# Patient Record
Sex: FEMALE | Race: BLACK OR AFRICAN AMERICAN | Employment: OTHER | URBAN - METROPOLITAN AREA
[De-identification: names, ages, dates, MRNs, and addresses within clinical notes are randomized per-mention and may not be internally consistent; named-entity substitution may affect disease eponyms.]

---

## 2017-01-03 NOTE — TELEPHONE ENCOUNTER
Last Visit: 12/15/2016 with MATA Rodriguez    Next Appointment: 01/26/2017 with MD Geri Moreno   Previous Refill Encounters: 12/15/2016 per MATA Rodriguez #[id number]     Requested Prescriptions     Pending Prescriptions Disp Refills    HYDROcodone-acetaminophen (NORCO)  mg tablet 60 Tab 0     Sig: Take 1 Tab by mouth every eight (8) hours as needed for Pain. Max Daily Amount: 3 Tabs.

## 2017-01-04 ENCOUNTER — HOSPITAL ENCOUNTER (EMERGENCY)
Age: 67
Discharge: HOME | End: 2017-01-04
Attending: EMERGENCY MEDICINE | Admitting: EMERGENCY MEDICINE
Payer: MEDICARE

## 2017-01-04 ENCOUNTER — APPOINTMENT (OUTPATIENT)
Dept: GENERAL RADIOLOGY | Age: 67
End: 2017-01-04
Attending: EMERGENCY MEDICINE
Payer: MEDICARE

## 2017-01-04 VITALS
RESPIRATION RATE: 24 BRPM | HEART RATE: 108 BPM | HEIGHT: 65 IN | BODY MASS INDEX: 31.65 KG/M2 | OXYGEN SATURATION: 95 % | TEMPERATURE: 99.5 F | WEIGHT: 190 LBS | SYSTOLIC BLOOD PRESSURE: 144 MMHG | DIASTOLIC BLOOD PRESSURE: 73 MMHG

## 2017-01-04 DIAGNOSIS — J45.21 MILD INTERMITTENT ASTHMA WITH ACUTE EXACERBATION: Primary | ICD-10-CM

## 2017-01-04 PROCEDURE — 94640 AIRWAY INHALATION TREATMENT: CPT

## 2017-01-04 PROCEDURE — 71020 XR CHEST PA LAT: CPT

## 2017-01-04 PROCEDURE — 99283 EMERGENCY DEPT VISIT LOW MDM: CPT

## 2017-01-04 PROCEDURE — 96372 THER/PROPH/DIAG INJ SC/IM: CPT

## 2017-01-04 PROCEDURE — 77030013140 HC MSK NEB VYRM -A

## 2017-01-04 PROCEDURE — 74011000250 HC RX REV CODE- 250: Performed by: EMERGENCY MEDICINE

## 2017-01-04 PROCEDURE — 74011250636 HC RX REV CODE- 250/636: Performed by: EMERGENCY MEDICINE

## 2017-01-04 PROCEDURE — 74011250637 HC RX REV CODE- 250/637: Performed by: EMERGENCY MEDICINE

## 2017-01-04 RX ORDER — AZITHROMYCIN 250 MG/1
TABLET, FILM COATED ORAL
Qty: 4 TAB | Refills: 0 | Status: SHIPPED | OUTPATIENT
Start: 2017-01-04 | End: 2017-01-09

## 2017-01-04 RX ORDER — PREDNISONE 50 MG/1
50 TABLET ORAL DAILY
Qty: 5 TAB | Refills: 0 | Status: SHIPPED | OUTPATIENT
Start: 2017-01-04 | End: 2017-01-09

## 2017-01-04 RX ORDER — AZITHROMYCIN 250 MG/1
500 TABLET, FILM COATED ORAL
Status: COMPLETED | OUTPATIENT
Start: 2017-01-04 | End: 2017-01-04

## 2017-01-04 RX ORDER — HYDROCODONE BITARTRATE AND ACETAMINOPHEN 10; 325 MG/1; MG/1
1 TABLET ORAL
Qty: 60 TAB | Refills: 0 | Status: SHIPPED | OUTPATIENT
Start: 2017-01-04 | End: 2017-01-04

## 2017-01-04 RX ORDER — IPRATROPIUM BROMIDE AND ALBUTEROL SULFATE 2.5; .5 MG/3ML; MG/3ML
3 SOLUTION RESPIRATORY (INHALATION) ONCE
Status: COMPLETED | OUTPATIENT
Start: 2017-01-04 | End: 2017-01-04

## 2017-01-04 RX ORDER — IPRATROPIUM BROMIDE AND ALBUTEROL SULFATE 2.5; .5 MG/3ML; MG/3ML
3 SOLUTION RESPIRATORY (INHALATION)
Status: COMPLETED | OUTPATIENT
Start: 2017-01-04 | End: 2017-01-04

## 2017-01-04 RX ADMIN — IPRATROPIUM BROMIDE AND ALBUTEROL SULFATE 3 ML: .5; 3 SOLUTION RESPIRATORY (INHALATION) at 18:53

## 2017-01-04 RX ADMIN — METHYLPREDNISOLONE SODIUM SUCCINATE 125 MG: 125 INJECTION, POWDER, FOR SOLUTION INTRAMUSCULAR; INTRAVENOUS at 19:07

## 2017-01-04 RX ADMIN — IPRATROPIUM BROMIDE AND ALBUTEROL SULFATE 3 ML: .5; 3 SOLUTION RESPIRATORY (INHALATION) at 19:07

## 2017-01-04 RX ADMIN — AZITHROMYCIN 500 MG: 250 TABLET, FILM COATED ORAL at 19:42

## 2017-01-04 NOTE — ED TRIAGE NOTES
Pt presents to ER c/o difficulty breathing, cough productive of white sputum & no appetite x 3 days.

## 2017-01-04 NOTE — ED PROVIDER NOTES
HPI Comments: 6:18 PM female 73 y.o. with a history of breast cancer, HTN, DM, hypercholesteremia, asthma, COPD and GERD presenting to the ED complaining of dyspnea x 3 days. She also endorses productive cough with white sputum and 3-4 episodes of NV today. Also reports no appetite today. Denies fever, dysuria, hematuria or recent sick contacts. She states she used her duonebulizer x 2 today with no relief of symptoms. Pt denies the use of tobacco. No other associated symptoms or other concerns at this time. PCP: Isabel Segovia MD      Patient is a 73 y.o. female presenting with shortness of breath, cough, and arm pain. The history is provided by the patient. Shortness of Breath   Associated symptoms include cough. Pertinent negatives include no fever, no headaches, no sore throat, no neck pain, no chest pain, no vomiting, no abdominal pain and no rash. Cough   Associated symptoms include shortness of breath. Pertinent negatives include no chest pain, no chills, no headaches, no sore throat, no nausea and no vomiting. Arm Pain    Pertinent negatives include no back pain and no neck pain. Past Medical History:   Diagnosis Date    Anemia     Asthma     Blood disorder     Cancer ([facility] [address]      breast, diagnosed 2006 left    Cancer [facility])     Chest pain, unspecified      The working diagnosis is chest pain. The differential diagnosis includes chest wall pain, stable angina.  Chronic obstructive pulmonary disease (HCC)     Essential hypertension     Essential hypertension, benign      Uncontrolled     GERD (gastroesophageal reflux disease)     Hypercholesterolemia     Hypertension     Neuropathy     Nonspecific abnormal electrocardiogram (ECG) (EKG)     Obesity, unspecified      Weight loss has been strongly encouraged by following dietary restrictions and an exercise routine.     Polio      as a young child    Pre-operative cardiovascular examination     Sciatica     Unspecified cerebral artery occlusion with cerebral infarction [facility])        Past Surgical History:   Procedure Laterality Date    Pr breast surgery procedure unlisted       initial surgery 2006, then left modified radical mastectomy 6/2012    Hx breast biopsy  4/11/12     Left    Hx breast biopsy Left          Family History:   Problem Relation Age of Onset    Cancer Mother     Cancer Father     Arthritis-osteo Other     Hypertension Other     Heart Disease Neg Hx      Negative family history of premature CAD or CVA       Social History     Social History    Marital status:      Spouse name: N/A    Number of children: N/A    Years of education: N/A     Occupational History    Not on file. Social History Main Topics    Smoking status: Never Smoker    Smokeless tobacco: Never Used    Alcohol use No    Drug use: No    Sexual activity: Not on file     Other Topics Concern    Not on file     Social History Narrative    ** Merged History Encounter **              ALLERGIES: Review of patient's allergies indicates no known allergies. Review of Systems   Constitutional: Negative for chills and fever. HENT: Negative for congestion, nosebleeds and sore throat. Eyes: Negative for pain. Respiratory: Positive for cough and shortness of breath. Cardiovascular: Negative for chest pain and palpitations. Gastrointestinal: Negative for abdominal pain, constipation, nausea and vomiting. Genitourinary: Negative for dysuria and flank pain. Musculoskeletal: Negative for back pain and neck pain. Skin: Negative for color change and rash. Neurological: Negative for dizziness, weakness and headaches. All other systems reviewed and are negative.       Vitals:    01/04/17 1733 01/04/17 1816   BP: (!) 152/96    Pulse: (!) 110    Resp: 24    Temp: 98.4 °F (36.9 °C)    SpO2: 95% 95%   Weight: 86.2 kg (190 lb)    Height: 5' 5\" (1.651 m)             Physical Exam   Constitutional: She appears well-developed and well-nourished. Non-toxic appearance. She does not have a sickly appearance. She does not appear ill. No distress. HENT:   Head: Normocephalic and atraumatic. Mouth/Throat: Oropharynx is clear and moist. No oropharyngeal exudate. Eyes: Conjunctivae and EOM are normal. Pupils are equal, round, and reactive to light. No scleral icterus. Neck: Normal range of motion. Neck supple. No hepatojugular reflux and no JVD present. No tracheal deviation present. No thyromegaly present. Cardiovascular: Regular rhythm, S1 normal, S2 normal, normal heart sounds, intact distal pulses and normal pulses. Tachycardia present. Exam reveals no gallop, no S3 and no S4. No murmur heard. Pulses:       Radial pulses are 2+ on the right side, and 2+ on the left side. Dorsalis pedis pulses are 2+ on the right side, and 2+ on the left side. Pulmonary/Chest: Accessory muscle usage present. Tachypnea noted. She is in respiratory distress. She has decreased breath sounds. She has wheezes in the right middle field, the right lower field, the left middle field and the left lower field. She has no rhonchi. She has no rales. Abdominal: Soft. Normal appearance and bowel sounds are normal. She exhibits no distension and no mass. There is no hepatosplenomegaly. There is no tenderness. There is no rigidity, no rebound, no guarding, no CVA tenderness, no tenderness at McBurney's point and negative Ayala's sign. Musculoskeletal: Normal range of motion. Strength 3/5 throughout    Lymphadenopathy:        Head (right side): No submental, no submandibular, no preauricular and no occipital adenopathy present. Head (left side): No submental, no submandibular, no preauricular and no occipital adenopathy present. She has no cervical adenopathy. Right: No supraclavicular adenopathy present. Left: No supraclavicular adenopathy present. Neurological: She is alert.  She has normal strength and normal reflexes. She is not disoriented. No cranial nerve deficit or sensory deficit. Coordination and gait normal. GCS eye subscore is 4. GCS verbal subscore is 5. GCS motor subscore is 6. Grossly intact    Skin: Skin is warm, dry and intact. No rash noted. She is not diaphoretic. Psychiatric: She has a normal mood and affect. Her speech is normal and behavior is normal. Judgment and thought content normal. Cognition and memory are normal.   Nursing note and vitals reviewed. MDM  Number of Diagnoses or Management Options  Mild intermittent asthma with acute exacerbation:   Diagnosis management comments: Shortness of breath etiologies include chronic obstructive pulmonary disease (COPD), acute asthma exacerbation, congestive heart failure, pneumonia, acute bronchitis, pulmonary embolism, upper respiratory infection, cardiac event to include acute coronary syndrome, acute myocardial infarction or a combination of the above (ex URI on top of COPD thus causing respiratory distress). ED Course       Procedures    Chest X-Ray showed atelectasis vs infiltrate on the right side. 7:26 PM 1/4/2017    I have reassessed the patient. Patient is feeling much better. Patient will be prescribed Azithromycin and Prednisone. Patient was discharged in stable condition. Patient is to contact Eunice Duran MD for follow up care. Patient is to return to emergency department if any new or worsening condition. 7:26 PM     Diagnosis:  1. Mild intermittent asthma with acute exacerbation      Disposition: Discharged    Scribe [address], [place] [address] for and in the presence of Sourav Michaels DO  01/04/17. Signed by: Tr Batista 01/04/17, 7:27 PM    Physician Attestation  I personally performed the services described in the documentation, reviewed the documentation, as recorded by the scribe in my presence, and it accurately and completely records my words and actions.     Sourav Michaels DO 01/04/17

## 2017-01-05 NOTE — DISCHARGE INSTRUCTIONS

## 2017-01-05 NOTE — ED NOTES
Report given to oncoming nurse. Patient tolerating breathing treatments fair. Family member at bedside. Patient does not want IVL for meds or lab work. Dr. Dorina Khan aware. CBC on hold at present.

## 2017-01-05 NOTE — ED NOTES
Pt discharge upon DR Ramos re- evaluation. Pt states she feels better than when she got here. Pt aware to return if not improving or condition continuous. Pt also oriented about medication prescribe and follow up.

## 2017-01-25 ENCOUNTER — HOSPITAL ENCOUNTER (OUTPATIENT)
Dept: MAMMOGRAPHY | Age: 67
Discharge: HOME | End: 2017-01-25
Attending: SURGERY
Payer: MEDICARE

## 2017-01-25 DIAGNOSIS — C50.919 RECURRENT BREAST CANCER, UNSPECIFIED LATERALITY (HCC): ICD-10-CM

## 2017-01-25 PROCEDURE — 77061 BREAST TOMOSYNTHESIS UNI: CPT

## 2017-01-26 ENCOUNTER — OFFICE VISIT (OUTPATIENT)
Dept: ORTHOPEDIC SURGERY | Age: 67
End: 2017-01-26

## 2017-01-26 VITALS
HEIGHT: 65 IN | HEART RATE: 103 BPM | DIASTOLIC BLOOD PRESSURE: 76 MMHG | SYSTOLIC BLOOD PRESSURE: 140 MMHG | WEIGHT: 199 LBS | BODY MASS INDEX: 33.15 KG/M2

## 2017-01-26 DIAGNOSIS — M25.561 RIGHT KNEE PAIN, UNSPECIFIED CHRONICITY: Primary | ICD-10-CM

## 2017-01-26 DIAGNOSIS — M17.11 PRIMARY OSTEOARTHRITIS OF RIGHT KNEE: ICD-10-CM

## 2017-01-26 RX ORDER — HYDROCODONE BITARTRATE AND ACETAMINOPHEN 7.5; 325 MG/1; MG/1
TABLET ORAL
Qty: 40 TAB | Refills: 0 | Status: SHIPPED | OUTPATIENT
Start: 2017-01-26 | End: 2017-01-31 | Stop reason: ALTCHOICE

## 2017-01-26 RX ORDER — DICLOFENAC SODIUM 10 MG/G
GEL TOPICAL EVERY 6 HOURS
Qty: 100 G | Refills: 4 | Status: SHIPPED | OUTPATIENT
Start: 2017-01-26 | End: 2019-07-31 | Stop reason: SDUPTHER

## 2017-01-26 NOTE — PATIENT INSTRUCTIONS
An MRI or CT has been ordered for you. A [employer] will be contacting you to schedule the appointment as soon as it has been approved with your insurance company. Please schedule an appointment to follow up with the doctor or the physicians assistant after the MRI or CT has been conducted. Magnetic Resonance Imaging (MRI): About This Test  What is it? Magnetic resonance imaging (MRI) is a test that uses a magnetic field and pulses of radio wave energy to make pictures of organs and structures inside the body. When you have an MRI, you lie on a table and your body is moved into the MRI machine, where an image is taken of the area of the body being studied. Why is this test done? You may have an MRI for many reasons. This test can find problems such as tumors, bleeding, injury, blood vessel disease, and infection. An MRI also may provide more information about a problem seen on an X-ray, ultrasound scan, CT scan, or nuclear medicine exam.  How can you prepare for the test?  Talk to your doctor about all your health conditions before the test. For example, tell your doctor if:  · You are allergic to any medicines. · You are or might be pregnant. · You have a pacemaker, an artificial limb, any metal pins or metal parts in your body, metal heart valves, metal clips in your brain, metal implants in your ears, or any other implanted or prosthetic medical device. · You have an intrauterine device (IUD) in place. · You get nervous in confined spaces. You may need medicine to help you relax. · You wear any patches that contain medicine. · You have kidney disease. What happens before the test?  · You will remove all metal objects from your body. These include hearing aids, dentures, jewelry, watches, and hairpins. · You will take off all or most of your clothes and then change into a gown. · If you do leave some clothes on, make sure you take everything out of your pockets.   · You may have contrast materials (dye) put into your arm through a tube called an IV. Contrast material helps doctors see specific organs, blood vessels, and most tumors. What happens during the test?  · You will lie on your back on a table that is part of the MRI scanner. · The table will slide into the space that contains the magnet. · Inside the scanner you will hear a fan and feel air moving. You may hear tapping, thumping, or snapping noises. You may be given earplugs or headphones to reduce the noise. · You will be asked to hold still during the scan. You may be asked to hold your breath for short periods. · You may be alone in the scanning room, but a technologist will be watching you through a window and talking with you during the test.  What else should you know about the test?  · An MRI does not hurt. · If a dye is used, you may feel a quick sting or pinch and some coolness when the IV is started. The dye may give you a metallic taste in your mouth. Some people feel sick to their stomach or get a headache. · If you breastfeed and are concerned about whether the dye used in this test is safe, talk to your doctor. Most experts believe that very little dye passes into breast milk and even less is passed on to the baby. But if you prefer, you can store some of your breast milk ahead of time and use it for a day or two after the test.  · You may feel warmth in the area being examined. This is normal.  How long does the test take? · The test usually takes 30 to 60 minutes but can take as long as 2 hours. What happens after the test?  · You will probably be able to go home right away, depending on the reason for the test.  Follow-up care is a key part of your treatment and safety. Be sure to make and go to all appointments, and call your doctor if you are having problems. It's also a good idea to keep a list of the medicines you take. Ask your doctor when you can expect to have your test results.   Where can you learn more?  Go to http://laura-jessie.info/. Enter V016 in the search box to learn more about \"Magnetic Resonance Imaging (MRI): About This Test.\"  Current as of: February 19, 2016  Content Version: 11.1  © 8269-2572 [employer]. Care instructions adapted under license by [employer] (which disclaims liability or warranty for this information). If you have questions about a medical condition or this instruction, always ask your healthcare professional. [place] [address] any warranty or liability for your use of this information. Knee Pain or Injury: Care Instructions  Your Care Instructions    Injuries are a common cause of knee problems. Sudden (acute) injuries may be caused by a direct blow to the knee. They can also be caused by abnormal twisting, bending, or falling on the knee. Pain, bruising, or swelling may be severe, and may start within minutes of the injury. Overuse is another cause of knee pain. Other causes are climbing stairs, kneeling, and other activities that use the knee. Everyday wear and tear, especially as you get older, also can cause knee pain. Rest, along with home treatment, often relieves pain and allows your knee to heal. If you have a serious knee injury, you may need tests and treatment. Follow-up care is a key part of your treatment and safety. Be sure to make and go to all appointments, and call your doctor if you are having problems. It's also a good idea to know your test results and keep a list of the medicines you take. How can you care for yourself at home? · Be safe with medicines. Read and follow all instructions on the label. ¨ If the doctor gave you a prescription medicine for pain, take it as prescribed. ¨ If you are not taking a prescription pain medicine, ask your doctor if you can take an over-the-counter medicine. · Rest and protect your knee. Take a break from any activity that may cause pain.   · Put ice or a cold pack on your knee for 10 to 20 minutes at a time. Put a thin cloth between the ice and your skin. · Prop up a sore knee on a pillow when you ice it or anytime you sit or lie down for the next 3 days. Try to keep it above the level of your heart. This will help reduce swelling. · If your knee is not swollen, you can put moist heat, a heating pad, or a warm cloth on your knee. · If your doctor recommends an elastic bandage, sleeve, or other type of support for your knee, wear it as directed. · Follow your doctor's instructions about how much weight you can put on your leg. Use a cane, crutches, or a walker as instructed. · Follow your doctor's instructions about activity during your healing process. If you can do mild exercise, slowly increase your activity. · Reach and stay at a healthy weight. Extra weight can strain the joints, especially the knees and hips, and make the pain worse. Losing even a few pounds may help. When should you call for help? Call 911 anytime you think you may need emergency care. For example, call if:  · You have symptoms of a blood clot in your lung (called a pulmonary embolism). These may include:  ¨ Sudden chest pain. ¨ Trouble breathing. ¨ Coughing up blood. Call your doctor now or seek immediate medical care if:  · You have severe or increasing pain. · Your leg or foot turns cold or changes color. · You cannot stand or put weight on your knee. · Your knee looks twisted or bent out of shape. · You cannot move your knee. · You have signs of infection, such as:  ¨ Increased pain, swelling, warmth, or redness. ¨ Red streaks leading from the knee. ¨ Pus draining from a place on your knee. ¨ A fever. · You have signs of a blood clot in your leg (called a deep vein thrombosis), such as:  ¨ Pain in your calf, back of the knee, thigh, or groin. ¨ Redness and swelling in your leg or groin.   Watch closely for changes in your health, and be sure to contact your doctor if:  · You have tingling, weakness, or numbness in your knee. · You have any new symptoms, such as swelling. · You have bruises from a knee injury that last longer than 2 weeks. · You do not get better as expected. Where can you learn more? Go to http://laura-jessie.info/. Enter K195 in the search box to learn more about \"Knee Pain or Injury: Care Instructions. \"  Current as of: May 27, 2016  Content Version: 11.1  © 4130-9766 [employer]. Care instructions adapted under license by [employer] (which disclaims liability or warranty for this information). If you have questions about a medical condition or this instruction, always ask your healthcare professional. [address] any warranty or liability for your use of this information.

## 2017-01-26 NOTE — MR AVS SNAPSHOT
Visit Information Date & Time Provider Department Dept. Phone Encounter #  
 1/26/2017  2:00 PM Jermain Borjas MD [facility] [id number] Your Appointments 1/31/2017  1:00 PM  
Follow Up with MD FREDIS [facility] ([address]) Appt Note: 2 month f/up [address] [phone] [address] [facility] Upcoming Health Maintenance Date Due Hepatitis C Screening 1950 DTaP/Tdap/Td series (1 - Tdap) 11/24/1972 ZOSTER VACCINE AGE 60> 11/24/2011 FOBT Q 1 YEAR AGE 50-75 6/9/2013 INFLUENZA AGE 9 TO ADULT 8/1/2016 GLAUCOMA SCREENING Q2Y 11/24/2016 OSTEOPOROSIS SCREENING (DEXA) 11/24/2016 Pneumococcal 65+ High/Highest Risk (1 of 2 - PCV13) 11/24/2016 MEDICARE YEARLY EXAM 11/24/2016 BREAST CANCER SCRN MAMMOGRAM 1/25/2019 Allergies as of 1/26/2017  Review Complete On: 1/26/2017 By: Jermain Borjas MD  
 No Known Allergies Current Immunizations  Never Reviewed No immunizations on file. Not reviewed this visit You Were Diagnosed With   
  
 Codes Comments Right knee pain, unspecified chronicity    -  Primary ICD-10-CM: M25.561 ICD-9-CM: 719.46 Primary osteoarthritis of right knee     ICD-10-CM: M17.11 ICD-9-CM: 715.16 Vitals BP Pulse Height(growth percentile) Weight(growth percentile) BMI OB Status 140/76 (!) 103 5' 5\" (1.651 m) 199 lb (90.3 kg) 33.12 kg/m2 Postmenopausal  
 Smoking Status Never Smoker Vitals History BMI and BSA Data Body Mass Index Body Surface Area  
 33.12 kg/m 2 2.04 m 2 Preferred Pharmacy Pharmacy Name Phone [id number] - [address] [phone] Your Updated Medication List  
  
   
 This list is accurate as of: 1/26/17  3:41 PM.  Always use your most recent med list.  
  
  
  
  
 Emile Custard HFA IN Take  by inhalation. amLODIPine 10 mg tablet Commonly known as:  Sofi Davis Take  by mouth daily. aspirin 81 mg tablet Take 81 mg by mouth. bethanechol 10 mg tablet Commonly known as:  URECHOLINE Take 10 mg by mouth Before breakfast, lunch, and dinner. desonide 0.05 % cream  
Commonly known as:  TRIDESILON  
  
 gabapentin 100 mg capsule Commonly known as:  NEURONTIN Take 2 Caps by mouth three (3) times daily. Indications: NEUROPATHIC PAIN, POSTOPERATIVE ACUTE PAIN  
  
 HYDROcodone-acetaminophen  mg tablet Commonly known as:  [place]esa Dorothy Take 1 Tab by mouth every eight (8) hours as needed for Pain. Max Daily Amount: 3 Tabs. lisinopril 20 mg tablet Commonly known as:  Brian Edman Take  by mouth daily. methocarbamol 500 mg tablet Commonly known as:  ROBAXIN Take 1 Tab by mouth four (4) times daily. naproxen 375 mg tablet Commonly known as:  NAPROSYN Take 1 Tab by mouth two (2) times daily (with meals). NexIUM 40 mg capsule Generic drug:  esomeprazole Take  by mouth daily. oxyCODONE-acetaminophen 7.5-325 mg per tablet Commonly known as:  PERCOCET 7.5 Take 1 Tab by mouth every four (4) hours as needed for Pain. Max Daily Amount: 6 Tabs. PROAIR HFA IN Take  by inhalation. SINGULAIR 10 mg tablet Generic drug:  montelukast  
Take 10 mg by mouth daily. SPIRIVA WITH HANDIHALER 18 mcg inhalation capsule Generic drug:  tiotropium Take 1 Cap by inhalation daily. triamterene-hydroCHLOROthiazide 37.5-25 mg per capsule Commonly known as:  DYAZIDE  
  
 VITAMIN D2 50,000 unit capsule Generic drug:  ergocalciferol Take 50,000 Units by mouth Every Thursday. We Performed the Following AMB POC XRAY, KNEE; COMPLETE, 4+ VIEW [27956 CPT(R)] Patient Instructions An MRI or CT has been ordered for you. A [employer] will be contacting you to schedule the appointment as soon as it has been approved with your insurance company. Please schedule an appointment to follow up with the doctor or the physicians assistant after the MRI or CT has been conducted. Magnetic Resonance Imaging (MRI): About This Test 
What is it? Magnetic resonance imaging (MRI) is a test that uses a magnetic field and pulses of radio wave energy to make pictures of organs and structures inside the body. When you have an MRI, you lie on a table and your body is moved into the MRI machine, where an image is taken of the area of the body being studied. Why is this test done? You may have an MRI for many reasons. This test can find problems such as tumors, bleeding, injury, blood vessel disease, and infection. An MRI also may provide more information about a problem seen on an X-ray, ultrasound scan, CT scan, or nuclear medicine exam. 
How can you prepare for the test? 
Talk to your doctor about all your health conditions before the test. For example, tell your doctor if: 
· You are allergic to any medicines. · You are or might be pregnant. · You have a pacemaker, an artificial limb, any metal pins or metal parts in your body, metal heart valves, metal clips in your brain, metal implants in your ears, or any other implanted or prosthetic medical device. · You have an intrauterine device (IUD) in place. · You get nervous in confined spaces. You may need medicine to help you relax. · You wear any patches that contain medicine. · You have kidney disease. What happens before the test? 
· You will remove all metal objects from your body. These include hearing aids, dentures, jewelry, watches, and hairpins. · You will take off all or most of your clothes and then change into a gown. · If you do leave some clothes on, make sure you take everything out of your pockets. · You may have contrast materials (dye) put into your arm through a tube called an IV. Contrast material helps doctors see specific organs, blood vessels, and most tumors. What happens during the test? 
· You will lie on your back on a table that is part of the MRI scanner. · The table will slide into the space that contains the magnet. · Inside the scanner you will hear a fan and feel air moving. You may hear tapping, thumping, or snapping noises. You may be given earplugs or headphones to reduce the noise. · You will be asked to hold still during the scan. You may be asked to hold your breath for short periods. · You may be alone in the scanning room, but a technologist will be watching you through a window and talking with you during the test. 
What else should you know about the test? 
· An MRI does not hurt. · If a dye is used, you may feel a quick sting or pinch and some coolness when the IV is started. The dye may give you a metallic taste in your mouth. Some people feel sick to their stomach or get a headache. · If you breastfeed and are concerned about whether the dye used in this test is safe, talk to your doctor. Most experts believe that very little dye passes into breast milk and even less is passed on to the baby. But if you prefer, you can store some of your breast milk ahead of time and use it for a day or two after the test. 
· You may feel warmth in the area being examined. This is normal. 
How long does the test take? · The test usually takes 30 to 60 minutes but can take as long as 2 hours. What happens after the test? 
· You will probably be able to go home right away, depending on the reason for the test. 
Follow-up care is a key part of your treatment and safety. Be sure to make and go to all appointments, and call your doctor if you are having problems. It's also a good idea to keep a list of the medicines you take. Ask your doctor when you can expect to have your test results. Where can you learn more? Go to http://laura-jessie.info/. Enter V016 in the search box to learn more about \"Magnetic Resonance Imaging (MRI): About This Test.\" Current as of: February 19, 2016 Content Version: 11.1 © 8396-9266 [employer]. Care instructions adapted under license by [employer] (which disclaims liability or warranty for this information). If you have questions about a medical condition or this instruction, always ask your healthcare professional. [place] [address] any warranty or liability for your use of this information. Knee Pain or Injury: Care Instructions Your Care Instructions Injuries are a common cause of knee problems. Sudden (acute) injuries may be caused by a direct blow to the knee. They can also be caused by abnormal twisting, bending, or falling on the knee. Pain, bruising, or swelling may be severe, and may start within minutes of the injury. Overuse is another cause of knee pain. Other causes are climbing stairs, kneeling, and other activities that use the knee. Everyday wear and tear, especially as you get older, also can cause knee pain. Rest, along with home treatment, often relieves pain and allows your knee to heal. If you have a serious knee injury, you may need tests and treatment. Follow-up care is a key part of your treatment and safety. Be sure to make and go to all appointments, and call your doctor if you are having problems. It's also a good idea to know your test results and keep a list of the medicines you take. How can you care for yourself at home? · Be safe with medicines. Read and follow all instructions on the label. ¨ If the doctor gave you a prescription medicine for pain, take it as prescribed. ¨ If you are not taking a prescription pain medicine, ask your doctor if you can take an over-the-counter medicine. · Rest and protect your knee.  Take a break from any activity that may cause pain. · Put ice or a cold pack on your knee for 10 to 20 minutes at a time. Put a thin cloth between the ice and your skin. · Prop up a sore knee on a pillow when you ice it or anytime you sit or lie down for the next 3 days. Try to keep it above the level of your heart. This will help reduce swelling. · If your knee is not swollen, you can put moist heat, a heating pad, or a warm cloth on your knee. · If your doctor recommends an elastic bandage, sleeve, or other type of support for your knee, wear it as directed. · Follow your doctor's instructions about how much weight you can put on your leg. Use a cane, crutches, or a walker as instructed. · Follow your doctor's instructions about activity during your healing process. If you can do mild exercise, slowly increase your activity. · Reach and stay at a healthy weight. Extra weight can strain the joints, especially the knees and hips, and make the pain worse. Losing even a few pounds may help. When should you call for help? Call 911 anytime you think you may need emergency care. For example, call if: 
· You have symptoms of a blood clot in your lung (called a pulmonary embolism). These may include: 
¨ Sudden chest pain. ¨ Trouble breathing. ¨ Coughing up blood. Call your doctor now or seek immediate medical care if: 
· You have severe or increasing pain. · Your leg or foot turns cold or changes color. · You cannot stand or put weight on your knee. · Your knee looks twisted or bent out of shape. · You cannot move your knee. · You have signs of infection, such as: 
¨ Increased pain, swelling, warmth, or redness. ¨ Red streaks leading from the knee. ¨ Pus draining from a place on your knee. ¨ A fever. · You have signs of a blood clot in your leg (called a deep vein thrombosis), such as: 
¨ Pain in your calf, back of the knee, thigh, or groin. ¨ Redness and swelling in your leg or groin. Watch closely for changes in your health, and be sure to contact your doctor if: 
· You have tingling, weakness, or numbness in your knee. · You have any new symptoms, such as swelling. · You have bruises from a knee injury that last longer than 2 weeks. · You do not get better as expected. Where can you learn more? Go to http://laura-jessie.info/. Enter K195 in the search box to learn more about \"Knee Pain or Injury: Care Instructions. \" Current as of: May 27, 2016 Content Version: 11.1 © 2015-9268 [employer]. Care instructions adapted under license by [employer] (which disclaims liability or warranty for this information). If you have questions about a medical condition or this instruction, always ask your healthcare professional. [address] any warranty or liability for your use of this information. Introducing [facility]! Clinton Fraire introduces AppMakr patient portal. Now you can access parts of your medical record, email your doctor's office, and request medication refills online. 1. In your internet browser, go to [email or web address] 2. Click on the First Time User? Click Here link in the Sign In box. You will see the New Member Sign Up page. 3. Enter your AppMakr Access Code exactly as it appears below. You will not need to use this code after youve completed the sign-up process. If you do not sign up before the expiration date, you must request a new code. · AppMakr Access Code: [email or web address] Expires: 2/6/2017 12:44 PM 
 
4. Enter the last four digits of your Social Security Number (xxxx) and Date of Birth (mm/dd/yyyy) as indicated and click Submit. You will be taken to the next sign-up page. 5. Create a AppMakr ID. This will be your AppMakr login ID and cannot be changed, so think of one that is secure and easy to remember. 6. Create a Red Blue Voice password. You can change your password at any time. 7. Enter your Password Reset Question and Answer. This can be used at a later time if you forget your password. 8. Enter your e-mail address. You will receive e-mail notification when new information is available in [address] 9. Click Sign Up. You can now view and download portions of your medical record. 10. Click the Download Summary menu link to download a portable copy of your medical information. If you have questions, please visit the Frequently Asked Questions section of the Red Blue Voice website. Remember, Red [employer] Voice is NOT to be used for urgent needs. For medical emergencies, dial 911. Now available from your iPhone and Android! Please provide this summary of care documentation to your next provider. Your primary care clinician is listed as Laura Arguello. If you have any questions after today's visit, please call [phone].

## 2017-01-26 NOTE — PROGRESS NOTES
Patient: Guerda Martinez                MRN: [id number]       SSN: [id number]  YOB: 1950        AGE: 73 y.o. SEX: female  Body mass index is 33.12 kg/(m^2). PCP: David Alex MD  01/26/17    HISTORY: Eric Bernal returns in followup with complaints of right knee pain. She rates it a 9/10. I do not think it is quite 9/10, but it is significant. She was on Percocet 10 mg recently. She has had no recent trauma. She has had cortisone. She has had viscosupplementation. She is still complaining of fairly severe, medially based pain. It is worse with stairs, kneeling, and getting up and down from chair. There is no true locking or giving way. There is some swelling involved. She also complains of some ankle discomfort as well. All systems are reviewed and updated on the EMR. All other systems are reviewed and are essentially negative. PHYSICAL EXAMINATION:  She is examined with a female assistant present. Her hips rotate nicely. Her ankle has good motion. There is a little bit of peripheral edema distally. Sensation is grossly intact to L4-5. The knee itself, the knee extensors are slightly weak compared to the opposite side. There is a little bit of a positive J sign. There is mild VMO attenuation. She has distinct medial joint line tenderness. Her Mikey's test is just equivocal. There is no click, but it is tender for her. She has 1+ ACL. The knee is stable. There is no evidence for infection or DVT. RADIOGRAPHS:  Review of her x-rays of her knee show mild, possibly moderate, arthritis only. PLAN:  I think her pain is a little out of proportion to the clinical findings today. I would strongly recommend an MRI test to rule out AVN of the medial femoral condyle as well as meniscal tear. We will see her back afterwards.            REVIEW OF SYSTEMS:      CON: negative for weight loss, fever  EYE: negative for double vision  ENT: negative for hoarseness  RS:   negative for Tb  GI:    negative for blood in stool  :  negative for blood in urine  Other systems reviewed and noted below. Past Medical History   Diagnosis Date    Anemia     Asthma     Blood disorder     Cancer ([facility] [address]      breast, diagnosed 2006 left    Cancer [facility])     Chest pain, unspecified      The working diagnosis is chest pain. The differential diagnosis includes chest wall pain, stable angina.  Chronic obstructive pulmonary disease (HCC)     Essential hypertension     Essential hypertension, benign      Uncontrolled     GERD (gastroesophageal reflux disease)     Hypercholesterolemia     Hypertension     Neuropathy     Nonspecific abnormal electrocardiogram (ECG) (EKG)     Obesity, unspecified      Weight loss has been strongly encouraged by following dietary restrictions and an exercise routine.  Polio      as a young child    Pre-operative cardiovascular examination     Sciatica     Unspecified cerebral artery occlusion with cerebral infarction ([facility] [address]        Family History   Problem Relation Age of Onset    Cancer Mother     Cancer Father     Arthritis-osteo Other     Hypertension Other     Heart Disease Neg Hx      Negative family history of premature CAD or CVA       Current Outpatient Prescriptions   Medication Sig Dispense Refill    HYDROcodone-acetaminophen (NORCO)  mg tablet Take 1 Tab by mouth every eight (8) hours as needed for Pain. Max Daily Amount: 3 Tabs. 60 Tab 0    gabapentin (NEURONTIN) 100 mg capsule Take 2 Caps by mouth three (3) times daily. Indications: NEUROPATHIC PAIN, POSTOPERATIVE ACUTE PAIN 20 Cap 0    triamterene-hydrochlorothiazide (DYAZIDE) 37.5-25 mg per capsule   5    desonide (TRIDESILON) 0.05 % cream   0    ALBUTEROL SULFATE (PROAIR HFA IN) Take  by inhalation.  FLUTICASONE/SALMETEROL (ADVAIR HFA IN) Take  by inhalation.  montelukast (SINGULAIR) 10 mg tablet Take 10 mg by mouth daily.       tiotropium (SPIRIVA WITH HANDIHALER) 18 mcg inhalation capsule Take 1 Cap by inhalation daily.  lisinopril (PRINIVIL, ZESTRIL) 20 mg tablet Take  by mouth daily.  amLODIPine (NORVASC) 10 mg tablet Take  by mouth daily.  bethanechol (URECHOLINE) 10 mg tablet Take 10 mg by mouth Before breakfast, lunch, and dinner.  esomeprazole (NEXIUM) 40 mg capsule Take  by mouth daily.  aspirin 81 mg tablet Take 81 mg by mouth.  ergocalciferol (VITAMIN D2) 50,000 unit capsule Take 50,000 Units by mouth Every Thursday.  oxyCODONE-acetaminophen (PERCOCET 7.5) 7.5-325 mg per tablet Take 1 Tab by mouth every four (4) hours as needed for Pain. Max Daily Amount: 6 Tabs. 40 Tab 0    methocarbamol (ROBAXIN) 500 mg tablet Take 1 Tab by mouth four (4) times daily. 20 Tab 0    naproxen (NAPROSYN) 375 mg tablet Take 1 Tab by mouth two (2) times daily (with meals). 14 Tab 0       No Known Allergies    Past Surgical History   Procedure Laterality Date    Pr breast surgery procedure unlisted       initial surgery 2006, then left modified radical mastectomy 6/2012    Hx breast biopsy  4/11/12     Left    Hx breast biopsy Left        Social History     Social History    Marital status:      Spouse name: N/A    Number of children: N/A    Years of education: N/A     Occupational History    Not on file. Social History Main Topics    Smoking status: Never Smoker    Smokeless tobacco: Never Used    Alcohol use No    Drug use: No    Sexual activity: Not on file     Other Topics Concern    Not on file     Social History Narrative    ** Merged History Encounter **            Visit Vitals    /76    Pulse (!) 103    Ht 5' 5\" (1.651 m)    Wt 199 lb (90.3 kg)    BMI 33.12 kg/m2         PHYSICAL EXAMINATION:  GENERAL: Alert and oriented x3, in no acute distress, well-developed, well-nourished, afebrile. HEART: No JVD.   EYES: No scleral icterus   NECK: No significant lymphadenopathy LUNGS: No respiratory compromise or indrawing  ABDOMEN: Soft, non-tender, non-distended. Electronically signed by:  Noreen Rodriguez MD

## 2017-01-31 ENCOUNTER — OFFICE VISIT (OUTPATIENT)
Dept: SURGERY | Age: 67
End: 2017-01-31

## 2017-01-31 VITALS
BODY MASS INDEX: 32.82 KG/M2 | SYSTOLIC BLOOD PRESSURE: 128 MMHG | HEIGHT: 65 IN | WEIGHT: 197 LBS | HEART RATE: 92 BPM | RESPIRATION RATE: 15 BRPM | OXYGEN SATURATION: 99 % | TEMPERATURE: 98.1 F | DIASTOLIC BLOOD PRESSURE: 65 MMHG

## 2017-01-31 DIAGNOSIS — Z85.3 HISTORY OF BREAST CANCER: ICD-10-CM

## 2017-01-31 DIAGNOSIS — I89.0 LYMPHEDEMA OF LEFT ARM: Primary | ICD-10-CM

## 2017-01-31 RX ORDER — GABAPENTIN 100 MG/1
200 CAPSULE ORAL 3 TIMES DAILY
Qty: 180 CAP | Refills: 2 | Status: SHIPPED | OUTPATIENT
Start: 2017-01-31 | End: 2017-01-31 | Stop reason: ALTCHOICE

## 2017-01-31 RX ORDER — GABAPENTIN 100 MG/1
100 CAPSULE ORAL 3 TIMES DAILY
Qty: 90 CAP | Refills: 1 | Status: SHIPPED | OUTPATIENT
Start: 2017-01-31 | End: 2017-03-07 | Stop reason: SDUPTHER

## 2017-01-31 RX ORDER — OXYCODONE AND ACETAMINOPHEN 7.5; 325 MG/1; MG/1
1 TABLET ORAL
Qty: 40 TAB | Refills: 0 | Status: SHIPPED | OUTPATIENT
Start: 2017-01-31 | End: 2017-02-09 | Stop reason: SDUPTHER

## 2017-01-31 NOTE — MR AVS SNAPSHOT
Visit Information Date & Time Provider Department Dept. Phone Encounter #  
 1/31/2017  1:00 PM MD FREDIS [facility] [id number] Your Appointments 2/9/2017  3:10 PM  
Follow Up with Pamela Kendall PA-C  
[address] ([facility]) Appt Note: f/u MRI sched for 2/3 - rt knee [address], [address] [facility] [address]  
[phone] 23[address] [facility]  
  
    
 2/23/2017  2:00 PM  
New Patient with Sisi Baxter MD  
[address] [facility], [address] [facility] [address] ([facility]) Appt Note: 1 mo f/u rt knee [address], [address] [facility]  
[phone] 2300 [facility]  
  
    
 3/7/2017  2:00 PM  
Follow Up with MD FREDIS [facility] ([facility]) Appt Note: 1 month f/up [address] [id number] [address] [facility] Upcoming Health Maintenance Date Due Hepatitis C Screening 1950 DTaP/Tdap/Td series (1 - Tdap) 11/24/1972 ZOSTER VACCINE AGE 60> 11/24/2011 FOBT Q 1 YEAR AGE 50-75 6/9/2013 INFLUENZA AGE 9 TO ADULT 8/1/2016 GLAUCOMA SCREENING Q2Y 11/24/2016 OSTEOPOROSIS SCREENING (DEXA) 11/24/2016 Pneumococcal 65+ High/Highest Risk (1 of 2 - PCV13) 11/24/2016 MEDICARE YEARLY EXAM 11/24/2016 BREAST CANCER SCRN MAMMOGRAM 1/25/2019 Allergies as of 1/31/2017  Review Complete On: 1/31/2017 By: Chasidy Smith MD  
 No Known Allergies Current Immunizations  Never Reviewed No immunizations on file. Not reviewed this visit You Were Diagnosed With   
  
 Codes Comments Lymphedema of left arm    -  Primary ICD-10-CM: I89.0 ICD-9-CM: 591.5 History of breast cancer     ICD-10-CM: Z85.3 ICD-9-CM: V10.3 Vitals BP Pulse Temp Resp Height(growth percentile) Weight(growth percentile) 128/65 92 98.1 °F (36.7 °C) (Oral) 15 5' 5\" (1.651 m) 197 lb (89.4 kg) SpO2 BMI OB Status Smoking Status 99% 32.78 kg/m2 Postmenopausal Never Smoker Vitals History BMI and BSA Data Body Mass Index Body Surface Area 32.78 kg/m 2 2.02 m 2 Preferred Pharmacy Pharmacy Name Phone 9[address] [facility], [address] Your Updated Medication List  
  
   
This list is accurate as of: 1/31/17  2:25 PM.  Always use your most recent med list.  
  
  
  
  
 ADVAIR HFA IN Take  by inhalation. amLODIPine 10 mg tablet Commonly known as:  [place] Take  by mouth daily. aspirin 81 mg tablet Take 81 mg by mouth. bethanechol 10 mg tablet Commonly known as:  URECHOLINE Take 10 mg by mouth Before breakfast, lunch, and dinner. desonide 0.05 % cream  
Commonly known as:  TRIDESILON  
  
 diclofenac 1 % Gel Commonly known as:  VOLTAREN Apply  to affected area every six (6) hours. Apply 4 grams to affected joint up to 4 times per day, maximum 16 grams per joint per day Dispense 5 100 gram tubes  
  
 gabapentin 100 mg capsule Commonly known as:  NEURONTIN Take 1 Cap by mouth three (3) times daily. Indications: NEUROPATHIC PAIN  
  
 lisinopril 20 mg tablet Commonly known as:  Antonio [place] Take  by mouth daily. methocarbamol 500 mg tablet Commonly known as:  ROBAXIN Take 1 Tab by mouth four (4) times daily. naproxen 375 mg tablet Commonly known as:  NAPROSYN Take 1 Tab by mouth two (2) times daily (with meals). NexIUM 40 mg capsule Generic drug:  esomeprazole Take  by mouth daily. oxyCODONE-acetaminophen 7.5-325 mg per tablet Commonly known as:  PERCOCET 7.5 Take 1 Tab by mouth every four (4) hours as needed for Pain. Max Daily Amount: 6 Tabs. PROAIR HFA IN Take  by inhalation. SINGULAIR 10 mg tablet Generic drug:  montelukast  
Take 10 mg by mouth daily. SPIRIVA WITH HANDIHALER 18 mcg inhalation capsule Generic drug:  tiotropium Take 1 Cap by inhalation daily. triamterene-hydroCHLOROthiazide 37.5-25 mg per capsule Commonly known as:  DYAZIDE  
  
 VITAMIN D2 50,000 unit capsule Generic drug:  ergocalciferol Take 50,000 Units by mouth Every Thursday. Prescriptions Printed Refills  
 oxyCODONE-acetaminophen (PERCOCET 7.5) 7.5-325 mg per tablet 0 Sig: Take 1 Tab by mouth every four (4) hours as needed for Pain. Max Daily Amount: 6 Tabs. Class: Print Route: Oral  
  
Prescriptions Sent to Pharmacy Refills  
 gabapentin (NEURONTIN) 100 mg capsule 1 Sig: Take 1 Cap by mouth three (3) times daily. Indications: NEUROPATHIC PAIN Class: Normal  
 Pharmacy: [address] [place] [address], [facility] Ph #: [phone] Route: Oral  
  
To-Do List   
 02/03/2017 9:00 AM  
  Appointment with [facility] MRI RM 1 at [address] [facility] ([phone]) GENERAL INSTRUCTIONS  Bring information (ID card) if you have any medically implanted devices. You will be required to lie still while the procedure is being performed. Remove any jewelry (including body piercing, hairpins) prior to MRI. If you have had a creatinine level drawn within the past 30 days, please bring most recent results to your appt. Bring any films, CD's, and reports related to your study with you on the day of your exam.  This only includes studies done outside of [facility], [facility], [place], and [facility].   Bring a complete list of all medications you are currently taking to include prescriptions, over-the-counter meds, herbals, vitamins & any dietary supplements. If you were given medications for claustrophobia or anxiety, please arrange to have someone drive you to your appointment. QUESTIONS  Notify the MRI Department if you have any questions concerning your study. [address] - [phone] [address] - [phone] Introducing [facility]! Saturnino Hooks introduces Listia patient portal. Now you can access parts of your medical record, email your doctor's office, and request medication refills online. 1. In your internet browser, go to [email or web address] the Bet. [email or web address] the Bet 2. Click on the First Time User? Click Here link in the Sign In box. You will see the New Member Sign Up page. 3. Enter your Listia Access Code exactly as it appears below. You will not need to use this code after youve completed the sign-up process. If you do not sign up before the expiration date, you must request a new code. · Listia Access Code: [email or web address] Expires: 2/6/2017 12:44 PM 
 
4. Enter the last four digits of your Social Security Number (xxxx) and Date of Birth (mm/dd/yyyy) as indicated and click Submit. You will be taken to the next sign-up page. 5. Create a Listia ID. This will be your Listia login ID and cannot be changed, so think of one that is secure and easy to remember. 6. Create a Listia password. You can change your password at any time. 7. Enter your Password Reset Question and Answer. This can be used at a later time if you forget your password. 8. Enter your e-mail address. You will receive e-mail notification when new information is available in [address] 9. Click Sign Up. You can now view and download portions of your medical record. 10. Click the Download Summary menu link to download a portable copy of your medical information. If you have questions, please visit the Frequently Asked Questions section of the Listia website.  Remember, Listia is NOT to be used for urgent needs. For medical emergencies, dial 911. Now available from your iPhone and Android! Please provide this summary of care documentation to your next provider. Your primary care clinician is listed as Jim Foster. If you have any questions after today's visit, please call [phone].

## 2017-01-31 NOTE — PROGRESS NOTES
Shon Arteaga M.D. FACS  PROGRESS NOTE    Name: Janice Alvarenga MRN: [id number]   : 1950 [facility]   Date: 2017 Admission Date: No admission date for patient encounter. Subjective:  Patient presents today with continued complaints of pain swelling and coldness in the left upper extremity and hand. She has documented lymphedema of the left upper extremity status post left modified radical mastectomy with immediate reconstruction. She states that she does not take the Neurontin because it does not make her feel the same way that the Percocet does not make her not care about the pain with the Percocet does. She denies ever having a sleeve for compression. She states that she did participate with the lymphedema clinic but she was discharged from the clinic after completing the therapy sessions. She states that they wrapped the arm while she was in therapy but never gave her a sleeve. A lymphedema pump had been requested for the patient in the past.  She was denied. We wish to show that the patient has been compliant with all therapy so that she can be considered for lymphedema pump. Objective:  Vitals:    17 1337   BP: 128/65   Pulse: 92   Resp: 15   Temp: 98.1 °F (36.7 °C)   TempSrc: Oral   SpO2: 99%   Weight: 89.4 kg (197 lb)   Height: 5' 5\" (1.651 m)       Physical Exam:    General: in no apparent distress    BREASTS:    Left:  No dimpling, discoloration   No axillary or supraclavicular lymphadenopathy. No mass   Right:  No dimpling, discoloration, nipple inversion or retractions. No axillary or supraclavicular lymphadenopathy. No mass   Left upper extremity: Swelling from the shoulder down to the hand no pitting. There is  tenderness to palpation mostly in the upper arm. No open areas or skin. No erythema or  infection. Labs:  No results found for this or any previous visit (from the past 24 hour(s)).     Current Medications:  Current Outpatient Prescriptions   Medication Sig Dispense Refill    gabapentin (NEURONTIN) 100 mg capsule Take 2 Caps by mouth three (3) times daily. Indications: NEUROPATHIC PAIN, POSTOPERATIVE ACUTE PAIN 180 Cap 2    oxyCODONE-acetaminophen (PERCOCET 7.5) 7.5-325 mg per tablet Take 1 Tab by mouth every four (4) hours as needed for Pain. Max Daily Amount: 6 Tabs. 40 Tab 0    diclofenac (VOLTAREN) 1 % gel Apply  to affected area every six (6) hours. Apply 4 grams to affected joint up to 4 times per day, maximum 16 grams per joint per day  Dispense 5 100 gram tubes 100 g 4    methocarbamol (ROBAXIN) 500 mg tablet Take 1 Tab by mouth four (4) times daily. 20 Tab 0    naproxen (NAPROSYN) 375 mg tablet Take 1 Tab by mouth two (2) times daily (with meals). 14 Tab 0    triamterene-hydrochlorothiazide (DYAZIDE) 37.5-25 mg per capsule   5    desonide (TRIDESILON) 0.05 % cream   0    ALBUTEROL SULFATE (PROAIR HFA IN) Take  by inhalation.  FLUTICASONE/SALMETEROL (ADVAIR HFA IN) Take  by inhalation.  montelukast (SINGULAIR) 10 mg tablet Take 10 mg by mouth daily.  tiotropium (SPIRIVA WITH HANDIHALER) 18 mcg inhalation capsule Take 1 Cap by inhalation daily.  lisinopril (PRINIVIL, ZESTRIL) 20 mg tablet Take  by mouth daily.  amLODIPine (NORVASC) 10 mg tablet Take  by mouth daily.  bethanechol (URECHOLINE) 10 mg tablet Take 10 mg by mouth Before breakfast, lunch, and dinner.  esomeprazole (NEXIUM) 40 mg capsule Take  by mouth daily.  aspirin 81 mg tablet Take 81 mg by mouth.  ergocalciferol (VITAMIN D2) 50,000 unit capsule Take 50,000 Units by mouth Every Thursday. Chart and notes reviewed. Data reviewed. I have evaluated and examined the patient. Right breast mammogram 10/25/2017 BI-RADS 2       IMPRESSION:   · Patient with lymphedema of the left upper extremity following left modified radical mastectomy with immediate reconstruction.         PLAN:/DISCUSION: · Compression sleeve prescription given to the patient along with information for a medical supply store-[facility]. · The patient was given prescriptions for Neurontin 100 mg p.o. 3 times daily and Percocet. · Follow-up in 1 week  · Annual mammography of the right breast next year after January 25.           Tish Osorio MD

## 2017-02-09 ENCOUNTER — OFFICE VISIT (OUTPATIENT)
Dept: ORTHOPEDIC SURGERY | Age: 67
End: 2017-02-09

## 2017-02-09 VITALS
BODY MASS INDEX: 33.32 KG/M2 | TEMPERATURE: 97.4 F | WEIGHT: 200 LBS | SYSTOLIC BLOOD PRESSURE: 168 MMHG | HEART RATE: 101 BPM | HEIGHT: 65 IN | DIASTOLIC BLOOD PRESSURE: 92 MMHG

## 2017-02-09 RX ORDER — OXYCODONE AND ACETAMINOPHEN 7.5; 325 MG/1; MG/1
1 TABLET ORAL
Qty: 60 TAB | Refills: 0 | Status: SHIPPED | OUTPATIENT
Start: 2017-02-09 | End: 2017-03-07 | Stop reason: SDUPTHER

## 2017-02-11 ENCOUNTER — HOSPITAL ENCOUNTER (OUTPATIENT)
Dept: MRI IMAGING | Age: 67
Discharge: HOME | End: 2017-02-11
Attending: ORTHOPAEDIC SURGERY
Payer: MEDICARE

## 2017-02-11 DIAGNOSIS — M17.11 PRIMARY OSTEOARTHRITIS OF RIGHT KNEE: ICD-10-CM

## 2017-02-11 PROCEDURE — 73721 MRI JNT OF LWR EXTRE W/O DYE: CPT

## 2017-02-23 ENCOUNTER — OFFICE VISIT (OUTPATIENT)
Dept: ORTHOPEDIC SURGERY | Age: 67
End: 2017-02-23

## 2017-02-23 VITALS
SYSTOLIC BLOOD PRESSURE: 142 MMHG | DIASTOLIC BLOOD PRESSURE: 74 MMHG | HEIGHT: 65 IN | BODY MASS INDEX: 32.99 KG/M2 | WEIGHT: 198 LBS | HEART RATE: 98 BPM

## 2017-02-23 DIAGNOSIS — M17.11 PRIMARY OSTEOARTHRITIS OF RIGHT KNEE: Primary | ICD-10-CM

## 2017-02-23 DIAGNOSIS — G89.29 CHRONIC RIGHT-SIDED LOW BACK PAIN WITH RIGHT-SIDED SCIATICA: ICD-10-CM

## 2017-02-23 DIAGNOSIS — M54.41 CHRONIC RIGHT-SIDED LOW BACK PAIN WITH RIGHT-SIDED SCIATICA: ICD-10-CM

## 2017-02-23 RX ORDER — BETAMETHASONE SODIUM PHOSPHATE AND BETAMETHASONE ACETATE 3; 3 MG/ML; MG/ML
6 INJECTION, SUSPENSION INTRA-ARTICULAR; INTRALESIONAL; INTRAMUSCULAR; SOFT TISSUE ONCE
Qty: 1 ML | Refills: 0 | Status: CANCELLED
Start: 2017-02-23 | End: 2017-02-23

## 2017-02-23 NOTE — PROGRESS NOTES
Patient: Rajwinder Herrera                MRN: [id number]       SSN: [id number]  YOB: 1950        AGE: 73 y.o. SEX: female  Body mass index is 32.95 kg/(m^2). PCP: Gavin Howard MD  02/23/17    HISTORY:  I saw Ms. Jorje Causey. She has had an arthroscopy a couple of years ago. She has had viscosupplementation, cortisone, and still has a lot of knee pain. It is moderate. She also reports dysesthesia pain going all the way down leg if she stands at the kitchen sink for any period of time, and her back has been bothering her. I am going to send her The [address]. We did obtain an MRI of the knee, as she had failed the injections, and it reveals some moderate arthritis involving the knee, especially patellofemoral, but the main part of the knee is actually doing reasonably well and there is some arthritis in the lateral compartment as well. There is mild cartilage thinning in the medial compartment. There is no avascular necrosis. There is some blunting of the free edge of the medial meniscus but no definitive tear. PHYSICAL EXAMINATION:  On examination today, she walks with her cane. She has no major antalgic component to the gait. Her VMOs are somewhat attenuated, and she is a little bit resistant about going back to physical therapy. She denies fevers or chills. She has a little bit of pitting edema distally but not severely so. She has some mild soft tissue tenderness all over in both lower extremities. I am wondering if she has a component of fibromyalgia as well. IMPRESSION:  My overall impression is mild to moderate arthritis of the knee but noo surgery indicated currently. PLAN:  I would like her to have some blood tests, inflammatory work. I would like her to have a rheumatological opinion. I think she has a component of symptomatic spinal stenosis. Again, surgery is not indicated for the knee currently.  She may eventually require joint replacement, but I think physical therapy would be helpful for her even though she is not too keen on the idea at this point. We will see her back after the blood tests and investigations. REVIEW OF SYSTEMS:      CON: negative for weight loss, fever  EYE: negative for double vision  ENT: negative for hoarseness  RS:   negative for Tb  GI:    negative for blood in stool  :  negative for blood in urine  Other systems reviewed and noted below. Past Medical History:   Diagnosis Date    Anemia     Asthma     Blood disorder     Cancer ([facility] [address]     breast, diagnosed 2006 left    Cancer [facility])     Chest pain, unspecified     The working diagnosis is chest pain. The differential diagnosis includes chest wall pain, stable angina.  Chronic obstructive pulmonary disease (HCC)     Essential hypertension     Essential hypertension, benign     Uncontrolled     GERD (gastroesophageal reflux disease)     Hypercholesterolemia     Hypertension     Neuropathy     Nonspecific abnormal electrocardiogram (ECG) (EKG)     Obesity, unspecified     Weight loss has been strongly encouraged by following dietary restrictions and an exercise routine.  Polio     as a young child    Pre-operative cardiovascular examination     Sciatica     Unspecified cerebral artery occlusion with cerebral infarction ([facility] [address]        Family History   Problem Relation Age of Onset    Cancer Mother     Cancer Father     Arthritis-osteo Other     Hypertension Other     Heart Disease Neg Hx      Negative family history of premature CAD or CVA       Current Outpatient Prescriptions   Medication Sig Dispense Refill    oxyCODONE-acetaminophen (PERCOCET 7.5) 7.5-325 mg per tablet Take 1 Tab by mouth every four (4) hours as needed for Pain. Max Daily Amount: 6 Tabs. 60 Tab 0    gabapentin (NEURONTIN) 100 mg capsule Take 1 Cap by mouth three (3) times daily.  Indications: NEUROPATHIC PAIN 90 Cap 1    diclofenac (VOLTAREN) 1 % gel Apply  to affected area every six (6) hours. Apply 4 grams to affected joint up to 4 times per day, maximum 16 grams per joint per day  Dispense 5 100 gram tubes 100 g 4    methocarbamol (ROBAXIN) 500 mg tablet Take 1 Tab by mouth four (4) times daily. 20 Tab 0    triamterene-hydrochlorothiazide (DYAZIDE) 37.5-25 mg per capsule   5    desonide (TRIDESILON) 0.05 % cream   0    ALBUTEROL SULFATE (PROAIR HFA IN) Take  by inhalation.  FLUTICASONE/SALMETEROL (ADVAIR HFA IN) Take  by inhalation.  montelukast (SINGULAIR) 10 mg tablet Take 10 mg by mouth daily.  tiotropium (SPIRIVA WITH HANDIHALER) 18 mcg inhalation capsule Take 1 Cap by inhalation daily.  lisinopril (PRINIVIL, ZESTRIL) 20 mg tablet Take  by mouth daily.  amLODIPine (NORVASC) 10 mg tablet Take  by mouth daily.  esomeprazole (NEXIUM) 40 mg capsule Take  by mouth daily.  aspirin 81 mg tablet Take 81 mg by mouth.  ergocalciferol (VITAMIN D2) 50,000 unit capsule Take 50,000 Units by mouth Every Thursday.  naproxen (NAPROSYN) 375 mg tablet Take 1 Tab by mouth two (2) times daily (with meals). 14 Tab 0    bethanechol (URECHOLINE) 10 mg tablet Take 10 mg by mouth Before breakfast, lunch, and dinner. No Known Allergies    Past Surgical History:   Procedure Laterality Date    BREAST SURGERY PROCEDURE UNLISTED      initial surgery 2006, then left modified radical mastectomy 6/2012    HX BREAST BIOPSY  4/11/12    Left    HX BREAST BIOPSY Left        Social History     Social History    Marital status:      Spouse name: N/A    Number of children: N/A    Years of education: N/A     Occupational History    Not on file.      Social History Main Topics    Smoking status: Never Smoker    Smokeless tobacco: Never Used    Alcohol use No    Drug use: No    Sexual activity: Not on file     Other Topics Concern    Not on file     Social History Narrative    ** Merged History Encounter ** Visit Vitals    /74    Pulse 98    Ht 5' 5\" (1.651 m)    Wt 198 lb (89.8 kg)    BMI 32.95 kg/m2         PHYSICAL EXAMINATION:  GENERAL: Alert and oriented x3, in no acute distress, well-developed, well-nourished, afebrile. HEART: No JVD. EYES: No scleral icterus   NECK: No significant lymphadenopathy   LUNGS: No respiratory compromise or indrawing  ABDOMEN: Soft, non-tender, non-distended. Electronically signed by:  Abdoul Shepard MD

## 2017-02-23 NOTE — PATIENT INSTRUCTIONS
The doctor has ordered some laboratory studies for you. Please go to [facility] or [facility] to have your lab tests conducted. If you wish to go to another facility that is okay. Please have the lab forward a copy of the results to our office. Knee Pain or Injury: Care Instructions  Your Care Instructions    Injuries are a common cause of knee problems. Sudden (acute) injuries may be caused by a direct blow to the knee. They can also be caused by abnormal twisting, bending, or falling on the knee. Pain, bruising, or swelling may be severe, and may start within minutes of the injury. Overuse is another cause of knee pain. Other causes are climbing stairs, kneeling, and other activities that use the knee. Everyday wear and tear, especially as you get older, also can cause knee pain. Rest, along with home treatment, often relieves pain and allows your knee to heal. If you have a serious knee injury, you may need tests and treatment. Follow-up care is a key part of your treatment and safety. Be sure to make and go to all appointments, and call your doctor if you are having problems. It's also a good idea to know your test results and keep a list of the medicines you take. How can you care for yourself at home? · Be safe with medicines. Read and follow all instructions on the label. ¨ If the doctor gave you a prescription medicine for pain, take it as prescribed. ¨ If you are not taking a prescription pain medicine, ask your doctor if you can take an over-the-counter medicine. · Rest and protect your knee. Take a break from any activity that may cause pain. · Put ice or a cold pack on your knee for 10 to 20 minutes at a time. Put a thin cloth between the ice and your skin. · Prop up a sore knee on a pillow when you ice it or anytime you sit or lie down for the next 3 days. Try to keep it above the level of your heart. This will help reduce swelling.   · If your knee is not swollen, you can put moist heat, a heating pad, or a warm cloth on your knee. · If your doctor recommends an elastic bandage, sleeve, or other type of support for your knee, wear it as directed. · Follow your doctor's instructions about how much weight you can put on your leg. Use a cane, crutches, or a walker as instructed. · Follow your doctor's instructions about activity during your healing process. If you can do mild exercise, slowly increase your activity. · Reach and stay at a healthy weight. Extra weight can strain the joints, especially the knees and hips, and make the pain worse. Losing even a few pounds may help. When should you call for help? Call 911 anytime you think you may need emergency care. For example, call if:  · You have symptoms of a blood clot in your lung (called a pulmonary embolism). These may include:  ¨ Sudden chest pain. ¨ Trouble breathing. ¨ Coughing up blood. Call your doctor now or seek immediate medical care if:  · You have severe or increasing pain. · Your leg or foot turns cold or changes color. · You cannot stand or put weight on your knee. · Your knee looks twisted or bent out of shape. · You cannot move your knee. · You have signs of infection, such as:  ¨ Increased pain, swelling, warmth, or redness. ¨ Red streaks leading from the knee. ¨ Pus draining from a place on your knee. ¨ A fever. · You have signs of a blood clot in your leg (called a deep vein thrombosis), such as:  ¨ Pain in your calf, back of the knee, thigh, or groin. ¨ Redness and swelling in your leg or groin. Watch closely for changes in your health, and be sure to contact your doctor if:  · You have tingling, weakness, or numbness in your knee. · You have any new symptoms, such as swelling. · You have bruises from a knee injury that last longer than 2 weeks. · You do not get better as expected. Where can you learn more? Go to http://laura-jessie.info/.   Enter K195 in the search box to learn more about \"Knee Pain or Injury: Care Instructions. \"  Current as of: May 27, 2016  Content Version: 11.1  © 2103-8561 [employer]. Care instructions adapted under license by [employer] (which disclaims liability or warranty for this information). If you have questions about a medical condition or this instruction, always ask your healthcare professional. [place] [address] any warranty or liability for your use of this information.

## 2017-02-23 NOTE — MR AVS SNAPSHOT
Visit Information Date & Time Provider Department Dept. Phone Encounter #  
 2/23/2017  2:00 PM Vivica Klinefelter, MD [facility] [phone] [id number] Your Appointments 3/7/2017  2:00 PM  
Follow Up with MD FREDIS Mason [facility] ([facility]) Appt Note: 1 month f/up [address] [place] [id number] [address] Upcoming Health Maintenance Date Due Hepatitis C Screening 1950 DTaP/Tdap/Td series (1 - Tdap) 11/24/1972 ZOSTER VACCINE AGE 60> 11/24/2011 FOBT Q 1 YEAR AGE 50-75 6/9/2013 INFLUENZA AGE 9 TO ADULT 8/1/2016 GLAUCOMA SCREENING Q2Y 11/24/2016 OSTEOPOROSIS SCREENING (DEXA) 11/24/2016 Pneumococcal 65+ High/Highest Risk (1 of 2 - PCV13) 11/24/2016 MEDICARE YEARLY EXAM 11/24/2016 BREAST CANCER SCRN MAMMOGRAM 1/25/2019 Allergies as of 2/23/2017  Review Complete On: 2/23/2017 By: Vivica Klinefelter, MD  
 No Known Allergies Current Immunizations  Never Reviewed No immunizations on file. Not reviewed this visit You Were Diagnosed With   
  
 Codes Comments Primary osteoarthritis of right knee    -  Primary ICD-10-CM: M17.11 ICD-9-CM: 715.16 Chronic right-sided low back pain with right-sided sciatica     ICD-10-CM: M54.41, G89.29 ICD-9-CM: 724.2, 724.3, 338.29 Vitals BP  
  
  
  
  
  
 142/74 Vitals History BMI and BSA Data Body Mass Index Body Surface Area 32.95 kg/m 2 2.03 m 2 Preferred Pharmacy Pharmacy Name Phone [phone] [facility], [address] [address]ane Your Updated Medication List  
  
   
This list is accurate as of: 2/23/17  2:57 PM.  Always use your most recent med list.  
  
  
  
  
 ADVAIR HFA IN Take  by inhalation. amLODIPine 10 mg tablet Commonly known as:  Sofi Davis Take  by mouth daily. aspirin 81 mg tablet Take 81 mg by mouth. bethanechol 10 mg tablet Commonly known as:  URECHOLINE Take 10 mg by mouth Before breakfast, lunch, and dinner. desonide 0.05 % cream  
Commonly known as:  TRIDESILON  
  
 diclofenac 1 % Gel Commonly known as:  VOLTAREN Apply  to affected area every six (6) hours. Apply 4 grams to affected joint up to 4 times per day, maximum 16 grams per joint per day Dispense 5 100 gram tubes  
  
 gabapentin 100 mg capsule Commonly known as:  NEURONTIN Take 1 Cap by mouth three (3) times daily. Indications: NEUROPATHIC PAIN  
  
 lisinopril 20 mg tablet Commonly known as:  Torres Edman Take  by mouth daily. methocarbamol 500 mg tablet Commonly known as:  ROBAXIN Take 1 Tab by mouth four (4) times daily. naproxen 375 mg tablet Commonly known as:  NAPROSYN Take 1 Tab by mouth two (2) times daily (with meals). NexIUM 40 mg capsule Generic drug:  esomeprazole Take  by mouth daily. oxyCODONE-acetaminophen 7.5-325 mg per tablet Commonly known as:  PERCOCET 7.5 Take 1 Tab by mouth every four (4) hours as needed for Pain. Max Daily Amount: 6 Tabs. PROAIR HFA IN Take  by inhalation. SINGULAIR 10 mg tablet Generic drug:  montelukast  
Take 10 mg by mouth daily. SPIRIVA WITH HANDIHALER 18 mcg inhalation capsule Generic drug:  tiotropium Take 1 Cap by inhalation daily. triamterene-hydroCHLOROthiazide 37.5-25 mg per capsule Commonly known as:  DYAZIDE  
  
 VITAMIN D2 50,000 unit capsule Generic drug:  ergocalciferol Take 50,000 Units by mouth Every Thursday. Patient Instructions The doctor has ordered some laboratory studies for you.  Please go to [facility] or [facility] to have your lab tests conducted. If you wish to go to another facility that is okay. Please have the lab forward a copy of the results to our office. Knee Pain or Injury: Care Instructions Your Care Instructions Injuries are a common cause of knee problems. Sudden (acute) injuries may be caused by a direct blow to the knee. They can also be caused by abnormal twisting, bending, or falling on the knee. Pain, bruising, or swelling may be severe, and may start within minutes of the injury. Overuse is another cause of knee pain. Other causes are climbing stairs, kneeling, and other activities that use the knee. Everyday wear and tear, especially as you get older, also can cause knee pain. Rest, along with home treatment, often relieves pain and allows your knee to heal. If you have a serious knee injury, you may need tests and treatment. Follow-up care is a key part of your treatment and safety. Be sure to make and go to all appointments, and call your doctor if you are having problems. It's also a good idea to know your test results and keep a list of the medicines you take. How can you care for yourself at home? · Be safe with medicines. Read and follow all instructions on the label. ¨ If the doctor gave you a prescription medicine for pain, take it as prescribed. ¨ If you are not taking a prescription pain medicine, ask your doctor if you can take an over-the-counter medicine. · Rest and protect your knee. Take a break from any activity that may cause pain. · Put ice or a cold pack on your knee for 10 to 20 minutes at a time. Put a thin cloth between the ice and your skin. · Prop up a sore knee on a pillow when you ice it or anytime you sit or lie down for the next 3 days. Try to keep it above the level of your heart. This will help reduce swelling. · If your knee is not swollen, you can put moist heat, a heating pad, or a warm cloth on your knee. · If your doctor recommends an elastic bandage, sleeve, or other type of support for your knee, wear it as directed. · Follow your doctor's instructions about how much weight you can put on your leg. Use a cane, crutches, or a walker as instructed. · Follow your doctor's instructions about activity during your healing process. If you can do mild exercise, slowly increase your activity. · Reach and stay at a healthy weight. Extra weight can strain the joints, especially the knees and hips, and make the pain worse. Losing even a few pounds may help. When should you call for help? Call 911 anytime you think you may need emergency care. For example, call if: 
· You have symptoms of a blood clot in your lung (called a pulmonary embolism). These may include: 
¨ Sudden chest pain. ¨ Trouble breathing. ¨ Coughing up blood. Call your doctor now or seek immediate medical care if: 
· You have severe or increasing pain. · Your leg or foot turns cold or changes color. · You cannot stand or put weight on your knee. · Your knee looks twisted or bent out of shape. · You cannot move your knee. · You have signs of infection, such as: 
¨ Increased pain, swelling, warmth, or redness. ¨ Red streaks leading from the knee. ¨ Pus draining from a place on your knee. ¨ A fever. · You have signs of a blood clot in your leg (called a deep vein thrombosis), such as: 
¨ Pain in your calf, back of the knee, thigh, or groin. ¨ Redness and swelling in your leg or groin. Watch closely for changes in your health, and be sure to contact your doctor if: 
· You have tingling, weakness, or numbness in your knee. · You have any new symptoms, such as swelling. · You have bruises from a knee injury that last longer than 2 weeks. · You do not get better as expected. Where can you learn more? Go to http://laura-jessie.info/. Enter K195 in the search box to learn more about \"Knee Pain or Injury: Care Instructions. \" 
 Current as of: May 27, 2016 Content Version: 11.1 © 9708-5349 [employer], [employer]. Care instructions adapted under license by [employer] (which disclaims liability or warranty for this information). If you have questions about a medical condition or this instruction, always ask your healthcare professional. [address] any warranty or liability for your use of this information. Introducing \[facility]\"" & HEALTH SERVICES! Alexia Shannon introduces Gen[employer]per[employer] patient portal. Now you can access parts of your medical record, email your doctor's office, and request medication refills online. 1. In your internet browser, go to [email or web address] 2. Click on the First Time User? Click Here link in the Sign In box. You will see the New Member Sign Up page. 3. Enter your [employer] Access Code exactly as it appears below. You will not need to use this code after youve completed the sign-up process. If you do not sign up before the expiration date, you must request a new code. · [employer] Access Code: [email or web address] Expires: 5/10/2017  3:48 PM 
 
4. Enter the last four digits of your Social Security Number (xxxx) and Date of Birth (mm/dd/yyyy) as indicated and click Submit. You will be taken to the next sign-up page. 5. Create a GenSpera ID. This will be your Gen[employer]pera login ID and cannot be changed, so think of one that is secure and easy to remember. 6. Create a [employer] password. You can change your password at any time. 7. Enter your Password Reset Question and Answer. This can be used at a later time if you forget your password. 8. Enter your e-mail address. You will receive e-mail notification when new information is available in [address] 9. Click Sign Up. You can now view and download portions of your medical record. 10. Click the Download Summary menu link to download a portable copy of your medical information. If you have questions, please visit the Frequently Asked Questions section of the [employer] website. Remember, [employer] is NOT to be used for urgent needs. For medical emergencies, dial 911. Now available from your iPhone and Android! Please provide this summary of care documentation to your next provider. Lyme Disease Testing Disclaimer:   
 § 87.8-6747.6. (Expires July 1, 2018) Lyme disease testing information disclosure. A. Every licensee or his in-office designee who orders a laboratory test for the presence of Lyme disease shall provide to the patient or his legal representative the following written information: \"ACCORDING TO THE CENTERS FOR DISEASE CONTROL AND PREVENTION, AS OF 2011 LYME DISEASE IS THE SIXTH FASTEST GROWING DISEASE IN THE [place]. YOUR HEALTH CARE PROVIDER HAS ORDERED A LABORATORY TEST FOR THE PRESENCE OF LYME DISEASE FOR YOU. CURRENT LABORATORY TESTING FOR LYME DISEASE CAN BE PROBLEMATIC AND STANDARD LABORATORY TESTS OFTEN RESULT IN FALSE NEGATIVE AND FALSE POSITIVE RESULTS, AND IF DONE TOO EARLY, YOU MAY NOT HAVE PRODUCED ENOUGH ANTIBODIES TO BE CONSIDERED POSITIVE BECAUSE YOUR IMMUNE RESPONSE REQUIRES TIME TO DEVELOP ANTIBODIES. IF YOU ARE TESTED FOR LYME DISEASE, AND THE RESULTS ARE NEGATIVE, THIS DOES NOT NECESSARILY MEAN YOU DO NOT HAVE LYME DISEASE. IF YOU CONTINUE TO EXPERIENCE SYMPTOMS, YOU SHOULD CONTACT YOUR HEALTH CARE PROVIDER AND INQUIRE ABOUT THE APPROPRIATENESS OF RETESTING OR ADDITIONAL TREATMENT. \"  
B. Licensees shall be immune from civil liability for the provision of the written information required by this section absent gross negligence or willful misconduct. Your primary care clinician is listed as Stephen Rodriguez. If you have any questions after today's visit, please call [phone].

## 2017-02-24 ENCOUNTER — TELEPHONE (OUTPATIENT)
Dept: ORTHOPEDIC SURGERY | Age: 67
End: 2017-02-24

## 2017-02-24 NOTE — TELEPHONE ENCOUNTER
PATIENT CALLED  FOR DR. HOUSE  OR A NURSE. PATIENT SAID DR. HOUSE WANTS HER TO GET SOME TEST DONE BUT IS UNSURE AS TO WHICH TEST AND WHERE DR. HOUSE WANTS HER TO GO .    PATIENT SAID SOMEONE CALLED HER THIS MORNING BUT SHE DOES NOT KNOW WHO CALLED HER OR WHY. PATIENT SAID SHE IS ALSO SUPPOSED TO GO SOMEWHERE IN THE MONTH OF April BUT DOES NOT KNOW WHERE. PATIENT IS ASKING FOR A CALL BACK AS TO WHAT TEST DR. HOUSE WANTS HER TO HAVE AND WHEN AND WHERE SHE IS SUPPOSED TO GO TO GET IT DONE. PATIENT TEL. [phone].

## 2017-03-02 ENCOUNTER — HOSPITAL ENCOUNTER (OUTPATIENT)
Dept: LAB | Age: 67
Discharge: HOME | End: 2017-03-02
Payer: MEDICARE

## 2017-03-02 LAB
BASOPHILS # BLD AUTO: 0 K/UL (ref 0–0.06)
BASOPHILS # BLD: 0 % (ref 0–2)
CRP SERPL-MCNC: 1.2 MG/DL (ref 0–0.3)
DIFFERENTIAL METHOD BLD: ABNORMAL
EOSINOPHIL # BLD: 0 K/UL (ref 0–0.4)
EOSINOPHIL NFR BLD: 1 % (ref 0–5)
ERYTHROCYTE [DISTWIDTH] IN BLOOD BY AUTOMATED COUNT: 15.5 % (ref 11.6–14.5)
ERYTHROCYTE [SEDIMENTATION RATE] IN BLOOD: 44 MM/HR (ref 0–30)
HCT VFR BLD AUTO: 38.4 % (ref 35–45)
HGB BLD-MCNC: 11.8 G/DL (ref 12–16)
LYMPHOCYTES # BLD AUTO: 45 % (ref 21–52)
LYMPHOCYTES # BLD: 3.4 K/UL (ref 0.9–3.6)
MCH RBC QN AUTO: 26 PG (ref 24–34)
MCHC RBC AUTO-ENTMCNC: 30.7 G/DL (ref 31–37)
MCV RBC AUTO: 84.6 FL (ref 74–97)
MONOCYTES # BLD: 0.4 K/UL (ref 0.05–1.2)
MONOCYTES NFR BLD AUTO: 5 % (ref 3–10)
NEUTS SEG # BLD: 3.7 K/UL (ref 1.8–8)
NEUTS SEG NFR BLD AUTO: 49 % (ref 40–73)
PLATELET # BLD AUTO: 251 K/UL (ref 135–420)
PMV BLD AUTO: 11.6 FL (ref 9.2–11.8)
RBC # BLD AUTO: 4.54 M/UL (ref 4.2–5.3)
URATE SERPL-MCNC: 4.2 MG/DL (ref 2.6–7.2)
WBC # BLD AUTO: 7.6 K/UL (ref 4.6–13.2)

## 2017-03-02 PROCEDURE — 85652 RBC SED RATE AUTOMATED: CPT | Performed by: ORTHOPAEDIC SURGERY

## 2017-03-02 PROCEDURE — 86225 DNA ANTIBODY NATIVE: CPT | Performed by: ORTHOPAEDIC SURGERY

## 2017-03-02 PROCEDURE — 86431 RHEUMATOID FACTOR QUANT: CPT | Performed by: ORTHOPAEDIC SURGERY

## 2017-03-02 PROCEDURE — 85025 COMPLETE CBC W/AUTO DIFF WBC: CPT | Performed by: ORTHOPAEDIC SURGERY

## 2017-03-02 PROCEDURE — 84550 ASSAY OF BLOOD/URIC ACID: CPT | Performed by: ORTHOPAEDIC SURGERY

## 2017-03-02 PROCEDURE — 36415 COLL VENOUS BLD VENIPUNCTURE: CPT | Performed by: ORTHOPAEDIC SURGERY

## 2017-03-02 PROCEDURE — 86140 C-REACTIVE PROTEIN: CPT | Performed by: ORTHOPAEDIC SURGERY

## 2017-03-02 PROCEDURE — 86617 LYME DISEASE ANTIBODY: CPT | Performed by: ORTHOPAEDIC SURGERY

## 2017-03-03 LAB
CENTROMERE B AB SER-ACNC: <0.2 AI (ref 0–0.9)
CHROMATIN AB SERPL-ACNC: <0.2 AI (ref 0–0.9)
DSDNA AB SER-ACNC: <1 IU/ML (ref 0–9)
ENA JO1 AB SER-ACNC: <0.2 AI (ref 0–0.9)
ENA RNP AB SER-ACNC: 0.2 AI (ref 0–0.9)
ENA SCL70 AB SER-ACNC: <0.2 AI (ref 0–0.9)
ENA SM AB SER-ACNC: <0.2 AI (ref 0–0.9)
ENA SS-A AB SER-ACNC: <0.2 AI (ref 0–0.9)
ENA SS-B AB SER-ACNC: <0.2 AI (ref 0–0.9)
SEE BELOW, 164869: NORMAL

## 2017-03-06 LAB
B BURGDOR IGG PATRN SER IB-IMP: NEGATIVE
B BURGDOR IGM PATRN SER IB-IMP: NEGATIVE
B BURGDOR18KD IGG SER QL IB: ABNORMAL
B BURGDOR23KD IGG SER QL IB: ABNORMAL
B BURGDOR23KD IGM SER QL IB: PRESENT
B BURGDOR28KD IGG SER QL IB: ABNORMAL
B BURGDOR30KD IGG SER QL IB: ABNORMAL
B BURGDOR39KD IGG SER QL IB: PRESENT
B BURGDOR39KD IGM SER QL IB: ABNORMAL
B BURGDOR41KD IGG SER QL IB: PRESENT
B BURGDOR41KD IGM SER QL IB: ABNORMAL
B BURGDOR45KD IGG SER QL IB: ABNORMAL
B BURGDOR58KD IGG SER QL IB: ABNORMAL
B BURGDOR66KD IGG SER QL IB: ABNORMAL
B BURGDOR93KD IGG SER QL IB: PRESENT

## 2017-03-07 ENCOUNTER — OFFICE VISIT (OUTPATIENT)
Dept: SURGERY | Age: 67
End: 2017-03-07

## 2017-03-07 VITALS
HEIGHT: 65 IN | RESPIRATION RATE: 16 BRPM | TEMPERATURE: 98.2 F | HEART RATE: 102 BPM | DIASTOLIC BLOOD PRESSURE: 78 MMHG | OXYGEN SATURATION: 99 % | SYSTOLIC BLOOD PRESSURE: 135 MMHG | WEIGHT: 199 LBS | BODY MASS INDEX: 33.15 KG/M2

## 2017-03-07 DIAGNOSIS — I89.0 LYMPHEDEMA OF LEFT ARM: Primary | ICD-10-CM

## 2017-03-07 RX ORDER — OXYCODONE AND ACETAMINOPHEN 7.5; 325 MG/1; MG/1
1 TABLET ORAL
Qty: 60 TAB | Refills: 0 | Status: SHIPPED | OUTPATIENT
Start: 2017-03-07 | End: 2017-04-11 | Stop reason: SDUPTHER

## 2017-03-07 RX ORDER — GABAPENTIN 100 MG/1
200 CAPSULE ORAL 3 TIMES DAILY
Qty: 90 CAP | Refills: 1 | Status: SHIPPED | OUTPATIENT
Start: 2017-03-07 | End: 2017-03-07 | Stop reason: SDUPTHER

## 2017-03-07 RX ORDER — GABAPENTIN 100 MG/1
100 CAPSULE ORAL 3 TIMES DAILY
Qty: 90 CAP | Refills: 1 | Status: SHIPPED | OUTPATIENT
Start: 2017-03-07 | End: 2017-04-11 | Stop reason: SDUPTHER

## 2017-03-07 NOTE — PROGRESS NOTES
Shon Martínez M.D. FACS  PROGRESS NOTE    Subjective:  Patient is here with a lymphedema stocking which she states she found at home. She now recalls being given these prior to doing physical therapy at [facility] several years ago. She states that at the time when she was wearing the stocking daily she had no swelling. After physical therapy she states that they did not inform her that she needs to continue to wear the stockings so she stopped wearing it. We have been attempting to get her stockings since our first encounter back in September or October. The patient was also prescribed Neurontin 100 mg p.o. 3 times daily. She states she took the Neurontin so she stopped having the pain that she stopped taking the Neurontin and the pain returned. She is also taking the 7.5/325 milligrams Percocet. She states that the left arm is much bigger than the right. Objective:  Vitals:    03/07/17 1409   BP: 135/78   Pulse: (!) 102   Resp: 16   Temp: 98.2 °F (36.8 °C)   TempSrc: Oral   SpO2: 99%   Weight: 90.3 kg (199 lb)   Height: 5' 5\" (1.651 m)       Physical Exam:    General: Awake and alert, no apparent distress, uncomfortable   Breasts:    Left: Upper extremity is nearly twice the diameter of the right upper extremity. The arm is slightly coated to touch and tender to palpation from the forearm to the shoulder. No masses are palpable. Current Medications:  Current Outpatient Prescriptions   Medication Sig Dispense Refill    oxyCODONE-acetaminophen (PERCOCET 7.5) 7.5-325 mg per tablet Take 1 Tab by mouth every four (4) hours as needed for Pain. Max Daily Amount: 6 Tabs. 60 Tab 0    diclofenac (VOLTAREN) 1 % gel Apply  to affected area every six (6) hours. Apply 4 grams to affected joint up to 4 times per day, maximum 16 grams per joint per day  Dispense 5 100 gram tubes 100 g 4    methocarbamol (ROBAXIN) 500 mg tablet Take 1 Tab by mouth four (4) times daily.  20 Tab 0    triamterene-hydrochlorothiazide (DYAZIDE) 37.5-25 mg per capsule   5    desonide (TRIDESILON) 0.05 % cream   0    ALBUTEROL SULFATE (PROAIR HFA IN) Take  by inhalation.  FLUTICASONE/SALMETEROL (ADVAIR HFA IN) Take  by inhalation.  montelukast (SINGULAIR) 10 mg tablet Take 10 mg by mouth daily.  tiotropium (SPIRIVA WITH HANDIHALER) 18 mcg inhalation capsule Take 1 Cap by inhalation daily.  lisinopril (PRINIVIL, ZESTRIL) 20 mg tablet Take  by mouth daily.  amLODIPine (NORVASC) 10 mg tablet Take  by mouth daily.  bethanechol (URECHOLINE) 10 mg tablet Take 10 mg by mouth Before breakfast, lunch, and dinner.  esomeprazole (NEXIUM) 40 mg capsule Take  by mouth daily.  aspirin 81 mg tablet Take 81 mg by mouth.  ergocalciferol (VITAMIN D2) 50,000 unit capsule Take 50,000 Units by mouth Every Thursday.  gabapentin (NEURONTIN) 100 mg capsule Take 1 Cap by mouth three (3) times daily. Indications: NEUROPATHIC PAIN 90 Cap 1    naproxen (NAPROSYN) 375 mg tablet Take 1 Tab by mouth two (2) times daily (with meals). 14 Tab 0       Chart and notes reviewed. Data reviewed. I have evaluated and examined the patient. Impression and plan:  Patient with long history of lymphedema of the left upper extremity following lymph node dissection the left axilla. We will continue our efforts to secure a lymphedema stocking and physical therapy for the patient.   Increase Neurontin dose to 200 mg p.o. 3 times daily  Percocet 7.5/325 mg p.o. every 6 hours as needed pain  Follow-up in 4 weeks     Carry MD Stefan

## 2017-03-16 LAB — CANNABINOIDS BLD CFM-MCNC: 9.8 IU/ML (ref 0–15)

## 2017-03-24 ENCOUNTER — OFFICE VISIT (OUTPATIENT)
Dept: ORTHOPEDIC SURGERY | Age: 67
End: 2017-03-24

## 2017-03-24 VITALS
WEIGHT: 198.4 LBS | DIASTOLIC BLOOD PRESSURE: 65 MMHG | SYSTOLIC BLOOD PRESSURE: 130 MMHG | HEIGHT: 65 IN | HEART RATE: 93 BPM | BODY MASS INDEX: 33.05 KG/M2 | TEMPERATURE: 96.6 F

## 2017-03-24 DIAGNOSIS — M06.061 RHEUMATOID ARTHRITIS INVOLVING RIGHT KNEE WITH NEGATIVE RHEUMATOID FACTOR (HCC): Primary | ICD-10-CM

## 2017-03-24 RX ORDER — HYDROCODONE BITARTRATE AND ACETAMINOPHEN 7.5; 325 MG/1; MG/1
1 TABLET ORAL
Qty: 50 TAB | Refills: 0 | Status: SHIPPED | OUTPATIENT
Start: 2017-03-24 | End: 2017-04-10

## 2017-03-24 NOTE — PATIENT INSTRUCTIONS
You should follow up after your Rheumatology appointment. If your condition worsens, contact our office. Rheumatoid Arthritis: Care Instructions  Your Care Instructions    Arthritis is a common health problem in which the joints are inflamed. There are many types of arthritis. In rheumatoid arthritis, the body's own immune system attacks the joints. This causes pain, stiffness, and swelling in the joints, especially in the hands and feet. It can become hard to open jars, write, and do other daily tasks. Sometimes rheumatoid arthritis can also cause bumps to form under the skin. Over time, rheumatoid arthritis can damage and deform joints. Early treatment with medicines may reduce your chances of having a lasting disability. Follow-up care is a key part of your treatment and safety. Be sure to make and go to all appointments, and call your doctor if you are having problems. Its also a good idea to know your test results and keep a list of the medicines you take. How can you care for yourself at home? · If your doctor recommends it, get more exercise. Walking is a good choice. If your knees or ankles hurt, try riding a stationary bike or swimming. · Move each joint gently through its full range of motion once or twice a day. · Rest joints when they are sore or overworked. Short rest breaks may help more than staying in bed. · Reach and stay at a healthy weight. Regular exercise and a healthy diet will help you do this. Extra weight can strain the joints, especially the knees and hips, and make the pain worse. Losing even a few pounds may help. · Get enough calcium and vitamin D to help prevent osteoporosis, which causes thin bones. Talk to your doctor about how much you should take. · Protect your joints from injury. Do not overuse them. Try to limit or avoid activities that cause joint pain or swelling.  Use special kitchen tools and other self-help devices as well as walkers, splints, or canes if needed. · Use heat to ease pain. Take warm showers or baths. Use hot packs or a heating pad set on low. Sleep under a warm electric blanket. · Put ice or a cold pack on the area for 10 to 20 minutes at a time. Put a thin cloth between the ice and your skin. · Take pain medicines exactly as directed. ¨ If the doctor gave you a prescription medicine for pain, take it as prescribed. ¨ If you are not taking a prescription pain medicine, ask your doctor if you can take an over-the-counter medicine. · Take an active role in managing your condition. Set up a treatment plan with your doctor, and learn as much as you can about rheumatoid arthritis. This will help you control pain and stay active. When should you call for help? Call your doctor now or seek immediate medical care if:  · You have a fever or a rash along with joint pain. · You have joint pain that is so severe that you cannot use the joint at all. · You have sudden swelling, redness, or pain in one or more joints, and you do not know why. · You have back or neck pain along with weakness in your arms or legs. · You have a loss of bowel or bladder control. Watch closely for changes in your health, and be sure to contact your doctor if:  · You have joint pain that lasts for more than 6 weeks. · You have side effects from your arthritis medicines, such as stomach pain, nausea, heartburn, or dark and tarlike stools. Where can you learn more? Go to http://laura-jessie.info/. Enter K205 in the search box to learn more about \"Rheumatoid Arthritis: Care Instructions. \"  Current as of: February 24, 2016  Content Version: 11.1  © 3224-9636 [employer]. Care instructions adapted under license by [employer] (which disclaims liability or warranty for this information).  If you have questions about a medical condition or this instruction, always ask your healthcare professional. [address] any warranty or liability for your use of this information.

## 2017-03-24 NOTE — PROGRESS NOTES
Patient: Huy Watkins                MRN: [id number]       SSN: [id number]  YOB: 1950        AGE: 73 y.o. SEX: female  Body mass index is 33.02 kg/(m^2). PCP: Jim Foster MD  03/24/17    Ms. Juwan Ordonez is here today in follow-up. She had a knee scope a few years ago by Dr. Zachary Monsivais where there were grade III changes. We sent her for a repeat MRI. There are no acute tears. It does confirm some arthritis and patellofemoral arthritis as well. I sent her for inflammatory labs, and her C-reactive protein and sedimentation rate are up a little bit and so is her rheumatoid factor at 9.8. I am wondering if she is a seronegative or borderline positive inflammatory arthritic patient. As a result, I suspect the X-rays are lagging behind. The injections have not been helpful for her. Her pain is worse with stairs, kneeling, and getting up and down from a chair. She has had no mechanical symptoms currently either. The MRI is negative for avascular necrosis. All systems were reviewed and updated on the EMR. She does get some mild morning stiffness. EXAMINATION: She walks with a mildly antalgic gait owing to the affected extremity. She has a fair bit of patellofemoral crepitus actually with terminal extension. There is a fair bit of rub. She has joint line tenderness in all three compartments. There is mild effusion. It does not appear to be infected. The calf is nontender. There is a mild Bakers cyst.    PLAN:  I am going to get Dr. Salazar Hooks and Dr. Mark Gomez input with this to see if there is anything else medication wise and see what they think about the rheumatological labs. I do think she is heading towards a knee replacement. We did discuss chronic pain associated with knee replacements, as well as for some patients not getting as much relief of pain as they want when arthritis surgery is done a little bit earlier than later.   We are going to discuss this again when I see her next time, and we are going to repeat the weightbearing X-rays of the knee upon her arrival.           REVIEW OF SYSTEMS:      CON: negative for weight loss, fever  EYE: negative for double vision  ENT: negative for hoarseness  RS:   negative for Tb  GI:    negative for blood in stool  :  negative for blood in urine  Other systems reviewed and noted below. Past Medical History:   Diagnosis Date    Anemia     Asthma     Blood disorder     Cancer ([facility]     breast, diagnosed 2006 left    Cancer [facility])     Chest pain, unspecified     The working diagnosis is chest pain. The differential diagnosis includes chest wall pain, stable angina.  Chronic obstructive pulmonary disease (HCC)     Essential hypertension     Essential hypertension, benign     Uncontrolled     GERD (gastroesophageal reflux disease)     Hypercholesterolemia     Hypertension     Neuropathy     Nonspecific abnormal electrocardiogram (ECG) (EKG)     Obesity, unspecified     Weight loss has been strongly encouraged by following dietary restrictions and an exercise routine.  Polio     as a young child    Pre-operative cardiovascular examination     Sciatica     Unspecified cerebral artery occlusion with cerebral infarction ([facility]        Family History   Problem Relation Age of Onset    Cancer Mother     Cancer Father     Arthritis-osteo Other     Hypertension Other     Heart Disease Neg Hx      Negative family history of premature CAD or CVA       Current Outpatient Prescriptions   Medication Sig Dispense Refill    oxyCODONE-acetaminophen (PERCOCET 7.5) 7.5-325 mg per tablet Take 1 Tab by mouth every four (4) hours as needed for Pain. Max Daily Amount: 6 Tabs. 60 Tab 0    gabapentin (NEURONTIN) 100 mg capsule Take 1 Cap by mouth three (3) times daily. Indications: NEUROPATHIC PAIN 90 Cap 1    diclofenac (VOLTAREN) 1 % gel Apply  to affected area every six (6) hours.  Apply 4 grams to affected joint up to 4 times per day, maximum 16 grams per joint per day  Dispense 5 100 gram tubes 100 g 4    methocarbamol (ROBAXIN) 500 mg tablet Take 1 Tab by mouth four (4) times daily. 20 Tab 0    naproxen (NAPROSYN) 375 mg tablet Take 1 Tab by mouth two (2) times daily (with meals). 14 Tab 0    triamterene-hydrochlorothiazide (DYAZIDE) 37.5-25 mg per capsule   5    desonide (TRIDESILON) 0.05 % cream   0    ALBUTEROL SULFATE (PROAIR HFA IN) Take  by inhalation.  FLUTICASONE/SALMETEROL (ADVAIR HFA IN) Take  by inhalation.  montelukast (SINGULAIR) 10 mg tablet Take 10 mg by mouth daily.  tiotropium (SPIRIVA WITH HANDIHALER) 18 mcg inhalation capsule Take 1 Cap by inhalation daily.  lisinopril (PRINIVIL, ZESTRIL) 20 mg tablet Take  by mouth daily.  amLODIPine (NORVASC) 10 mg tablet Take  by mouth daily.  bethanechol (URECHOLINE) 10 mg tablet Take 10 mg by mouth Before breakfast, lunch, and dinner.  esomeprazole (NEXIUM) 40 mg capsule Take  by mouth daily.  aspirin 81 mg tablet Take 81 mg by mouth.  ergocalciferol (VITAMIN D2) 50,000 unit capsule Take 50,000 Units by mouth Every Thursday. No Known Allergies    Past Surgical History:   Procedure Laterality Date    BREAST SURGERY PROCEDURE UNLISTED      initial surgery 2006, then left modified radical mastectomy 6/2012    HX BREAST BIOPSY  4/11/12    Left    HX BREAST BIOPSY Left        Social History     Social History    Marital status:      Spouse name: N/A    Number of children: N/A    Years of education: N/A     Occupational History    Not on file.      Social History Main Topics    Smoking status: Never Smoker    Smokeless tobacco: Never Used    Alcohol use No    Drug use: No    Sexual activity: Not on file     Other Topics Concern    Not on file     Social History Narrative    ** Merged History Encounter **            Visit Vitals    /65    Pulse 93    Temp 96.6 °F (35.9 °C) (Oral)    Ht 5' 5\" (1.651 m)    Wt 198 lb 6.4 oz (90 kg)    BMI 33.02 kg/m2         PHYSICAL EXAMINATION:  GENERAL: Alert and oriented x3, in no acute distress, well-developed, well-nourished, afebrile. HEART: No JVD. EYES: No scleral icterus   NECK: No significant lymphadenopathy   LUNGS: No respiratory compromise or indrawing  ABDOMEN: Soft, non-tender, non-distended. Electronically signed by:  Jil Griffin MD

## 2017-03-24 NOTE — MR AVS SNAPSHOT
Visit Information Date & Time Provider Department Dept. Phone Encounter #  
 3/24/2017 10:00 AM Sherwin Silvina, [address] [facility] [phone] Your Appointments 4/3/2017 10:35 AM  
New Patient with Donn Lutz MD  
[facility] [place] ([address]) Appt Note: BACK PAIN  
 [address] [facility] [id number]  
[address] 4/11/2017  2:00 PM  
Follow Up with Anderson Sanabria MD  
[facility] ([address]) Appt Note: 4w f/u; 4w f/u  
 [address] [facility] [address] [facility] [address] [facility] Upcoming Health Maintenance Date Due Hepatitis C Screening 1950 DTaP/Tdap/Td series (1 - Tdap) 11/24/1972 ZOSTER VACCINE AGE 60> 11/24/2011 FOBT Q 1 YEAR AGE 50-75 6/9/2013 INFLUENZA AGE 9 TO ADULT 8/1/2016 GLAUCOMA SCREENING Q2Y 11/24/2016 OSTEOPOROSIS SCREENING (DEXA) 11/24/2016 Pneumococcal 65+ High/Highest Risk (1 of 2 - PCV13) 11/24/2016 MEDICARE YEARLY EXAM 11/24/2016 BREAST CANCER SCRN MAMMOGRAM 1/25/2019 Allergies as of 3/24/2017  Review Complete On: 3/24/2017 By: Janneth Mei No Known Allergies Current Immunizations  Never Reviewed No immunizations on file. Not reviewed this visit You Were Diagnosed With   
  
 Codes Comments Rheumatoid arthritis involving right knee with negative rheumatoid factor ([facility] [address]    -  Primary ICD-10-CM: M06.061 
ICD-9-CM: 714.0 Vitals BP Pulse Temp Height(growth percentile) Weight(growth percentile) BMI  
 130/65 93 96.6 °F (35.9 °C) (Oral) 5' 5\" (1.651 m) 198 lb 6.4 oz (90 kg) 33.02 kg/m2 OB Status Smoking Status Postmenopausal Never Smoker BMI and BSA Data Body Mass Index Body Surface Area 33.02 kg/m 2 2.03 m 2 Preferred Pharmacy Pharmacy Name Phone [address] [facility], [address] Your Updated Medication List  
  
   
This list is accurate as of: 3/24/17 11:38 AM.  Always use your most recent med list.  
  
  
  
  
 Nadine Nurse HFA IN Take  by inhalation. amLODIPine 10 mg tablet Commonly known as:  Fidelina Blade Take  by mouth daily. aspirin 81 mg tablet Take 81 mg by mouth. bethanechol 10 mg tablet Commonly known as:  URECHOLINE Take 10 mg by mouth Before breakfast, lunch, and dinner. desonide 0.05 % cream  
Commonly known as:  TRIDESILON  
  
 diclofenac 1 % Gel Commonly known as:  VOLTAREN Apply  to affected area every six (6) hours. Apply 4 grams to affected joint up to 4 times per day, maximum 16 grams per joint per day Dispense 5 100 gram tubes  
  
 gabapentin 100 mg capsule Commonly known as:  NEURONTIN Take 1 Cap by mouth three (3) times daily. Indications: NEUROPATHIC PAIN  
  
 lisinopril 20 mg tablet Commonly known as:  Ora Bee Take  by mouth daily. methocarbamol 500 mg tablet Commonly known as:  ROBAXIN Take 1 Tab by mouth four (4) times daily. naproxen 375 mg tablet Commonly known as:  NAPROSYN Take 1 Tab by mouth two (2) times daily (with meals). NexIUM 40 mg capsule Generic drug:  esomeprazole Take  by mouth daily. oxyCODONE-acetaminophen 7.5-325 mg per tablet Commonly known as:  PERCOCET 7.5 Take 1 Tab by mouth every four (4) hours as needed for Pain. Max Daily Amount: 6 Tabs. PROAIR HFA IN Take  by inhalation. SINGULAIR 10 mg tablet Generic drug:  montelukast  
Take 10 mg by mouth daily. SPIRIVA WITH HANDIHALER 18 mcg inhalation capsule Generic drug:  tiotropium Take 1 Cap by inhalation daily. triamterene-hydroCHLOROthiazide 37.5-25 mg per capsule Commonly known as:  DYAZIDE  
  
 VITAMIN D2 50,000 unit capsule Generic drug:  ergocalciferol Take 50,000 Units by mouth Every Thursday. Patient Instructions You should follow up after your Rheumatology appointment. If your condition worsens, contact our office. Rheumatoid Arthritis: Care Instructions Your Care Instructions Arthritis is a common health problem in which the joints are inflamed. There are many types of arthritis. In rheumatoid arthritis, the body's own immune system attacks the joints. This causes pain, stiffness, and swelling in the joints, especially in the hands and feet. It can become hard to open jars, write, and do other daily tasks. Sometimes rheumatoid arthritis can also cause bumps to form under the skin. Over time, rheumatoid arthritis can damage and deform joints. Early treatment with medicines may reduce your chances of having a lasting disability. Follow-up care is a key part of your treatment and safety. Be sure to make and go to all appointments, and call your doctor if you are having problems. Its also a good idea to know your test results and keep a list of the medicines you take. How can you care for yourself at home? · If your doctor recommends it, get more exercise. Walking is a good choice. If your knees or ankles hurt, try riding a stationary bike or swimming. · Move each joint gently through its full range of motion once or twice a day. · Rest joints when they are sore or overworked. Short rest breaks may help more than staying in bed. · Reach and stay at a healthy weight. Regular exercise and a healthy diet will help you do this. Extra weight can strain the joints, especially the knees and hips, and make the pain worse. Losing even a few pounds may help. · Get enough calcium and vitamin D to help prevent osteoporosis, which causes thin bones. Talk to your doctor about how much you should take. · Protect your joints from injury. Do not overuse them. Try to limit or avoid activities that cause joint pain or swelling. Use special kitchen tools and other self-help devices as well as walkers, splints, or canes if needed. · Use heat to ease pain. Take warm showers or baths. Use hot packs or a heating pad set on low. Sleep under a warm electric blanket. · Put ice or a cold pack on the area for 10 to 20 minutes at a time. Put a thin cloth between the ice and your skin. · Take pain medicines exactly as directed. ¨ If the doctor gave you a prescription medicine for pain, take it as prescribed. ¨ If you are not taking a prescription pain medicine, ask your doctor if you can take an over-the-counter medicine. · Take an active role in managing your condition. Set up a treatment plan with your doctor, and learn as much as you can about rheumatoid arthritis. This will help you control pain and stay active. When should you call for help? Call your doctor now or seek immediate medical care if: 
· You have a fever or a rash along with joint pain. · You have joint pain that is so severe that you cannot use the joint at all. · You have sudden swelling, redness, or pain in one or more joints, and you do not know why. · You have back or neck pain along with weakness in your arms or legs. · You have a loss of bowel or bladder control. Watch closely for changes in your health, and be sure to contact your doctor if: 
· You have joint pain that lasts for more than 6 weeks. · You have side effects from your arthritis medicines, such as stomach pain, nausea, heartburn, or dark and tarlike stools. Where can you learn more? Go to http://laura-jessie.info/. Enter K205 in the search box to learn more about \"Rheumatoid Arthritis: Care Instructions. \" Current as of: February 24, 2016 Content Version: 11.1 © 1768-0919 [employer].  Care instructions adapted under license by [address] (which disclaims liability or warranty for this information). If you have questions about a medical condition or this instruction, always ask your healthcare professional. [address] any warranty or liability for your use of this information. Introducing [facility]! [employer] introduces Strut patient portal. Now you can access parts of your medical record, email your doctor's office, and request medication refills online. 1. In your internet browser, go to [email or web address] 2. Click on the First Time User? Click Here link in the Sign In box. You will see the New Member Sign Up page. 3. Enter your Strut Access Code exactly as it appears below. You will not need to use this code after youve completed the sign-up process. If you do not sign up before the expiration date, you must request a new code. · Strut Access Code: [email or web address] Expires: 5/10/2017  4:48 PM 
 
4. Enter the last four digits of your Social Security Number (xxxx) and Date of Birth (mm/dd/yyyy) as indicated and click Submit. You will be taken to the next sign-up page. 5. Create a Strut ID. This will be your Strut login ID and cannot be changed, so think of one that is secure and easy to remember. 6. Create a Strut password. You can change your password at any time. 7. Enter your Password Reset Question and Answer. This can be used at a later time if you forget your password. 8. Enter your e-mail address. You will receive e-mail notification when new information is available in [address] 9. Click Sign Up. You can now view and download portions of your medical record. 10. Click the Download Summary menu link to download a portable copy of your medical information. If you have questions, please visit the Frequently Asked Questions section of the Strut website.  Remember, Strut is NOT to be used for urgent needs. For medical emergencies, dial 911. Now available from your iPhone and Android! Please provide this summary of care documentation to your next provider. Your primary care clinician is listed as Cali Gould. If you have any questions after today's visit, please call [phone].

## 2017-04-10 ENCOUNTER — APPOINTMENT (OUTPATIENT)
Dept: GENERAL RADIOLOGY | Age: 67
End: 2017-04-10
Attending: PHYSICIAN ASSISTANT
Payer: MEDICARE

## 2017-04-10 ENCOUNTER — HOSPITAL ENCOUNTER (EMERGENCY)
Age: 67
Discharge: HOME | End: 2017-04-10
Attending: EMERGENCY MEDICINE | Admitting: EMERGENCY MEDICINE
Payer: MEDICARE

## 2017-04-10 VITALS
RESPIRATION RATE: 19 BRPM | HEIGHT: 65 IN | OXYGEN SATURATION: 100 % | BODY MASS INDEX: 31.65 KG/M2 | TEMPERATURE: 97.9 F | WEIGHT: 190 LBS | HEART RATE: 102 BPM | DIASTOLIC BLOOD PRESSURE: 73 MMHG | SYSTOLIC BLOOD PRESSURE: 149 MMHG

## 2017-04-10 DIAGNOSIS — M17.12 ARTHRITIS OF LEFT KNEE: Primary | ICD-10-CM

## 2017-04-10 PROCEDURE — 99283 EMERGENCY DEPT VISIT LOW MDM: CPT

## 2017-04-10 PROCEDURE — 74011250637 HC RX REV CODE- 250/637: Performed by: PHYSICIAN ASSISTANT

## 2017-04-10 PROCEDURE — 73562 X-RAY EXAM OF KNEE 3: CPT

## 2017-04-10 PROCEDURE — 93971 EXTREMITY STUDY: CPT

## 2017-04-10 RX ORDER — OXYCODONE AND ACETAMINOPHEN 5; 325 MG/1; MG/1
TABLET ORAL
Qty: 12 TAB | Refills: 0 | Status: SHIPPED | OUTPATIENT
Start: 2017-04-10 | End: 2017-05-09

## 2017-04-10 RX ORDER — OXYCODONE AND ACETAMINOPHEN 5; 325 MG/1; MG/1
1 TABLET ORAL
Status: COMPLETED | OUTPATIENT
Start: 2017-04-10 | End: 2017-04-10

## 2017-04-10 RX ADMIN — OXYCODONE HYDROCHLORIDE AND ACETAMINOPHEN 1 TABLET: 5; 325 TABLET ORAL at 11:08

## 2017-04-10 NOTE — ED NOTES
I have reviewed discharge instructions with the patient. The patient verbalized understanding. Current Discharge Medication List      START taking these medications    Details   oxyCODONE-acetaminophen (PERCOCET) 5-325 mg per tablet Take 1-2 tabs every 4-6 hours prn  Qty: 12 Tab, Refills: 0         CONTINUE these medications which have NOT CHANGED    Details   oxyCODONE-acetaminophen (PERCOCET 7.5) 7.5-325 mg per tablet Take 1 Tab by mouth every four (4) hours as needed for Pain. Max Daily Amount: 6 Tabs. Qty: 60 Tab, Refills: 0      gabapentin (NEURONTIN) 100 mg capsule Take 1 Cap by mouth three (3) times daily. Indications: NEUROPATHIC PAIN  Qty: 90 Cap, Refills: 1      diclofenac (VOLTAREN) 1 % gel Apply  to affected area every six (6) hours. Apply 4 grams to affected joint up to 4 times per day, maximum 16 grams per joint per day  Dispense 5 100 gram tubes  Qty: 100 g, Refills: 4      methocarbamol (ROBAXIN) 500 mg tablet Take 1 Tab by mouth four (4) times daily. Qty: 20 Tab, Refills: 0      naproxen (NAPROSYN) 375 mg tablet Take 1 Tab by mouth two (2) times daily (with meals). Qty: 14 Tab, Refills: 0      triamterene-hydrochlorothiazide (DYAZIDE) 37.5-25 mg per capsule Refills: 5      desonide (TRIDESILON) 0.05 % cream Refills: 0      ALBUTEROL SULFATE (PROAIR HFA IN) Take  by inhalation. FLUTICASONE/SALMETEROL (ADVAIR HFA IN) Take  by inhalation. montelukast (SINGULAIR) 10 mg tablet Take 10 mg by mouth daily. tiotropium (SPIRIVA WITH HANDIHALER) 18 mcg inhalation capsule Take 1 Cap by inhalation daily. lisinopril (PRINIVIL, ZESTRIL) 20 mg tablet Take  by mouth daily. amLODIPine (NORVASC) 10 mg tablet Take  by mouth daily. bethanechol (URECHOLINE) 10 mg tablet Take 10 mg by mouth Before breakfast, lunch, and dinner. esomeprazole (NEXIUM) 40 mg capsule Take  by mouth daily.         aspirin 81 mg tablet Take 81 mg by mouth.        ergocalciferol (VITAMIN D2) 50,000 unit capsule Take 50,000 Units by mouth Every Thursday.          Patient armband removed and shredded

## 2017-04-10 NOTE — PROCEDURES
[facility]  *** FINAL REPORT ***    Name: Gatito Jean  MRN: [id number]  : 1950  HIS Order #: [id number]  [id number] [facility] Visit #: [id number]  Date: 10 Apr 2017    TYPE OF TEST: Peripheral Venous Testing    REASON FOR TEST  Pain in limb, Limb swelling    Left Leg:-  Deep venous thrombosis:           No  Superficial venous thrombosis:    No  Deep venous insufficiency:        Not examined  Superficial venous insufficiency: Not examined      INTERPRETATION/FINDINGS  Duplex images were obtained using 2-D gray scale, color flow, and  spectral Doppler analysis. Left leg :  1. Deep vein(s) visualized include the common femoral, proximal  femoral, mid femoral, distal femoral, popliteal(above knee),  popliteal(fossa), popliteal(below knee), posterior tibial and peroneal   veins. 2. No evidence of deep venous thrombosis detected in the veins  visualized. 3. No evidence of deep vein thrombosis in the contralateral common  femoral vein. 4. Superficial vein(s) visualized include the great saphenous vein. 5. No evidence of superficial thrombosis detected. ADDITIONAL COMMENTS    I have personally reviewed the data relevant to the interpretation of  this  study. TECHNOLOGIST: Cleotilde Kawasaki, [facility], RVT/  Signed: 04/10/2017 11:32 AM    PHYSICIAN: Rhonda Youssef.  Rebecca Randolph MD  Signed: 04/10/2017 12:39 PM

## 2017-04-10 NOTE — ED PROVIDER NOTES
HPI Comments: 70yoF with hx of CVA, HTN, anemia, sciatica, polio, asthma, GERD, COPD, breast cancer, arthritis to ED c/o posterior knee pain x 2 days. Reports mild swelling. More painful with extension. Pt denies trauma. Taking percocet for pain with some relief. Patient is a 73 y.o. female presenting with knee pain. The history is provided by the patient. Knee Pain           Past Medical History:   Diagnosis Date    Anemia     Asthma     Blood disorder     Cancer ([address]     breast, diagnosed 2006 left    Cancer [facility])     Chest pain, unspecified     The working diagnosis is chest pain. The differential diagnosis includes chest wall pain, stable angina.  Chronic obstructive pulmonary disease (HCC)     Essential hypertension     Essential hypertension, benign     Uncontrolled     GERD (gastroesophageal reflux disease)     Hypercholesterolemia     Hypertension     Neuropathy     Nonspecific abnormal electrocardiogram (ECG) (EKG)     Obesity, unspecified     Weight loss has been strongly encouraged by following dietary restrictions and an exercise routine. [address] [place] [address] Polio     as a young child    Pre-operative cardiovascular examination     Sciatica     Unspecified cerebral artery occlusion with cerebral infarction        Past Surgical History:   Procedure Laterality Date    BREAST SURGERY PROCEDURE UNLISTED      initial surgery 2006, then left modified radical mastectomy 6/2012    HX BREAST BIOPSY  4/11/12    Left    HX BREAST BIOPSY Left          Family History:   Problem Relation Age of Onset    Cancer Mother     Cancer Father     Arthritis-osteo Other     Hypertension Other     Heart Disease Neg Hx      Negative family history of premature CAD or CVA       Social History     Social History    Marital status:      Spouse name: N/A    Number of children: N/A    Years of education: N/A     Occupational History    Not on file.      Social History Main Topics    Smoking status: Never Smoker    Smokeless tobacco: Never Used    Alcohol use No    Drug use: No    Sexual activity: Not on file     Other Topics Concern    Not on file     Social History Narrative    ** Merged History Encounter **              ALLERGIES: Review of patient's allergies indicates no known allergies. Review of Systems   Musculoskeletal: Positive for arthralgias and joint swelling. All other systems reviewed and are negative. Vitals:    04/10/17 1057   BP: 149/73   Pulse: (!) 102   Resp: 19   Temp: 97.9 °F (36.6 °C)   SpO2: 100%   Weight: 86.2 kg (190 lb)   Height: 5' 5\" (1.651 m)            Physical Exam   Constitutional: She appears well-developed and well-nourished. No distress. HENT:   Head: Normocephalic and atraumatic. Right Ear: External ear normal.   Left Ear: External ear normal.   Mouth/Throat: Oropharynx is clear and moist.   Eyes: Conjunctivae are normal.   Neck: Normal range of motion. Neck supple. Cardiovascular: Normal rate and regular rhythm. Pulmonary/Chest: Effort normal and breath sounds normal. She has no wheezes. Musculoskeletal:        Left knee: She exhibits decreased range of motion (limited by pain) and swelling (mild). She exhibits no ecchymosis, no deformity, no erythema and no bony tenderness. Tenderness (posterior knee) found. Left lower leg: Normal.   Skin: She is not diaphoretic. MDM  Number of Diagnoses or Management Options  Arthritis of left knee:   Diagnosis management comments: Ddx: arthritis, DVT, Baker's cyst, effusion, gout. Left posterior knee pain, atraumatic. Will check xray and venous doppler. 11:42 AM No DVT; left knee xr with DJD. Ace wrap applied. Advised pt to f/u with ortho. Pt agrees with plan. Splint Note  Type of Splint: ace wrap  Location: left knee    Applied by RN neurovascular intact prior to splint placement neurovascular intact after splint placement splint is placed in good position.      MATA Mir  April 10, 2017  11:43 AM         Amount and/or Complexity of Data Reviewed  Tests in the radiology section of CPT®: ordered and reviewed    Risk of Complications, Morbidity, and/or Mortality  Presenting problems: moderate  Diagnostic procedures: low  Management options: low    Patient Progress  Patient progress: stable    ED Course       Procedures

## 2017-04-11 ENCOUNTER — OFFICE VISIT (OUTPATIENT)
Dept: SURGERY | Age: 67
End: 2017-04-11

## 2017-04-11 ENCOUNTER — HOSPITAL ENCOUNTER (OUTPATIENT)
Dept: LAB | Age: 67
Discharge: HOME | End: 2017-04-11
Payer: MEDICARE

## 2017-04-11 VITALS
SYSTOLIC BLOOD PRESSURE: 138 MMHG | HEIGHT: 65 IN | HEART RATE: 98 BPM | DIASTOLIC BLOOD PRESSURE: 76 MMHG | BODY MASS INDEX: 32.32 KG/M2 | RESPIRATION RATE: 16 BRPM | WEIGHT: 194 LBS

## 2017-04-11 DIAGNOSIS — I89.0 LYMPHEDEMA OF LEFT ARM: ICD-10-CM

## 2017-04-11 DIAGNOSIS — M79.602 UPPER EXTREMITY PAIN, INFERIOR, LEFT: Primary | ICD-10-CM

## 2017-04-11 DIAGNOSIS — Z85.3 HISTORY OF BREAST CANCER: ICD-10-CM

## 2017-04-11 DIAGNOSIS — M79.602 UPPER EXTREMITY PAIN, INFERIOR, LEFT: ICD-10-CM

## 2017-04-11 LAB
AMPHET UR QL SCN: NEGATIVE
BARBITURATES UR QL SCN: NEGATIVE
BENZODIAZ UR QL: NEGATIVE
CANNABINOIDS UR QL SCN: NEGATIVE
COCAINE UR QL SCN: NEGATIVE
HDSCOM,HDSCOM: NORMAL
METHADONE UR QL: NEGATIVE
OPIATES UR QL: NEGATIVE
PCP UR QL: NEGATIVE

## 2017-04-11 PROCEDURE — 80307 DRUG TEST PRSMV CHEM ANLYZR: CPT | Performed by: SURGERY

## 2017-04-11 RX ORDER — NALOXONE HYDROCHLORIDE 4 MG/.1ML
1 SPRAY NASAL AS NEEDED
Qty: 1 CONTAINER | Refills: 1 | Status: SHIPPED | OUTPATIENT
Start: 2017-04-11 | End: 2017-05-09

## 2017-04-11 RX ORDER — OXYCODONE AND ACETAMINOPHEN 7.5; 325 MG/1; MG/1
1 TABLET ORAL
Qty: 60 TAB | Refills: 0 | Status: SHIPPED | OUTPATIENT
Start: 2017-04-11 | End: 2017-05-09

## 2017-04-11 RX ORDER — GABAPENTIN 100 MG/1
200 CAPSULE ORAL 3 TIMES DAILY
Qty: 90 CAP | Refills: 1 | Status: SHIPPED | OUTPATIENT
Start: 2017-04-11 | End: 2017-07-19 | Stop reason: SDUPTHER

## 2017-04-24 ENCOUNTER — OFFICE VISIT (OUTPATIENT)
Dept: ORTHOPEDIC SURGERY | Age: 67
End: 2017-04-24

## 2017-04-24 VITALS
WEIGHT: 198.4 LBS | RESPIRATION RATE: 18 BRPM | HEART RATE: 98 BPM | DIASTOLIC BLOOD PRESSURE: 84 MMHG | HEIGHT: 65 IN | BODY MASS INDEX: 33.05 KG/M2 | SYSTOLIC BLOOD PRESSURE: 142 MMHG

## 2017-04-24 DIAGNOSIS — M50.20 DISPLACEMENT OF CERVICAL INTERVERTEBRAL DISC WITHOUT MYELOPATHY: ICD-10-CM

## 2017-04-24 DIAGNOSIS — M54.50 LOW BACK PAIN WITHOUT SCIATICA, UNSPECIFIED BACK PAIN LATERALITY, UNSPECIFIED CHRONICITY: Primary | ICD-10-CM

## 2017-04-24 DIAGNOSIS — M48.03 SPINAL STENOSIS, CERVICOTHORACIC REGION: ICD-10-CM

## 2017-04-24 DIAGNOSIS — M51.26 DISPLACEMENT OF LUMBAR INTERVERTEBRAL DISC WITHOUT MYELOPATHY: ICD-10-CM

## 2017-04-24 RX ORDER — IBUPROFEN 800 MG/1
TABLET ORAL
COMMUNITY
End: 2017-10-28 | Stop reason: ALTCHOICE

## 2017-04-24 NOTE — MR AVS SNAPSHOT
Visit Information Date & Time Provider Department Dept. Phone Encounter #  
 4/24/2017  2:45 PM Jaclyn Rees  [facility] [address] [facility] - [facility] [phone] [id number] Follow-up Instructions Return for following block. Your Appointments 5/12/2017  1:00 PM  
Follow Up with MD FREDIS Ribeiro [facility] ([facility]) Appt Note: 1 month f/up [address] [facility] [address] [facility] Upcoming Health Maintenance Date Due Hepatitis C Screening 1950 DTaP/Tdap/Td series (1 - Tdap) 11/24/1972 ZOSTER VACCINE AGE 60> 11/24/2011 FOBT Q 1 YEAR AGE 50-75 6/9/2013 INFLUENZA AGE 9 TO ADULT 8/1/2016 GLAUCOMA SCREENING Q2Y 11/24/2016 OSTEOPOROSIS SCREENING (DEXA) 11/24/2016 Pneumococcal 65+ High/Highest Risk (1 of 2 - PCV13) 11/24/2016 MEDICARE YEARLY EXAM 11/24/2016 BREAST CANCER SCRN MAMMOGRAM 1/25/2019 Allergies as of 4/24/2017  Review Complete On: 4/24/2017 By: Jaclyn Rees MD  
 No Known Allergies Current Immunizations  Never Reviewed No immunizations on file. Not reviewed this visit You Were Diagnosed With   
  
 Codes Comments Low back pain without sciatica, unspecified back pain laterality, unspecified chronicity    -  Primary ICD-10-CM: M54.5 ICD-9-CM: 724.2 Displacement of lumbar intervertebral disc without myelopathy     ICD-10-CM: M51.26 
ICD-9-CM: 722.10 Thoracic or lumbosacral neuritis or radiculitis, unspecified     ICD-10-CM: [id number] ICD-9-CM: 724.4 Displacement of cervical intervertebral disc without myelopathy     ICD-10-CM: M50.20 ICD-9-CM: 722.0 Spinal stenosis, cervicothoracic region     ICD-10-CM: M48.03 
ICD-9-CM: 723.0 Vitals BP Pulse Resp Height(growth percentile) Weight(growth percentile) BMI 142/84 (BP 1 Location: Right arm, BP Patient Position: Sitting) 98 18 5' 5\" (1.651 m) 198 lb 6.4 oz (90 kg) 33.02 kg/m2 OB Status Smoking Status Postmenopausal Never Smoker BMI and BSA Data Body Mass Index Body Surface Area 33.02 kg/m 2 2.03 m 2 Preferred Pharmacy Pharmacy Name Phone [phone], [address] Your Updated Medication List  
  
   
This list is accurate as of: 4/24/17  4:08 PM.  Always use your most recent med list.  
  
  
  
  
 ADVAIR HFA IN Take  by inhalation. amLODIPine 10 mg tablet Commonly known as:  Ann Million Take  by mouth daily. aspirin 81 mg tablet Take 81 mg by mouth. bethanechol 10 mg tablet Commonly known as:  URECHOLINE Take 10 mg by mouth Before breakfast, lunch, and dinner. desonide 0.05 % cream  
Commonly known as:  TRIDESILON  
  
 diclofenac 1 % Gel Commonly known as:  VOLTAREN Apply  to affected area every six (6) hours. Apply 4 grams to affected joint up to 4 times per day, maximum 16 grams per joint per day Dispense 5 100 gram tubes  
  
 gabapentin 100 mg capsule Commonly known as:  NEURONTIN Take 2 Caps by mouth three (3) times daily. Indications: NEUROPATHIC PAIN  
  
 ibuprofen 800 mg tablet Commonly known as:  MOTRIN Take  by mouth.  
  
 lisinopril 20 mg tablet Commonly known as:  Dorette Rajeev Take  by mouth daily. methocarbamol 500 mg tablet Commonly known as:  ROBAXIN Take 1 Tab by mouth four (4) times daily. naloxone 4 mg/actuation Spry Commonly known as:  NARCAN  
1 mg by Nasal route as needed. Indications: OPIATE-INDUCED RESPIRATORY DEPRESSION, OPIOID TOXICITY  
  
 naproxen 375 mg tablet Commonly known as:  NAPROSYN Take 1 Tab by mouth two (2) times daily (with meals). NexIUM 40 mg capsule Generic drug:  esomeprazole Take  by mouth daily. * oxyCODONE-acetaminophen 5-325 mg per tablet Commonly known as:  PERCOCET Take 1-2 tabs every 4-6 hours prn  
  
 * oxyCODONE-acetaminophen 7.5-325 mg per tablet Commonly known as:  PERCOCET 7.5 Take 1 Tab by mouth every four (4) hours as needed for Pain. Max Daily Amount: 6 Tabs. PROAIR HFA IN Take  by inhalation. SINGULAIR 10 mg tablet Generic drug:  montelukast  
Take 10 mg by mouth daily. SPIRIVA WITH HANDIHALER 18 mcg inhalation capsule Generic drug:  tiotropium Take 1 Cap by inhalation daily. triamterene-hydroCHLOROthiazide 37.5-25 mg per capsule Commonly known as:  DYAZIDE  
  
 VITAMIN D2 50,000 unit capsule Generic drug:  ergocalciferol Take 50,000 Units by mouth Every Thursday. * Notice: This list has 2 medication(s) that are the same as other medications prescribed for you. Read the directions carefully, and ask your doctor or other care provider to review them with you. We Performed the Following AMB POC XRAY, SPINE, LUMBOSACRAL; 2 O [78382 CPT(R)] Follow-up Instructions Return for following block. Introducing \[facility] [place] [facility]\"" & HEALTH SERVICES! Lexii Owen introduces [employer] patient portal. Now you can access parts of your medical record, email your doctor's office, and request medication refills online. 1. In your internet browser, go to [email or web address] 2. Click on the First Time User? Click Here link in the Sign In box. You will see the New Member Sign Up page. 3. Enter your [employer] Access Code exactly as it appears below. You will not need to use this code after youve completed the sign-up process. If you do not sign up before the expiration date, you must request a new code. · [employer] Access Code: [email or web address] Expires: 5/10/2017  4:48 PM 
 
4. Enter the last four digits of your Social Security Number (xxxx) and Date of Birth (mm/dd/yyyy) as indicated and click Submit.  You will be taken to the next sign-up page. 5. Create a [employer] ID. This will be your [employer] login ID and cannot be changed, so think of one that is secure and easy to remember. 6. Create a [employer] password. You can change your password at any time. 7. Enter your Password Reset Question and Answer. This can be used at a later time if you forget your password. 8. Enter your e-mail address. You will receive e-mail notification when new information is available in [address]. 9. Click Sign Up. You can now view and download portions of your medical record. 10. Click the Download Summary menu link to download a portable copy of your medical information. If you have questions, please visit the Frequently Asked Questions section of the [employer] website. Remember, [employer] is NOT to be used for urgent needs. For medical emergencies, dial 911. Now available from your iPhone and Android! Please provide this summary of care documentation to your next provider. Your primary care clinician is listed as Maya Bermudez. If you have any questions after today's visit, please call [phone].

## 2017-04-24 NOTE — PROGRESS NOTES
[facility]  [address], [address]  Phone: [phone]  Fax: [phone]        PROGRESS NOTE      HISTORY OF PRESENT ILLNESS:  The patient is a 73 y.o. female whom I last saw on 7/14/14 low back pain extending into the RLE. Pt has taken Neurontin and Cymbalta, noting more relief with Cymbalta. A previous EMG per report revealed evidence to suggest chronic S1 radiculopathy on the right side. At her last clinical appointment, I discussed with the patient multiple treatment options. She wished to continue her current treatment. I refilled her Cymbalta. She failed to f/u in 3 month's time until today. The patient returns today with low back pain into the BLE (R>L) extending anteriorly into the plantar aspect of the feet. She rates pain 10/10. Pt states her current symptoms are similar to when she previously saw me in 7/2014 but are progressing. Pt was discharge from pain management with Dr. Jade Alvarado. Pt is followed by Dr. Lew Jett for breast cancer and underwent surgery 5-6 years ago. She did receive radiation therapy. Pt denies h/o lumbar spinal surgery or blocks. She has not recently attended physical therapy/chiropractor and discontinued her HEP. Lumbar spine MRI dated 5/18/15 per report revealed straightened lumbar lordosis. No malalignment otherwise. No vertebral body compression deformity or edema. Multilevel degenerative discogenic disease, with mild to moderate central canal narrowing L4-5, fairly similar in extent to the previous exam. Varying degrees of bilateral neural foraminal encroachment at several levels.  reviewed. Past Medical History:   Diagnosis Date    Anemia     Asthma     Blood disorder     Cancer ([address]     breast, diagnosed 2006 left    Cancer [facility])     Chest pain, unspecified     The working diagnosis is chest pain. The differential diagnosis includes chest wall pain, stable angina.     Chronic obstructive pulmonary disease ([facility]     Essential hypertension     Essential hypertension, benign     Uncontrolled     GERD (gastroesophageal reflux disease)     Hypercholesterolemia     Hypertension     Neuropathy     Nonspecific abnormal electrocardiogram (ECG) (EKG)     Obesity, unspecified     Weight loss has been strongly encouraged by following dietary restrictions and an exercise routine.  Polio     as a young child    Pre-operative cardiovascular examination     Sciatica     Unspecified cerebral artery occlusion with cerebral infarction         Social History     Social History    Marital status:      Spouse name: N/A    Number of children: N/A    Years of education: N/A     Occupational History    Not on file. Social History Main Topics    Smoking status: Never Smoker    Smokeless tobacco: Never Used    Alcohol use No    Drug use: No    Sexual activity: Not on file     Other Topics Concern    Not on file     Social History Narrative    ** Merged History Encounter **            Current Outpatient Prescriptions   Medication Sig Dispense Refill    ibuprofen (MOTRIN) 800 mg tablet Take  by mouth.  oxyCODONE-acetaminophen (PERCOCET 7.5) 7.5-325 mg per tablet Take 1 Tab by mouth every four (4) hours as needed for Pain. Max Daily Amount: 6 Tabs. 60 Tab 0    ALBUTEROL SULFATE (PROAIR HFA IN) Take  by inhalation.  montelukast (SINGULAIR) 10 mg tablet Take 10 mg by mouth daily.  tiotropium (SPIRIVA WITH HANDIHALER) 18 mcg inhalation capsule Take 1 Cap by inhalation daily.  amLODIPine (NORVASC) 10 mg tablet Take  by mouth daily.  bethanechol (URECHOLINE) 10 mg tablet Take 10 mg by mouth Before breakfast, lunch, and dinner.  aspirin 81 mg tablet Take 81 mg by mouth.  ergocalciferol (VITAMIN D2) 50,000 unit capsule Take 50,000 Units by mouth Every Thursday.  gabapentin (NEURONTIN) 100 mg capsule Take 2 Caps by mouth three (3) times daily.  Indications: NEUROPATHIC PAIN 90 Cap 1    naloxone (NARCAN) 4 mg/actuation spry 1 mg by Nasal route as needed. Indications: OPIATE-INDUCED RESPIRATORY DEPRESSION, OPIOID TOXICITY 1 Container 1    oxyCODONE-acetaminophen (PERCOCET) 5-325 mg per tablet Take 1-2 tabs every 4-6 hours prn 12 Tab 0    diclofenac (VOLTAREN) 1 % gel Apply  to affected area every six (6) hours. Apply 4 grams to affected joint up to 4 times per day, maximum 16 grams per joint per day  Dispense 5 100 gram tubes 100 g 4    methocarbamol (ROBAXIN) 500 mg tablet Take 1 Tab by mouth four (4) times daily. 20 Tab 0    naproxen (NAPROSYN) 375 mg tablet Take 1 Tab by mouth two (2) times daily (with meals). 14 Tab 0    triamterene-hydrochlorothiazide (DYAZIDE) 37.5-25 mg per capsule   5    desonide (TRIDESILON) 0.05 % cream   0    FLUTICASONE/SALMETEROL (ADVAIR HFA IN) Take  by inhalation.  lisinopril (PRINIVIL, ZESTRIL) 20 mg tablet Take  by mouth daily.  esomeprazole (NEXIUM) 40 mg capsule Take  by mouth daily. No Known Allergies     Ambulates with a single point cane. PHYSICAL EXAMINATION    Visit Vitals    /84 (BP 1 Location: Right arm, BP Patient Position: Sitting)    Pulse 98    Resp 18    Ht 5' 5\" (1.651 m)    Wt 198 lb 6.4 oz (90 kg)    BMI 33.02 kg/m2       CONSTITUTIONAL: NAD, A&O x 3  SENSATION: Intact to light touch throughout  RANGE OF MOTION: The patient has full passive range of motion in all four extremities. MOTOR:  Straight Leg Raise: Negative, bilateral               Hip Flex Knee Ext Knee Flex Ankle DF GTE Ankle PF Tone   Right +4/5 +4/5 +4/5 +4/5 +4/5 +4/5 +4/5   Left +4/5 +4/5 +4/5 +4/5 +4/5 +4/5 +4/5     RADIOGRAPHS  Preliminary reading of lumbar plain films:   Anterior osteophytes at L2, L3, L4, L5. No acute pathology identified. These are being sent out for official reading by Dr. Andrey Chew. ROSS Monsivais was seen today for back pain and leg pain.     Diagnoses and all orders for this visit:    Low back pain with sciatica, unspecified back pain laterality, unspecified chronicity  -     [96302] L/S 2-3 views  -     SCHEDULE SURGERY    Displacement of lumbar intervertebral disc without myelopathy  -     SCHEDULE SURGERY    Thoracic or lumbosacral neuritis or radiculitis, unspecified  -     SCHEDULE SURGERY    Displacement of cervical intervertebral disc without myelopathy  -     SCHEDULE SURGERY    Spinal stenosis, cervicothoracic region  -     SCHEDULE SURGERY      IMPRESSION AND PLAN:  Patient is neurologically intact. We discussed multiple treatment options. I informed patient that it has been my policy to not prescribe narcotics to patients who have previously been discharged from pain management. I explained to patient that I would not be taking over her chronic pain management or prescribe any scheduled medications. She is uninterested in surgical intervention. Patient elects to proceed with lumbar blocks. I will order a L4/5 epidural. I will see the patient back following block. Written by Angella Miguel, as dictated by Mario Eduardo MD  I examined the patient, reviewed and agree with the note.

## 2017-05-07 ENCOUNTER — HOSPITAL ENCOUNTER (EMERGENCY)
Age: 67
Discharge: LEFT BEFORE DISCHARGE | End: 2017-05-07
Attending: EMERGENCY MEDICINE
Payer: MEDICARE

## 2017-05-07 VITALS
DIASTOLIC BLOOD PRESSURE: 85 MMHG | WEIGHT: 180 LBS | TEMPERATURE: 98 F | SYSTOLIC BLOOD PRESSURE: 156 MMHG | HEIGHT: 65 IN | RESPIRATION RATE: 16 BRPM | OXYGEN SATURATION: 99 % | BODY MASS INDEX: 29.99 KG/M2 | HEART RATE: 95 BPM

## 2017-05-07 PROCEDURE — 77030013179 HC SHOE PSTOP OPN DJOR -A

## 2017-05-07 PROCEDURE — 75810000275 HC EMERGENCY DEPT VISIT NO LEVEL OF CARE

## 2017-05-07 PROCEDURE — L3809 WHFO W/O JOINTS PRE OTS: HCPCS

## 2017-05-07 NOTE — ED TRIAGE NOTES
Pt presents to the ED with right foot pain onset x2 weeks. Pt states increasing pain. Pt reports taking taking leftover 7.5 mg of Percocet, states some relief. Pt denies falls or injuries.

## 2017-05-09 ENCOUNTER — APPOINTMENT (OUTPATIENT)
Dept: GENERAL RADIOLOGY | Age: 67
End: 2017-05-09
Attending: PHYSICIAN ASSISTANT
Payer: MEDICARE

## 2017-05-09 ENCOUNTER — HOSPITAL ENCOUNTER (EMERGENCY)
Age: 67
Discharge: HOME | End: 2017-05-09
Attending: EMERGENCY MEDICINE | Admitting: EMERGENCY MEDICINE
Payer: MEDICARE

## 2017-05-09 VITALS
HEIGHT: 65 IN | WEIGHT: 190 LBS | OXYGEN SATURATION: 97 % | SYSTOLIC BLOOD PRESSURE: 148 MMHG | BODY MASS INDEX: 31.65 KG/M2 | DIASTOLIC BLOOD PRESSURE: 86 MMHG | HEART RATE: 93 BPM | TEMPERATURE: 98 F

## 2017-05-09 DIAGNOSIS — M79.671 RIGHT FOOT PAIN: Primary | ICD-10-CM

## 2017-05-09 PROCEDURE — 74011250637 HC RX REV CODE- 250/637: Performed by: PHYSICIAN ASSISTANT

## 2017-05-09 PROCEDURE — L1902 AFO ANKLE GAUNTLET PRE OTS: HCPCS

## 2017-05-09 PROCEDURE — 73630 X-RAY EXAM OF FOOT: CPT

## 2017-05-09 PROCEDURE — 99282 EMERGENCY DEPT VISIT SF MDM: CPT

## 2017-05-09 RX ORDER — OXYCODONE AND ACETAMINOPHEN 7.5; 325 MG/1; MG/1
1 TABLET ORAL
Qty: 12 TAB | Refills: 0 | Status: SHIPPED | OUTPATIENT
Start: 2017-05-09 | End: 2017-05-12 | Stop reason: SDUPTHER

## 2017-05-09 RX ORDER — OXYCODONE AND ACETAMINOPHEN 5; 325 MG/1; MG/1
2 TABLET ORAL
Status: COMPLETED | OUTPATIENT
Start: 2017-05-09 | End: 2017-05-09

## 2017-05-09 RX ADMIN — OXYCODONE HYDROCHLORIDE AND ACETAMINOPHEN 2 TABLET: 5; 325 TABLET ORAL at 13:46

## 2017-05-09 NOTE — ED NOTES
Frederic Bolton is a 73 y.o. female that was discharged in good condition. The patients diagnosis, condition and treatment were explained to  patient and aftercare instructions were given. The patient verbalized understanding. Patient armband removed and shredded.

## 2017-05-09 NOTE — DISCHARGE INSTRUCTIONS

## 2017-05-09 NOTE — ED PROVIDER NOTES
HPI Comments: 73year old female to the ED with C/O right foot pain for the past three weeks. Denies history of gout, denies history of falls. States she is limping due to the pain. The history is provided by the patient. Past Medical History:   Diagnosis Date    Anemia     Asthma     Blood disorder     Cancer ([address]     breast, diagnosed 2006 left    Cancer [facility])     Chest pain, unspecified     The working diagnosis is chest pain. The differential diagnosis includes chest wall pain, stable angina.  Chronic obstructive pulmonary disease (HCC)     Essential hypertension     Essential hypertension, benign     Uncontrolled     GERD (gastroesophageal reflux disease)     Hypercholesterolemia     Hypertension     Neuropathy     Nonspecific abnormal electrocardiogram (ECG) (EKG)     Obesity, unspecified     Weight loss has been strongly encouraged by following dietary restrictions and an exercise routine. [facility]     as a young child    Pre-operative cardiovascular examination     Sciatica     Unspecified cerebral artery occlusion with cerebral infarction        Past Surgical History:   Procedure Laterality Date    BREAST SURGERY PROCEDURE UNLISTED      initial surgery 2006, then left modified radical mastectomy 6/2012    HX BREAST BIOPSY  4/11/12    Left    HX BREAST BIOPSY Left          Family History:   Problem Relation Age of Onset    Cancer Mother     Cancer Father     Arthritis-osteo Other     Hypertension Other     Heart Disease Neg Hx      Negative family history of premature CAD or CVA       Social History     Social History    Marital status:      Spouse name: N/A    Number of children: N/A    Years of education: N/A     Occupational History    Not on file.      Social History Main Topics    Smoking status: Never Smoker    Smokeless tobacco: Never Used    Alcohol use No    Drug use: No    Sexual activity: Not on file     Other Topics Concern    Not on file     Social History Narrative    ** Merged History Encounter **              ALLERGIES: Review of patient's allergies indicates no known allergies. Review of Systems   Constitutional: Negative for chills and fever. HENT: Negative. Respiratory: Negative for chest tightness, shortness of breath and wheezing. Gastrointestinal: Negative for abdominal pain, diarrhea, nausea and vomiting. Genitourinary: Negative for dysuria, flank pain and frequency. Musculoskeletal: Positive for arthralgias. Right foot pain   Skin: Negative. Neurological: Negative. Vitals:    05/09/17 1257   BP: 148/86   Pulse: 93   Temp: 98 °F (36.7 °C)   SpO2: 97%   Weight: 86.2 kg (190 lb)   Height: 5' 5\" (1.651 m)            Physical Exam   Constitutional: She is oriented to person, place, and time. She appears well-developed and well-nourished. No distress. HENT:   Head: Normocephalic and atraumatic. Eyes: EOM are normal. Pupils are equal, round, and reactive to light. No scleral icterus. Neck: Neck supple. No tracheal deviation present. Cardiovascular: Normal rate, regular rhythm and normal heart sounds. Exam reveals no gallop and no friction rub. No murmur heard. Pulmonary/Chest: Effort normal and breath sounds normal. No respiratory distress. She has no wheezes. She has no rales. Musculoskeletal:   FROM of  BLE against resistance in flexion and extension with 5/5 strength. Non tender to bilateral hands/hips/knees/ankles. There is TTP over the lateral aspect of the right foot without gross edema or deformity. Patient can ambulate on the foot with assistance of a cane. Pulses intact and equal.       Lymphadenopathy:     She has no cervical adenopathy. Neurological: She is alert and oriented to person, place, and time. No cranial nerve deficit. Skin: Skin is warm and dry. No rash noted. She is not diaphoretic. No erythema. No pallor. Psychiatric: She has a normal mood and affect.  Her behavior is normal.   Nursing note and vitals reviewed. MDM  Number of Diagnoses or Management Options  Diagnosis management comments: XR prelim by me:  + arthritis. + small calcaneal spur. No fracture. Impression;  Right foot pain. Suspect it could be related to an arthritic process vs a fasciitis. Plan to place in post op shoe, give small amount of pain meds, have her follow with PCP and podiatry. She agrees with the plan.   MATA Larson 1:44 PM           Amount and/or Complexity of Data Reviewed  Tests in the radiology section of CPT®: ordered and reviewed    Risk of Complications, Morbidity, and/or Mortality  Presenting problems: low  Diagnostic procedures: low  Management options: low    Patient Progress  Patient progress: stable    ED Course       Procedures

## 2017-05-09 NOTE — ED TRIAGE NOTES
Patient c/o pain to the right foot for 3 weeks. Patient denies any injury to it.  Patient is using a cane to walk

## 2017-05-12 ENCOUNTER — OFFICE VISIT (OUTPATIENT)
Dept: SURGERY | Age: 67
End: 2017-05-12

## 2017-05-12 VITALS
WEIGHT: 197 LBS | RESPIRATION RATE: 20 BRPM | HEIGHT: 65 IN | DIASTOLIC BLOOD PRESSURE: 79 MMHG | TEMPERATURE: 97.9 F | SYSTOLIC BLOOD PRESSURE: 130 MMHG | BODY MASS INDEX: 32.82 KG/M2 | HEART RATE: 93 BPM

## 2017-05-12 DIAGNOSIS — G89.4 CHRONIC PAIN SYNDROME: Primary | ICD-10-CM

## 2017-05-12 DIAGNOSIS — I89.0 LYMPHEDEMA OF LEFT ARM: ICD-10-CM

## 2017-05-12 RX ORDER — OXYCODONE AND ACETAMINOPHEN 7.5; 325 MG/1; MG/1
1 TABLET ORAL
Qty: 12 TAB | Refills: 0 | Status: SHIPPED | OUTPATIENT
Start: 2017-05-12 | End: 2017-06-21 | Stop reason: SDUPTHER

## 2017-05-12 NOTE — MR AVS SNAPSHOT
Visit Information Date & Time Provider Department Dept. Phone Encounter #  
 5/12/2017  1:00 PM MD FREDIS Cummins [facility] [phone] [id number] Your Appointments 6/9/2017  1:00 PM  
Follow Up with Freddy Elizabeth MD  
[facility] [place] ([address]) Appt Note: fu block [address] [facility] [address]  
[address] 6/13/2017 11:30 AM  
Follow Up with MD FREDIS Cummins [facility] ([address]) Appt Note: 1 month f/up [address] [facility] [address] Upcoming Health Maintenance Date Due Hepatitis C Screening 1950 DTaP/Tdap/Td series (1 - Tdap) 11/24/1972 ZOSTER VACCINE AGE 60> 11/24/2011 FOBT Q 1 YEAR AGE 50-75 6/9/2013 GLAUCOMA SCREENING Q2Y 11/24/2016 OSTEOPOROSIS SCREENING (DEXA) 11/24/2016 Pneumococcal 65+ High/Highest Risk (1 of 2 - PCV13) 11/24/2016 MEDICARE YEARLY EXAM 11/24/2016 INFLUENZA AGE 9 TO ADULT 8/1/2017 BREAST CANCER SCRN MAMMOGRAM 1/25/2019 Allergies as of 5/12/2017  Review Complete On: 5/12/2017 By: Mandie Miles MD  
 No Known Allergies Current Immunizations  Never Reviewed No immunizations on file. Not reviewed this visit You Were Diagnosed With   
  
 Codes Comments Chronic pain syndrome    -  Primary ICD-10-CM: G89.4 ICD-9-CM: 338. 4 Vitals BP Pulse Temp Resp Height(growth percentile) Weight(growth percentile) 130/79 93 97.9 °F (36.6 °C) 20 5' 5\" (1.651 m) 197 lb (89.4 kg) BMI OB Status Smoking Status 32.78 kg/m2 Postmenopausal Never Smoker Vitals History BMI and BSA Data Body Mass Index Body Surface Area  32.78 kg/m 2 2.02 m 2  
  
  
 Preferred Pharmacy Pharmacy Name Phone [address] [facility], [address] Your Updated Medication List  
  
   
This list is accurate as of: 5/12/17  1:58 PM.  Always use your most recent med list.  
  
  
  
  
 ADVAIR HFA IN Take  by inhalation. amLODIPine 10 mg tablet Commonly known as:  Zelda Grebe Take  by mouth daily. aspirin 81 mg tablet Take 81 mg by mouth. bethanechol 10 mg tablet Commonly known as:  URECHOLINE Take 10 mg by mouth Before breakfast, lunch, and dinner. desonide 0.05 % cream  
Commonly known as:  TRIDESILON  
  
 diclofenac 1 % Gel Commonly known as:  VOLTAREN Apply  to affected area every six (6) hours. Apply 4 grams to affected joint up to 4 times per day, maximum 16 grams per joint per day Dispense 5 100 gram tubes  
  
 gabapentin 100 mg capsule Commonly known as:  NEURONTIN Take 2 Caps by mouth three (3) times daily. Indications: NEUROPATHIC PAIN  
  
 ibuprofen 800 mg tablet Commonly known as:  MOTRIN Take  by mouth.  
  
 lisinopril 20 mg tablet Commonly known as:  Valene Pencil Take  by mouth daily. methocarbamol 500 mg tablet Commonly known as:  ROBAXIN Take 1 Tab by mouth four (4) times daily. naproxen 375 mg tablet Commonly known as:  NAPROSYN Take 1 Tab by mouth two (2) times daily (with meals). NexIUM 40 mg capsule Generic drug:  esomeprazole Take  by mouth daily. oxyCODONE-acetaminophen 7.5-325 mg per tablet Commonly known as:  PERCOCET 7.5 Take 1 Tab by mouth every eight (8) hours as needed for Pain. Max Daily Amount: 3 Tabs. PROAIR HFA IN Take  by inhalation. SINGULAIR 10 mg tablet Generic drug:  montelukast  
Take 10 mg by mouth daily. SPIRIVA WITH HANDIHALER 18 mcg inhalation capsule Generic drug:  tiotropium Take 1 Cap by inhalation daily. triamterene-hydroCHLOROthiazide 37.5-25 mg per capsule Commonly known as:  DYAZIDE  
  
 VITAMIN D2 50,000 unit capsule Generic drug:  ergocalciferol Take 50,000 Units by mouth Every Thursday. Prescriptions Printed Refills  
 oxyCODONE-acetaminophen (PERCOCET 7.5) 7.5-325 mg per tablet 0 Sig: Take 1 Tab by mouth every eight (8) hours as needed for Pain. Max Daily Amount: 3 Tabs. Class: Print Route: Oral  
  
To-Do List   
 05/12/2017 Lab:  DRUG SCREEN, URINE Introducing [facility] & HEALTH SERVICES! [facility] introduces [employer] patient portal. Now you can access parts of your medical record, email your doctor's office, and request medication refills online. 1. In your internet browser, go to [email or web address] 2. Click on the First Time User? Click Here link in the Sign In box. You will see the New Member Sign Up page. 3. Enter your HealthC[employer]d Access Code exactly as it appears below. You will not need to use this code after youve completed the sign-up process. If you do not sign up before the expiration date, you must request a new code. · [employer]d Access Code: [email or web address] Expires: 8/10/2017  1:58 PM 
 
4. Enter the last four digits of your Social Security Number (xxxx) and Date of Birth (mm/dd/yyyy) as indicated and click Submit. You will be taken to the next sign-up page. 5. Create a HealthC[employer]d ID. This will be your HealthCrowd login ID and cannot be changed, so think of one that is secure and easy to remember. 6. Create a [employer]d password. You can change your password at any time. 7. Enter your Password Reset Question and Answer. This can be used at a later time if you forget your password. 8. Enter your e-mail address. You will receive e-mail notification when new information is available in [address] 9. Click Sign Up. You can now view and download portions of your medical record. 10. Click the Download Summary menu link to download a portable copy of your medical information. If you have questions, please visit the Frequently Asked Questions section of the [employer] website. Remember, [employer] is NOT to be used for urgent needs. For medical emergencies, dial 911. Now available from your iPhone and Android! Please provide this summary of care documentation to your next provider. Your primary care clinician is listed as Keke Solares. If you have any questions after today's visit, please call [phone].

## 2017-06-07 ENCOUNTER — HOSPITAL ENCOUNTER (OUTPATIENT)
Dept: LAB | Age: 67
Discharge: HOME | End: 2017-06-07
Payer: MEDICARE

## 2017-06-07 DIAGNOSIS — G89.4 CHRONIC PAIN SYNDROME: ICD-10-CM

## 2017-06-07 LAB
AMPHET UR QL SCN: NEGATIVE
BARBITURATES UR QL SCN: NEGATIVE
BENZODIAZ UR QL: NEGATIVE
CANNABINOIDS UR QL SCN: NEGATIVE
COCAINE UR QL SCN: NEGATIVE
HDSCOM,HDSCOM: ABNORMAL
METHADONE UR QL: NEGATIVE
OPIATES UR QL: POSITIVE
PCP UR QL: NEGATIVE

## 2017-06-07 PROCEDURE — 36415 COLL VENOUS BLD VENIPUNCTURE: CPT | Performed by: SURGERY

## 2017-06-07 PROCEDURE — 80307 DRUG TEST PRSMV CHEM ANLYZR: CPT | Performed by: SURGERY

## 2017-06-21 ENCOUNTER — OFFICE VISIT (OUTPATIENT)
Dept: SURGERY | Age: 67
End: 2017-06-21

## 2017-06-21 VITALS
WEIGHT: 195 LBS | SYSTOLIC BLOOD PRESSURE: 128 MMHG | HEART RATE: 92 BPM | HEIGHT: 65 IN | RESPIRATION RATE: 16 BRPM | BODY MASS INDEX: 32.49 KG/M2 | DIASTOLIC BLOOD PRESSURE: 72 MMHG | TEMPERATURE: 97.8 F

## 2017-06-21 DIAGNOSIS — G89.4 CHRONIC PAIN SYNDROME: Primary | ICD-10-CM

## 2017-06-21 RX ORDER — OXYCODONE AND ACETAMINOPHEN 7.5; 325 MG/1; MG/1
1 TABLET ORAL
Qty: 30 TAB | Refills: 0 | Status: SHIPPED | OUTPATIENT
Start: 2017-06-21 | End: 2017-10-14

## 2017-06-21 NOTE — PROGRESS NOTES
[facility]  General Surgery    Subjective:  Patient returns today to it for evaluation of lymphedema. He has been 6 weeks since her last visit. She states that she has been out of the narcotic Percocet for 4 weeks. She admits that she does not use the gabapentin regularly. I explained to the patient that if she is not going to use the gabapentin regularly it will not help her because it needs to be at a steady state in her system. She voiced an understanding of this. She is supposed be taking 200 mg p.o. 3 times daily. She states that she does not take the gabapentin because it does not work for her like the [address] does I explained to her that I understand that the medicine does not work the way the [address] does. However, she needs to take the medication the way that it is written or I would no longer give her the Percocet either. The purpose of the gabapentin is to slow the impulses of the pain to the brain thereby decrease in pain. The narcotic works by making not care about the pain. The patient voiced an understanding of this and stated that she would take the gabapentin as written. I did ask her to take a another drug test.  Objective:  Vitals:    06/21/17 1322   BP: 128/72   Pulse: 92   Resp: 16   Temp: 97.8 °F (36.6 °C)   TempSrc: Oral   Weight: 88.5 kg (195 lb)   Height: 5' 5\" (1.651 m)       Physical Exam:    General: Awake and alert, oriented ×4, no apparent distress   Breasts: The left upper extremity is swollen the glove and the sleeve are in place. The patient states that she is wearing these religiously. No cellulitis no erythema. There is some tenderness to touch along the arm. Current Medications:  Current Outpatient Prescriptions   Medication Sig Dispense Refill    oxyCODONE-acetaminophen (PERCOCET 7.5) 7.5-325 mg per tablet Take 1 Tab by mouth every eight (8) hours as needed for Pain. Max Daily Amount: 3 Tabs.  30 Tab 0    ibuprofen (MOTRIN) 800 mg tablet Take  by mouth.  gabapentin (NEURONTIN) 100 mg capsule Take 2 Caps by mouth three (3) times daily. Indications: NEUROPATHIC PAIN 90 Cap 1    diclofenac (VOLTAREN) 1 % gel Apply  to affected area every six (6) hours. Apply 4 grams to affected joint up to 4 times per day, maximum 16 grams per joint per day  Dispense 5 100 gram tubes 100 g 4    naproxen (NAPROSYN) 375 mg tablet Take 1 Tab by mouth two (2) times daily (with meals). 14 Tab 0    triamterene-hydrochlorothiazide (DYAZIDE) 37.5-25 mg per capsule   5    desonide (TRIDESILON) 0.05 % cream   0    ALBUTEROL SULFATE (PROAIR HFA IN) Take  by inhalation.  FLUTICASONE/SALMETEROL (ADVAIR HFA IN) Take  by inhalation.  montelukast (SINGULAIR) 10 mg tablet Take 10 mg by mouth daily.  tiotropium (SPIRIVA WITH HANDIHALER) 18 mcg inhalation capsule Take 1 Cap by inhalation daily.  lisinopril (PRINIVIL, ZESTRIL) 20 mg tablet Take  by mouth daily.  amLODIPine (NORVASC) 10 mg tablet Take  by mouth daily.  bethanechol (URECHOLINE) 10 mg tablet Take 10 mg by mouth Before breakfast, lunch, and dinner.  esomeprazole (NEXIUM) 40 mg capsule Take  by mouth daily.  aspirin 81 mg tablet Take 81 mg by mouth.  ergocalciferol (VITAMIN D2) 50,000 unit capsule Take 50,000 Units by mouth Every Thursday.  methocarbamol (ROBAXIN) 500 mg tablet Take 1 Tab by mouth four (4) times daily. 20 Tab 0       Chart and notes reviewed. Data reviewed. I have evaluated and examined the patient. Impression and plan:  Patient with lymphedema of the left upper extremity following left modified radical mastectomy with immediate reconstruction. The patient needs to take the gabapentin. 200 mg p.o. 3 times daily  Percocet 7.5/325 mg dispense  The representative from the [employer] is working toward getting the patient a pump for lymphedema.      Rossy Ley MD

## 2017-06-29 NOTE — PROGRESS NOTES
Shon Gipson M.D. FACS  PROGRESS NOTE    Name: Allen Webster MRN: [id number]   : 1950 Hospital: [facility]   Date: 2017 Admission Date: No admission date for patient encounter. Subjective:  Pt without new c/o but continued c/o of the LUE pain and swelling. Objective:  Vitals:    17 1408   BP: 138/76   Pulse: 98   Resp: 16   Weight: 88 kg (194 lb)   Height: 5' 5\" (1.651 m)       Physical Exam:    General: in no apparent distress    Lungs: normal air entry   Heart: Regular rate and rhythm   Left upper ext: moderate non pitting edema, tender in the upper arm and forearm, no open wounds,  No discoloration    Labs:  No results found for this or any previous visit (from the past 24 hour(s)). Current Medications:  Current Outpatient Prescriptions   Medication Sig Dispense Refill    gabapentin (NEURONTIN) 100 mg capsule Take 2 Caps by mouth three (3) times daily. Indications: NEUROPATHIC PAIN 90 Cap 1    oxyCODONE-acetaminophen (PERCOCET 7.5) 7.5-325 mg per tablet Take 1 Tab by mouth every eight (8) hours as needed for Pain. Max Daily Amount: 3 Tabs. 30 Tab 0    ibuprofen (MOTRIN) 800 mg tablet Take  by mouth.  diclofenac (VOLTAREN) 1 % gel Apply  to affected area every six (6) hours. Apply 4 grams to affected joint up to 4 times per day, maximum 16 grams per joint per day  Dispense 5 100 gram tubes 100 g 4    methocarbamol (ROBAXIN) 500 mg tablet Take 1 Tab by mouth four (4) times daily. 20 Tab 0    naproxen (NAPROSYN) 375 mg tablet Take 1 Tab by mouth two (2) times daily (with meals). 14 Tab 0    triamterene-hydrochlorothiazide (DYAZIDE) 37.5-25 mg per capsule   5    desonide (TRIDESILON) 0.05 % cream   0    ALBUTEROL SULFATE (PROAIR HFA IN) Take  by inhalation.  FLUTICASONE/SALMETEROL (ADVAIR HFA IN) Take  by inhalation.  montelukast (SINGULAIR) 10 mg tablet Take 10 mg by mouth daily.       tiotropium (SPIRIVA WITH HANDIHALER) 18 mcg inhalation capsule Take 1 Cap by inhalation daily.  lisinopril (PRINIVIL, ZESTRIL) 20 mg tablet Take  by mouth daily.  amLODIPine (NORVASC) 10 mg tablet Take  by mouth daily.  bethanechol (URECHOLINE) 10 mg tablet Take 10 mg by mouth Before breakfast, lunch, and dinner.  esomeprazole (NEXIUM) 40 mg capsule Take  by mouth daily.  aspirin 81 mg tablet Take 81 mg by mouth.  ergocalciferol (VITAMIN D2) 50,000 unit capsule Take 50,000 Units by mouth Every Thursday. Chart and notes reviewed. Data reviewed. I have evaluated and examined the patient. IMPRESSION:   · Pt is with lymphedema of the LUE following Left MRM for left breast cancer.        PLAN:/DISCUSION:   · Referral to pain management   · Percocet/gabapentin/mobic  · UDS to confirm usage of medication  · F/u 1 month        Malu Donato MD

## 2017-06-30 ENCOUNTER — HOSPITAL ENCOUNTER (OUTPATIENT)
Dept: LAB | Age: 67
Discharge: HOME | End: 2017-06-30
Payer: MEDICARE

## 2017-06-30 PROCEDURE — 80307 DRUG TEST PRSMV CHEM ANLYZR: CPT | Performed by: SURGERY

## 2017-06-30 PROCEDURE — 36415 COLL VENOUS BLD VENIPUNCTURE: CPT | Performed by: SURGERY

## 2017-07-03 ENCOUNTER — OFFICE VISIT (OUTPATIENT)
Dept: ORTHOPEDIC SURGERY | Age: 67
End: 2017-07-03

## 2017-07-03 VITALS
SYSTOLIC BLOOD PRESSURE: 164 MMHG | DIASTOLIC BLOOD PRESSURE: 84 MMHG | HEIGHT: 66 IN | BODY MASS INDEX: 29.73 KG/M2 | HEART RATE: 94 BPM | WEIGHT: 185 LBS | TEMPERATURE: 99 F

## 2017-07-03 DIAGNOSIS — M48.061 LUMBAR SPINAL STENOSIS: Primary | ICD-10-CM

## 2017-07-03 DIAGNOSIS — M54.16 LUMBAR NEURITIS: ICD-10-CM

## 2017-07-03 DIAGNOSIS — M51.26 OTHER INTERVERTEBRAL DISC DISPLACEMENT, LUMBAR REGION: ICD-10-CM

## 2017-07-03 DIAGNOSIS — M51.36 OTHER INTERVERTEBRAL DISC DEGENERATION, LUMBAR REGION: ICD-10-CM

## 2017-07-03 RX ORDER — MELOXICAM 15 MG/1
TABLET ORAL
Refills: 0 | COMMUNITY
Start: 2017-06-27 | End: 2017-10-14

## 2017-07-03 RX ORDER — DULOXETIN HYDROCHLORIDE 30 MG/1
30 CAPSULE, DELAYED RELEASE ORAL DAILY
Qty: 30 CAP | Refills: 1 | Status: SHIPPED | OUTPATIENT
Start: 2017-07-03 | End: 2017-10-28

## 2017-07-03 RX ORDER — DICYCLOMINE HYDROCHLORIDE 20 MG/1
20 TABLET ORAL EVERY 6 HOURS
Refills: 0 | COMMUNITY
Start: 2017-05-28 | End: 2022-08-11

## 2017-07-03 NOTE — MR AVS SNAPSHOT
Visit Information Date & Time Provider Department Dept. Phone Encounter #  
 7/3/2017  2:45 PM Long Mejia  [facility] [address] [facility] - SPECIALTY [facility] [phone] [id number] Follow-up Instructions Return in about 4 weeks (around 7/31/2017). Your Appointments 7/19/2017  2:00 PM  
Follow Up with MD FREDIS Yeboah [facility] ([facility]) Appt Note: 2 wk f/up; 2 wk f/up [address] [place] [address] [address] [facility] [address] [facility] [address] Upcoming Health Maintenance Date Due Hepatitis C Screening 1950 DTaP/Tdap/Td series (1 - Tdap) 11/24/1972 ZOSTER VACCINE AGE 60> 11/24/2011 FOBT Q 1 YEAR AGE 50-75 6/9/2013 GLAUCOMA SCREENING Q2Y 11/24/2016 OSTEOPOROSIS SCREENING (DEXA) 11/24/2016 Pneumococcal 65+ High/Highest Risk (1 of 2 - PCV13) 11/24/2016 MEDICARE YEARLY EXAM 11/24/2016 INFLUENZA AGE 9 TO ADULT 8/1/2017 BREAST CANCER SCRN MAMMOGRAM 1/25/2019 Allergies as of 7/3/2017  Review Complete On: 7/3/2017 By: Long Mejia MD  
 No Known Allergies Current Immunizations  Never Reviewed No immunizations on file. Not reviewed this visit You Were Diagnosed With   
  
 Codes Comments Lumbar spinal stenosis    -  Primary ICD-10-CM: M48.06 
ICD-9-CM: 724.02 Other intervertebral disc displacement, lumbar region     ICD-10-CM: M51.26 
ICD-9-CM: 722.10 Lumbar neuritis     ICD-10-CM: M54.16 
ICD-9-CM: 724.4 Other intervertebral disc degeneration, lumbar region     ICD-10-CM: M51.36 
ICD-9-CM: 722.52 Vitals BP Pulse Temp Height(growth percentile) Weight(growth percentile) BMI  
 164/84 94 99 °F (37.2 °C) 5' 5.5\" (1.664 m) 185 lb (83.9 kg) 30.32 kg/m2 OB Status Smoking Status Postmenopausal Never Smoker BMI and BSA Data Body Mass Index Body Surface Area  
 30.32 kg/m 2 1.97 m 2 Preferred Pharmacy Pharmacy Name Phone [address] [facility], [address] Your Updated Medication List  
  
   
This list is accurate as of: 7/3/17  4:21 PM.  Always use your most recent med list.  
  
  
  
  
 ADVAIR HFA IN Take  by inhalation. amLODIPine 10 mg tablet Commonly known as:  Remonia Grates Take  by mouth daily. aspirin 81 mg tablet Take 81 mg by mouth. bethanechol 10 mg tablet Commonly known as:  URECHOLINE Take 10 mg by mouth Before breakfast, lunch, and dinner. desonide 0.05 % cream  
Commonly known as:  TRIDESILON  
  
 diclofenac 1 % Gel Commonly known as:  VOLTAREN Apply  to affected area every six (6) hours. Apply 4 grams to affected joint up to 4 times per day, maximum 16 grams per joint per day Dispense 5 100 gram tubes  
  
 dicyclomine 20 mg tablet Commonly known as:  BENTYL DULoxetine 30 mg capsule Commonly known as:  CYMBALTA Take 1 Cap by mouth daily. gabapentin 100 mg capsule Commonly known as:  NEURONTIN Take 2 Caps by mouth three (3) times daily. Indications: NEUROPATHIC PAIN  
  
 ibuprofen 800 mg tablet Commonly known as:  MOTRIN Take  by mouth.  
  
 lisinopril 20 mg tablet Commonly known as:  Radhames Littler Take  by mouth daily. meloxicam 15 mg tablet Commonly known as:  MOBIC  
TAKE 1 TAB ONCE A DAY AS NEEDED FOR PAIN  
  
 methocarbamol 500 mg tablet Commonly known as:  ROBAXIN Take 1 Tab by mouth four (4) times daily. naproxen 375 mg tablet Commonly known as:  NAPROSYN Take 1 Tab by mouth two (2) times daily (with meals). NexIUM 40 mg capsule Generic drug:  esomeprazole Take  by mouth daily. oxyCODONE-acetaminophen 7.5-325 mg per tablet Commonly known as:  PERCOCET 7.5 Take 1 Tab by mouth every eight (8) hours as needed for Pain.  Max Daily Amount: 3 Tabs. PROAIR HFA IN Take  by inhalation. SINGULAIR 10 mg tablet Generic drug:  montelukast  
Take 10 mg by mouth daily. SPIRIVA WITH HANDIHALER 18 mcg inhalation capsule Generic drug:  tiotropium Take 1 Cap by inhalation daily. triamterene-hydroCHLOROthiazide 37.5-25 mg per capsule Commonly known as:  DYAZIDE  
  
 VITAMIN D2 50,000 unit capsule Generic drug:  ergocalciferol Take 50,000 Units by mouth Every Thursday. Prescriptions Sent to Pharmacy Refills DULoxetine (CYMBALTA) 30 mg capsule 1 Sig: Take 1 Cap by mouth daily. Class: Normal  
 Pharmacy: [id number], [address] [facility] Ph #: [phone] Route: Oral  
  
Follow-up Instructions Return in about 4 weeks (around 7/31/2017). Introducing [facility] & HEALTH SERVICES! Mary Wills introduces [employer] patient portal. Now you can access parts of your medical record, email your doctor's office, and request medication refills online. 1. In your internet browser, go to [email or web address] 2. Click on the First Time User? Click Here link in the Sign In box. You will see the New Member Sign Up page. 3. Enter your Ret[employer]Ps Access Code exactly as it appears below. You will not need to use this code after youve completed the sign-up process. If you do not sign up before the expiration date, you must request a new code. · [employer] Access Code: [email or web address] Expires: 8/10/2017  1:58 PM 
 
4. Enter the last four digits of your Social Security Number (xxxx) and Date of Birth (mm/dd/yyyy) as indicated and click Submit. You will be taken to the next sign-up page. 5. Create a [employer] ID. This will be your Ret[employer]Ps login ID and cannot be changed, so think of one that is secure and easy to remember. 6. Create a [employer]Ps password. You can change your password at any time. 7. Enter your Password Reset Question and Answer.  This can be used at a later time if you forget your password. 8. Enter your e-mail address. You will receive e-mail notification when new information is available in [address] 9. Click Sign Up. You can now view and download portions of your medical record. 10. Click the Download Summary menu link to download a portable copy of your medical information. If you have questions, please visit the Frequently Asked Questions section of the [employer] website. Remember, [employer] is NOT to be used for urgent needs. For medical emergencies, dial 911. Now available from your iPhone and Android! Please provide this summary of care documentation to your next provider. Your primary care clinician is listed as Koffi Patino. If you have any questions after today's visit, please call [phone].

## 2017-07-03 NOTE — PROGRESS NOTES
[facility]  [address] [facility]  [address], [address]   Phone: [phone]  Fax: [phone]        PROGRESS NOTE      HISTORY OF PRESENT ILLNESS:  The patient is a 73 y.o. female and was seen today for follow up of low back pain into the BLE (R>L) extending anteriorly into the plantar aspect of the feet. Pt states her current symptoms are similar to when she previously saw me in 7/2014 but are progressing. Pt was discharged from pain management with Dr. General Sandoval. Pt is followed by Dr. Troy Joel for breast cancer and underwent surgery 5-6 years ago. She did receive radiation therapy. Pt denies h/o lumbar spinal surgery or blocks. She has not recently attended physical therapy/chiropractor and discontinued her HEP. Pt has taken Neurontin and Cymbalta in the past, noting more relief with Cymbalta. A previous EMG per report revealed evidence to suggest chronic S1 radiculopathy on the right side. Lumbar spine MRI dated 5/18/15 per report revealed straightened lumbar lordosis. No malalignment otherwise. No vertebral body compression deformity or edema. Multilevel degenerative discogenic disease, with mild to moderate central canal narrowing L4-5, fairly similar in extent to the previous exam. Varying degrees of bilateral neural foraminal encroachment at several levels. Preliminary reading of lumbar plain films revealed anterior osteophytes at L2, L3, L4, L5. No acute pathology identified. At her last clinical appointment, patient was neurologically intact. We discussed multiple treatment options. I informed patient that it had been my policy to not prescribe narcotics to patients who have previously been discharged from pain management. I explained to patient that I would not be taking over her chronic pain management or prescribe any scheduled medications. She was uninterested in surgical intervention. Patient elected to proceed with lumbar blocks.  I ordered a L4/5 epidural. The patient returns today with pain location and distribution remain unchanged. She rates pain 7/10, a decrease since her last visit (10/10). ER note from Maliha Quesada PA-C dated 5/9/17 indicates patient presented for right foot pain. Pt underwent L4/5 epidural on 5/16/17 with slight relief.  reviewed. Past Medical History:   Diagnosis Date    Anemia     Asthma     Blood disorder     Cancer ([facility] [address]     breast, diagnosed 2006 left    Cancer [facility])     Chest pain, unspecified     The working diagnosis is chest pain. The differential diagnosis includes chest wall pain, stable angina.  Chronic obstructive pulmonary disease (HCC)     Essential hypertension     Essential hypertension, benign     Uncontrolled     GERD (gastroesophageal reflux disease)     Hypercholesterolemia     Hypertension     Neuropathy     Nonspecific abnormal electrocardiogram (ECG) (EKG)     Obesity, unspecified     Weight loss has been strongly encouraged by following dietary restrictions and an exercise routine.  Polio     as a young child    Pre-operative cardiovascular examination     Sciatica     Unspecified cerebral artery occlusion with cerebral infarction         Social History     Social History    Marital status:      Spouse name: N/A    Number of children: N/A    Years of education: N/A     Occupational History    Not on file. Social History Main Topics    Smoking status: Never Smoker    Smokeless tobacco: Never Used    Alcohol use No    Drug use: No    Sexual activity: Not on file     Other Topics Concern    Not on file     Social History Narrative    ** Merged History Encounter **            Current Outpatient Prescriptions   Medication Sig Dispense Refill    meloxicam (MOBIC) 15 mg tablet TAKE 1 TAB ONCE A DAY AS NEEDED FOR PAIN  0    dicyclomine (BENTYL) 20 mg tablet   0    DULoxetine (CYMBALTA) 30 mg capsule Take 1 Cap by mouth daily.  30 Cap 1    oxyCODONE-acetaminophen (PERCOCET 7.5) 7.5-325 mg per tablet Take 1 Tab by mouth every eight (8) hours as needed for Pain. Max Daily Amount: 3 Tabs. 30 Tab 0    gabapentin (NEURONTIN) 100 mg capsule Take 2 Caps by mouth three (3) times daily. Indications: NEUROPATHIC PAIN 90 Cap 1    diclofenac (VOLTAREN) 1 % gel Apply  to affected area every six (6) hours. Apply 4 grams to affected joint up to 4 times per day, maximum 16 grams per joint per day  Dispense 5 100 gram tubes 100 g 4    naproxen (NAPROSYN) 375 mg tablet Take 1 Tab by mouth two (2) times daily (with meals). 14 Tab 0    triamterene-hydrochlorothiazide (DYAZIDE) 37.5-25 mg per capsule   5    desonide (TRIDESILON) 0.05 % cream   0    ALBUTEROL SULFATE (PROAIR HFA IN) Take  by inhalation.  FLUTICASONE/SALMETEROL (ADVAIR HFA IN) Take  by inhalation.  montelukast (SINGULAIR) 10 mg tablet Take 10 mg by mouth daily.  tiotropium (SPIRIVA WITH HANDIHALER) 18 mcg inhalation capsule Take 1 Cap by inhalation daily.  lisinopril (PRINIVIL, ZESTRIL) 20 mg tablet Take  by mouth daily.  amLODIPine (NORVASC) 10 mg tablet Take  by mouth daily.  bethanechol (URECHOLINE) 10 mg tablet Take 10 mg by mouth Before breakfast, lunch, and dinner.  esomeprazole (NEXIUM) 40 mg capsule Take  by mouth daily.  aspirin 81 mg tablet Take 81 mg by mouth.  ergocalciferol (VITAMIN D2) 50,000 unit capsule Take 50,000 Units by mouth Every Thursday.  ibuprofen (MOTRIN) 800 mg tablet Take  by mouth.  methocarbamol (ROBAXIN) 500 mg tablet Take 1 Tab by mouth four (4) times daily. 20 Tab 0       No Known Allergies     Ambulates with a single point cane. PHYSICAL EXAMINATION    Visit Vitals    /84    Pulse 94    Temp 99 °F (37.2 °C)    Ht 5' 5.5\" (1.664 m)    Wt 185 lb (83.9 kg)    BMI 30.32 kg/m2       CONSTITUTIONAL: NAD, A&O x 3  SENSATION: Decreased sensation to light touch at S1 of the RLE. Sensation to light touch otherwise intact.   RANGE OF MOTION: The patient has full passive range of motion in all four extremities. MOTOR:  Straight Leg Raise: Negative, bilateral               Hip Flex Knee Ext Knee Flex Ankle DF GTE Ankle PF Tone   Right +4/5 +4/5 +4/5 +4/5 +4/5 +4/5 +4/5   Left +4/5 +4/5 +4/5 +4/5 +4/5 +4/5 +4/5       ASSESSMENT   Leo Morales was seen today for back pain. Diagnoses and all orders for this visit:    Lumbar spinal stenosis    Other intervertebral disc displacement, lumbar region    Lumbar neuritis    Other intervertebral disc degeneration, lumbar region    Other orders  -     DULoxetine (CYMBALTA) 30 mg capsule; Take 1 Cap by mouth daily. IMPRESSION AND PLAN:  Patient is s/p L4/5 epidural from 5/16/17 noting slight relief. She is not interested in additional blocks at this time. I will restart her on Cymbalta 30 mg qd. Patient advised to call the office if intolerant to medication. I will see the patient back in 1 month's time or earlier if needed. Written by Mp Dover, as dictated by Melania Darby MD  I examined the patient, reviewed and agree with the note.

## 2017-07-19 ENCOUNTER — OFFICE VISIT (OUTPATIENT)
Dept: SURGERY | Age: 67
End: 2017-07-19

## 2017-07-19 DIAGNOSIS — I89.0 LYMPHEDEMA OF LEFT ARM: ICD-10-CM

## 2017-07-19 DIAGNOSIS — I97.2 POSTMASTECTOMY LYMPHEDEMA SYNDROME: Primary | ICD-10-CM

## 2017-07-19 RX ORDER — GABAPENTIN 100 MG/1
200 CAPSULE ORAL 3 TIMES DAILY
Qty: 90 CAP | Refills: 1 | Status: SHIPPED | OUTPATIENT
Start: 2017-07-19 | End: 2019-06-10 | Stop reason: DRUGHIGH

## 2017-07-19 NOTE — PROGRESS NOTES
[facility]. ANDRÉS Summers  Office Progress Note    Patient: Quinn Castro  MRN: [id number]  SSN: [id number]   YOB: 1950  Age: 73 y.o. Sex: female     Chief Complaint   Patient presents with    Follow-up     left arm       HPI    Ms. Constanza Nixon is a very pleasant 23-[facility] with a history of left modified radical mastectomy with tissue reconstruction for a recurrent carcinoma in June of 2012. She continues to have left upper extremity edema. during today's visit, she is wearing her compression sleeve to her entire left arm and hand. She is being evaluated by [facility] for a lymphedema pump and states that they have been her home for consult but she has not received the pump yet. Dr. Evita Dial saw the patient as well and discussed her use of narcotic pain medication related to her negative urine drug screens. He explained her that we will no longer be able to provide her narcotic pain relief, but will be happy to refill her gabapentin for the nerve pain. I will touch base with [facility] following her visit today to ensure they have everything they need to fit her with a  lymphedema pump. Past Medical History:   Diagnosis Date    Anemia     Asthma     Blood disorder     Cancer ([facility] [address]     breast, diagnosed 2006 left    Cancer [facility])     Chest pain, unspecified     The working diagnosis is chest pain. The differential diagnosis includes chest wall pain, stable angina.  Chronic obstructive pulmonary disease (HCC)     Essential hypertension     Essential hypertension, benign     Uncontrolled     GERD (gastroesophageal reflux disease)     Hypercholesterolemia     Hypertension     Neuropathy (HCC)     Nonspecific abnormal electrocardiogram (ECG) (EKG)     Obesity, unspecified     Weight loss has been strongly encouraged by following dietary restrictions and an exercise routine.     Polio     as a young child    Pre-operative cardiovascular examination     Sciatica     Unspecified cerebral artery occlusion with cerebral infarction      Past Surgical History:   Procedure Laterality Date    BREAST SURGERY PROCEDURE UNLISTED      initial surgery 2006, then left modified radical mastectomy 6/2012    HX BREAST BIOPSY  4/11/12    Left    HX BREAST BIOPSY Left      No Known Allergies  Current Outpatient Prescriptions   Medication Sig Dispense Refill    gabapentin (NEURONTIN) 100 mg capsule Take 2 Caps by mouth three (3) times daily. Indications: NEUROPATHIC PAIN 90 Cap 1    dicyclomine (BENTYL) 20 mg tablet   0    DULoxetine (CYMBALTA) 30 mg capsule Take 1 Cap by mouth daily. 30 Cap 1    oxyCODONE-acetaminophen (PERCOCET 7.5) 7.5-325 mg per tablet Take 1 Tab by mouth every eight (8) hours as needed for Pain. Max Daily Amount: 3 Tabs. 30 Tab 0    ibuprofen (MOTRIN) 800 mg tablet Take  by mouth.  diclofenac (VOLTAREN) 1 % gel Apply  to affected area every six (6) hours. Apply 4 grams to affected joint up to 4 times per day, maximum 16 grams per joint per day  Dispense 5 100 gram tubes 100 g 4    methocarbamol (ROBAXIN) 500 mg tablet Take 1 Tab by mouth four (4) times daily. 20 Tab 0    naproxen (NAPROSYN) 375 mg tablet Take 1 Tab by mouth two (2) times daily (with meals). 14 Tab 0    desonide (TRIDESILON) 0.05 % cream   0    ALBUTEROL SULFATE (PROAIR HFA IN) Take  by inhalation.  FLUTICASONE/SALMETEROL (ADVAIR HFA IN) Take  by inhalation.  montelukast (SINGULAIR) 10 mg tablet Take 10 mg by mouth daily.  tiotropium (SPIRIVA WITH HANDIHALER) 18 mcg inhalation capsule Take 1 Cap by inhalation daily.  lisinopril (PRINIVIL, ZESTRIL) 20 mg tablet Take  by mouth daily.  amLODIPine (NORVASC) 10 mg tablet Take  by mouth daily.  bethanechol (URECHOLINE) 10 mg tablet Take 10 mg by mouth Before breakfast, lunch, and dinner.  esomeprazole (NEXIUM) 40 mg capsule Take  by mouth daily.  aspirin 81 mg tablet Take 81 mg by mouth.  ergocalciferol (VITAMIN D2) 50,000 unit capsule Take 50,000 Units by mouth Every Thursday.  meloxicam (MOBIC) 15 mg tablet TAKE 1 TAB ONCE A DAY AS NEEDED FOR PAIN  0    triamterene-hydrochlorothiazide (DYAZIDE) 37.5-25 mg per capsule   5     Social History     Social History    Marital status:      Spouse name: N/A    Number of children: N/A    Years of education: N/A     Occupational History    Not on file. Social History Main Topics    Smoking status: Never Smoker    Smokeless tobacco: Never Used    Alcohol use No    Drug use: No    Sexual activity: Not on file     Other Topics Concern    Not on file     Social History Narrative    ** Merged History Encounter **          Family History   Problem Relation Age of Onset    Cancer Mother     Cancer Father     Arthritis-osteo Other     Hypertension Other     Heart Disease Neg Hx      Negative family history of premature CAD or CVA         Review of systems:  Patient denies any reflux, emesis, change in bowel habits, difficulties voiding, hematochezia, melena, fever, unintentional weight loss, fatigue, chills, palpitations, hypertension, edema, wheezing, dizziness, or fainting. Patient denies no new onset chest pain, abdominal pain, new, sharp or differing headache, new or lingering cough,or new onset shortness of breath. Physical Examination    Well developed well nourished female in no apparent distress  Head: normocephalic, atraumatic  Neck: supple, no masses, no adenopathy, trachea midline  Resp: clear to auscultation bilaterally, no wheezes, rhonchi or rales  Cardio: Regular rate and rhythm, no murmurs, no edema or varicosities  Abdomen: soft, nontender, nondistended, normoactive bowel sounds, no hernias  Back: Deferred  Extremeties: warm, well-perfused, normal gait/station. Left upper extremity edema from shoulder to fingertips. Patient is wearing compression garment to this extremity today.   Neuro: sensation and strength grossly intact and symmetrical  Psych: alert and oriented to person, place and time      IMPRESSION  Patient with a history of left modified radical mastectomy with tissue reconstruction for a recurrent carcinoma in June of 2012 here for evaluation of ongoing lymphedema and chronic pain. PLAN  Orders Placed This Encounter    gabapentin (NEURONTIN) 100 mg capsule     Sig: Take 2 Caps by mouth three (3) times daily. Indications: NEUROPATHIC PAIN     Dispense:  90 Cap     Refill:  1     Refill gabapentin  Follow-up in 3 months or as needed    Ms. Santy Kwon has a reminder for a \"due or due soon\" health maintenance. I have asked that she contact her primary care provider for follow-up on this health maintenance. Renea Wright.  George Nash, FNP-C

## 2017-07-19 NOTE — PATIENT INSTRUCTIONS
Lymphedema: Care Instructions  Your Care Instructions  Lymphedema is fluid that builds up in the arms or legs. It is often caused by surgery to remove lymph nodes during cancer treatment, especially breast cancer surgery, which can cause fluid to build up in the arm. It can happen after radiation treatment to an area that involves lymph nodes. It also can be caused by a fractured bone or surgery to fix a fracture. And some medicines also can cause lymphedema. Some people get it for unknown reasons. Normally, lymph nodes trap bacteria and other substances as fluid flows through them. Then, the white cells in the body's defense, or immune, system can destroy the substances. But if there are few or no lymph nodes--or if the lymph system in an arm or leg has been damaged--fluid can build up in the affected arm or leg. You can take simple steps at home to help treat or prevent fluid buildup. Treatment may include raising the arm or leg to let gravity drain the fluid. You also can wear compression stockings or sleeves. Follow-up care is a key part of your treatment and safety. Be sure to make and go to all appointments, and call your doctor if you are having problems. Its also a good idea to know your test results and keep a list of the medicines you take. How can you care for yourself at home? · Wear a compression stocking or sleeve as your doctor suggests. It can help keep fluid from pooling in an arm or leg. Wear it during air travel. · Prop up the swollen arm or leg on a pillow anytime you sit or lie down. Try to keep it above the level of your heart. This will help reduce swelling. · Avoid crossing your legs if your legs are swollen. · Get some exercise on most days of the week. Increase the intensity of exercise slowly. Water aerobics can help reduce swelling by helping fluid move around. Wear your compression stocking or sleeve during exercise, but not during water exercise.   · See a physical therapist. He or she can teach you how to do self-massage to help fluid move around. You also can learn what activities would be best for you. · Keep your feet clean and wear clean socks or stockings every day. Check your feet often for signs of infection, such as redness or heat. Do not walk barefoot. · If you have had lymph nodes removed from under your arm:  ¨ Do not have blood drawn from the arm on the side of the lymph node surgery. ¨ Do not allow a blood pressure cuff to be placed on that arm. If you are in the hospital, make sure your nurse and other hospital staff know of your condition. ¨ Wear gloves when gardening or doing other activities that may lead to cuts on your fingers or hands. · If you have had lymph nodes removed from your groin:  ¨ Bathe your feet daily in lukewarm, not hot, water. Use a mild soap that has a moisturizer, or use a moisturizer separately. ¨ Check your feet for blisters or cuts. ¨ Wear comfortable and supportive shoes that fit properly. ¨ Wear the correct size of panty hose and stockings. Avoid garters or knee-high or thigh-high stockings. · Ask your doctor how to treat any cuts, scratches, insect bites, or other injuries that may occur. · Use sunscreen and insect repellent when outdoors to protect your skin from sunburn and insect bites. · Wear medical alert jewelry that says you have lymphedema. You can buy these at most drugstores and on the Internet. When should you call for help? Call your doctor now or seek immediate medical care if:  · You have signs of infection, such as:  ¨ Increased pain, swelling, warmth, or redness. ¨ Red streaks leading from the area of lymph node surgery or radiation. ¨ Pus draining from the area of surgery or radiation. ¨ A fever. · You have a feeling of tightness or swelling in or around your arm, hand, leg, or foot. · You have pain, weakness that keeps getting worse, or a \"pins-and-needles\" feeling.   Watch closely for changes in your health, and be sure to contact your doctor if:  · You continue to have fluid buildup even with home treatment. Where can you learn more? Go to http://laura-jessie.info/. Enter V398 in the search box to learn more about \"Lymphedema: Care Instructions. \"  Current as of: July 26, 2016  Content Version: 11.3  © 2078-9778 [employer]. Care instructions adapted under license by [employer] (which disclaims liability or warranty for this information). If you have questions about a medical condition or this instruction, always ask your healthcare professional. [place] [address] any warranty or liability for your use of this information.

## 2017-07-28 NOTE — PROGRESS NOTES
formerly [facility]  General Surgery    Subjective:  Patient presents today with continued complaints of left upper extremity pain. She has been wearing her sleeve. She was seen in the emergency department since her last visit for pain in her feet. She states that she is taking Neurontin twice daily as prescribed along with the narcotic. Objective:  Vitals:    05/12/17 1317   BP: 130/79   Pulse: 93   Resp: 20   Temp: 97.9 °F (36.6 °C)   Weight: 89.4 kg (197 lb)   Height: 5' 5\" (1.651 m)       Physical Exam:    General: Awake alert, oriented ×4, no apparent distress   Breasts:    Left: Swelling with tenderness to touch no pitting edema. Current Medications:  Current Outpatient Prescriptions   Medication Sig Dispense Refill    ibuprofen (MOTRIN) 800 mg tablet Take  by mouth.  diclofenac (VOLTAREN) 1 % gel Apply  to affected area every six (6) hours. Apply 4 grams to affected joint up to 4 times per day, maximum 16 grams per joint per day  Dispense 5 100 gram tubes 100 g 4    naproxen (NAPROSYN) 375 mg tablet Take 1 Tab by mouth two (2) times daily (with meals). 14 Tab 0    triamterene-hydrochlorothiazide (DYAZIDE) 37.5-25 mg per capsule   5    desonide (TRIDESILON) 0.05 % cream   0    ALBUTEROL SULFATE (PROAIR HFA IN) Take  by inhalation.  FLUTICASONE/SALMETEROL (ADVAIR HFA IN) Take  by inhalation.  montelukast (SINGULAIR) 10 mg tablet Take 10 mg by mouth daily.  tiotropium (SPIRIVA WITH HANDIHALER) 18 mcg inhalation capsule Take 1 Cap by inhalation daily.  lisinopril (PRINIVIL, ZESTRIL) 20 mg tablet Take  by mouth daily.  amLODIPine (NORVASC) 10 mg tablet Take  by mouth daily.  bethanechol (URECHOLINE) 10 mg tablet Take 10 mg by mouth Before breakfast, lunch, and dinner.  esomeprazole (NEXIUM) 40 mg capsule Take  by mouth daily.  aspirin 81 mg tablet Take 81 mg by mouth.         ergocalciferol (VITAMIN D2) 50,000 unit capsule Take 50,000 Units by mouth Every Thursday.  gabapentin (NEURONTIN) 100 mg capsule Take 2 Caps by mouth three (3) times daily. Indications: NEUROPATHIC PAIN 90 Cap 1    meloxicam (MOBIC) 15 mg tablet TAKE 1 TAB ONCE A DAY AS NEEDED FOR PAIN  0    dicyclomine (BENTYL) 20 mg tablet   0    DULoxetine (CYMBALTA) 30 mg capsule Take 1 Cap by mouth daily. 30 Cap 1    oxyCODONE-acetaminophen (PERCOCET 7.5) 7.5-325 mg per tablet Take 1 Tab by mouth every eight (8) hours as needed for Pain. Max Daily Amount: 3 Tabs. 30 Tab 0    methocarbamol (ROBAXIN) 500 mg tablet Take 1 Tab by mouth four (4) times daily. 20 Tab 0       Chart and notes reviewed. Data reviewed. I have evaluated and examined the patient. Impression and plan:  Patient with history of lymphedema left upper extremity with continued chronic pain. We have been unable to get her into the pain clinic.   Urine drug screen  Percocet  Continue Neurontin  We will evaluate for lymphedema pump with [facility].  Follow-up in 1 month     Tray Catherine MD

## 2017-08-23 ENCOUNTER — HOSPITAL ENCOUNTER (OUTPATIENT)
Dept: ULTRASOUND IMAGING | Age: 67
Discharge: HOME | End: 2017-08-23
Attending: INTERNAL MEDICINE
Payer: MEDICARE

## 2017-08-23 ENCOUNTER — HOSPITAL ENCOUNTER (OUTPATIENT)
Dept: MAMMOGRAPHY | Age: 67
Discharge: HOME | End: 2017-08-23
Attending: INTERNAL MEDICINE
Payer: MEDICARE

## 2017-08-23 DIAGNOSIS — N63.10 MASS OF RIGHT BREAST: ICD-10-CM

## 2017-08-23 PROCEDURE — 77061 BREAST TOMOSYNTHESIS UNI: CPT

## 2017-08-23 PROCEDURE — 76642 ULTRASOUND BREAST LIMITED: CPT

## 2017-09-12 ENCOUNTER — HOSPITAL ENCOUNTER (EMERGENCY)
Age: 67
Discharge: HOME | End: 2017-09-12
Attending: EMERGENCY MEDICINE
Payer: MEDICARE

## 2017-09-12 VITALS
HEIGHT: 64 IN | WEIGHT: 180 LBS | HEART RATE: 105 BPM | OXYGEN SATURATION: 95 % | DIASTOLIC BLOOD PRESSURE: 85 MMHG | TEMPERATURE: 98.6 F | SYSTOLIC BLOOD PRESSURE: 145 MMHG | BODY MASS INDEX: 30.73 KG/M2 | RESPIRATION RATE: 20 BRPM

## 2017-09-12 DIAGNOSIS — N30.00 ACUTE CYSTITIS WITHOUT HEMATURIA: Primary | ICD-10-CM

## 2017-09-12 LAB
APPEARANCE UR: ABNORMAL
BILIRUB UR QL: NEGATIVE
COLOR UR: YELLOW
EPITH CASTS URNS QL MICRO: ABNORMAL /LPF (ref 0–5)
GLUCOSE UR STRIP.AUTO-MCNC: NEGATIVE MG/DL
HGB UR QL STRIP: ABNORMAL
KETONES UR QL STRIP.AUTO: NEGATIVE MG/DL
LEUKOCYTE ESTERASE UR QL STRIP.AUTO: ABNORMAL
MUCOUS THREADS URNS QL MICRO: ABNORMAL /LPF
NITRITE UR QL STRIP.AUTO: NEGATIVE
PH UR STRIP: 6.5 [PH] (ref 5–8)
PROT UR STRIP-MCNC: 30 MG/DL
RBC #/AREA URNS HPF: ABNORMAL /HPF (ref 0–5)
SP GR UR REFRACTOMETRY: 1.01 (ref 1–1.03)
UROBILINOGEN UR QL STRIP.AUTO: 1 EU/DL (ref 0.2–1)
WBC URNS QL MICRO: ABNORMAL /HPF (ref 0–4)

## 2017-09-12 PROCEDURE — 81001 URINALYSIS AUTO W/SCOPE: CPT | Performed by: EMERGENCY MEDICINE

## 2017-09-12 PROCEDURE — 99283 EMERGENCY DEPT VISIT LOW MDM: CPT

## 2017-09-12 RX ORDER — NITROFURANTOIN 25; 75 MG/1; MG/1
100 CAPSULE ORAL 2 TIMES DAILY
Qty: 20 CAP | Refills: 0 | Status: SHIPPED | OUTPATIENT
Start: 2017-09-12 | End: 2017-09-22

## 2017-09-12 NOTE — ED PROVIDER NOTES
HPI Comments: 12:11 PM Emelina Schwarz is a 73 y.o. female with a history of HTN, asthma, GERD, and COPD presents to ED c/o dysuria onset for one week and progressively worsened. Associated symptoms include lower abdominal pain. Pt states that she has used the restroom about five times in one night which is not normal for her. Denies hematuria, fever, and any other symptoms or complaints at the moment. The history is provided by the patient. Past Medical History:   Diagnosis Date    Anemia     Asthma     Blood disorder     Cancer ([address]     breast, diagnosed 2006 left    Cancer [facility])     Chest pain, unspecified     The working diagnosis is chest pain. The differential diagnosis includes chest wall pain, stable angina.  Chronic obstructive pulmonary disease (HCC)     Essential hypertension     Essential hypertension, benign     Uncontrolled     GERD (gastroesophageal reflux disease)     Hypercholesterolemia     Hypertension     Neuropathy (HCC)     Nonspecific abnormal electrocardiogram (ECG) (EKG)     Obesity, unspecified     Weight loss has been strongly encouraged by following dietary restrictions and an exercise routine.    [facility] Polio     as a young child    Pre-operative cardiovascular examination     Sciatica     Unspecified cerebral artery occlusion with cerebral infarction        Past Surgical History:   Procedure Laterality Date    BREAST SURGERY PROCEDURE UNLISTED      initial surgery 2006, then left modified radical mastectomy 6/2012    HX BREAST BIOPSY  4/11/12    Left    HX BREAST BIOPSY Left          Family History:   Problem Relation Age of Onset    Cancer Mother     Cancer Father     Arthritis-osteo Other     Hypertension Other     Heart Disease Neg Hx      Negative family history of premature CAD or CVA       Social History     Social History    Marital status:      Spouse name: N/A    Number of children: N/A    Years of education: N/A Occupational History    Not on file. Social History Main Topics    Smoking status: Never Smoker    Smokeless tobacco: Never Used    Alcohol use No    Drug use: No    Sexual activity: Not on file     Other Topics Concern    Not on file     Social History Narrative    ** Merged History Encounter **              ALLERGIES: Review of patient's allergies indicates no known allergies. Review of Systems   Constitutional: Negative for chills, fatigue and fever. HENT: Negative for congestion, ear pain and sore throat. Eyes: Negative for pain, redness and itching. Respiratory: Negative for chest tightness, shortness of breath and wheezing. Cardiovascular: Negative for chest pain, palpitations and leg swelling. Gastrointestinal: Positive for abdominal pain. Negative for diarrhea, nausea and vomiting. Genitourinary: Positive for dysuria. Negative for flank pain, hematuria and pelvic pain. Musculoskeletal: Negative for arthralgias, back pain, joint swelling and myalgias. Skin: Negative for color change, pallor and rash. Neurological: Negative for dizziness, weakness and headaches. Hematological: Negative for adenopathy. Does not bruise/bleed easily. All other systems reviewed and are negative. Vitals:    09/12/17 1157   BP: 145/85   Pulse: (!) 105   Resp: 20   Temp: 98.6 °F (37 °C)   SpO2: 95%   Weight: 81.6 kg (180 lb)   Height: 5' 4\" (1.626 m)            Physical Exam   Constitutional: No distress. HENT:   Head: Normocephalic and atraumatic. Mouth/Throat: Oropharynx is clear and moist.   Eyes: Conjunctivae and EOM are normal. Pupils are equal, round, and reactive to light. Neck: Normal range of motion. Neck supple. Cardiovascular: Normal rate, regular rhythm and normal heart sounds. No murmur heard. Pulmonary/Chest: Effort normal and breath sounds normal. She has no wheezes. She has no rales. Abdominal: Soft. Bowel sounds are normal. She exhibits no distension.  There is no tenderness. Musculoskeletal: Normal range of motion. She exhibits no edema or deformity. Lymphadenopathy:     She has no cervical adenopathy. Neurological: She is alert. She exhibits normal muscle tone. Coordination normal.   Skin: Skin is warm and dry. No rash noted. She is not diaphoretic. No erythema. Psychiatric: She has a normal mood and affect. Her behavior is normal.        MDM  Number of Diagnoses or Management Options  Acute cystitis without hematuria:   Diagnosis management comments: Results reviewed with pt, she agrees with dispo and F/U plan  Bard Moraima MD         Amount and/or Complexity of Data Reviewed  Clinical lab tests: ordered and reviewed      ED Course       Procedures      Vitals:  Patient Vitals for the past 12 hrs:   Temp Pulse Resp BP SpO2   09/12/17 1157 98.6 °F (37 °C) (!) 105 20 145/85 95 %   Patient is 95% O2 on RA, indicating adequate oxygenation. Medications ordered:   Medications - No data to display      Lab findings:  Recent Results (from the past 12 hour(s))   URINALYSIS W/ RFLX MICROSCOPIC    Collection Time: 09/12/17 11:56 AM   Result Value Ref Range    Color YELLOW      Appearance CLOUDY      Specific gravity 1.012 1.005 - 1.030      pH (UA) 6.5 5.0 - 8.0      Protein 30 (A) NEG mg/dL    Glucose NEGATIVE  NEG mg/dL    Ketone NEGATIVE  NEG mg/dL    Bilirubin NEGATIVE  NEG      Blood SMALL (A) NEG      Urobilinogen 1.0 0.2 - 1.0 EU/dL    Nitrites NEGATIVE  NEG      Leukocyte Esterase LARGE (A) NEG     URINE MICROSCOPIC ONLY    Collection Time: 09/12/17 11:56 AM   Result Value Ref Range    WBC TOO NUMEROUS TO COUNT 0 - 4 /hpf    RBC 1 to 3 0 - 5 /hpf    Epithelial cells 3+ 0 - 5 /lpf    Mucus FEW (A) NEG /lpf         X-Ray, CT or other radiology findings or impressions:  No results found. Progress notes, Consult notes or additional Procedure notes:     Reevaluation of patient:   I have reassessed the patient. Patient is feeling .  Patient was Disposition:  Diagnosis:   1. Acute cystitis without hematuria        Disposition: discharged. Follow-up Information     None           Patient's Medications   Start Taking    No medications on file   Continue Taking    ALBUTEROL SULFATE (PROAIR HFA IN)    Take  by inhalation. AMLODIPINE (NORVASC) 10 MG TABLET    Take  by mouth daily. ASPIRIN 81 MG TABLET    Take 81 mg by mouth. BETHANECHOL (URECHOLINE) 10 MG TABLET    Take 10 mg by mouth Before breakfast, lunch, and dinner. DESONIDE (TRIDESILON) 0.05 % CREAM        DICLOFENAC (VOLTAREN) 1 % GEL    Apply  to affected area every six (6) hours. Apply 4 grams to affected joint up to 4 times per day, maximum 16 grams per joint per day  Dispense 5 100 gram tubes    DICYCLOMINE (BENTYL) 20 MG TABLET        DULOXETINE (CYMBALTA) 30 MG CAPSULE    Take 1 Cap by mouth daily. ERGOCALCIFEROL (VITAMIN D2) 50,000 UNIT CAPSULE    Take 50,000 Units by mouth Every Thursday. ESOMEPRAZOLE (NEXIUM) 40 MG CAPSULE    Take  by mouth daily. FLUTICASONE/SALMETEROL (ADVAIR HFA IN)    Take  by inhalation. GABAPENTIN (NEURONTIN) 100 MG CAPSULE    Take 2 Caps by mouth three (3) times daily. Indications: NEUROPATHIC PAIN    IBUPROFEN (MOTRIN) 800 MG TABLET    Take  by mouth. LISINOPRIL (PRINIVIL, ZESTRIL) 20 MG TABLET    Take  by mouth daily. MELOXICAM (MOBIC) 15 MG TABLET    TAKE 1 TAB ONCE A DAY AS NEEDED FOR PAIN    METHOCARBAMOL (ROBAXIN) 500 MG TABLET    Take 1 Tab by mouth four (4) times daily. MONTELUKAST (SINGULAIR) 10 MG TABLET    Take 10 mg by mouth daily. NAPROXEN (NAPROSYN) 375 MG TABLET    Take 1 Tab by mouth two (2) times daily (with meals). OXYCODONE-ACETAMINOPHEN (PERCOCET 7.5) 7.5-325 MG PER TABLET    Take 1 Tab by mouth every eight (8) hours as needed for Pain. Max Daily Amount: 3 Tabs. TIOTROPIUM (SPIRIVA WITH HANDIHALER) 18 MCG INHALATION CAPSULE    Take 1 Cap by inhalation daily.     TRIAMTERENE-HYDROCHLOROTHIAZIDE (DYAZIDE) 37.5-25 MG PER CAPSULE       These Medications have changed    No medications on file   Stop Taking    No medications on file         Scribe Attestation      Danny Mcdaniels (Aj) acting as a scribe for and in the presence of Karine Lowe MD      September 12, 2017 at 1:10 PM       Provider Attestation:      I personally performed the services described in the documentation, reviewed the documentation, as recorded by the scribe in my presence, and it accurately and completely records my words and actions.  September 12, 2017 at 1:10 PM - Karine Lowe MD

## 2017-09-12 NOTE — DISCHARGE INSTRUCTIONS
Urinary Tract Infection in Women: Care Instructions  Your Care Instructions    A urinary tract infection, or UTI, is a general term for an infection anywhere between the kidneys and the urethra (where urine comes out). Most UTIs are bladder infections. They often cause pain or burning when you urinate. UTIs are caused by bacteria and can be cured with antibiotics. Be sure to complete your treatment so that the infection goes away. Follow-up care is a key part of your treatment and safety. Be sure to make and go to all appointments, and call your doctor if you are having problems. It's also a good idea to know your test results and keep a list of the medicines you take. How can you care for yourself at home? · Take your antibiotics as directed. Do not stop taking them just because you feel better. You need to take the full course of antibiotics. · Drink extra water and other fluids for the next day or two. This may help wash out the bacteria that are causing the infection. (If you have kidney, heart, or liver disease and have to limit fluids, talk with your doctor before you increase your fluid intake.)  · Avoid drinks that are carbonated or have caffeine. They can irritate the bladder. · Urinate often. Try to empty your bladder each time. · To relieve pain, take a hot bath or lay a heating pad set on low over your lower belly or genital area. Never go to sleep with a heating pad in place. To prevent UTIs  · Drink plenty of water each day. This helps you urinate often, which clears bacteria from your system. (If you have kidney, heart, or liver disease and have to limit fluids, talk with your doctor before you increase your fluid intake.)  · Urinate when you need to. · Urinate right after you have sex. · Change sanitary pads often. · Avoid douches, bubble baths, feminine hygiene sprays, and other feminine hygiene products that have deodorants.   · After going to the bathroom, wipe from front to back.  When should you call for help? Call your doctor now or seek immediate medical care if:  · Symptoms such as fever, chills, nausea, or vomiting get worse or appear for the first time. · You have new pain in your back just below your rib cage. This is called flank pain. · There is new blood or pus in your urine. · You have any problems with your antibiotic medicine. Watch closely for changes in your health, and be sure to contact your doctor if:  · You are not getting better after taking an antibiotic for 2 days. · Your symptoms go away but then come back. Where can you learn more? Go to http://laura-jessie.info/. Enter A988 in the search box to learn more about \"Urinary Tract Infection in Women: Care Instructions. \"  Current as of: November 28, 2016  Content Version: 11.3  © 4202-5314 [employer]. Care instructions adapted under license by [employer] (which disclaims liability or warranty for this information). If you have questions about a medical condition or this instruction, always ask your healthcare professional. [address] any warranty or liability for your use of this information.

## 2017-09-12 NOTE — ED TRIAGE NOTES
Patient c/o lower abdominal pain, dysuria and right leg pain x 2 weeks.   Patient states possible UTI

## 2017-09-28 ENCOUNTER — OFFICE VISIT (OUTPATIENT)
Dept: ORTHOPEDIC SURGERY | Age: 67
End: 2017-09-28

## 2017-09-28 VITALS
RESPIRATION RATE: 20 BRPM | OXYGEN SATURATION: 98 % | WEIGHT: 192 LBS | SYSTOLIC BLOOD PRESSURE: 134 MMHG | HEART RATE: 96 BPM | BODY MASS INDEX: 32.78 KG/M2 | HEIGHT: 64 IN | DIASTOLIC BLOOD PRESSURE: 81 MMHG

## 2017-09-28 DIAGNOSIS — M25.561 CHRONIC PAIN OF RIGHT KNEE: Primary | ICD-10-CM

## 2017-09-28 DIAGNOSIS — G89.29 CHRONIC PAIN OF RIGHT KNEE: Primary | ICD-10-CM

## 2017-09-28 DIAGNOSIS — M17.11 PRIMARY OSTEOARTHRITIS OF RIGHT KNEE: ICD-10-CM

## 2017-09-28 RX ORDER — BETAMETHASONE SODIUM PHOSPHATE AND BETAMETHASONE ACETATE 3; 3 MG/ML; MG/ML
6 INJECTION, SUSPENSION INTRA-ARTICULAR; INTRALESIONAL; INTRAMUSCULAR; SOFT TISSUE ONCE
Qty: 1 ML | Refills: 0
Start: 2017-09-28 | End: 2017-09-28

## 2017-09-28 RX ORDER — MELOXICAM 15 MG/1
TABLET ORAL
Qty: 30 TAB | Refills: 2 | Status: SHIPPED | OUTPATIENT
Start: 2017-09-28 | End: 2017-10-28 | Stop reason: ALTCHOICE

## 2017-09-28 RX ORDER — BETAMETHASONE SODIUM PHOSPHATE AND BETAMETHASONE ACETATE 3; 3 MG/ML; MG/ML
6 INJECTION, SUSPENSION INTRA-ARTICULAR; INTRALESIONAL; INTRAMUSCULAR; SOFT TISSUE ONCE
Qty: 1 ML | Refills: 0 | Status: CANCELLED
Start: 2017-09-28 | End: 2017-09-28

## 2017-09-28 RX ORDER — HYDROCODONE BITARTRATE AND ACETAMINOPHEN 7.5; 325 MG/1; MG/1
1 TABLET ORAL
Qty: 21 TAB | Refills: 0 | Status: SHIPPED | OUTPATIENT
Start: 2017-09-28 | End: 2017-10-14

## 2017-09-28 NOTE — PROGRESS NOTES
[facility]  [phone]           Patient: Dulce Pang                MRN: [id number]       SSN: [id number]  YOB: 1950        AGE: 73 y.o. SEX: female  Body mass index is 32.96 kg/(m^2). PCP: Ollie Perry MD  09/28/17      This office note has been dictated. REVIEW OF SYSTEMS:  Constitutional: Negative for fever, chills, weight loss and malaise/fatigue. HENT: Negative. Eyes: Negative. Respiratory: Negative. Cardiovascular: Negative. Gastrointestinal: No bowel incontinence or constipation. Genitourinary: No bladder incontinence or saddle anesthesia. Skin: Negative. Neurological: Negative. Endo/Heme/Allergies: Negative. Psychiatric/Behavioral: Negative. Musculoskeletal: As per HPI above. Past Medical History:   Diagnosis Date    Anemia     Asthma     Blood disorder     Cancer ([facility] [address]     breast, diagnosed 2006 left    Cancer [facility])     Chest pain, unspecified     The working diagnosis is chest pain. The differential diagnosis includes chest wall pain, stable angina.  Chronic obstructive pulmonary disease (HCC)     Essential hypertension     Essential hypertension, benign     Uncontrolled     GERD (gastroesophageal reflux disease)     Hypercholesterolemia     Hypertension     Neuropathy (HCC)     Nonspecific abnormal electrocardiogram (ECG) (EKG)     Obesity, unspecified     Weight loss has been strongly encouraged by following dietary restrictions and an exercise routine.  Polio     as a young child    Pre-operative cardiovascular examination     Sciatica     Unspecified cerebral artery occlusion with cerebral infarction          Current Outpatient Prescriptions:     gabapentin (NEURONTIN) 100 mg capsule, Take 2 Caps by mouth three (3) times daily.  Indications: NEUROPATHIC PAIN, Disp: 90 Cap, Rfl: 1    dicyclomine (BENTYL) 20 mg tablet, , Disp: , Rfl: 0    DULoxetine (CYMBALTA) 30 mg capsule, Take 1 Cap by mouth daily. , Disp: 30 Cap, Rfl: 1    ibuprofen (MOTRIN) 800 mg tablet, Take  by mouth., Disp: , Rfl:     diclofenac (VOLTAREN) 1 % gel, Apply  to affected area every six (6) hours. Apply 4 grams to affected joint up to 4 times per day, maximum 16 grams per joint per day Dispense 5 100 gram tubes, Disp: 100 g, Rfl: 4    triamterene-hydrochlorothiazide (DYAZIDE) 37.5-25 mg per capsule, , Disp: , Rfl: 5    desonide (TRIDESILON) 0.05 % cream, , Disp: , Rfl: 0    ALBUTEROL SULFATE (PROAIR HFA IN), Take  by inhalation. , Disp: , Rfl:     FLUTICASONE/SALMETEROL (ADVAIR HFA IN), Take  by inhalation. , Disp: , Rfl:     montelukast (SINGULAIR) 10 mg tablet, Take 10 mg by mouth daily. , Disp: , Rfl:     tiotropium (SPIRIVA WITH HANDIHALER) 18 mcg inhalation capsule, Take 1 Cap by inhalation daily. , Disp: , Rfl:     lisinopril (PRINIVIL, ZESTRIL) 20 mg tablet, Take  by mouth daily. , Disp: , Rfl:     amLODIPine (NORVASC) 10 mg tablet, Take  by mouth daily. , Disp: , Rfl:     esomeprazole (NEXIUM) 40 mg capsule, Take  by mouth daily. , Disp: , Rfl:     aspirin 81 mg tablet, Take 81 mg by mouth.  , Disp: , Rfl:     ergocalciferol (VITAMIN D2) 50,000 unit capsule, Take 50,000 Units by mouth Every Thursday. , Disp: , Rfl:     meloxicam (MOBIC) 15 mg tablet, TAKE 1 TAB ONCE A DAY AS NEEDED FOR PAIN, Disp: , Rfl: 0    oxyCODONE-acetaminophen (PERCOCET 7.5) 7.5-325 mg per tablet, Take 1 Tab by mouth every eight (8) hours as needed for Pain. Max Daily Amount: 3 Tabs. (Patient not taking: Reported on 9/28/2017), Disp: 30 Tab, Rfl: 0    methocarbamol (ROBAXIN) 500 mg tablet, Take 1 Tab by mouth four (4) times daily. (Patient not taking: Reported on 9/28/2017), Disp: 20 Tab, Rfl: 0    naproxen (NAPROSYN) 375 mg tablet, Take 1 Tab by mouth two (2) times daily (with meals).  (Patient not taking: Reported on 9/28/2017), Disp: 14 Tab, Rfl: 0    bethanechol (URECHOLINE) 10 mg tablet, Take 10 mg by mouth Before breakfast, lunch, and dinner., Disp: , Rfl:     No Known Allergies    Social History     Social History    Marital status:      Spouse name: N/A    Number of children: N/A    Years of education: N/A     Occupational History    Not on file. Social History Main Topics    Smoking status: Never Smoker    Smokeless tobacco: Never Used    Alcohol use No    Drug use: No    Sexual activity: Not on file     Other Topics Concern    Not on file     Social History Narrative    ** Merged History Encounter **            Past Surgical History:   Procedure Laterality Date    BREAST SURGERY PROCEDURE UNLISTED      initial surgery 2006, then left modified radical mastectomy 6/2012    HX BREAST BIOPSY  4/11/12    Left    HX BREAST BIOPSY Left            * Patient was identified by name and date of birth   * Agreement on procedure being performed was verified  * Risks and Benefits explained to the patient  * Procedure site verified and marked as necessary  * Patient was positioned for comfort  * Consent was signed and verified  3:44 PM    The patient was instructed on post injection care. We did see Ms. Corry Estrada for followup with regards to her right knee mainly. The patient does have a history of patellofemoral arthritis in the knees. She has had cortisone in the past, as well as viscosupplementation. She had good results from cortisone. Viscosupplementation did not work that well. She is having trouble getting up from a chair, going up and down stairs, and occasional night pain. She is also reporting a little bit of groin discomfort on occasion with specific movements, i.e., getting in and out of a bed and in and out of a car. She also states the right shoulder has been bothering her over the past couple of weeks without injury, mainly pain in the front of the shoulder and some popping at times.   She has had no numbness and tingling in the upper extremity. She has had no recent fevers, chills, systemic changes, and no injuries to report. PHYSICAL EXAMINATION:  In general, the patient is alert and oriented x 3 in no acute distress. The patient is well-developed, well-nourished, with a normal affect. The patient is afebrile. HEENT:  Head is normocephalic and atraumatic. Pupils are equally round and reactive to light and accommodation. Extraocular eye movements are intact. Neck is supple. Trachea is midline. No JVD is present. Breathing is nonlabored. Examination of the neck reveals good range of motion of the cervical spine. Examination of the right shoulder reveals the skin is intact. There is no ecchymosis, no warmth, and no signs of infection or cellulitis present. There is pain to palpation to the bicipital tendon anteriorly, as well as a positive Armstrong test.  There is a negative drop arm, Speeds, and ODats. External rotation strength is symmetric bilaterally. She has well-maintained range of motion. Radial pulse is 2+. Examination of the lower extremities well-maintained range of motion of the hips. Forced internal rotation does cause some groin discomfort. She does have a little discomfort to palpation of the greater trochanteric bursae. There is negative straight leg raise. There is negative calf tenderness. There is negative Ashers. There is no evidence of DVT present. Examination of the right knee reveals the skin is intact. There is no ecchymosis and no warmth. She does have pain to palpation to the medial joint line, as well as patellofemoral grind and crepitus anteriorly with range of motion activities is noted. RADIOGRAPHS:  Radiographs in the office today, including AP, tunnel, lateral, and skyline of the right knee shows moderate arthritis in the medial compartment with advanced patellofemoral disease. ASSESSMENT:      1.  Right shoulder bicipital tendinitis and subacromial bursitis 2. Right hip osteoarthritis. 3. Right hip trochanteric bursitis. 4. Right knee osteoarthritis. PLAN:  At this point, we discussed treatment options. I am going to start her on Mobic 15 mg once a day with food. A refill of Norco is provided today, a one-week prescription. We will move forward with a cortisone injection for the right knee today. Under aseptic conditions, and after informed and written consent, with ultrasound-guided assistance, and a time out performed, the right knee was prepped with Betadine and 6 mg of Celestone was injected without complications. The patient tolerated the injection well. The patient was instructed on post injection care. We will see her back in the office in about four weeks time for evaluation. If she is still having shoulder and hip pain, we will plan on x-raying these areas at that time.                      JR Rojelio REN, THERON, ATC

## 2017-10-10 ENCOUNTER — OFFICE VISIT (OUTPATIENT)
Dept: CARDIOLOGY CLINIC | Age: 67
End: 2017-10-10

## 2017-10-10 VITALS
SYSTOLIC BLOOD PRESSURE: 136 MMHG | DIASTOLIC BLOOD PRESSURE: 74 MMHG | HEART RATE: 95 BPM | WEIGHT: 191 LBS | HEIGHT: 64 IN | BODY MASS INDEX: 32.61 KG/M2

## 2017-10-10 DIAGNOSIS — I10 ESSENTIAL HYPERTENSION, BENIGN: ICD-10-CM

## 2017-10-10 DIAGNOSIS — E78.5 DYSLIPIDEMIA: ICD-10-CM

## 2017-10-10 DIAGNOSIS — G45.9 TRANSIENT CEREBRAL ISCHEMIA, UNSPECIFIED TYPE: ICD-10-CM

## 2017-10-10 DIAGNOSIS — R06.02 SOB (SHORTNESS OF BREATH): Primary | ICD-10-CM

## 2017-10-10 NOTE — PATIENT INSTRUCTIONS

## 2017-10-10 NOTE — MR AVS SNAPSHOT
Visit Information Date & Time Provider Department Dept. Phone Encounter #  
 10/10/2017  2:15 PM Belinda Ureña MD [facility] [id number] Follow-up Instructions Return in about 1 week (around 10/17/2017). Your Appointments 10/17/2017 10:45 AM  
PROCEDURE with CA ECHO [facility] ([facility]) Appt Note: Echo/[address] [facility] [address]  
  
   
 [address] [facility] [address]  
  
    
 10/18/2017 11:00 AM  
ESTABLISHED PATIENT with Belinda Ureña MD  
[facility] ([facility]) Appt Note: 1 week post echo follow up  
 [address] [facility] [address]  
  
   
 [address] [address] [address] [address]  
  
    
 10/18/2017  2:30 PM  
Follow Up with JENNIFER [address] ([facility]) Appt Note: 3 month f/up [address] [facility] [address] [id number]  
  
    
 10/26/2017  1:40 PM  
Follow Up with Rossy Sanders PA-C  
4 [facility], [address] [facility] ([facility]) Appt Note: RT KNEE 4wk fu  
 [address], [address] [facility] [address]  
[phone] [facility] Upcoming Health Maintenance Date Due Hepatitis C Screening 1950 DTaP/Tdap/Td series (1 - Tdap) 11/24/1972 ZOSTER VACCINE AGE 60> 9/24/2011 FOBT Q 1 YEAR AGE 50-75 6/9/2013 GLAUCOMA SCREENING Q2Y 11/24/2016 OSTEOPOROSIS SCREENING (DEXA) 11/24/2016 Pneumococcal 65+ High/Highest Risk (1 of 2 - PCV13) 11/24/2016 MEDICARE YEARLY EXAM 11/24/2016 INFLUENZA AGE 9 TO ADULT 8/1/2017 BREAST CANCER SCRN MAMMOGRAM 8/23/2019 Allergies as of 10/10/2017  Review Complete On: 10/10/2017 By: Ariana Johnson Arely Precise No Known Allergies Current Immunizations  Never Reviewed No immunizations on file. Not reviewed this visit You Were Diagnosed With   
  
 Codes Comments SOB (shortness of breath)    -  Primary ICD-10-CM: R06.02 
ICD-9-CM: 786.05 Essential hypertension, benign     ICD-10-CM: I10 
ICD-9-CM: 401.1 Dyslipidemia     ICD-10-CM: E78.5 ICD-9-CM: 272.4 Transient cerebral ischemia, unspecified type     ICD-10-CM: G45.9 ICD-9-CM: 435.9 Vitals BP Pulse Height(growth percentile) Weight(growth percentile) BMI OB Status 136/74 95 5' 4\" (1.626 m) 191 lb (86.6 kg) 32.79 kg/m2 Postmenopausal  
 Smoking Status Never Smoker Vitals History BMI and BSA Data Body Mass Index Body Surface Area 32.79 kg/m 2 1.98 m 2 Preferred Pharmacy Pharmacy Name Phone [address] [facility], [phone] [facility] Your Updated Medication List  
  
   
This list is accurate as of: 10/10/17  2:41 PM.  Always use your most recent med list.  
  
  
  
  
 ADVAIR HFA IN Take  by inhalation. amLODIPine 10 mg tablet Commonly known as:  Plascencia Fanti Take  by mouth daily. aspirin 81 mg tablet Take 81 mg by mouth. bethanechol 10 mg tablet Commonly known as:  URECHOLINE Take 10 mg by mouth Before breakfast, lunch, and dinner. desonide 0.05 % cream  
Commonly known as:  TRIDESILON  
  
 diclofenac 1 % Gel Commonly known as:  VOLTAREN Apply  to affected area every six (6) hours. Apply 4 grams to affected joint up to 4 times per day, maximum 16 grams per joint per day Dispense 5 100 gram tubes  
  
 dicyclomine 20 mg tablet Commonly known as:  BENTYL DULoxetine 30 mg capsule Commonly known as:  CYMBALTA Take 1 Cap by mouth daily. gabapentin 100 mg capsule Commonly known as:  NEURONTIN Take 2 Caps by mouth three (3) times daily.  Indications: NEUROPATHIC PAIN  
  
 HYDROcodone-acetaminophen 7.5-325 mg per tablet Commonly known as:  [facility] Take 1 Tab by mouth every eight (8) hours as needed for Pain. Max Daily Amount: 3 Tabs. ibuprofen 800 mg tablet Commonly known as:  MOTRIN Take  by mouth.  
  
 lisinopril 20 mg tablet Commonly known as:  Donnald Code Take  by mouth daily. * meloxicam 15 mg tablet Commonly known as:  MOBIC  
TAKE 1 TAB ONCE A DAY AS NEEDED FOR PAIN  
  
 * meloxicam 15 mg tablet Commonly known as:  MOBIC  
1 tab by mouth daily with food  
  
 methocarbamol 500 mg tablet Commonly known as:  ROBAXIN Take 1 Tab by mouth four (4) times daily. naproxen 375 mg tablet Commonly known as:  NAPROSYN Take 1 Tab by mouth two (2) times daily (with meals). NexIUM 40 mg capsule Generic drug:  esomeprazole Take  by mouth daily. oxyCODONE-acetaminophen 7.5-325 mg per tablet Commonly known as:  PERCOCET 7.5 Take 1 Tab by mouth every eight (8) hours as needed for Pain. Max Daily Amount: 3 Tabs. PROAIR HFA IN Take  by inhalation. SINGULAIR 10 mg tablet Generic drug:  montelukast  
Take 10 mg by mouth daily. SPIRIVA WITH HANDIHALER 18 mcg inhalation capsule Generic drug:  tiotropium Take 1 Cap by inhalation daily. triamterene-hydroCHLOROthiazide 37.5-25 mg per capsule Commonly known as:  DYAZIDE  
  
 VITAMIN D2 50,000 unit capsule Generic drug:  ergocalciferol Take 50,000 Units by mouth Every Thursday. * Notice: This list has 2 medication(s) that are the same as other medications prescribed for you. Read the directions carefully, and ask your doctor or other care provider to review them with you. Follow-up Instructions Return in about 1 week (around 10/17/2017). To-Do List   
 10/17/2017 Cardiac Services:  2D ECHO COMPLETE ADULT (TTE) Patient Instructions Shortness of Breath: Care Instructions Your Care Instructions Shortness of breath has many causes. Sometimes conditions such as anxiety can lead to shortness of breath. Some people get mild shortness of breath when they exercise. Trouble breathing also can be a symptom of a serious problem, such as asthma, lung disease, emphysema, heart problems, and pneumonia. If your shortness of breath continues, you may need tests and treatment. Watch for any changes in your breathing and other symptoms. Follow-up care is a key part of your treatment and safety. Be sure to make and go to all appointments, and call your doctor if you are having problems. Its also a good idea to know your test results and keep a list of the medicines you take. How can you care for yourself at home? · Do not smoke or allow others to smoke around you. If you need help quitting, talk to your doctor about stop-smoking programs and medicines. These can increase your chances of quitting for good. · Get plenty of rest and sleep. · Take your medicines exactly as prescribed. Call your doctor if you think you are having a problem with your medicine. · Find healthy ways to deal with stress. ¨ Exercise daily. ¨ Get plenty of sleep. ¨ Eat regularly and well. When should you call for help? Call 911 anytime you think you may need emergency care. For example, call if: 
· You have severe shortness of breath. · You have symptoms of a heart attack. These may include: ¨ Chest pain or pressure, or a strange feeling in the chest. 
¨ Sweating. ¨ Shortness of breath. ¨ Nausea or vomiting. ¨ Pain, pressure, or a strange feeling in the back, neck, jaw, or upper belly or in one or both shoulders or arms. ¨ Lightheadedness or sudden weakness. ¨ A fast or irregular heartbeat. After you call 911, the  may tell you to chew 1 adult-strength or 2 to 4 low-dose aspirin. Wait for an ambulance. Do not try to drive yourself. Call your doctor now or seek immediate medical care if: · Your shortness of breath gets worse or you start to wheeze. Wheezing is a high-pitched sound when you breathe. · You wake up at night out of breath or have to prop your head up on several pillows to breathe. · You are short of breath after only light activity or while at rest. 
Watch closely for changes in your health, and be sure to contact your doctor if: 
· You do not get better over the next 1 to 2 days. Where can you learn more? Go to http://laura-jessie.info/. Enter S780 in the search box to learn more about \"Shortness of Breath: Care Instructions. \" Current as of: March 25, 2017 Content Version: 11.3 © 2330-2435 [employer]. Care instructions adapted under license by [employer] (which disclaims liability or warranty for this information). If you have questions about a medical condition or this instruction, always ask your healthcare professional. [address] any warranty or liability for your use of this information. Introducing [facility]! [facility] introduces ZMP patient portal. Now you can access parts of your medical record, email your doctor's office, and request medication refills online. 1. In your internet browser, go to [email or web address]. [employer] 2. Click on the First Time User? Click Here link in the Sign In box. You will see the New Member Sign Up page. 3. Enter your ZMP Access Code exactly as it appears below. You will not need to use this code after youve completed the sign-up process. If you do not sign up before the expiration date, you must request a new code. · ZMP Access Code: [email or web address] Expires: 11/20/2017  3:20 PM 
 
4. Enter the last four digits of your Social Security Number (xxxx) and Date of Birth (mm/dd/yyyy) as indicated and click Submit. You will be taken to the next sign-up page. 5. Create a [employer] ID. This will be your [employer] login ID and cannot be changed, so think of one that is secure and easy to remember. 6. Create a [employer] password. You can change your password at any time. 7. Enter your Password Reset Question and Answer. This can be used at a later time if you forget your password. 8. Enter your e-mail address. You will receive e-mail notification when new information is available in [address] 9. Click Sign Up. You can now view and download portions of your medical record. 10. Click the Download Summary menu link to download a portable copy of your medical information. If you have questions, please visit the Frequently Asked Questions section of the [employer] website. Remember, [employer] is NOT to be used for urgent needs. For medical emergencies, dial 911. Now available from your iPhone and Android! Please provide this summary of care documentation to your next provider. Your primary care clinician is listed as Randall Fonseca. If you have any questions after today's visit, please call [phone].

## 2017-10-13 NOTE — PROGRESS NOTES
HISTORY OF PRESENT ILLNESS  Harry Martinez is a 73 y.o. female. New Patient   The history is provided by the patient. This is a chronic problem. The current episode started more than 1 week ago. The problem occurs daily. The problem has been gradually worsening. Associated symptoms include shortness of breath. Pertinent negatives include no chest pain, no abdominal pain and no headaches. Hypertension   The history is provided by the patient. This is a chronic problem. The current episode started more than 1 week ago. The problem occurs every several days. The problem has not changed since onset. Associated symptoms include shortness of breath. Pertinent negatives include no chest pain, no abdominal pain and no headaches. Shortness of Breath   The history is provided by the patient. This is a chronic problem. The problem occurs intermittently. The current episode started more than 1 week ago. The problem has been gradually worsening. Pertinent negatives include no fever, no headaches, no ear pain, no neck pain, no cough, no sputum production, no hemoptysis, no wheezing, no PND, no orthopnea, no chest pain, no vomiting, no abdominal pain, no rash, no leg swelling and no claudication. Associated medical issues do not include chronic lung disease, CAD or heart failure. Review of Systems   Constitutional: Negative for chills, diaphoresis, fever, malaise/fatigue and weight loss. HENT: Negative for congestion, ear discharge, ear pain, hearing loss, nosebleeds and tinnitus. Eyes: Negative for blurred vision. Respiratory: Positive for shortness of breath. Negative for cough, hemoptysis, sputum production, wheezing and stridor. Cardiovascular: Negative for chest pain, palpitations, orthopnea, claudication, leg swelling and PND. Gastrointestinal: Negative for abdominal pain, heartburn, nausea and vomiting. Musculoskeletal: Negative for myalgias and neck pain. Skin: Negative for itching and rash. Neurological: Negative for dizziness, tingling, tremors, focal weakness, loss of consciousness, weakness and headaches. Psychiatric/Behavioral: Negative for depression and suicidal ideas. Family History   Problem Relation Age of Onset   Amber Maru Cancer Mother     Cancer Father     Arthritis-osteo Other     Hypertension Other     Heart Disease Neg Hx      Negative family history of premature CAD or CVA       Past Medical History:   Diagnosis Date    Anemia     Asthma     Blood disorder     Cancer ([facility]     breast, diagnosed 2006 left    Cancer ([facility]     Chest pain, unspecified     The working diagnosis is chest pain. The differential diagnosis includes chest wall pain, stable angina.  Chronic obstructive pulmonary disease (HCC)     Essential hypertension     Essential hypertension, benign     Uncontrolled     GERD (gastroesophageal reflux disease)     Hypercholesterolemia     Hypertension     Neuropathy     Nonspecific abnormal electrocardiogram (ECG) (EKG)     Obesity, unspecified     Weight loss has been strongly encouraged by following dietary restrictions and an exercise routine.     Polio     as a young child    Pre-operative cardiovascular examination     Sciatica     Unspecified cerebral artery occlusion with cerebral infarction        Past Surgical History:   Procedure Laterality Date    BREAST SURGERY PROCEDURE UNLISTED      initial surgery 2006, then left modified radical mastectomy 6/2012    HX BREAST BIOPSY  4/11/12    Left    HX BREAST BIOPSY Left        Social History   Substance Use Topics    Smoking status: Never Smoker    Smokeless tobacco: Never Used    Alcohol use No       No Known Allergies    Outpatient Prescriptions Marked as Taking for the 10/10/17 encounter (Office Visit) with Sofia Richmond MD   Medication Sig Dispense Refill    meloxicam (MOBIC) 15 mg tablet 1 tab by mouth daily with food 30 Tab 2    HYDROcodone-acetaminophen (NORCO) 7.5-325 mg per tablet Take 1 Tab by mouth every eight (8) hours as needed for Pain. Max Daily Amount: 3 Tabs. 21 Tab 0    gabapentin (NEURONTIN) 100 mg capsule Take 2 Caps by mouth three (3) times daily. Indications: NEUROPATHIC PAIN 90 Cap 1    dicyclomine (BENTYL) 20 mg tablet   0    ibuprofen (MOTRIN) 800 mg tablet Take  by mouth.  diclofenac (VOLTAREN) 1 % gel Apply  to affected area every six (6) hours. Apply 4 grams to affected joint up to 4 times per day, maximum 16 grams per joint per day  Dispense 5 100 gram tubes 100 g 4    desonide (TRIDESILON) 0.05 % cream   0    ALBUTEROL SULFATE (PROAIR HFA IN) Take  by inhalation.  FLUTICASONE/SALMETEROL (ADVAIR HFA IN) Take  by inhalation.  montelukast (SINGULAIR) 10 mg tablet Take 10 mg by mouth daily.  tiotropium (SPIRIVA WITH HANDIHALER) 18 mcg inhalation capsule Take 1 Cap by inhalation daily.  lisinopril (PRINIVIL, ZESTRIL) 20 mg tablet Take  by mouth daily.  amLODIPine (NORVASC) 10 mg tablet Take  by mouth daily.  esomeprazole (NEXIUM) 40 mg capsule Take  by mouth daily.  aspirin 81 mg tablet Take 81 mg by mouth.  ergocalciferol (VITAMIN D2) 50,000 unit capsule Take 50,000 Units by mouth Every Thursday. Visit Vitals    /74    Pulse 95    Ht 5' 4\" (1.626 m)    Wt 86.6 kg (191 lb)    BMI 32.79 kg/m2     Physical Exam   Constitutional: She is oriented to person, place, and time. She appears well-developed and well-nourished. No distress. HENT:   Head: Atraumatic. Mouth/Throat: No oropharyngeal exudate. Eyes: Conjunctivae are normal. Right eye exhibits no discharge. Left eye exhibits no discharge. No scleral icterus. Neck: Normal range of motion. Neck supple. No JVD present. No tracheal deviation present. No thyromegaly present. Cardiovascular: Normal rate and regular rhythm. Exam reveals no gallop. No murmur heard. Pulmonary/Chest: Effort normal and breath sounds normal. No stridor.  She has no wheezes. She has no rales. Abdominal: Soft. There is no tenderness. There is no rebound and no guarding. Musculoskeletal: Normal range of motion. She exhibits no edema or tenderness. Lymphadenopathy:     She has no cervical adenopathy. Neurological: She is alert and oriented to person, place, and time. She exhibits normal muscle tone. Skin: Skin is warm. She is not diaphoretic. Psychiatric: She has a normal mood and affect. Her behavior is normal.     ekg sinus rhythm with no acute st-t changes    ASSESSMENT and PLAN    ICD-10-CM ICD-9-CM    1. SOB (shortness of breath) R06.02 786.05 2D ECHO COMPLETE ADULT (TTE)   2. Essential hypertension, benign I10 401.1 CANCELED: AMB POC EKG ROUTINE W/ 12 LEADS, INTER & REP   3. Dyslipidemia E78.5 272.4    4. Transient cerebral ischemia, unspecified type G45.9 435.9      Orders Placed This Encounter    2D ECHO COMPLETE ADULT (TTE)     Standing Status:   Future     Standing Expiration Date:   4/10/2018     Order Specific Question:   Reason for Exam:     Answer:   sob     Follow-up Disposition:  Return in about 1 week (around 10/17/2017). current treatment plan is effective, no change in therapy  reviewed diet, exercise and weight control. Patient with longstanding htn seen for worsening dyspnea  Will proceed with echo to assess LV function. Continue salt restriction and weight reduction.

## 2017-10-14 ENCOUNTER — APPOINTMENT (OUTPATIENT)
Dept: GENERAL RADIOLOGY | Age: 67
End: 2017-10-14
Attending: EMERGENCY MEDICINE
Payer: MEDICARE

## 2017-10-14 ENCOUNTER — HOSPITAL ENCOUNTER (EMERGENCY)
Age: 67
Discharge: HOME | End: 2017-10-14
Attending: EMERGENCY MEDICINE | Admitting: EMERGENCY MEDICINE
Payer: MEDICARE

## 2017-10-14 VITALS
RESPIRATION RATE: 20 BRPM | SYSTOLIC BLOOD PRESSURE: 160 MMHG | OXYGEN SATURATION: 96 % | BODY MASS INDEX: 31.65 KG/M2 | HEIGHT: 65 IN | WEIGHT: 190 LBS | TEMPERATURE: 97.7 F | DIASTOLIC BLOOD PRESSURE: 89 MMHG | HEART RATE: 98 BPM

## 2017-10-14 DIAGNOSIS — M25.511 PAIN IN JOINT OF RIGHT SHOULDER: Primary | ICD-10-CM

## 2017-10-14 PROCEDURE — L3670 SO ACRO/CLAV CAN WEB PRE OTS: HCPCS

## 2017-10-14 PROCEDURE — 93971 EXTREMITY STUDY: CPT

## 2017-10-14 PROCEDURE — 74011250636 HC RX REV CODE- 250/636: Performed by: EMERGENCY MEDICINE

## 2017-10-14 PROCEDURE — 73030 X-RAY EXAM OF SHOULDER: CPT

## 2017-10-14 PROCEDURE — 71010 XR CHEST SNGL V: CPT

## 2017-10-14 PROCEDURE — 99283 EMERGENCY DEPT VISIT LOW MDM: CPT

## 2017-10-14 PROCEDURE — 93005 ELECTROCARDIOGRAM TRACING: CPT

## 2017-10-14 PROCEDURE — 96372 THER/PROPH/DIAG INJ SC/IM: CPT

## 2017-10-14 RX ORDER — MORPHINE SULFATE 2 MG/ML
4 INJECTION, SOLUTION INTRAMUSCULAR; INTRAVENOUS ONCE
Status: COMPLETED | OUTPATIENT
Start: 2017-10-14 | End: 2017-10-14

## 2017-10-14 RX ORDER — TRAMADOL HYDROCHLORIDE 50 MG/1
50 TABLET ORAL
Qty: 8 TAB | Refills: 0 | Status: SHIPPED | OUTPATIENT
Start: 2017-10-14 | End: 2017-10-28

## 2017-10-14 RX ADMIN — Medication 4 MG: at 12:29

## 2017-10-14 NOTE — ED PROVIDER NOTES
HPI Comments: 12:04 PM Kali Walker is a 73 y.o. female with PMHx of HTN, HTN, hypercholesterolemia, COPD, and breast cancer presenting to the ED c/o constant right shoulder pain for the past 2 weeks. Describes pain as sharp and aching. States pain worsens with movement and states \"my arm keeps popping. \" Reports no previous history of similar symptoms. PSHx includes left mastectomy due to breast cancer. Denies injury and any other symptoms or complaints. Patient is a 73 y.o. female presenting with shoulder pain. Shoulder Pain           Past Medical History:   Diagnosis Date    Anemia     Asthma     Blood disorder     Cancer ([facility] [address]     breast, diagnosed 2006 left    Cancer [facility])     Chest pain, unspecified     The working diagnosis is chest pain. The differential diagnosis includes chest wall pain, stable angina.  Chronic obstructive pulmonary disease (HCC)     Essential hypertension     Essential hypertension, benign     Uncontrolled     GERD (gastroesophageal reflux disease)     Hypercholesterolemia     Hypertension     Neuropathy     Nonspecific abnormal electrocardiogram (ECG) (EKG)     Obesity, unspecified     Weight loss has been strongly encouraged by following dietary restrictions and an exercise routine.    [place]     as a young child    Pre-operative cardiovascular examination     Sciatica     Unspecified cerebral artery occlusion with cerebral infarction        Past Surgical History:   Procedure Laterality Date    BREAST SURGERY PROCEDURE UNLISTED      initial surgery 2006, then left modified radical mastectomy 6/2012    HX BREAST BIOPSY  4/11/12    Left    HX BREAST BIOPSY Left          Family History:   Problem Relation Age of Onset    Cancer Mother     Cancer Father     Arthritis-osteo Other     Hypertension Other     Heart Disease Neg Hx      Negative family history of premature CAD or CVA       Social History     Social History    Marital status:  Spouse name: N/A    Number of children: N/A    Years of education: N/A     Occupational History    Not on file. Social History Main Topics    Smoking status: Never Smoker    Smokeless tobacco: Never Used    Alcohol use No    Drug use: No    Sexual activity: Not on file     Other Topics Concern    Not on file     Social History Narrative    ** Merged History Encounter **              ALLERGIES: Review of patient's allergies indicates no known allergies. Review of Systems   Constitutional: Negative for chills, fatigue and fever. HENT: Negative for congestion, ear pain, rhinorrhea and sore throat. Eyes: Negative for pain, redness and itching. Respiratory: Negative for cough, chest tightness and shortness of breath. Cardiovascular: Negative for chest pain, palpitations and leg swelling. Gastrointestinal: Negative for abdominal pain, diarrhea, nausea and vomiting. Genitourinary: Negative for decreased urine volume, dysuria, flank pain, hematuria and pelvic pain. Musculoskeletal: Positive for arthralgias (right shoulder). Negative for back pain, joint swelling and myalgias. Skin: Negative for color change, pallor and rash. Neurological: Negative for dizziness, weakness and headaches. Hematological: Negative for adenopathy. Does not bruise/bleed easily. Vitals:    10/14/17 1135   BP: 160/89   Pulse: 98   Resp: 20   Temp: 97.7 °F (36.5 °C)   SpO2: 96%   Weight: 86.2 kg (190 lb)   Height: 5' 5\" (1.651 m)            Physical Exam   Constitutional: No distress. HENT:   Head: Normocephalic and atraumatic. Mouth/Throat: Oropharynx is clear and moist.   Eyes: Conjunctivae and EOM are normal. Pupils are equal, round, and reactive to light. Neck: Normal range of motion. Neck supple. Cardiovascular: Normal rate, regular rhythm and normal heart sounds. No murmur heard. Pulmonary/Chest: Effort normal and breath sounds normal. She has no wheezes. She has no rales.    Abdominal: Soft. Bowel sounds are normal. She exhibits no distension. There is no tenderness. Musculoskeletal:        Right shoulder: She exhibits tenderness and pain. She exhibits no swelling, no effusion, no crepitus, no deformity, normal pulse and normal strength. Arms:  Tenderness anteriorly. No effusion, erythema, or warmth. Distal sensation and pulses intake. No muscle atrophy. Lymphadenopathy:     She has no cervical adenopathy. Neurological: She is alert. She exhibits normal muscle tone. Coordination normal.   Skin: Skin is warm and dry. No rash noted. She is not diaphoretic. No erythema. Psychiatric: She has a normal mood and affect. Her behavior is normal.        [facility]  ED Course       Procedures      Vitals:  Patient Vitals for the past 12 hrs:   Temp Pulse Resp BP SpO2   10/14/17 1135 97.7 °F (36.5 °C) 98 20 160/89 96 %       Medications Ordered:  Medications   morphine injection 4 mg (4 mg IntraMUSCular Given 10/14/17 1229)       Lab Findings:  Recent Results (from the past 12 hour(s))   EKG, 12 LEAD, INITIAL    Collection Time: 10/14/17 12:18 PM   Result Value Ref Range    Ventricular Rate 102 BPM    Atrial Rate 102 BPM    P-R Interval 142 ms    QRS Duration 88 ms    Q-T Interval 378 ms    QTC Calculation (Bezet) 492 ms    Calculated P Axis 80 degrees    Calculated R Axis 71 degrees    Calculated T Axis 86 degrees    Diagnosis       Sinus tachycardia  Possible Left atrial enlargement  Septal infarct (cited on or before 30-APR-2014)  Abnormal ECG  When compared with ECG of 30-APR-2014 14:13,  T wave inversion less evident in Anterolateral leads         EKG Interpretation by ED physician:    Sinus tach 102, no acute ST changes    X-ray, CT or radiology findings or impressions:  XR CHEST SNGL V   Final Result   IMPRESSION:     No acute abnormalities. Chronic fibrotic and granulomatous changes greatest in  the left upper lung field.    DUPLEX UPPER EXT VENOUS RIGHT   INTERPRETATION/FINDINGS  Duplex images were obtained using 2-D gray scale, color flow, and  spectral Doppler analysis. Right arm :  1. Deep veins visualized include the internal jugular, subclavian,  axillary and brachial veins. 2. No evidence of deep venous thrombosis detected in the veins  visualized. 3. No evidence of deep vein thrombosis in the contralateral internal  jugular and subclavian veins. 4. Superficial veins visualized include the basilic (upper arm) and  cephalic (upper arm) veins. 5. No evidence of superficial thrombosis detected. XR SHOULDER RT AP/LAT MIN 2 V      IMPRESSION     Normal right shoulder. Progress notes, consult notes, or additional procedure notes:    3:19 PM  Patient p/w reproducible right shoulder pain. EKG, CXR, shoulder x-ray unremarkable. No DVT on ultrasound. No signs of infection. Will treat for likely impingement syndrome. Discussed return precautions      Diagnosis:   1. Pain in joint of right shoulder        Disposition: Discharge    Follow-up Information     None           Patient's Medications   Start Taking    No medications on file   Continue Taking    ALBUTEROL SULFATE (PROAIR HFA IN)    Take  by inhalation. AMLODIPINE (NORVASC) 10 MG TABLET    Take  by mouth daily. ASPIRIN 81 MG TABLET    Take 81 mg by mouth. BETHANECHOL (URECHOLINE) 10 MG TABLET    Take 10 mg by mouth Before breakfast, lunch, and dinner. DESONIDE (TRIDESILON) 0.05 % CREAM        DICLOFENAC (VOLTAREN) 1 % GEL    Apply  to affected area every six (6) hours. Apply 4 grams to affected joint up to 4 times per day, maximum 16 grams per joint per day  Dispense 5 100 gram tubes    DICYCLOMINE (BENTYL) 20 MG TABLET        DULOXETINE (CYMBALTA) 30 MG CAPSULE    Take 1 Cap by mouth daily. ERGOCALCIFEROL (VITAMIN D2) 50,000 UNIT CAPSULE    Take 50,000 Units by mouth Every Thursday. ESOMEPRAZOLE (NEXIUM) 40 MG CAPSULE    Take  by mouth daily. FLUTICASONE/SALMETEROL (ADVAIR HFA IN)    Take  by inhalation. GABAPENTIN (NEURONTIN) 100 MG CAPSULE    Take 2 Caps by mouth three (3) times daily. Indications: NEUROPATHIC PAIN    IBUPROFEN (MOTRIN) 800 MG TABLET    Take  by mouth. LISINOPRIL (PRINIVIL, ZESTRIL) 20 MG TABLET    Take  by mouth daily. MELOXICAM (MOBIC) 15 MG TABLET    1 tab by mouth daily with food    MONTELUKAST (SINGULAIR) 10 MG TABLET    Take 10 mg by mouth daily. TIOTROPIUM (SPIRIVA WITH HANDIHALER) 18 MCG INHALATION CAPSULE    Take 1 Cap by inhalation daily. TRIAMTERENE-HYDROCHLOROTHIAZIDE (DYAZIDE) 37.5-25 MG PER CAPSULE       These Medications have changed    No medications on file   Stop Taking    HYDROCODONE-ACETAMINOPHEN (NORCO) 7.5-325 MG PER TABLET    Take 1 Tab by mouth every eight (8) hours as needed for Pain. Max Daily Amount: 3 Tabs. MELOXICAM (MOBIC) 15 MG TABLET    TAKE 1 TAB ONCE A DAY AS NEEDED FOR PAIN    METHOCARBAMOL (ROBAXIN) 500 MG TABLET    Take 1 Tab by mouth four (4) times daily. NAPROXEN (NAPROSYN) 375 MG TABLET    Take 1 Tab by mouth two (2) times daily (with meals). OXYCODONE-ACETAMINOPHEN (PERCOCET 7.5) 7.5-325 MG PER TABLET    Take 1 Tab by mouth every eight (8) hours as needed for Pain. Max Daily Amount: 3 Tabs. Scribe Attestation     Geovanna Townsend acting as a scribe for and in the presence of Nicole Parrish MD      October 14, 2017 at 2:21 PM       Provider Attestation:      I personally performed the services described in the documentation, reviewed the documentation, as recorded by the scribe in my presence, and it accurately and completely records my words and actions.  October 14, 2017 at 2:21 PM - Nicole Parrish MD

## 2017-10-14 NOTE — ED NOTES
Pt states ready for discharge. Pt states she will follow up with PCP as instructed by provider. Pt appears in NOAD. I have reviewed discharge instructions with the patient. Prescriptions were reviewed with patient instructed not to drink alcohol, drive a car, or operate heavy machinery while taking this medicine. The patient verbalized understanding. Patient seen leaving ED ambulatory without difficulty or need for assistance, with S/O in no sign of distress. Patient armband removed and shredded      Current Discharge Medication List      START taking these medications    Details   traMADol (ULTRAM) 50 mg tablet Take 1 Tab by mouth every eight (8) hours as needed for Pain.  Max Daily Amount: 150 mg.  Qty: 8 Tab, Refills: 0

## 2017-10-14 NOTE — DISCHARGE INSTRUCTIONS
IF YOU HAVE SEVERE PAIN, CHEST PAIN, TROUBLE BREATHING, FEVER, OR ANY OTHER WORRYING SIGNS THEN RETURN TO THE ER RIGHT AWAY. Shoulder Pain: Care Instructions  Your Care Instructions    You can hurt your shoulder by using it too much during an activity, such as fishing or baseball. It can also happen as part of the everyday wear and tear of getting older. Shoulder injuries can be slow to heal, but your shoulder should get better with time. Your doctor may recommend a sling to rest your shoulder. If you have injured your shoulder, you may need testing and treatment. Follow-up care is a key part of your treatment and safety. Be sure to make and go to all appointments, and call your doctor if you are having problems. It's also a good idea to know your test results and keep a list of the medicines you take. How can you care for yourself at home? · Take pain medicines exactly as directed. ¨ If the doctor gave you a prescription medicine for pain, take it as prescribed. ¨ If you are not taking a prescription pain medicine, ask your doctor if you can take an over-the-counter medicine. ¨ Do not take two or more pain medicines at the same time unless the doctor told you to. Many pain medicines contain acetaminophen, which is Tylenol. Too much acetaminophen (Tylenol) can be harmful. · If your doctor recommends that you wear a sling, use it as directed. Do not take it off before your doctor tells you to. · Put ice or a cold pack on the sore area for 10 to 20 minutes at a time. Put a thin cloth between the ice and your skin. · If there is no swelling, you can put moist heat, a heating pad, or a warm cloth on your shoulder. Some doctors suggest alternating between hot and cold. · Rest your shoulder for a few days. If your doctor recommends it, you can then begin gentle exercise of the shoulder, but do not lift anything heavy. When should you call for help? Call 911 anytime you think you may need emergency care. For example, call if:  · You have chest pain or pressure. This may occur with:  ¨ Sweating. ¨ Shortness of breath. ¨ Nausea or vomiting. ¨ Pain that spreads from the chest to the neck, jaw, or one or both shoulders or arms. ¨ Dizziness or lightheadedness. ¨ A fast or uneven pulse. After calling 911, chew 1 adult-strength aspirin. Wait for an ambulance. Do not try to drive yourself. · Your arm or hand is cool or pale or changes color. Call your doctor now or seek immediate medical care if:  · You have signs of infection, such as:  ¨ Increased pain, swelling, warmth, or redness in your shoulder. ¨ Red streaks leading from a place on your shoulder. ¨ Pus draining from an area of your shoulder. ¨ Swollen lymph nodes in your neck, armpits, or groin. ¨ A fever. Watch closely for changes in your health, and be sure to contact your doctor if:  · You cannot use your shoulder. · Your shoulder does not get better as expected. Where can you learn more? Go to http://laura-jessie.info/. Enter E670 in the search box to learn more about \"Shoulder Pain: Care Instructions. \"  Current as of: March 21, 2017  Content Version: 11.3  © 5280-0352 [employer]. Care instructions adapted under license by [employer] (which disclaims liability or warranty for this information). If you have questions about a medical condition or this instruction, always ask your healthcare professional. [place] [address] any warranty or liability for your use of this information. Shoulder Stretches: Exercises  Your Care Instructions  Here are some examples of exercises for your shoulder. Start each exercise slowly. Ease off the exercise if you start to have pain. Your doctor or physical therapist will tell you when you can start these exercises and which ones will work best for you.   How to do the exercises  Note: These exercises should cause you to feel a gentle stretch, but no pain. Shoulder stretch    1.  a doorway and place one arm against the door frame. Your elbow should be a little higher than your shoulder. 2. Relax your shoulders as you lean forward, allowing your chest and shoulder muscles to stretch. You can also turn your body slightly away from your arm to stretch the muscles even more. 3. Hold for 15 to 30 seconds. 4. Repeat 2 to 4 times with each arm. Shoulder and chest stretch    Shoulder and chest stretch  1. While sitting, relax your upper body so you slump slightly in your chair. 2. As you breathe in, straighten your back and open your arms out to the sides. 3. Gently pull your shoulder blades back and downward. 4. Hold for 15 to 30 seconds as your breathe normally. 5. Repeat 2 to 4 times. Overhead stretch    1. Reach up over your head with both arms. 2. Hold for 15 to 30 seconds. 3. Repeat 2 to 4 times. Follow-up care is a key part of your treatment and safety. Be sure to make and go to all appointments, and call your doctor if you are having problems. It's also a good idea to know your test results and keep a list of the medicines you take. Where can you learn more? Go to http://laura-jessie.info/. Enter S254 in the search box to learn more about \"Shoulder Stretches: Exercises. \"  Current as of: March 21, 2017  Content Version: 11.3  © 2066-1008 [employer]. Care instructions adapted under license by [employer] (which disclaims liability or warranty for this information). If you have questions about a medical condition or this instruction, always ask your healthcare professional. [place] [address] any warranty or liability for your use of this information.

## 2017-10-14 NOTE — PROCEDURES
[facility]  *** FINAL REPORT ***    Name: Meera Serrato  MRN: [id number]  : 1950  HIS Order #: [id number]  [id number] [facility] Visit #: [id number]  Date: 14 Oct 2017    TYPE OF TEST: Peripheral Venous Testing    REASON FOR TEST  Pain in limb    Right Arm:-  Deep venous thrombosis:           No  Superficial venous thrombosis:    No      INTERPRETATION/FINDINGS  Duplex images were obtained using 2-D gray scale, color flow, and  spectral Doppler analysis. Right arm :  1. Deep veins visualized include the internal jugular, subclavian,  axillary and brachial veins. 2. No evidence of deep venous thrombosis detected in the veins  visualized. 3. No evidence of deep vein thrombosis in the contralateral internal  jugular and subclavian veins. 4. Superficial veins visualized include the basilic (upper arm) and  cephalic (upper arm) veins. 5. No evidence of superficial thrombosis detected. ADDITIONAL COMMENTS    I have personally reviewed the data relevant to the interpretation of  this  study. TECHNOLOGIST: ANDRE Serrato, CEZAR  Signed: 10/14/2017 02:11 PM    PHYSICIAN: Juliana Holloway.  Millie Rankin MD  Signed: 10/16/2017 08:48 AM

## 2017-10-15 LAB
ATRIAL RATE: 102 BPM
CALCULATED P AXIS, ECG09: 80 DEGREES
CALCULATED R AXIS, ECG10: 71 DEGREES
CALCULATED T AXIS, ECG11: 86 DEGREES
DIAGNOSIS, 93000: NORMAL
P-R INTERVAL, ECG05: 142 MS
Q-T INTERVAL, ECG07: 378 MS
QRS DURATION, ECG06: 88 MS
QTC CALCULATION (BEZET), ECG08: 492 MS
VENTRICULAR RATE, ECG03: 102 BPM

## 2017-10-17 ENCOUNTER — CLINICAL SUPPORT (OUTPATIENT)
Dept: CARDIOLOGY CLINIC | Age: 67
End: 2017-10-17

## 2017-10-17 DIAGNOSIS — R06.02 SOB (SHORTNESS OF BREATH): ICD-10-CM

## 2017-10-18 ENCOUNTER — TELEPHONE (OUTPATIENT)
Dept: ORTHOPEDIC SURGERY | Facility: CLINIC | Age: 67
End: 2017-10-18

## 2017-10-18 ENCOUNTER — OFFICE VISIT (OUTPATIENT)
Dept: SURGERY | Age: 67
End: 2017-10-18

## 2017-10-18 ENCOUNTER — OFFICE VISIT (OUTPATIENT)
Dept: CARDIOLOGY CLINIC | Age: 67
End: 2017-10-18

## 2017-10-18 VITALS
BODY MASS INDEX: 31.99 KG/M2 | SYSTOLIC BLOOD PRESSURE: 144 MMHG | HEART RATE: 99 BPM | WEIGHT: 192 LBS | HEIGHT: 65 IN | DIASTOLIC BLOOD PRESSURE: 73 MMHG

## 2017-10-18 VITALS
BODY MASS INDEX: 31.99 KG/M2 | TEMPERATURE: 98.5 F | RESPIRATION RATE: 18 BRPM | DIASTOLIC BLOOD PRESSURE: 88 MMHG | HEIGHT: 65 IN | SYSTOLIC BLOOD PRESSURE: 132 MMHG | WEIGHT: 192 LBS | HEART RATE: 99 BPM

## 2017-10-18 DIAGNOSIS — I10 ESSENTIAL HYPERTENSION, BENIGN: ICD-10-CM

## 2017-10-18 DIAGNOSIS — I89.0 LYMPHEDEMA OF LEFT ARM: ICD-10-CM

## 2017-10-18 DIAGNOSIS — G45.9 TRANSIENT CEREBRAL ISCHEMIA, UNSPECIFIED TYPE: ICD-10-CM

## 2017-10-18 DIAGNOSIS — R06.02 SOB (SHORTNESS OF BREATH): Primary | ICD-10-CM

## 2017-10-18 DIAGNOSIS — Z85.3 HISTORY OF LEFT BREAST CANCER: ICD-10-CM

## 2017-10-18 DIAGNOSIS — M79.602 ARM PAIN, DIFFUSE, LEFT: Primary | ICD-10-CM

## 2017-10-18 DIAGNOSIS — E78.5 DYSLIPIDEMIA: ICD-10-CM

## 2017-10-18 DIAGNOSIS — I97.2 POSTMASTECTOMY LYMPHEDEMA SYNDROME: ICD-10-CM

## 2017-10-18 RX ORDER — METOPROLOL TARTRATE 25 MG/1
25 TABLET, FILM COATED ORAL 2 TIMES DAILY
Qty: 60 TAB | Refills: 4 | Status: SHIPPED | OUTPATIENT
Start: 2017-10-18 | End: 2018-06-30 | Stop reason: SDUPTHER

## 2017-10-18 NOTE — PATIENT INSTRUCTIONS

## 2017-10-18 NOTE — PROGRESS NOTES
1. Have you been to the ER, urgent care clinic since your last visit? Hospitalized since your last visit? No    2. Have you seen or consulted any other health care providers outside of the [facility] since your last visit? Include any pap smears or colon screening. No     3. Since your last visit, have you had any of the following symptoms? shortness of breath. 4.  Have you had any blood work, X-rays or cardiac testing? No              5.  Where do you normally have your labs drawn? [facility]    6. Do you need any refills today?    No

## 2017-10-18 NOTE — PROGRESS NOTES
HISTORY OF PRESENT ILLNESS  Rajwinder Herrera is a 73 y.o. female. Hypertension   The history is provided by the patient. This is a chronic problem. The current episode started more than 1 week ago. The problem occurs every several days. The problem has not changed since onset. Associated symptoms include shortness of breath. Pertinent negatives include no chest pain. Shortness of Breath   The history is provided by the patient. This is a chronic problem. The problem occurs intermittently. The current episode started more than 1 week ago. The problem has not changed since onset. Pertinent negatives include no fever, no ear pain, no neck pain, no cough, no sputum production, no hemoptysis, no wheezing, no PND, no orthopnea, no chest pain, no vomiting, no rash, no leg swelling and no claudication. Associated medical issues do not include chronic lung disease, CAD or heart failure. Review of Systems   Constitutional: Negative for chills, diaphoresis, fever, malaise/fatigue and weight loss. HENT: Negative for congestion, ear discharge, ear pain, hearing loss, nosebleeds and tinnitus. Eyes: Negative for blurred vision. Respiratory: Positive for shortness of breath. Negative for cough, hemoptysis, sputum production, wheezing and stridor. Cardiovascular: Negative for chest pain, palpitations, orthopnea, claudication, leg swelling and PND. Gastrointestinal: Negative for heartburn, nausea and vomiting. Musculoskeletal: Negative for myalgias and neck pain. Skin: Negative for itching and rash. Neurological: Negative for dizziness, tingling, tremors, focal weakness, loss of consciousness and weakness. Psychiatric/Behavioral: Negative for depression and suicidal ideas.      Family History   Problem Relation Age of Onset   [facility] Cancer Mother     Cancer Father     Arthritis-osteo Other     Hypertension Other     Heart Disease Neg Hx      Negative family history of premature CAD or CVA       Past Medical History:   Diagnosis Date    Anemia     Asthma     Blood disorder     Cancer ([facility] [address]     breast, diagnosed 2006 left    Cancer [facility])     Chest pain, unspecified     The working diagnosis is chest pain. The differential diagnosis includes chest wall pain, stable angina.  Chronic obstructive pulmonary disease (HCC)     Essential hypertension     Essential hypertension, benign     Uncontrolled     GERD (gastroesophageal reflux disease)     Hypercholesterolemia     Hypertension     Neuropathy     Nonspecific abnormal electrocardiogram (ECG) (EKG)     Obesity, unspecified     Weight loss has been strongly encouraged by following dietary restrictions and an exercise routine.  Polio     as a young child    Pre-operative cardiovascular examination     Sciatica     Unspecified cerebral artery occlusion with cerebral infarction        Past Surgical History:   Procedure Laterality Date    BREAST SURGERY PROCEDURE UNLISTED      initial surgery 2006, then left modified radical mastectomy 6/2012    HX BREAST BIOPSY  4/11/12    Left    HX BREAST BIOPSY Left        Social History   Substance Use Topics    Smoking status: Never Smoker    Smokeless tobacco: Never Used    Alcohol use No       No Known Allergies    Outpatient Prescriptions Marked as Taking for the 10/18/17 encounter (Office Visit) with Nancy Vega MD   Medication Sig Dispense Refill    metoprolol tartrate (LOPRESSOR) 25 mg tablet Take 1 Tab by mouth two (2) times a day. 60 Tab 4    meloxicam (MOBIC) 15 mg tablet 1 tab by mouth daily with food 30 Tab 2    gabapentin (NEURONTIN) 100 mg capsule Take 2 Caps by mouth three (3) times daily. Indications: NEUROPATHIC PAIN 90 Cap 1    dicyclomine (BENTYL) 20 mg tablet   0    ibuprofen (MOTRIN) 800 mg tablet Take  by mouth.  diclofenac (VOLTAREN) 1 % gel Apply  to affected area every six (6) hours.  Apply 4 grams to affected joint up to 4 times per day, maximum 16 grams per joint per day  Dispense 5 100 gram tubes 100 g 4    desonide (TRIDESILON) 0.05 % cream   0    ALBUTEROL SULFATE (PROAIR HFA IN) Take  by inhalation.  FLUTICASONE/SALMETEROL (ADVAIR HFA IN) Take  by inhalation.  montelukast (SINGULAIR) 10 mg tablet Take 10 mg by mouth daily.  tiotropium (SPIRIVA WITH HANDIHALER) 18 mcg inhalation capsule Take 1 Cap by inhalation daily.  lisinopril (PRINIVIL, ZESTRIL) 20 mg tablet Take  by mouth daily.  amLODIPine (NORVASC) 10 mg tablet Take  by mouth daily.  esomeprazole (NEXIUM) 40 mg capsule Take  by mouth daily.  aspirin 81 mg tablet Take 81 mg by mouth.  ergocalciferol (VITAMIN D2) 50,000 unit capsule Take 50,000 Units by mouth Every Thursday. Visit Vitals    /73    Pulse 99    Ht 5' 5\" (1.651 m)    Wt 87.1 kg (192 lb)    BMI 31.95 kg/m2     Physical Exam   Constitutional: She is oriented to person, place, and time. She appears well-developed and well-nourished. No distress. HENT:   Head: Atraumatic. Mouth/Throat: No oropharyngeal exudate. Eyes: Conjunctivae are normal. Right eye exhibits no discharge. Left eye exhibits no discharge. No scleral icterus. Neck: Normal range of motion. Neck supple. No JVD present. No tracheal deviation present. No thyromegaly present. Cardiovascular: Normal rate and regular rhythm. Exam reveals no gallop. No murmur heard. Pulmonary/Chest: Effort normal and breath sounds normal. No stridor. She has no wheezes. She has no rales. Abdominal: Soft. There is no tenderness. There is no rebound and no guarding. Musculoskeletal: Normal range of motion. She exhibits no edema or tenderness. Lymphadenopathy:     She has no cervical adenopathy. Neurological: She is alert and oriented to person, place, and time. She exhibits normal muscle tone. Skin: Skin is warm. She is not diaphoretic. Psychiatric: She has a normal mood and affect.  Her behavior is normal.     ekg sinus rhythm with no acute st-t changes    ASSESSMENT and PLAN    ICD-10-CM ICD-9-CM    1. SOB (shortness of breath) R06.02 786.05 TSH 3RD GENERATION   2. Dyslipidemia E78.5 272.4    3. Essential hypertension, benign I10 401.1    4. Transient cerebral ischemia, unspecified type G45.9 435.9      Orders Placed This Encounter    TSH 3RD GENERATION     Standing Status:   Future     Standing Expiration Date:   10/19/2018    metoprolol tartrate (LOPRESSOR) 25 mg tablet     Sig: Take 1 Tab by mouth two (2) times a day. Dispense:  60 Tab     Refill:  4     Follow-up Disposition:  Return in about 1 month (around 11/18/2017). current treatment plan is effective, no change in therapy  reviewed diet, exercise and weight control. Patient with longstanding htn seen for worsening dyspnea  Echo revealed normal LV function with LVH and grade 1 DD. Will add lopressor 25mg bid and TSH due to tachycardia. Continue salt restriction and weight reduction.

## 2017-10-18 NOTE — MR AVS SNAPSHOT
Visit Information Date & Time Provider Department Dept. Phone Encounter #  
 10/18/2017  2:30 PM Nellie Mojica  [address] [id number] Your Appointments 10/26/2017  1:40 PM  
Follow Up with Adiel Torres PA-C  
[facility], [address] and [facility] - [facility] ([address]) Appt Note: RT KNEE 4wk fu  
 [address], [address] [facility]  
[phone] [address] [place] [address]  
  
    
 11/15/2017 11:15 AM  
Follow Up with Devante Edmondson MD  
[facility] ([address]) Appt Note: 1 month follow up  
 [address] [facility] [address]  
  
   
 [address] [address] [address] [address]  
  
    
 2/7/2018 11:00 AM  
Follow Up with Nellie Mojica NP [facility] ([address] [facility]) Appt Note: 3 month f/up [address] [id number] [address] [facility] [address] Upcoming Health Maintenance Date Due Hepatitis C Screening 1950 DTaP/Tdap/Td series (1 - Tdap) 11/24/1972 ZOSTER VACCINE AGE 60> 9/24/2011 FOBT Q 1 YEAR AGE 50-75 6/9/2013 GLAUCOMA SCREENING Q2Y 11/24/2016 OSTEOPOROSIS SCREENING (DEXA) 11/24/2016 Pneumococcal 65+ High/Highest Risk (1 of 2 - PCV13) 11/24/2016 MEDICARE YEARLY EXAM 11/24/2016 INFLUENZA AGE 9 TO ADULT 8/1/2017 BREAST CANCER SCRN MAMMOGRAM 8/23/2019 Allergies as of 10/18/2017  Review Complete On: 10/18/2017 By: Dalton Orozco LPN No Known Allergies Current Immunizations  Never Reviewed No immunizations on file. Not reviewed this visit Vitals BP Pulse Temp Resp Height(growth percentile) Weight(growth percentile) 132/88 99 98.5 °F (36.9 °C) (Oral) 18 5' 5\" (1.651 m) 192 lb (87.1 kg) BMI OB Status Smoking Status 31.95 kg/m2 Postmenopausal Never Smoker BMI and BSA Data Body Mass Index Body Surface Area 31.95 kg/m 2 2 m 2 Preferred Pharmacy Pharmacy Name Phone [address] [facility], [address] Your Updated Medication List  
  
   
This list is accurate as of: 10/18/17  3:43 PM.  Always use your most recent med list.  
  
  
  
  
 ADVAIR HFA IN Take  by inhalation. amLODIPine 10 mg tablet Commonly known as:  Love Breach Take  by mouth daily. aspirin 81 mg tablet Take 81 mg by mouth. bethanechol 10 mg tablet Commonly known as:  URECHOLINE Take 10 mg by mouth Before breakfast, lunch, and dinner. desonide 0.05 % cream  
Commonly known as:  TRIDESILON  
  
 diclofenac 1 % Gel Commonly known as:  VOLTAREN Apply  to affected area every six (6) hours. Apply 4 grams to affected joint up to 4 times per day, maximum 16 grams per joint per day Dispense 5 100 gram tubes  
  
 dicyclomine 20 mg tablet Commonly known as:  BENTYL DULoxetine 30 mg capsule Commonly known as:  CYMBALTA Take 1 Cap by mouth daily. gabapentin 100 mg capsule Commonly known as:  NEURONTIN Take 2 Caps by mouth three (3) times daily. Indications: NEUROPATHIC PAIN  
  
 ibuprofen 800 mg tablet Commonly known as:  MOTRIN Take  by mouth.  
  
 lisinopril 20 mg tablet Commonly known as:  [place] Escort Take  by mouth daily. meloxicam 15 mg tablet Commonly known as:  MOBIC  
1 tab by mouth daily with food  
  
 metoprolol tartrate 25 mg tablet Commonly known as:  LOPRESSOR Take 1 Tab by mouth two (2) times a day. NexIUM 40 mg capsule Generic drug:  esomeprazole Take  by mouth daily. PROAIR HFA IN Take  by inhalation. SINGULAIR 10 mg tablet Generic drug:  montelukast  
Take 10 mg by mouth daily. SPIRIVA WITH HANDIHALER 18 mcg inhalation capsule Generic drug:  tiotropium Take 1 Cap by inhalation daily. traMADol 50 mg tablet Commonly known as:  ULTRAM  
Take 1 Tab by mouth every eight (8) hours as needed for Pain. Max Daily Amount: 150 mg.  
  
 triamterene-hydroCHLOROthiazide 37.5-25 mg per capsule Commonly known as:  DYAZIDE  
  
 VITAMIN D2 50,000 unit capsule Generic drug:  ergocalciferol Take 50,000 Units by mouth Every Thursday. Introducing \[facility]\"" & HEALTH SERVICES! Aparna Bo introduces [employer] patient portal. Now you can access parts of your medical record, email your doctor's office, and request medication refills online. 1. In your internet browser, go to [email or web address] 2. Click on the First Time User? Click Here link in the Sign In box. You will see the New Member Sign Up page. 3. Enter your [employer] Access Code exactly as it appears below. You will not need to use this code after youve completed the sign-up process. If you do not sign up before the expiration date, you must request a new code. · [employer] Access Code: [email or web address] Expires: 11/20/2017  3:20 PM 
 
4. Enter the last four digits of your Social Security Number (xxxx) and Date of Birth (mm/dd/yyyy) as indicated and click Submit. You will be taken to the next sign-up page. 5. Create a [employer] ID. This will be your [employer] login ID and cannot be changed, so think of one that is secure and easy to remember. 6. Create a [employer] password. You can change your password at any time. 7. Enter your Password Reset Question and Answer. This can be used at a later time if you forget your password. 8. Enter your e-mail address. You will receive e-mail notification when new information is available in [address] 9. Click Sign Up. You can now view and download portions of your medical record.  
10. Click the Download Summary menu link to download a portable copy of your medical information. If you have questions, please visit the Frequently Asked Questions section of the [employer] website. Remember, [employer] is NOT to be used for urgent needs. For medical emergencies, dial 911. Now available from your iPhone and Android! Please provide this summary of care documentation to your next provider. Your primary care clinician is listed as Sanam Hernandez. If you have any questions after today's visit, please call [phone].

## 2017-10-18 NOTE — MR AVS SNAPSHOT
Visit Information Date & Time Provider Department Dept. Phone Encounter #  
 10/18/2017 11:00 AM Nickie Pearce MD [facility] [phone] Follow-up Instructions Return in about 1 month (around 11/18/2017). Your Appointments 10/18/2017  2:30 PM  
Follow Up with JENNIFER Briceño [facility] ([facility]) Appt Note: 3 month f/up [address] [facility] [address] [id number]  
  
    
 10/26/2017  1:40 PM  
Follow Up with Ryland Spencer PA-C  
[address] [facility], [address] and [facility] - [address] ([facility]) Appt Note: RT KNEE 4wk fu  
 [address], [address] [facility]  
[phone] [address] [place] [address], [address] Upcoming Health Maintenance Date Due Hepatitis C Screening 1950 DTaP/Tdap/Td series (1 - Tdap) 11/24/1972 ZOSTER VACCINE AGE 60> 9/24/2011 FOBT Q 1 YEAR AGE 50-75 6/9/2013 GLAUCOMA SCREENING Q2Y 11/24/2016 OSTEOPOROSIS SCREENING (DEXA) 11/24/2016 Pneumococcal 65+ High/Highest Risk (1 of 2 - PCV13) 11/24/2016 MEDICARE YEARLY EXAM 11/24/2016 INFLUENZA AGE 9 TO ADULT 8/1/2017 BREAST CANCER SCRN MAMMOGRAM 8/23/2019 Allergies as of 10/18/2017  Review Complete On: 10/18/2017 By: Rosanna Talley No Known Allergies Current Immunizations  Never Reviewed No immunizations on file. Not reviewed this visit You Were Diagnosed With   
  
 Codes Comments SOB (shortness of breath)    -  Primary ICD-10-CM: R06.02 
ICD-9-CM: 786.05 Dyslipidemia     ICD-10-CM: E78.5 ICD-9-CM: 272.4 Essential hypertension, benign     ICD-10-CM: I10 
ICD-9-CM: 401.1 Transient cerebral ischemia, unspecified type     ICD-10-CM: G45.9 ICD-9-CM: 435.9 Vitals BP Pulse Height(growth percentile) Weight(growth percentile) BMI OB Status 144/73 99 5' 5\" (1.651 m) 192 lb (87.1 kg) 31.95 kg/m2 Postmenopausal  
 Smoking Status Never Smoker BMI and BSA Data Body Mass Index Body Surface Area 31.95 kg/m 2 2 m 2 Preferred Pharmacy Pharmacy Name Phone [address] [facility], [address] Your Updated Medication List  
  
   
This list is accurate as of: 10/18/17 12:39 PM.  Always use your most recent med list.  
  
  
  
  
 ADVAIR HFA IN Take  by inhalation. amLODIPine 10 mg tablet Commonly known as:  Plascencia Fanti Take  by mouth daily. aspirin 81 mg tablet Take 81 mg by mouth. bethanechol 10 mg tablet Commonly known as:  URECHOLINE Take 10 mg by mouth Before breakfast, lunch, and dinner. desonide 0.05 % cream  
Commonly known as:  TRIDESILON  
  
 diclofenac 1 % Gel Commonly known as:  VOLTAREN Apply  to affected area every six (6) hours. Apply 4 grams to affected joint up to 4 times per day, maximum 16 grams per joint per day Dispense 5 100 gram tubes  
  
 dicyclomine 20 mg tablet Commonly known as:  BENTYL DULoxetine 30 mg capsule Commonly known as:  CYMBALTA Take 1 Cap by mouth daily. gabapentin 100 mg capsule Commonly known as:  NEURONTIN Take 2 Caps by mouth three (3) times daily. Indications: NEUROPATHIC PAIN  
  
 ibuprofen 800 mg tablet Commonly known as:  MOTRIN Take  by mouth.  
  
 lisinopril 20 mg tablet Commonly known as:  Alissa Merino Take  by mouth daily. meloxicam 15 mg tablet Commonly known as:  MOBIC  
1 tab by mouth daily with food  
  
 metoprolol tartrate 25 mg tablet Commonly known as:  LOPRESSOR Take 1 Tab by mouth two (2) times a day. NexIUM 40 mg capsule Generic drug:  esomeprazole Take  by mouth daily. PROAIR HFA IN Take  by inhalation. SINGULAIR 10 mg tablet Generic drug:  montelukast  
Take 10 mg by mouth daily. SPIRIVA WITH HANDIHALER 18 mcg inhalation capsule Generic drug:  tiotropium Take 1 Cap by inhalation daily. traMADol 50 mg tablet Commonly known as:  ULTRAM  
Take 1 Tab by mouth every eight (8) hours as needed for Pain. Max Daily Amount: 150 mg.  
  
 triamterene-hydroCHLOROthiazide 37.5-25 mg per capsule Commonly known as:  DYAZIDE  
  
 VITAMIN D2 50,000 unit capsule Generic drug:  ergocalciferol Take 50,000 Units by mouth Every Thursday. Prescriptions Sent to Pharmacy Refills  
 metoprolol tartrate (LOPRESSOR) 25 mg tablet 4 Sig: Take 1 Tab by mouth two (2) times a day. Class: Normal  
 Pharmacy: [id number], [address] [place] [address] Ph #: [phone] Route: Oral  
  
Follow-up Instructions Return in about 1 month (around 11/18/2017). Patient Instructions Shortness of Breath: Care Instructions Your Care Instructions Shortness of breath has many causes. Sometimes conditions such as anxiety can lead to shortness of breath. Some people get mild shortness of breath when they exercise. Trouble breathing also can be a symptom of a serious problem, such as asthma, lung disease, emphysema, heart problems, and pneumonia. If your shortness of breath continues, you may need tests and treatment. Watch for any changes in your breathing and other symptoms. Follow-up care is a key part of your treatment and safety. Be sure to make and go to all appointments, and call your doctor if you are having problems. Its also a good idea to know your test results and keep a list of the medicines you take. How can you care for yourself at home? · Do not smoke or allow others to smoke around you. If you need help quitting, talk to your doctor about stop-smoking programs and medicines. These can increase your chances of quitting for good. · Get plenty of rest and sleep. · Take your medicines exactly as prescribed. Call your doctor if you think you are having a problem with your medicine. · Find healthy ways to deal with stress. ¨ Exercise daily. ¨ Get plenty of sleep. ¨ Eat regularly and well. When should you call for help? Call 911 anytime you think you may need emergency care. For example, call if: 
· You have severe shortness of breath. · You have symptoms of a heart attack. These may include: ¨ Chest pain or pressure, or a strange feeling in the chest. 
¨ Sweating. ¨ Shortness of breath. ¨ Nausea or vomiting. ¨ Pain, pressure, or a strange feeling in the back, neck, jaw, or upper belly or in one or both shoulders or arms. ¨ Lightheadedness or sudden weakness. ¨ A fast or irregular heartbeat. After you call 911, the  may tell you to chew 1 adult-strength or 2 to 4 low-dose aspirin. Wait for an ambulance. Do not try to drive yourself. Call your doctor now or seek immediate medical care if: 
· Your shortness of breath gets worse or you start to wheeze. Wheezing is a high-pitched sound when you breathe. · You wake up at night out of breath or have to prop your head up on several pillows to breathe. · You are short of breath after only light activity or while at rest. 
Watch closely for changes in your health, and be sure to contact your doctor if: 
· You do not get better over the next 1 to 2 days. Where can you learn more? Go to http://laura-jessie.info/. Enter S780 in the search box to learn more about \"Shortness of Breath: Care Instructions. \" Current as of: March 25, 2017 Content Version: 11.3 © 4445-9722 [employer]. Care instructions adapted under license by [employer] (which disclaims liability or warranty for this information).  If you have questions about a medical condition or this instruction, always ask your healthcare professional. [employer] disclaims any warranty or liability for your use of this information. Introducing [facility] & HEALTH SERVICES! Lauren Adams introduces [employer] patient portal. Now you can access parts of your medical record, email your doctor's office, and request medication refills online. 1. In your internet browser, go to [email or web address] 2. Click on the First Time User? Click Here link in the Sign In box. You will see the New Member Sign Up page. 3. Enter your [employer] Access Code exactly as it appears below. You will not need to use this code after youve completed the sign-up process. If you do not sign up before the expiration date, you must request a new code. · [email or web address] Access Code: [email or web address] Expires: 11/20/2017  3:20 PM 
 
4. Enter the last four digits of your Social Security Number (xxxx) and Date of Birth (mm/dd/yyyy) as indicated and click Submit. You will be taken to the next sign-up page. 5. Create a [employer] ID. This will be your [employer] login ID and cannot be changed, so think of one that is secure and easy to remember. 6. Create a [employer] password. You can change your password at any time. 7. Enter your Password Reset Question and Answer. This can be used at a later time if you forget your password. 8. Enter your e-mail address. You will receive e-mail notification when new information is available in [address] 9. Click Sign Up. You can now view and download portions of your medical record. 10. Click the Download Summary menu link to download a portable copy of your medical information. If you have questions, please visit the Frequently Asked Questions section of the [employer] website. Remember, [employer] is NOT to be used for urgent needs. For medical emergencies, dial 911. Now available from your iPhone and Android! Please provide this summary of care documentation to your next provider. Your primary care clinician is listed as [facility] Officer. If you have any questions after today's visit, please call [phone].

## 2017-10-19 ENCOUNTER — TELEPHONE (OUTPATIENT)
Dept: SURGERY | Age: 67
End: 2017-10-19

## 2017-10-19 ENCOUNTER — DOCUMENTATION ONLY (OUTPATIENT)
Dept: ORTHOPEDIC SURGERY | Age: 67
End: 2017-10-19

## 2017-10-19 RX ORDER — HYDROCODONE BITARTRATE AND ACETAMINOPHEN 7.5; 325 MG/1; MG/1
1 TABLET ORAL
Qty: 21 TAB | Refills: 0 | OUTPATIENT
Start: 2017-10-19

## 2017-10-19 NOTE — PROGRESS NOTES
Fran Plascencia. Jud Michael, FNP-C  PROGRESS NOTE    Subjective:    Ms. Aurora Jones is a very pleasant 72 yo AA female who presents today for a 3 month follow up for lymphedema. She has not yet received her lymphedema pump from [facility], but she states she was measured for it months ago. I will check with our [facility] Rep to get status update on delivery of pump. She is not wearing her compression garments to any extremity today and complains of severe pain in her left arm/wrist. She reports consistent compliance with compression garments. She also complains of pain to the tail of Bolton region and left axilla. She is s/p left mastectomy though she still has a significant portion of left breast remaining though it is unclear how much breast tissue vs fatty tissue remains. I gave her a prescription for gabapentin in July and had increased her dose from one capsule 3 times daily to 2 capsules 3 times daily. She states that she has been taking them correctly, but she does not need a refill today because she has ? Plenty? Left. She is asking for more percocet for pain, but I explained to her that if she was taking her gabapentin appropriately, the nerve pain should not be as severe and Dr. Andrzej Collins explained to her at her last visit that we would not be able to give her any more opioids for pain. I told her I would touch base with her PCP to discuss the possibility of pain management. When reviewing her breast surveillance, I noticed that she had a mammogram and U/S in August of this year ordered by Dr. Farida Raza. Patient states Dr. Farida Raza found an area of suspicion during a breast exam and that the tests came back as normal. I asked Ms. Aurora Jones if she would like to continue her breast surveillance with us or with another provider, and she says that she wants our practice to follow her breast care. Based on her left breast area and axillary pain, I will order a bilateral MRI for January since her yearly right mammogram is due then.  I also did a breast exam today which was unremarkable. 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 For months she has been diligent in her efforts to manage her condition by performing conservative therapies of exercise, compression, elevation and self-MLD. Due to her disease presentation, I strongly rule against basic pump use. Reasons include the inability of the basic pump to create new/healthy lymphatic pathways in/around her scarred axillary and chest wall. Furthermore, simply wearing compression displaces the edema from her arm into her shoulder/axillary. The Flexitouch is the only device clinically proven to stimulate the lymphatics and properly address her lymphedema      Objective:    Vitals:    10/18/17 1451   BP: 132/88   Pulse: 99   Resp: 18   Temp: 98.5 °F (36.9 °C)   TempSrc: Oral   Weight: 87.1 kg (192 lb)   Height: 5' 5\" (1.651 m)       Physical Exam:    General: Alert and oriented x 3, cooperative, in no apparent distress    Breasts:    Left: S/P left mastectomy. No nipple but significant breast size remains. No palpable masses, no skin changes, no adenopathy. Right:  no dimpling, discoloration, nipple inversion or retractions. no axillary or supraclavicular lymphadenopathy. no mass   Bilateral upper extremities: left arm is larger than right from shoulder to wrist.  strengths are mostly equal. Arm strength much less on left side. Hyperkeratosis to axilla. Current Medications:  Current Outpatient Prescriptions   Medication Sig Dispense Refill    metoprolol tartrate (LOPRESSOR) 25 mg tablet Take 1 Tab by mouth two (2) times a day. 60 Tab 4    meloxicam (MOBIC) 15 mg tablet 1 tab by mouth daily with food 30 Tab 2    gabapentin (NEURONTIN) 100 mg capsule Take 2 Caps by mouth three (3) times daily. Indications: NEUROPATHIC PAIN 90 Cap 1    dicyclomine (BENTYL) 20 mg tablet   0    ibuprofen (MOTRIN) 800 mg tablet Take  by mouth.       diclofenac (VOLTAREN) 1 % gel Apply  to affected area every six (6) hours. Apply 4 grams to affected joint up to 4 times per day, maximum 16 grams per joint per day  Dispense 5 100 gram tubes 100 g 4    triamterene-hydrochlorothiazide (DYAZIDE) 37.5-25 mg per capsule   5    desonide (TRIDESILON) 0.05 % cream   0    ALBUTEROL SULFATE (PROAIR HFA IN) Take  by inhalation.  FLUTICASONE/SALMETEROL (ADVAIR HFA IN) Take  by inhalation.  montelukast (SINGULAIR) 10 mg tablet Take 10 mg by mouth daily.  tiotropium (SPIRIVA WITH HANDIHALER) 18 mcg inhalation capsule Take 1 Cap by inhalation daily.  lisinopril (PRINIVIL, ZESTRIL) 20 mg tablet Take  by mouth daily.  amLODIPine (NORVASC) 10 mg tablet Take  by mouth daily.  esomeprazole (NEXIUM) 40 mg capsule Take  by mouth daily.  aspirin 81 mg tablet Take 81 mg by mouth.  ergocalciferol (VITAMIN D2) 50,000 unit capsule Take 50,000 Units by mouth Every Thursday.  traMADol (ULTRAM) 50 mg tablet Take 1 Tab by mouth every eight (8) hours as needed for Pain. Max Daily Amount: 150 mg. 8 Tab 0    DULoxetine (CYMBALTA) 30 mg capsule Take 1 Cap by mouth daily. 30 Cap 1    bethanechol (URECHOLINE) 10 mg tablet Take 10 mg by mouth Before breakfast, lunch, and dinner. Chart and notes reviewed. Data reviewed. I have evaluated and examined the patient. Impression:  · Patient here for 3 month lymphedema follow up has not yet received her pump, still complains of severe left arm pain. Plan:   · Breast exam today  · Follow up with [facility] for lymphedema pump  · Bilateral breast MRI in January followed by a breast exam in this office    Ms. Mike Eckert has a reminder for a \"due or due soon\" health maintenance. I have asked that she contact her primary care provider for follow-up on this health maintenance. Ben Vera. Letty Bolton, FNP-C    ADDENDUM: I have touched base with Titi Moseley from [employer] and she stated that Ms. Mills's insurance had changed and all paperwork had to be resubmitted. We will fax appropriate documentation to her at this time.

## 2017-10-19 NOTE — TELEPHONE ENCOUNTER
Medication Refill (REFILL OF VICODIN)            Daniela Couch routed conversation to You 17 hours ago (3:48 PM)                     [address]  Daniela Couch 17 hours ago (3:47 PM)                       Daniela Couch 17 hours ago (3:47 PM)                 PT CAME BY  OFFICE TO REQ REFILL OF VICODIN.  PLEASE CALL PT TO ADVISE WHEN READY FOR P/U AT [facility]                  Documentation

## 2017-10-19 NOTE — PATIENT INSTRUCTIONS
Lymphedema: Care Instructions  Your Care Instructions  Lymphedema is fluid that builds up in the arms or legs. It is often caused by surgery to remove lymph nodes during cancer treatment, especially breast cancer surgery, which can cause fluid to build up in the arm. It can happen after radiation treatment to an area that involves lymph nodes. It also can be caused by a fractured bone or surgery to fix a fracture. And some medicines also can cause lymphedema. Some people get it for unknown reasons. Normally, lymph nodes trap bacteria and other substances as fluid flows through them. Then, the white cells in the body's defense, or immune, system can destroy the substances. But if there are few or no lymph nodes--or if the lymph system in an arm or leg has been damaged--fluid can build up in the affected arm or leg. You can take simple steps at home to help treat or prevent fluid buildup. Treatment may include raising the arm or leg to let gravity drain the fluid. You also can wear compression stockings or sleeves. Follow-up care is a key part of your treatment and safety. Be sure to make and go to all appointments, and call your doctor if you are having problems. Its also a good idea to know your test results and keep a list of the medicines you take. How can you care for yourself at home? · Wear a compression stocking or sleeve as your doctor suggests. It can help keep fluid from pooling in an arm or leg. Wear it during air travel. · Prop up the swollen arm or leg on a pillow anytime you sit or lie down. Try to keep it above the level of your heart. This will help reduce swelling. · Avoid crossing your legs if your legs are swollen. · Get some exercise on most days of the week. Increase the intensity of exercise slowly. Water aerobics can help reduce swelling by helping fluid move around. Wear your compression stocking or sleeve during exercise, but not during water exercise.   · See a physical therapist. He or she can teach you how to do self-massage to help fluid move around. You also can learn what activities would be best for you. · Keep your feet clean and wear clean socks or stockings every day. Check your feet often for signs of infection, such as redness or heat. Do not walk barefoot. · If you have had lymph nodes removed from under your arm:  ¨ Do not have blood drawn from the arm on the side of the lymph node surgery. ¨ Do not allow a blood pressure cuff to be placed on that arm. If you are in the hospital, make sure your nurse and other hospital staff know of your condition. ¨ Wear gloves when gardening or doing other activities that may lead to cuts on your fingers or hands. · If you have had lymph nodes removed from your groin:  ¨ Bathe your feet daily in lukewarm, not hot, water. Use a mild soap that has a moisturizer, or use a moisturizer separately. ¨ Check your feet for blisters or cuts. ¨ Wear comfortable and supportive shoes that fit properly. ¨ Wear the correct size of panty hose and stockings. Avoid garters or knee-high or thigh-high stockings. · Ask your doctor how to treat any cuts, scratches, insect bites, or other injuries that may occur. · Use sunscreen and insect repellent when outdoors to protect your skin from sunburn and insect bites. · Wear medical alert jewelry that says you have lymphedema. You can buy these at most drugstores and on the Internet. When should you call for help? Call your doctor now or seek immediate medical care if:  · You have signs of infection, such as:  ¨ Increased pain, swelling, warmth, or redness. ¨ Red streaks leading from the area of lymph node surgery or radiation. ¨ Pus draining from the area of surgery or radiation. ¨ A fever. · You have a feeling of tightness or swelling in or around your arm, hand, leg, or foot. · You have pain, weakness that keeps getting worse, or a \"pins-and-needles\" feeling.   Watch closely for changes in your health, and be sure to contact your doctor if:  · You continue to have fluid buildup even with home treatment. Where can you learn more? Go to http://laura-jessie.info/. Enter V398 in the search box to learn more about \"Lymphedema: Care Instructions. \"  Current as of: July 26, 2016  Content Version: 11.3  © 9809-4375 [employer]. Care instructions adapted under license by [employer] (which disclaims liability or warranty for this information). If you have questions about a medical condition or this instruction, always ask your healthcare professional. [place] [address] any warranty or liability for your use of this information.

## 2017-10-19 NOTE — TELEPHONE ENCOUNTER
Patient notified of RX denial.  She said she did not want us to refer her to pain management that her breast cancer doctor was going to do that.

## 2017-10-19 NOTE — TELEPHONE ENCOUNTER
[facility] PER PATIENT'S REQUEST    Last Visit: 09/28/2017 with MATA Montoya    Next Appointment: 10/26/2017 with MATA Montoya   Previous Refill Encounters: 09/28/2017 per MATA Montoya #[id number]     Requested Prescriptions     Pending Prescriptions Disp Refills    HYDROcodone-acetaminophen (NORCO) 7.5-325 mg per tablet 21 Tab 0     Sig: Take 1 Tab by mouth every eight (8) hours as needed for Pain. Max Daily Amount: 3 Tabs.

## 2017-10-19 NOTE — PROGRESS NOTES
Dr. Lesli Izaguirre spoke with NP Carlos Keller from [facility] and she stated she saw in patients chart that she had requested a refill of [address] from Dr. Lesli Izaguirre at [facility] office which was denied by [facility]. Nurse Carlos Keller stated patient also requested Delio Valles from them as well and it appears she is receiving pain medication from multiple providers once checking the patients .

## 2017-10-19 NOTE — TELEPHONE ENCOUNTER
When I reviewed patient's chart, I noticed that Ms. Chito Jo went to Dr. Maciel Richardson office personally yesterday to request a refill of Vicodin. I attempted to call Dr. Prosper Hernandez, PCP, but was disconnected multiple times by the . I was able to speak with Dr. Moiz Dumont to let him know that I had pulled Ms. Mills's  and that there are multiple providers writing for opioids. I let him know that I will continue to try to contact Dr. Prosper Hernandez and request a pain management referral for this patient.

## 2017-10-19 NOTE — TELEPHONE ENCOUNTER
I called Ms. Aurora Jones to let her know that I had touched base with [facility] and faxed over appropriate paperwork to new insurance for pump. I also let her know that I placed an order for bilateral MRI to be done in January instead of a right mammogram. She has expressed some concern about claustrophobia d/t the machine. I will try to locate an open MRI machine for this test and will let her know if we are able to get this done.

## 2017-10-19 NOTE — TELEPHONE ENCOUNTER
I called [facility] at [phone] at 3:03pm on 10/19/2017 and spoke with the  to inquire, if there office has an open MRI for BI LAT BREAST W WO CONT. Unfortunately they do not have one. I proceeded to call [facility] with the imaging department, which also there facility does not have an open MRI. I spoke with Adonay Pickett in radiology here at [facility] and was informed that [place] [address] has an open MRI, but unfortunately  [facility] has severe anxiety and when given proper medication to help her anxiety it does not seems to work. I called Sissy Hutchinson. At central scheduling to schedule the patient for an open MRI; Mrs. Mary Carmen Wisdom informed me that Beatriz Gutierrez NP discussed the patients issues with being claustrophobiic and [facility] a good [facility] for the open MRI at [place] [address]. Mary Carmen Wisdom stated that, \" she would talk to Radiology and return my call.\". .. Ximena Guaman

## 2017-10-20 DIAGNOSIS — Z85.3 PERSONAL HISTORY OF BREAST CANCER: Primary | ICD-10-CM

## 2017-10-26 ENCOUNTER — OFFICE VISIT (OUTPATIENT)
Dept: ORTHOPEDIC SURGERY | Age: 67
End: 2017-10-26

## 2017-10-26 VITALS
OXYGEN SATURATION: 95 % | HEIGHT: 65 IN | RESPIRATION RATE: 16 BRPM | WEIGHT: 190 LBS | HEART RATE: 90 BPM | BODY MASS INDEX: 31.65 KG/M2 | TEMPERATURE: 97 F | SYSTOLIC BLOOD PRESSURE: 135 MMHG | DIASTOLIC BLOOD PRESSURE: 66 MMHG

## 2017-10-26 DIAGNOSIS — M17.11 PRIMARY OSTEOARTHRITIS OF RIGHT KNEE: Primary | ICD-10-CM

## 2017-10-26 RX ORDER — LIDOCAINE 50 MG/G
PATCH TOPICAL
Qty: 5 PACKAGE | Refills: 2 | Status: SHIPPED | OUTPATIENT
Start: 2017-10-26 | End: 2018-01-25 | Stop reason: SDUPTHER

## 2017-10-26 NOTE — PROGRESS NOTES
[address] [place] [address]  [phone]           Patient: Jacqueline Price                MRN: [id number]       SSN: [id number]  YOB: 1950        AGE: 73 y.o. SEX: female  Body mass index is 31.62 kg/(m^2). PCP: Mary Delgadillo MD  10/26/17      This office note has been dictated. REVIEW OF SYSTEMS:  Constitutional: Negative for fever, chills, weight loss and malaise/fatigue. HENT: Negative. Eyes: Negative. Respiratory: Negative. Cardiovascular: Negative. Gastrointestinal: No bowel incontinence or constipation. Genitourinary: No bladder incontinence or saddle anesthesia. Skin: Negative. Neurological: Negative. Endo/Heme/Allergies: Negative. Psychiatric/Behavioral: Negative. Musculoskeletal: As per HPI above. Past Medical History:   Diagnosis Date    Anemia     Asthma     Blood disorder     Cancer ([facility] [address]     breast, diagnosed 2006 left    Cancer [facility])     Chest pain, unspecified     The working diagnosis is chest pain. The differential diagnosis includes chest wall pain, stable angina.  Chronic obstructive pulmonary disease (HCC)     Essential hypertension     Essential hypertension, benign     Uncontrolled     GERD (gastroesophageal reflux disease)     Hypercholesterolemia     Hypertension     Neuropathy     Nonspecific abnormal electrocardiogram (ECG) (EKG)     Obesity, unspecified     Weight loss has been strongly encouraged by following dietary restrictions and an exercise routine.     Polio     as a young child    Pre-operative cardiovascular examination     Sciatica     Unspecified cerebral artery occlusion with cerebral infarction          Current Outpatient Prescriptions:     metoprolol tartrate (LOPRESSOR) 25 mg tablet, Take 1 Tab by mouth two (2) times a day., Disp: 60 Tab, Rfl: 4    traMADol (ULTRAM) 50 mg tablet, Take 1 Tab by mouth every eight (8) hours as needed for Pain. Max Daily Amount: 150 mg., Disp: 8 Tab, Rfl: 0    meloxicam (MOBIC) 15 mg tablet, 1 tab by mouth daily with food, Disp: 30 Tab, Rfl: 2    gabapentin (NEURONTIN) 100 mg capsule, Take 2 Caps by mouth three (3) times daily. Indications: NEUROPATHIC PAIN, Disp: 90 Cap, Rfl: 1    dicyclomine (BENTYL) 20 mg tablet, , Disp: , Rfl: 0    DULoxetine (CYMBALTA) 30 mg capsule, Take 1 Cap by mouth daily. , Disp: 30 Cap, Rfl: 1    ibuprofen (MOTRIN) 800 mg tablet, Take  by mouth., Disp: , Rfl:     diclofenac (VOLTAREN) 1 % gel, Apply  to affected area every six (6) hours. Apply 4 grams to affected joint up to 4 times per day, maximum 16 grams per joint per day Dispense 5 100 gram tubes, Disp: 100 g, Rfl: 4    triamterene-hydrochlorothiazide (DYAZIDE) 37.5-25 mg per capsule, , Disp: , Rfl: 5    desonide (TRIDESILON) 0.05 % cream, , Disp: , Rfl: 0    ALBUTEROL SULFATE (PROAIR HFA IN), Take  by inhalation. , Disp: , Rfl:     FLUTICASONE/SALMETEROL (ADVAIR HFA IN), Take  by inhalation. , Disp: , Rfl:     montelukast (SINGULAIR) 10 mg tablet, Take 10 mg by mouth daily. , Disp: , Rfl:     tiotropium (SPIRIVA WITH HANDIHALER) 18 mcg inhalation capsule, Take 1 Cap by inhalation daily. , Disp: , Rfl:     lisinopril (PRINIVIL, ZESTRIL) 20 mg tablet, Take  by mouth daily. , Disp: , Rfl:     amLODIPine (NORVASC) 10 mg tablet, Take  by mouth daily. , Disp: , Rfl:     bethanechol (URECHOLINE) 10 mg tablet, Take 10 mg by mouth Before breakfast, lunch, and dinner., Disp: , Rfl:     esomeprazole (NEXIUM) 40 mg capsule, Take  by mouth daily. , Disp: , Rfl:     aspirin 81 mg tablet, Take 81 mg by mouth.  , Disp: , Rfl:     ergocalciferol (VITAMIN D2) 50,000 unit capsule, Take 50,000 Units by mouth Every Thursday. , Disp: , Rfl:     No Known Allergies    Social History     Social History    Marital status:      Spouse name: N/A    Number of children: N/A    Years of education: N/A     Occupational History    Not on file. Social History Main Topics    Smoking status: Never Smoker    Smokeless tobacco: Never Used    Alcohol use No    Drug use: No    Sexual activity: Not on file     Other Topics Concern    Not on file     Social History Narrative    ** Merged History Encounter **            Past Surgical History:   Procedure Laterality Date    BREAST SURGERY PROCEDURE UNLISTED      initial surgery 2006, then left modified radical mastectomy 6/2012    HX BREAST BIOPSY  4/11/12    Left    HX BREAST BIOPSY Left          We did see Ms. Rosemarie Perez for followup with regards to mainly her right knee. The patient does have known advancing arthritis of the right knee worse in the patellofemoral articulation. Injections are not helping her as much as they once did. She has failed cortisone, as well as viscosupplementation. She is not quite ready for surgical intervention. X-rays show she has mildly advanced arthritis of the patellofemoral articulation, as well as confirmed by MRI. She does have some diffuse thinning of the cartilage. She is still able to get around pretty well. She does have slightly decreased walking tolerance and some occasional pain at night. She has had no recent fevers, chills, systemic changes, and no injuries to report with the exception of a recent ultrasound of the heart and she has a little bit of rib soreness in the area they did the ultrasound. She denies any shortness of breath. She has no chest pain. PHYSICAL EXAMINATION:  In general, the patient is alert and oriented x 3 in no acute distress. The patient is well-developed, well-nourished, with a normal affect. The patient is afebrile. HEENT:  Head is normocephalic and atraumatic. Pupils are equally round and reactive to light and accommodation. Extraocular eye movements are intact. Neck is supple. Trachea is midline. No JVD is present. Breathing is nonlabored.   Examination of the thorax, there is some slight tenderness to palpation through the ribs. There is no crepitus noted mainly to the intercostal areas. Examination of the lower extremities reveals pain-free range of motion of the hips. There is no pain to palpation of the greater trochanteric bursae. There is negative straight leg raise. There is negative calf tenderness. There is negative Ashers. There is no evidence of DVT present. Examination of the right knee reveals the skin is intact. There is no ecchymosis and no warmth. She does have some pain to palpation to the medial joint line, as well as patellofemoral grind and mild crepitus anteriorly with range of motion activities noted. RADIOGRAPHS:  Review of previous radiographs does show moderately advanced arthritis of the right knee, mainly patellofemoral articulation. The medial and lateral joint lines are pretty well preserved. ASSESSMENT:  Right knee advancing osteoarthritis. PLAN:  At this point, the patient is in the gray zone. She is not quite ready for knee replacement surgery. However, the conservative treatments are not working as well as they once did. We will try some Lidoderm patches for her. It is too soon for a pain medicine refill. We will see her back in a couple months time for evaluation.                    JR Rojelio REN, PAJenC, ATC

## 2017-10-27 ENCOUNTER — TELEPHONE (OUTPATIENT)
Dept: ORTHOPEDIC SURGERY | Age: 67
End: 2017-10-27

## 2017-10-27 NOTE — TELEPHONE ENCOUNTER
Patient ontact [facility] ref the medication pain patch. Office needs to call her ins.   Please call patient back at [phone]

## 2017-10-28 ENCOUNTER — HOSPITAL ENCOUNTER (EMERGENCY)
Age: 67
Discharge: HOME | End: 2017-10-28
Attending: EMERGENCY MEDICINE
Payer: MEDICARE

## 2017-10-28 ENCOUNTER — APPOINTMENT (OUTPATIENT)
Dept: GENERAL RADIOLOGY | Age: 67
End: 2017-10-28
Attending: PHYSICIAN ASSISTANT
Payer: MEDICARE

## 2017-10-28 VITALS
HEIGHT: 65 IN | TEMPERATURE: 98.4 F | HEART RATE: 88 BPM | WEIGHT: 190 LBS | BODY MASS INDEX: 31.65 KG/M2 | SYSTOLIC BLOOD PRESSURE: 134 MMHG | OXYGEN SATURATION: 99 % | RESPIRATION RATE: 18 BRPM | DIASTOLIC BLOOD PRESSURE: 74 MMHG

## 2017-10-28 DIAGNOSIS — R07.89 CHEST WALL PAIN: Primary | ICD-10-CM

## 2017-10-28 DIAGNOSIS — R05.9 COUGH: ICD-10-CM

## 2017-10-28 PROCEDURE — 71020 XR CHEST PA LAT: CPT

## 2017-10-28 PROCEDURE — 99282 EMERGENCY DEPT VISIT SF MDM: CPT

## 2017-10-28 RX ORDER — CYCLOBENZAPRINE HCL 5 MG
5 TABLET ORAL
Qty: 7 TAB | Refills: 0 | Status: SHIPPED | OUTPATIENT
Start: 2017-10-28 | End: 2019-05-07

## 2017-10-28 RX ORDER — NAPROXEN 500 MG/1
500 TABLET ORAL 2 TIMES DAILY WITH MEALS
Qty: 20 TAB | Refills: 0 | Status: SHIPPED | OUTPATIENT
Start: 2017-10-28 | End: 2019-09-06

## 2017-10-28 RX ORDER — BENZONATATE 100 MG/1
100 CAPSULE ORAL
Qty: 30 CAP | Refills: 0 | Status: SHIPPED | OUTPATIENT
Start: 2017-10-28 | End: 2017-11-04

## 2017-10-28 NOTE — DISCHARGE INSTRUCTIONS

## 2017-10-28 NOTE — ED TRIAGE NOTES
Pt c/o left rib pain since having an ultrasound on 10/17. States the tech pressed on her ribs and made them hurt. States called her dr and saw her arthitis dr and they both told her to come to the ED for an x-ray.  States ibuprofen doesn't help and she is out of her other pain meds

## 2017-10-28 NOTE — ED PROVIDER NOTES
HPI Comments: Deidra Valentine is a 73 y.o. female that presents to the ED with a complaint of right chest wall pain following an US on 10/18. Pt states that she told the the imaging was painful but tech told her that she needs to press hard to do the exam.  Pt states that she took ibuprofen once or twice for the pain with no relief. Pt at first said that she tried Vicoden and Tramadol with no relief but when questioned further she says that she is out of those medications. Pain is worse with cough or movement    Patient is a 73 y.o. female presenting with rib pain. Rib Pain   Pertinent negatives include no fever. Past Medical History:   Diagnosis Date    Anemia     Asthma     Blood disorder     Cancer ([facility] [address]     breast, diagnosed 2006 left    Cancer [facility])     Chest pain, unspecified     The working diagnosis is chest pain. The differential diagnosis includes chest wall pain, stable angina.  Chronic obstructive pulmonary disease (HCC)     Essential hypertension     Essential hypertension, benign     Uncontrolled     GERD (gastroesophageal reflux disease)     Hypercholesterolemia     Hypertension     Neuropathy     Nonspecific abnormal electrocardiogram (ECG) (EKG)     Obesity, unspecified     Weight loss has been strongly encouraged by following dietary restrictions and an exercise routine.     Polio     as a young child    Pre-operative cardiovascular examination     Sciatica     Unspecified cerebral artery occlusion with cerebral infarction        Past Surgical History:   Procedure Laterality Date    BREAST SURGERY PROCEDURE UNLISTED      initial surgery 2006, then left modified radical mastectomy 6/2012    HX BREAST BIOPSY  4/11/12    Left    HX BREAST BIOPSY Left          Family History:   Problem Relation Age of Onset    Cancer Mother     Cancer Father     Arthritis-osteo Other     Hypertension Other     Heart Disease Neg Hx      Negative family history of premature CAD or CVA       Social History     Social History    Marital status:      Spouse name: N/A    Number of children: N/A    Years of education: N/A     Occupational History    Not on file. Social History Main Topics    Smoking status: Never Smoker    Smokeless tobacco: Never Used    Alcohol use No    Drug use: No    Sexual activity: Not on file     Other Topics Concern    Not on file     Social History Narrative    ** Merged History Encounter **              ALLERGIES: Review of patient's allergies indicates no known allergies. Review of Systems   Constitutional: Negative for fatigue and fever. Vitals:    10/28/17 1355   BP: 136/73   Pulse: 79   Resp: 20   Temp: 98.4 °F (36.9 °C)   SpO2: 97%   Weight: 86.2 kg (190 lb)   Height: 5' 5\" (1.651 m)            Physical Exam   Constitutional: She is oriented to person, place, and time. She appears well-developed and well-nourished. HENT:   Head: Normocephalic and atraumatic. Nose: Nose normal.   Eyes: Conjunctivae are normal. Pupils are equal, round, and reactive to light. Neck: Normal range of motion. Cardiovascular: Normal rate, regular rhythm and normal heart sounds. Pulmonary/Chest: Effort normal and breath sounds normal. No respiratory distress. Chest wall is not dull to percussion. She exhibits tenderness. She exhibits no mass, no laceration, no crepitus, no edema, no deformity, no swelling and no retraction. Abdominal: Soft. Bowel sounds are normal. She exhibits no distension. There is no tenderness. There is no rebound. Musculoskeletal: Normal range of motion. Neurological: She is alert and oriented to person, place, and time. Skin: Skin is warm and dry. Psychiatric: She has a normal mood and affect.  Her behavior is normal.        MDM  Number of Diagnoses or Management Options  Diagnosis management comments: XRAY: negative for acute process      Impression: Chest wall pain    Plan: discharge home   Anti inflammatory medications and muscle relaxants  Follow up with PCP       Amount and/or Complexity of Data Reviewed  Tests in the radiology section of CPT®: ordered and reviewed    Risk of Complications, Morbidity, and/or Mortality  Presenting problems: low  Diagnostic procedures: low  Management options: low    Patient Progress  Patient progress: stable    ED Course       Procedures           Vitals:  Patient Vitals for the past 12 hrs:   Temp Pulse Resp BP SpO2   10/28/17 1355 98.4 °F (36.9 °C) 79 20 136/73 97 %         Medications ordered:   Medications - No data to display      Lab findings:  No results found for this or any previous visit (from the past 12 hour(s)). X-Ray, CT or other radiology findings or impressions:  XR CHEST PA LAT   Final Result          Progress notes, Consult notes or additional Procedure notes:       Disposition:  Diagnosis: No diagnosis found. Disposition: discharge    Follow-up Information     None           Patient's Medications   Start Taking    No medications on file   Continue Taking    ALBUTEROL SULFATE (PROAIR HFA IN)    Take  by inhalation. AMLODIPINE (NORVASC) 10 MG TABLET    Take  by mouth daily. ASPIRIN 81 MG TABLET    Take 81 mg by mouth. BETHANECHOL (URECHOLINE) 10 MG TABLET    Take 10 mg by mouth Before breakfast, lunch, and dinner. DESONIDE (TRIDESILON) 0.05 % CREAM        DICLOFENAC (VOLTAREN) 1 % GEL    Apply  to affected area every six (6) hours. Apply 4 grams to affected joint up to 4 times per day, maximum 16 grams per joint per day  Dispense 5 100 gram tubes    DICYCLOMINE (BENTYL) 20 MG TABLET        ERGOCALCIFEROL (VITAMIN D2) 50,000 UNIT CAPSULE    Take 50,000 Units by mouth Every Thursday. ESOMEPRAZOLE (NEXIUM) 40 MG CAPSULE    Take  by mouth daily. FLUTICASONE/SALMETEROL (ADVAIR HFA IN)    Take  by inhalation. GABAPENTIN (NEURONTIN) 100 MG CAPSULE    Take 2 Caps by mouth three (3) times daily.  Indications: NEUROPATHIC PAIN    IBUPROFEN (MOTRIN) 800 MG TABLET    Take  by mouth. LIDOCAINE (LIDODERM) 5 %    Apply patch to the affected area for 12 hours a day and remove for 12 hours a day. LISINOPRIL (PRINIVIL, ZESTRIL) 20 MG TABLET    Take  by mouth daily. MELOXICAM (MOBIC) 15 MG TABLET    1 tab by mouth daily with food    METOPROLOL TARTRATE (LOPRESSOR) 25 MG TABLET    Take 1 Tab by mouth two (2) times a day. MONTELUKAST (SINGULAIR) 10 MG TABLET    Take 10 mg by mouth daily. TIOTROPIUM (SPIRIVA WITH HANDIHALER) 18 MCG INHALATION CAPSULE    Take 1 Cap by inhalation daily. TRIAMTERENE-HYDROCHLOROTHIAZIDE (DYAZIDE) 37.5-25 MG PER CAPSULE       These Medications have changed    No medications on file   Stop Taking    DULOXETINE (CYMBALTA) 30 MG CAPSULE    Take 1 Cap by mouth daily. TRAMADOL (ULTRAM) 50 MG TABLET    Take 1 Tab by mouth every eight (8) hours as needed for Pain. Max Daily Amount: 150 mg.

## 2017-10-30 NOTE — TELEPHONE ENCOUNTER
Ariana Matias (Key: [id number])    [email or web address] has received your information, and the request will be reviewed. You may close this dialog, return to your dashboard, and perform other tasks. You will receive an electronic determination in [employer]. You can see the latest determination by locating this request on your dashboard or by reopening this request. Federica Norman will also receive a faxed copy of the determination. If you have any questions please contact [email or web address] at [phone]. If you need assistance, please chat with [employer] or call us at [phone].

## 2017-10-31 NOTE — TELEPHONE ENCOUNTER
Received a fax from [employer] stating a prior auth request for Lidoderm Patches has been approved from 10/30/2017 through 12/31/2018.

## 2017-11-14 LAB — TSH SERPL DL<=0.005 MIU/L-ACNC: 1.75 MCU/ML (ref 0.27–4.2)

## 2017-11-15 ENCOUNTER — OFFICE VISIT (OUTPATIENT)
Dept: CARDIOLOGY CLINIC | Age: 67
End: 2017-11-15

## 2017-11-15 VITALS
DIASTOLIC BLOOD PRESSURE: 74 MMHG | BODY MASS INDEX: 32.49 KG/M2 | SYSTOLIC BLOOD PRESSURE: 141 MMHG | WEIGHT: 195 LBS | HEART RATE: 87 BPM | HEIGHT: 65 IN

## 2017-11-15 DIAGNOSIS — G45.9 TRANSIENT CEREBRAL ISCHEMIA, UNSPECIFIED TYPE: ICD-10-CM

## 2017-11-15 DIAGNOSIS — R06.02 SOB (SHORTNESS OF BREATH): Primary | ICD-10-CM

## 2017-11-15 DIAGNOSIS — I10 ESSENTIAL HYPERTENSION, BENIGN: ICD-10-CM

## 2017-11-15 DIAGNOSIS — I50.32 CHRONIC DIASTOLIC CONGESTIVE HEART FAILURE (HCC): ICD-10-CM

## 2017-11-15 NOTE — PROGRESS NOTES
1. Have you been to the ER, urgent care clinic since your last visit? Hospitalized since your last visit?     no    2. Have you seen or consulted any other health care providers outside of the [address] [place] [address] since your last visit? Include any pap smears or colon screening. No     3. Since your last visit, have you had any of the following symptoms? chest pains and shortness of breath. 4.  Have you had any blood work, X-rays or cardiac testing? No         5. Where do you normally have your labs drawn? 6. Do you need any refills today?    no

## 2017-11-15 NOTE — PATIENT INSTRUCTIONS
Shortness of Breath: Care Instructions  Your Care Instructions  Shortness of breath has many causes. Sometimes conditions such as anxiety can lead to shortness of breath. Some people get mild shortness of breath when they exercise. Trouble breathing also can be a symptom of a serious problem, such as asthma, lung disease, emphysema, heart problems, and pneumonia. If your shortness of breath continues, you may need tests and treatment. Watch for any changes in your breathing and other symptoms. Follow-up care is a key part of your treatment and safety. Be sure to make and go to all appointments, and call your doctor if you are having problems. It's also a good idea to know your test results and keep a list of the medicines you take. How can you care for yourself at home? · Do not smoke or allow others to smoke around you. If you need help quitting, talk to your doctor about stop-smoking programs and medicines. These can increase your chances of quitting for good. · Get plenty of rest and sleep. · Take your medicines exactly as prescribed. Call your doctor if you think you are having a problem with your medicine. · Find healthy ways to deal with stress. ¨ Exercise daily. ¨ Get plenty of sleep. ¨ Eat regularly and well. When should you call for help? Call 911 anytime you think you may need emergency care. For example, call if:  ? · You have severe shortness of breath. ? · You have symptoms of a heart attack. These may include:  ¨ Chest pain or pressure, or a strange feeling in the chest.  ¨ Sweating. ¨ Shortness of breath. ¨ Nausea or vomiting. ¨ Pain, pressure, or a strange feeling in the back, neck, jaw, or upper belly or in one or both shoulders or arms. ¨ Lightheadedness or sudden weakness. ¨ A fast or irregular heartbeat. After you call 911, the  may tell you to chew 1 adult-strength or 2 to 4 low-dose aspirin. Wait for an ambulance. Do not try to drive yourself.    ?Call your doctor now or seek immediate medical care if:  ? · Your shortness of breath gets worse or you start to wheeze. Wheezing is a high-pitched sound when you breathe. ? · You wake up at night out of breath or have to prop your head up on several pillows to breathe. ? · You are short of breath after only light activity or while at rest.   ? Watch closely for changes in your health, and be sure to contact your doctor if:  ? · You do not get better over the next 1 to 2 days. Where can you learn more? Go to http://laura-jessie.info/. Enter S780 in the search box to learn more about \"Shortness of Breath: Care Instructions. \"  Current as of: May 12, 2017  Content Version: 11.4  © 2328-2944 [employer]. Care instructions adapted under license by [employer] (which disclaims liability or warranty for this information). If you have questions about a medical condition or this instruction, always ask your healthcare professional. [address] any warranty or liability for your use of this information.

## 2017-11-15 NOTE — PROGRESS NOTES
HISTORY OF PRESENT ILLNESS  Tigist De Leon is a 73 y.o. female. Hypertension   The history is provided by the patient. This is a chronic problem. The current episode started more than 1 week ago. The problem occurs every several days. The problem has not changed since onset. Associated symptoms include shortness of breath. Pertinent negatives include no chest pain. Shortness of Breath   The history is provided by the patient. This is a chronic problem. The problem occurs intermittently. The current episode started more than 1 week ago. The problem has been gradually improving. Pertinent negatives include no fever, no ear pain, no neck pain, no cough, no sputum production, no hemoptysis, no wheezing, no PND, no orthopnea, no chest pain, no vomiting, no rash, no leg swelling and no claudication. Associated medical issues do not include chronic lung disease, CAD or heart failure. Review of Systems   Constitutional: Negative for chills, diaphoresis, fever, malaise/fatigue and weight loss. HENT: Negative for congestion, ear discharge, ear pain, hearing loss, nosebleeds and tinnitus. Eyes: Negative for blurred vision. Respiratory: Positive for shortness of breath. Negative for cough, hemoptysis, sputum production, wheezing and stridor. Cardiovascular: Negative for chest pain, palpitations, orthopnea, claudication, leg swelling and PND. Gastrointestinal: Negative for heartburn, nausea and vomiting. Musculoskeletal: Negative for myalgias and neck pain. Skin: Negative for itching and rash. Neurological: Negative for dizziness, tingling, tremors, focal weakness, loss of consciousness and weakness. Psychiatric/Behavioral: Negative for depression and suicidal ideas.      Family History   Problem Relation Age of Onset   Amber Maru Cancer Mother     Cancer Father     Arthritis-osteo Other     Hypertension Other     Heart Disease Neg Hx      Negative family history of premature CAD or CVA       Past Medical History:   Diagnosis Date    Anemia     Asthma     Blood disorder     Cancer ([facility] [address]     breast, diagnosed 2006 left    Cancer [facility])     Chest pain, unspecified     The working diagnosis is chest pain. The differential diagnosis includes chest wall pain, stable angina.  Chronic obstructive pulmonary disease (HCC)     Essential hypertension     Essential hypertension, benign     Uncontrolled     GERD (gastroesophageal reflux disease)     Hypercholesterolemia     Hypertension     Neuropathy     Nonspecific abnormal electrocardiogram (ECG) (EKG)     Obesity, unspecified     Weight loss has been strongly encouraged by following dietary restrictions and an exercise routine.  Polio     as a young child    Pre-operative cardiovascular examination     Sciatica     Unspecified cerebral artery occlusion with cerebral infarction        Past Surgical History:   Procedure Laterality Date    BREAST SURGERY PROCEDURE UNLISTED      initial surgery 2006, then left modified radical mastectomy 6/2012    HX BREAST BIOPSY  4/11/12    Left    HX BREAST BIOPSY Left        Social History   Substance Use Topics    Smoking status: Never Smoker    Smokeless tobacco: Never Used    Alcohol use No       No Known Allergies    Outpatient Prescriptions Marked as Taking for the 11/15/17 encounter (Office Visit) with Silvio Duran MD   Medication Sig Dispense Refill    naproxen (NAPROSYN) 500 mg tablet Take 1 Tab by mouth two (2) times daily (with meals). 20 Tab 0    cyclobenzaprine (FLEXERIL) 5 mg tablet Take 1 Tab by mouth nightly. 7 Tab 0    lidocaine (LIDODERM) 5 % Apply patch to the affected area for 12 hours a day and remove for 12 hours a day. 5 Package 2    metoprolol tartrate (LOPRESSOR) 25 mg tablet Take 1 Tab by mouth two (2) times a day. 60 Tab 4    gabapentin (NEURONTIN) 100 mg capsule Take 2 Caps by mouth three (3) times daily.  Indications: NEUROPATHIC PAIN 90 Cap 1    dicyclomine (BENTYL) 20 mg tablet   0    diclofenac (VOLTAREN) 1 % gel Apply  to affected area every six (6) hours. Apply 4 grams to affected joint up to 4 times per day, maximum 16 grams per joint per day  Dispense 5 100 gram tubes 100 g 4    desonide (TRIDESILON) 0.05 % cream   0    ALBUTEROL SULFATE (PROAIR HFA IN) Take  by inhalation.  FLUTICASONE/SALMETEROL (ADVAIR HFA IN) Take  by inhalation.  montelukast (SINGULAIR) 10 mg tablet Take 10 mg by mouth daily.  tiotropium (SPIRIVA WITH HANDIHALER) 18 mcg inhalation capsule Take 1 Cap by inhalation daily.  lisinopril (PRINIVIL, ZESTRIL) 20 mg tablet Take  by mouth daily.  amLODIPine (NORVASC) 10 mg tablet Take  by mouth daily.  esomeprazole (NEXIUM) 40 mg capsule Take  by mouth daily.  aspirin 81 mg tablet Take 81 mg by mouth.  ergocalciferol (VITAMIN D2) 50,000 unit capsule Take 50,000 Units by mouth Every Thursday. Visit Vitals    /74    Pulse 87    Ht 5' 5\" (1.651 m)    Wt 88.5 kg (195 lb)    BMI 32.45 kg/m2     Physical Exam   Constitutional: She is oriented to person, place, and time. She appears well-developed and well-nourished. No distress. HENT:   Head: Atraumatic. Mouth/Throat: No oropharyngeal exudate. Eyes: Conjunctivae are normal. Right eye exhibits no discharge. Left eye exhibits no discharge. No scleral icterus. Neck: Normal range of motion. Neck supple. No JVD present. No tracheal deviation present. No thyromegaly present. Cardiovascular: Normal rate and regular rhythm. Exam reveals no gallop. No murmur heard. Pulmonary/Chest: Effort normal and breath sounds normal. No stridor. She has no wheezes. She has no rales. Abdominal: Soft. There is no tenderness. There is no rebound and no guarding. Musculoskeletal: Normal range of motion. She exhibits no edema or tenderness. Lymphadenopathy:     She has no cervical adenopathy.    Neurological: She is alert and oriented to person, place, and time. She exhibits normal muscle tone. Skin: Skin is warm. She is not diaphoretic. Psychiatric: She has a normal mood and affect. Her behavior is normal.     ekg sinus rhythm with no acute st-t changes  Echo 10/2017:  SUMMARY:  Left ventricle: Systolic function was normal. Ejection fraction was  estimated in the range of 55 % to 60 %. There were no regional wall motion  abnormalities. There was mild concentric hypertrophy. Doppler parameters  were consistent with abnormal left ventricular relaxation (grade 1  diastolic dysfunction). Mitral valve: There was mild regurgitation. Tricuspid valve: There was mild regurgitation. ASSESSMENT and PLAN    ICD-10-CM ICD-9-CM    1. SOB (shortness of breath) R06.02 786.05     slowly improving   2. Essential hypertension, benign I10 401.1    3. Chronic diastolic congestive heart failure (HCC) I50.32 428.32      428.0     NYHA class II   4. Transient cerebral ischemia, unspecified type G45.9 435.9      No orders of the defined types were placed in this encounter. Follow-up Disposition:  Return in about 4 months (around 3/15/2018). current treatment plan is effective, no change in therapy  reviewed diet, exercise and weight control. Patient with longstanding htn seen for worsening dyspnea  Echo revealed normal LV function with LVH and grade 1 DD. Her symptoms are slowly improving after starting lopressor. Discussed with patient at length regarding salt restriction.   Will reevaluate in 4 months and if symptoms persists or worsen, then consider stress test.

## 2017-11-15 NOTE — MR AVS SNAPSHOT
Visit Information Date & Time Provider Department Dept. Phone Encounter #  
 11/15/2017 11:15 AM Masoud Wilburn MD [facility] [place] [id number]  Follow-up Instructions Return in about 4 months (around 3/15/2018). Your Appointments 1/25/2018  1:30 PM  
Follow Up with Natanael Lau PA-C  
[facility] ([facility]) Appt Note: RT KNEE 3 mo fu  
 [address], [address] [facility]  
[phone] [address], [address]  
  
    
 2/7/2018 11:00 AM  
Follow Up with JENNIFER Blair [facility] ([facility] Appt Note: 3 month f/up [address] [place] [phone] [address] [place] [address] Upcoming Health Maintenance Date Due Hepatitis C Screening 1950 DTaP/Tdap/Td series (1 - Tdap) 11/24/1972 ZOSTER VACCINE AGE 60> 9/24/2011 FOBT Q 1 YEAR AGE 50-75 6/9/2013 GLAUCOMA SCREENING Q2Y 11/24/2016 OSTEOPOROSIS SCREENING (DEXA) 11/24/2016 Pneumococcal 65+ High/Highest Risk (1 of 2 - PCV13) 11/24/2016 MEDICARE YEARLY EXAM 11/24/2016 Influenza Age 5 to Adult 8/1/2017 BREAST CANCER SCRN MAMMOGRAM 8/23/2019 Allergies as of 11/15/2017  Review Complete On: 10/28/2017 By: Ivonne Bailey RN No Known Allergies Current Immunizations  Never Reviewed No immunizations on file. Not reviewed this visit You Were Diagnosed With   
  
 Codes Comments SOB (shortness of breath)    -  Primary ICD-10-CM: R06.02 
ICD-9-CM: 786.05 Essential hypertension, benign     ICD-10-CM: I10 
ICD-9-CM: 893. 1 Chronic diastolic congestive heart failure (HCC)     ICD-10-CM: I50.32 
ICD-9-CM: 428.32, 428.0 Transient cerebral ischemia, unspecified type     ICD-10-CM: G45.9 ICD-9-CM: 435.9 Vitals BP Pulse Height(growth percentile) Weight(growth percentile) BMI OB Status 141/74 87 5' 5\" (1.651 m) 195 lb (88.5 kg) 32.45 kg/m2 Postmenopausal  
 Smoking Status Never Smoker Vitals History BMI and BSA Data Body Mass Index Body Surface Area  
 32.45 kg/m 2 2.01 m 2 Preferred Pharmacy Pharmacy Name Phone [phone], [address] [address] Your Updated Medication List  
  
   
This list is accurate as of: 11/15/17 12:01 PM.  Always use your most recent med list.  
  
  
  
  
 Marty Ambrose HFA IN Take  by inhalation. amLODIPine 10 mg tablet Commonly known as:  Brooke Darting Take  by mouth daily. aspirin 81 mg tablet Take 81 mg by mouth. bethanechol 10 mg tablet Commonly known as:  URECHOLINE Take 10 mg by mouth Before breakfast, lunch, and dinner. cyclobenzaprine 5 mg tablet Commonly known as:  FLEXERIL Take 1 Tab by mouth nightly. desonide 0.05 % cream  
Commonly known as:  TRIDESILON  
  
 diclofenac 1 % Gel Commonly known as:  VOLTAREN Apply  to affected area every six (6) hours. Apply 4 grams to affected joint up to 4 times per day, maximum 16 grams per joint per day Dispense 5 100 gram tubes  
  
 dicyclomine 20 mg tablet Commonly known as:  BENTYL  
  
 gabapentin 100 mg capsule Commonly known as:  NEURONTIN Take 2 Caps by mouth three (3) times daily. Indications: NEUROPATHIC PAIN  
  
 lidocaine 5 % Commonly known as:  Sheran Alert Apply patch to the affected area for 12 hours a day and remove for 12 hours a day. lisinopril 20 mg tablet Commonly known as:  Marilynne Shows Take  by mouth daily. metoprolol tartrate 25 mg tablet Commonly known as:  LOPRESSOR Take 1 Tab by mouth two (2) times a day. naproxen 500 mg tablet Commonly known as:  NAPROSYN Take 1 Tab by mouth two (2) times daily (with meals). NexIUM 40 mg capsule Generic drug:  esomeprazole Take  by mouth daily. PROAIR HFA IN Take  by inhalation. SINGULAIR 10 mg tablet Generic drug:  montelukast  
Take 10 mg by mouth daily. SPIRIVA WITH HANDIHALER 18 mcg inhalation capsule Generic drug:  tiotropium Take 1 Cap by inhalation daily. triamterene-hydroCHLOROthiazide 37.5-25 mg per capsule Commonly known as:  DYAZIDE  
  
 VITAMIN D2 50,000 unit capsule Generic drug:  ergocalciferol Take 50,000 Units by mouth Every Thursday. Follow-up Instructions Return in about 4 months (around 3/15/2018). To-Do List   
 02/01/2018 10:00 AM  
  Appointment with YASMIN DANIELLE at [address] [place] [address] ([phone]) PAYMENT  For Non-Medicare patients - $15.00 will be collected from you at the time of your exam.  You will be billed $35.00 from the reading [facility]. OUTSIDE FILMS  - Any outside films related to the study being scheduled should be brought with you on the day of the exam.  If this cannot be done there may be a delay in the reading of the study. MEDICATIONS  - Patient must bring a complete list of all medications currently taking to include prescriptions, over-the-counter meds, herbals, vitamins & any dietary supplements GENERAL INSTRUCTIONS  - On the day of your exam do not use any bath powder, deodorant or lotions on the armpit area. Patient Instructions Shortness of Breath: Care Instructions Your Care Instructions Shortness of breath has many causes. Sometimes conditions such as anxiety can lead to shortness of breath. Some people get mild shortness of breath when they exercise. Trouble breathing also can be a symptom of a serious problem, such as asthma, lung disease, emphysema, heart problems, and pneumonia. If your shortness of breath continues, you may need tests and treatment. Watch for any changes in your breathing and other symptoms. Follow-up care is a key part of your treatment and safety. Be sure to make and go to all appointments, and call your doctor if you are having problems. It's also a good idea to know your test results and keep a list of the medicines you take. How can you care for yourself at home? · Do not smoke or allow others to smoke around you. If you need help quitting, talk to your doctor about stop-smoking programs and medicines. These can increase your chances of quitting for good. · Get plenty of rest and sleep. · Take your medicines exactly as prescribed. Call your doctor if you think you are having a problem with your medicine. · Find healthy ways to deal with stress. ¨ Exercise daily. ¨ Get plenty of sleep. ¨ Eat regularly and well. When should you call for help? Call 911 anytime you think you may need emergency care. For example, call if: 
? · You have severe shortness of breath. ? · You have symptoms of a heart attack. These may include: ¨ Chest pain or pressure, or a strange feeling in the chest. 
¨ Sweating. ¨ Shortness of breath. ¨ Nausea or vomiting. ¨ Pain, pressure, or a strange feeling in the back, neck, jaw, or upper belly or in one or both shoulders or arms. ¨ Lightheadedness or sudden weakness. ¨ A fast or irregular heartbeat. After you call 911, the  may tell you to chew 1 adult-strength or 2 to 4 low-dose aspirin. Wait for an ambulance. Do not try to drive yourself. ?Call your doctor now or seek immediate medical care if: 
? · Your shortness of breath gets worse or you start to wheeze. Wheezing is a high-pitched sound when you breathe. ? · You wake up at night out of breath or have to prop your head up on several pillows to breathe. ? · You are short of breath after only light activity or while at rest. ? Watch closely for changes in your health, and be sure to contact your doctor if: ? · You do not get better over the next 1 to 2 days. Where can you learn more? Go to http://laura-jessie.info/. Enter S780 in the search box to learn more about \"Shortness of Breath: Care Instructions. \" Current as of: May 12, 2017 Content Version: 11.4 © 7017-2413 [employer]. Care instructions adapted under license by [employer] (which disclaims liability or warranty for this information). If you have questions about a medical condition or this instruction, always ask your healthcare professional. [address] any warranty or liability for your use of this information. Introducing [facility]! Esha Forte introduces CiviQ patient portal. Now you can access parts of your medical record, email your doctor's office, and request medication refills online. 1. In your internet browser, go to [email or web address] 2. Click on the First Time User? Click Here link in the Sign In box. You will see the New Member Sign Up page. 3. Enter your CiviQ Access Code exactly as it appears below. You will not need to use this code after youve completed the sign-up process. If you do not sign up before the expiration date, you must request a new code. · CiviQ Access Code: [email or web address] Expires: 11/20/2017  2:20 PM 
 
4. Enter the last four digits of your Social Security Number (xxxx) and Date of Birth (mm/dd/yyyy) as indicated and click Submit. You will be taken to the next sign-up page. 5. Create a The Blazet ID. This will be your CiviQ login ID and cannot be changed, so think of one that is secure and easy to remember. 6. Create a CiviQ password. You can change your password at any time. 7. Enter your Password Reset Question and Answer. This can be used at a later time if you forget your password. 8. Enter your e-mail address.  You will receive e-mail notification when new information is available in Likeastore. 9. Click Sign Up. You can now view and download portions of your medical record. 10. Click the Download Summary menu link to download a portable copy of your medical information. If you have questions, please visit the Frequently Asked Questions section of the Like[employer] website. Remember, Likeastore is NOT to be used for urgent needs. For medical emergencies, dial 911. Now available from your iPhone and Android! Please provide this summary of care documentation to your next provider. Your primary care clinician is listed as Markell Talbot. If you have any questions after today's visit, please call [phone].

## 2018-01-25 ENCOUNTER — OFFICE VISIT (OUTPATIENT)
Dept: ORTHOPEDIC SURGERY | Age: 68
End: 2018-01-25

## 2018-01-25 VITALS — DIASTOLIC BLOOD PRESSURE: 77 MMHG | WEIGHT: 197 LBS | BODY MASS INDEX: 32.78 KG/M2 | SYSTOLIC BLOOD PRESSURE: 153 MMHG

## 2018-01-25 DIAGNOSIS — M54.50 LOW BACK PAIN WITHOUT SCIATICA, UNSPECIFIED BACK PAIN LATERALITY, UNSPECIFIED CHRONICITY: Primary | ICD-10-CM

## 2018-01-25 DIAGNOSIS — G89.29 CHRONIC PAIN OF RIGHT KNEE: ICD-10-CM

## 2018-01-25 DIAGNOSIS — M25.561 CHRONIC PAIN OF RIGHT KNEE: ICD-10-CM

## 2018-01-25 DIAGNOSIS — M25.511 RIGHT SHOULDER PAIN, UNSPECIFIED CHRONICITY: ICD-10-CM

## 2018-01-25 RX ORDER — HYDROCODONE BITARTRATE AND ACETAMINOPHEN 7.5; 325 MG/1; MG/1
1 TABLET ORAL
Qty: 21 TAB | Refills: 0 | Status: SHIPPED | OUTPATIENT
Start: 2018-01-25 | End: 2019-09-06

## 2018-01-25 RX ORDER — LIDOCAINE 50 MG/G
PATCH TOPICAL
Qty: 5 PACKAGE | Refills: 2 | Status: SHIPPED | OUTPATIENT
Start: 2018-01-25 | End: 2018-09-21 | Stop reason: SDUPTHER

## 2018-01-25 NOTE — PROGRESS NOTES
[facility] [address]  [phone]           Patient: Janice Haque                MRN: [id number]       SSN: [id number]  YOB: 1950        AGE: 78 y.o. SEX: female  Body mass index is 32.78 kg/(m^2). PCP: Yuly Blount MD  01/25/18      This office note has been dictated. REVIEW OF SYSTEMS:  Constitutional: Negative for fever, chills, weight loss and malaise/fatigue. HENT: Negative. Eyes: Negative. Respiratory: Negative. Cardiovascular: Negative. Gastrointestinal: No bowel incontinence or constipation. Genitourinary: No bladder incontinence or saddle anesthesia. Skin: Negative. Neurological: Negative. Endo/Heme/Allergies: Negative. Psychiatric/Behavioral: Negative. Musculoskeletal: As per HPI above. Past Medical History:   Diagnosis Date    Anemia     Asthma     Blood disorder     Cancer ([facility] [address]     breast, diagnosed 2006 left    Cancer [facility])     Chest pain, unspecified     The working diagnosis is chest pain. The differential diagnosis includes chest wall pain, stable angina.  Chronic obstructive pulmonary disease (HCC)     Essential hypertension     Essential hypertension, benign     Uncontrolled     GERD (gastroesophageal reflux disease)     Hypercholesterolemia     Hypertension     Neuropathy     Nonspecific abnormal electrocardiogram (ECG) (EKG)     Obesity, unspecified     Weight loss has been strongly encouraged by following dietary restrictions and an exercise routine.  Polio     as a young child    Pre-operative cardiovascular examination     Sciatica     Unspecified cerebral artery occlusion with cerebral infarction          Current Outpatient Prescriptions:     cyclobenzaprine (FLEXERIL) 5 mg tablet, Take 1 Tab by mouth nightly., Disp: 7 Tab, Rfl: 0    metoprolol tartrate (LOPRESSOR) 25 mg tablet, Take 1 Tab by mouth two (2) times a day. , Disp: 60 Tab, Rfl: 4    gabapentin (NEURONTIN) 100 mg capsule, Take 2 Caps by mouth three (3) times daily. Indications: NEUROPATHIC PAIN, Disp: 90 Cap, Rfl: 1    dicyclomine (BENTYL) 20 mg tablet, , Disp: , Rfl: 0    diclofenac (VOLTAREN) 1 % gel, Apply  to affected area every six (6) hours. Apply 4 grams to affected joint up to 4 times per day, maximum 16 grams per joint per day Dispense 5 100 gram tubes, Disp: 100 g, Rfl: 4    triamterene-hydrochlorothiazide (DYAZIDE) 37.5-25 mg per capsule, , Disp: , Rfl: 5    desonide (TRIDESILON) 0.05 % cream, , Disp: , Rfl: 0    ALBUTEROL SULFATE (PROAIR HFA IN), Take  by inhalation. , Disp: , Rfl:     FLUTICASONE/SALMETEROL (ADVAIR HFA IN), Take  by inhalation. , Disp: , Rfl:     montelukast (SINGULAIR) 10 mg tablet, Take 10 mg by mouth daily. , Disp: , Rfl:     tiotropium (SPIRIVA WITH HANDIHALER) 18 mcg inhalation capsule, Take 1 Cap by inhalation daily. , Disp: , Rfl:     lisinopril (PRINIVIL, ZESTRIL) 20 mg tablet, Take  by mouth daily. , Disp: , Rfl:     amLODIPine (NORVASC) 10 mg tablet, Take  by mouth daily. , Disp: , Rfl:     bethanechol (URECHOLINE) 10 mg tablet, Take 10 mg by mouth Before breakfast, lunch, and dinner., Disp: , Rfl:     esomeprazole (NEXIUM) 40 mg capsule, Take  by mouth daily. , Disp: , Rfl:     aspirin 81 mg tablet, Take 81 mg by mouth.  , Disp: , Rfl:     ergocalciferol (VITAMIN D2) 50,000 unit capsule, Take 50,000 Units by mouth Every Thursday. , Disp: , Rfl:     naproxen (NAPROSYN) 500 mg tablet, Take 1 Tab by mouth two (2) times daily (with meals). , Disp: 20 Tab, Rfl: 0    lidocaine (LIDODERM) 5 %, Apply patch to the affected area for 12 hours a day and remove for 12 hours a day., Disp: 5 Package, Rfl: 2    No Known Allergies    Social History     Social History    Marital status:      Spouse name: N/A    Number of children: N/A    Years of education: N/A     Occupational History    Not on file.      Social History Main Topics    Smoking status: Never Smoker    Smokeless tobacco: Never Used    Alcohol use No    Drug use: No    Sexual activity: Not on file     Other Topics Concern    Not on file     Social History Narrative    ** Merged History Encounter **            Past Surgical History:   Procedure Laterality Date    BREAST SURGERY PROCEDURE UNLISTED      initial surgery 2006, then left modified radical mastectomy 6/2012    HX BREAST BIOPSY  4/11/12    Left    HX BREAST BIOPSY Left                  We did see Ms. Stephanie Buckner for followup with regards to mainly her right knee. The patient does have known advancing arthritis of the right knee. The patient does have known advancing arthritis of the right knee. She has had cortisone in the past, which did not help her much. She has had viscosupplementation, which is really not helpful either. She is using Lidoderm patches currently, which do give her some relief. She takes occasional pain medicine. She has not had some in about three months. She is also reporting some right shoulder pain intermittently. She gets some popping at times. She is currently not having discomfort in her shoulder. She denies any radiating pain, numbness, or tingling in the upper or lower extremities. PHYSICAL EXAMINATION:  In general, the patient is alert and oriented x 3 in no acute distress. The patient is well-developed, well-nourished, with a normal affect. The patient is afebrile. HEENT:  Head is normocephalic and atraumatic. Pupils are equally round and reactive to light and accommodation. Extraocular eye movements are intact. Neck is supple. Trachea is midline. No JVD is present. Breathing is nonlabored. Examination of the neck reveals good range of motion of the cervical spine without reproduction of symptoms. There is a negative Spurlings. Examination of the right shoulder reveals the skin is intact.   There is no ecchymosis, no warmth, and no signs of infection or cellulitis present. The right shoulder has range of motion of 120° forward flexion, 100° abduction, positive Armstrong, and negative drop-arm, Speed's, and [place]'s. External rotation strength is symmetric bilaterally. Radial pulse is 2+. Capillary refill is within normal limits. Examination of the lower extremities reveals pain-free range of motion of the hips. There is no pain with palpation of the trochanteric bursae. There is negative straight leg raise. There is negative calf tenderness. There is negative Ashers. There is no evidence of DVT present. Examination of the right knee reveals the skin is intact. There is no ecchymosis, no warmth. She does have pain to palpation to the medial joint line, as well as patellofemoral grind and crepitus anteriorly with range of motion activities. Findings are consistent with advancing arthritis of the right knee. ASSESSMENT:      1. Right shoulder subacromial bursitis. 2. Right knee advancing osteoarthritis. PLAN:  At this point, we discussed treatment options. Injections do not help her much for the knee, so we are going to hold off on a shot. We did discuss cortisone injection for the shoulder, and she wishes to hold off as well. I will reorder Lidoderm patches for her. A prescription for [place] is provided, #21, no refill. We will also refer her to pain management. We will see her back in about three months time for evaluation. She will call with any questions or concerns that shall arise.                JR Rojelio REN, PAJenC, ATC

## 2018-01-25 NOTE — MR AVS SNAPSHOT
[address], [address] 200 [facility] 
[phone] Patient: Danny Figueroa MRN: [id number] Visit Information Date & Time Provider Department Dept. Phone Encounter #  
 1/25/2018  1:30 PM Cyndie Verdin [address] [facility] [phone] Follow-up Instructions Return in about 3 months (around 4/25/2018), or if symptoms worsen or fail to improve. Follow-up and Disposition History Your Appointments 9/12/2018 12:45 PM  
Office Visit with Cleve Padilla MD  
[facility] ([facility]) Appt Note: 6 month follow up  
 [address] [facility] [address]  
  
   
 [address] [address] [address] [address] Upcoming Health Maintenance Date Due Hepatitis C Screening 1950 DTaP/Tdap/Td series (1 - Tdap) 11/24/1972 ZOSTER VACCINE AGE 60> 9/24/2011 FOBT Q 1 YEAR AGE 50-75 6/9/2013 GLAUCOMA SCREENING Q2Y 11/24/2016 Bone Densitometry (Dexa) Screening 11/24/2016 Pneumococcal 65+ High/Highest Risk (1 of 2 - PCV13) 11/24/2016 MEDICARE YEARLY EXAM 3/14/2018 Influenza Age 5 to Adult 8/1/2018 BREAST CANCER SCRN MAMMOGRAM 2/1/2020 Allergies as of 1/25/2018  Review Complete On: 1/25/2018 By: Cyndie Verdin PA-C No Known Allergies Current Immunizations  Never Reviewed No immunizations on file. Not reviewed this visit You Were Diagnosed With   
  
 Codes Comments Low back pain without sciatica, unspecified back pain laterality, unspecified chronicity    -  Primary ICD-10-CM: M54.5 ICD-9-CM: 724.2 Chronic pain of right knee     ICD-10-CM: M25.561, G89.29 ICD-9-CM: 719.46, 338.29 Right shoulder pain, unspecified chronicity     ICD-10-CM: M25.511 ICD-9-CM: 719.41 Vitals BP Weight(growth percentile) BMI OB Status Smoking Status 153/77 197 lb (89.4 kg) 32.78 kg/m2 Postmenopausal Never Smoker BMI and BSA Data Body Mass Index Body Surface Area 32.78 kg/m 2 2.02 m 2 Preferred Pharmacy Pharmacy Name Phone [address] [facility], [address] Your Updated Medication List  
  
   
This list is accurate as of 1/25/18 11:59 PM.  Always use your most recent med list.  
  
  
  
  
 Xavier Cameron HFA IN Take  by inhalation. amLODIPine 10 mg tablet Commonly known as:  Aide Hair Take  by mouth daily. aspirin 81 mg tablet Take 81 mg by mouth. bethanechol 10 mg tablet Commonly known as:  URECHOLINE Take 10 mg by mouth Before breakfast, lunch, and dinner. cyclobenzaprine 5 mg tablet Commonly known as:  FLEXERIL Take 1 Tab by mouth nightly. desonide 0.05 % cream  
Commonly known as:  TRIDESILON  
  
 diclofenac 1 % Gel Commonly known as:  VOLTAREN Apply  to affected area every six (6) hours. Apply 4 grams to affected joint up to 4 times per day, maximum 16 grams per joint per day Dispense 5 100 gram tubes  
  
 dicyclomine 20 mg tablet Commonly known as:  BENTYL  
  
 gabapentin 100 mg capsule Commonly known as:  NEURONTIN Take 2 Caps by mouth three (3) times daily. Indications: NEUROPATHIC PAIN  
  
 HYDROcodone-acetaminophen 7.5-325 mg per tablet Commonly known as:  Flavio Lama Take 1 Tab by mouth every eight (8) hours as needed for Pain. Max Daily Amount: 3 Tabs.  
  
 lidocaine 5 % Commonly known as:  Kae Colunga Apply patch to the affected area for 12 hours a day and remove for 12 hours a day. lisinopril 20 mg tablet Commonly known as:  Af[employer]ands Take  by mouth daily. metoprolol tartrate 25 mg tablet Commonly known as:  LOPRESSOR Take 1 Tab by mouth two (2) times a day. naproxen 500 mg tablet Commonly known as:  NAPROSYN Take 1 Tab by mouth two (2) times daily (with meals). NexIUM 40 mg capsule Generic drug:  esomeprazole Take  by mouth daily. PROAIR HFA IN Take  by inhalation. SINGULAIR 10 mg tablet Generic drug:  montelukast  
Take 10 mg by mouth daily. SPIRIVA WITH HANDIHALER 18 mcg inhalation capsule Generic drug:  tiotropium Take 1 Cap by inhalation daily. triamterene-hydroCHLOROthiazide 37.5-25 mg per capsule Commonly known as:  DYAZIDE  
  
 VITAMIN D2 50,000 unit capsule Generic drug:  ergocalciferol Take 50,000 Units by mouth Every Thursday. Prescriptions Printed Refills HYDROcodone-acetaminophen (NORCO) 7.5-325 mg per tablet 0 Sig: Take 1 Tab by mouth every eight (8) hours as needed for Pain. Max Daily Amount: 3 Tabs. Class: Print Route: Oral  
  
Prescriptions Sent to Pharmacy Refills  
 lidocaine (LIDODERM) 5 % 2 Sig: Apply patch to the affected area for 12 hours a day and remove for 12 hours a day. Class: Normal  
 Pharmacy: [id number], [address] [place] [address] Ph #: [phone] We Performed the Following REFERRAL TO PAIN MANAGEMENT [DTQ869 Custom] Follow-up Instructions Return in about 3 months (around 4/25/2018), or if symptoms worsen or fail to improve. To-Do List   
 05/29/2018 4:00 PM  
  Appointment with Pardeep North OT at [address] [place] [address] ([phone])  
  
 02/04/2019 10:30 AM  
  Appointment with YASMIN DANIELLE 1 at [address] [place] [address] ([phone]) OUTSIDE FILMS  - Any outside films related to the study being scheduled should be brought with you on the day of the exam.  If this cannot be done there may be a delay in the reading of the study.   MEDICATIONS  - Patient must bring a complete list of all medications currently taking to include prescriptions, over-the-counter meds, herbals, vitamins & any dietary supplements  GENERAL INSTRUCTIONS  - On the day of your exam do not use any bath powder, deodorant or lotions on the armpit area. -Tenderness of breasts may cause an increase of discomfort during procedure. If you are experiencing breast tenderness on the day of your appointment and would like to reschedule, please call [phone]. Referral Information Referral ID Referred By Referred To  
  
 [id number] Yuliana Barksdale MD   
   
 Visits Status Start Date End Date 1 Closed 1/25/18 1/25/19 If your referral has a status of pending review or denied, additional information will be sent to support the outcome of this decision. Introducing \[facility]\"" & HEALTH SERVICES! [facility] introduces Tunepresto patient portal. Now you can access parts of your medical record, email your doctor's office, and request medication refills online. 1. In your internet browser, go to [email or web address] 2. Click on the First Time User? Click Here link in the Sign In box. You will see the New Member Sign Up page. 3. Enter your Tunepresto Access Code exactly as it appears below. You will not need to use this code after youve completed the sign-up process. If you do not sign up before the expiration date, you must request a new code. · Tunep[employer]o Access Code: [email or web address] Expires: 8/7/2018 10:00 AM 
 
4. Enter the last four digits of your Social Security Number (xxxx) and Date of Birth (mm/dd/yyyy) as indicated and click Submit. You will be taken to the next sign-up page. 5. Create a [employer] ID. This will be your Tunepresto login ID and cannot be changed, so think of one that is secure and easy to remember. 6. Create a [employer] password. You can change your password at any time. 7. Enter your Password Reset Question and Answer. This can be used at a later time if you forget your password. 8. Enter your e-mail address. You will receive e-mail notification when new information is available in [address] 9. Click Sign Up. You can now view and download portions of your medical record. 10. Click the Download Summary menu link to download a portable copy of your medical information. If you have questions, please visit the Frequently Asked Questions section of the [employer] website. Remember, [employer] is NOT to be used for urgent needs. For medical emergencies, dial 911. Now available from your iPhone and Android! Please provide this summary of care documentation to your next provider. Your primary care clinician is listed as Brandi Raw. If you have any questions after today's visit, please call [phone].

## 2018-02-01 ENCOUNTER — HOSPITAL ENCOUNTER (OUTPATIENT)
Dept: MAMMOGRAPHY | Age: 68
Discharge: HOME | End: 2018-02-01
Attending: NURSE PRACTITIONER
Payer: MEDICARE

## 2018-02-01 DIAGNOSIS — Z85.3 PERSONAL HISTORY OF BREAST CANCER: ICD-10-CM

## 2018-02-01 PROCEDURE — 77061 BREAST TOMOSYNTHESIS UNI: CPT

## 2018-02-07 ENCOUNTER — OFFICE VISIT (OUTPATIENT)
Dept: SURGERY | Age: 68
End: 2018-02-07

## 2018-02-07 VITALS
OXYGEN SATURATION: 98 % | HEART RATE: 88 BPM | DIASTOLIC BLOOD PRESSURE: 72 MMHG | TEMPERATURE: 98 F | RESPIRATION RATE: 16 BRPM | HEIGHT: 65 IN | BODY MASS INDEX: 31.99 KG/M2 | WEIGHT: 192 LBS | SYSTOLIC BLOOD PRESSURE: 140 MMHG

## 2018-02-07 DIAGNOSIS — M25.561 CHRONIC PAIN OF RIGHT KNEE: ICD-10-CM

## 2018-02-07 DIAGNOSIS — I97.2 POSTMASTECTOMY LYMPHEDEMA SYNDROME: ICD-10-CM

## 2018-02-07 DIAGNOSIS — Z12.31 SCREENING MAMMOGRAM, ENCOUNTER FOR: ICD-10-CM

## 2018-02-07 DIAGNOSIS — I89.0 LYMPHEDEMA OF LEFT ARM: ICD-10-CM

## 2018-02-07 DIAGNOSIS — Z85.3 HISTORY OF LEFT BREAST CANCER: ICD-10-CM

## 2018-02-07 DIAGNOSIS — M54.50 LOW BACK PAIN WITHOUT SCIATICA, UNSPECIFIED BACK PAIN LATERALITY, UNSPECIFIED CHRONICITY: ICD-10-CM

## 2018-02-07 DIAGNOSIS — M25.511 RIGHT SHOULDER PAIN, UNSPECIFIED CHRONICITY: ICD-10-CM

## 2018-02-07 DIAGNOSIS — G89.29 CHRONIC PAIN OF RIGHT KNEE: ICD-10-CM

## 2018-02-07 DIAGNOSIS — M79.602 ARM PAIN, DIFFUSE, LEFT: Primary | ICD-10-CM

## 2018-02-07 NOTE — PATIENT INSTRUCTIONS
Lymphedema: Care Instructions  Your Care Instructions    Lymphedema is fluid that builds up in the arms or legs. It is often caused by surgery to remove lymph nodes during cancer treatment, especially breast cancer surgery, which can cause fluid to build up in the arm. It can happen after radiation treatment to an area that involves lymph nodes. It also can be caused by a fractured bone or surgery to fix a fracture. And some medicines also can cause lymphedema. Some people get it for unknown reasons. Normally, lymph nodes trap bacteria and other substances as fluid flows through them. Then, the white cells in the body's defense, or immune, system can destroy the substances. But if there are few or no lymph nodes-or if the lymph system in an arm or leg has been damaged-fluid can build up in the affected arm or leg. You can take simple steps at home to help treat or prevent fluid buildup. Treatment may include raising the arm or leg to let gravity drain the fluid. You also can wear compression stockings or sleeves. Follow-up care is a key part of your treatment and safety. Be sure to make and go to all appointments, and call your doctor if you are having problems. It's also a good idea to know your test results and keep a list of the medicines you take. How can you care for yourself at home? · Wear a compression stocking or sleeve as your doctor suggests. It can help keep fluid from pooling in an arm or leg. Wear it during air travel. · Prop up the swollen arm or leg on a pillow anytime you sit or lie down. Try to keep it above the level of your heart. This will help reduce swelling. · Avoid crossing your legs if your legs are swollen. · Get some exercise on most days of the week. Increase the intensity of exercise slowly. Water aerobics can help reduce swelling by helping fluid move around. Wear your compression stocking or sleeve during exercise, but not during water exercise.   · See a physical therapist. He or she can teach you how to do self-massage to help fluid move around. You also can learn what activities would be best for you. · Keep your feet clean and wear clean socks or stockings every day. Check your feet often for signs of infection, such as redness or heat. Do not walk barefoot. · If you have had lymph nodes removed from under your arm:  ¨ Do not have blood drawn from the arm on the side of the lymph node surgery. ¨ Do not allow a blood pressure cuff to be placed on that arm. If you are in the hospital, make sure your nurse and other hospital staff know of your condition. ¨ Wear gloves when gardening or doing other activities that may lead to cuts on your fingers or hands. · If you have had lymph nodes removed from your groin:  ¨ Bathe your feet daily in lukewarm, not hot, water. Use a mild soap that has a moisturizer, or use a moisturizer separately. ¨ Check your feet for blisters or cuts. ¨ Wear comfortable and supportive shoes that fit properly. ¨ Wear the correct size of panty hose and stockings. Avoid garters or knee-high or thigh-high stockings. · Ask your doctor how to treat any cuts, scratches, insect bites, or other injuries that may occur. · Use sunscreen and insect repellent when outdoors to protect your skin from sunburn and insect bites. · Wear medical alert jewelry that says you have lymphedema. You can buy these at most drugstores and on the Internet. When should you call for help? Call your doctor now or seek immediate medical care if:  ? · You have signs of infection, such as:  ¨ Increased pain, swelling, warmth, or redness. ¨ Red streaks leading from the area. ¨ Pus draining from the area. ¨ A fever. ? Watch closely for changes in your health, and be sure to contact your doctor if:  ? · You have new or worse symptoms from lymphedema. ? · You do not get better as expected. Where can you learn more?   Go to http://laura-jessie.info/. Enter V398 in the search box to learn more about \"Lymphedema: Care Instructions. \"  Current as of: May 12, 2017  Content Version: 11.4  © 2694-9796 Healthwise, [employer]. Care instructions adapted under license by [employer] (which disclaims liability or warranty for this information). If you have questions about a medical condition or this instruction, always ask your healthcare professional. [place] [address] any warranty or liability for your use of this information.

## 2018-02-07 NOTE — PROGRESS NOTES
Rakel Sumner. Licia Riedel, THEODORAP-C  PROGRESS NOTE    Subjective:    Ms. Ollie Cleary is a very pleasant 15-year-old AA female who presents today for clinical evaluation following her most recent mammogram done on the right side of her first 2018. This was read as a BI-RADS 1. 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 Today, she has no breast health complaints. She denies any breast changes, skin changes, discharge from her remaining nipple. Her only concern is related to the fact that she is still does not have her lymphedema pump. I did explain to her that since we initiated the process back in the summer, her insurance carrier has changed several times which could likely be the cause of her not yet receiving it. I did hear from [employer] the company who will surface her pump, about the changes in insurance and the paperwork has been submitted several times. Patient reports today that her insurance changed once again on January 1, so I will touch base with tactile to take to see if they have the current information and what the status is on her pump. We did discuss lymphatic massage and I did teach her how to do this. Objective:    Vitals:    02/07/18 1116   BP: 140/72   Pulse: 88   Resp: 16   Temp: 98 °F (36.7 °C)   TempSrc: Oral   SpO2: 98%   Weight: 87.1 kg (192 lb)   Height: 5' 5\" (1.651 m)       Physical Exam:    General: Alert and oriented x 3, cooperative, in no apparent distress   Breasts:    Left: S/P mastectomy with reconstruction. No masses, no lesions, no adenopathy. Right: No dimpling, discoloration, nipple inversion or retractions. No axillary or supraclavicular lymphadenopathy. No mass      Current Medications:  Current Outpatient Prescriptions   Medication Sig Dispense Refill    lidocaine (LIDODERM) 5 % Apply patch to the affected area for 12 hours a day and remove for 12 hours a day.  5 Package 2    HYDROcodone-acetaminophen (NORCO) 7.5-325 mg per tablet Take 1 Tab by mouth every eight (8) hours as needed for Pain. Max Daily Amount: 3 Tabs. 21 Tab 0    naproxen (NAPROSYN) 500 mg tablet Take 1 Tab by mouth two (2) times daily (with meals). 20 Tab 0    cyclobenzaprine (FLEXERIL) 5 mg tablet Take 1 Tab by mouth nightly. 7 Tab 0    metoprolol tartrate (LOPRESSOR) 25 mg tablet Take 1 Tab by mouth two (2) times a day. 60 Tab 4    gabapentin (NEURONTIN) 100 mg capsule Take 2 Caps by mouth three (3) times daily. Indications: NEUROPATHIC PAIN 90 Cap 1    dicyclomine (BENTYL) 20 mg tablet   0    diclofenac (VOLTAREN) 1 % gel Apply  to affected area every six (6) hours. Apply 4 grams to affected joint up to 4 times per day, maximum 16 grams per joint per day  Dispense 5 100 gram tubes 100 g 4    triamterene-hydrochlorothiazide (DYAZIDE) 37.5-25 mg per capsule   5    desonide (TRIDESILON) 0.05 % cream   0    ALBUTEROL SULFATE (PROAIR HFA IN) Take  by inhalation.  FLUTICASONE/SALMETEROL (ADVAIR HFA IN) Take  by inhalation.  montelukast (SINGULAIR) 10 mg tablet Take 10 mg by mouth daily.  tiotropium (SPIRIVA WITH HANDIHALER) 18 mcg inhalation capsule Take 1 Cap by inhalation daily.  lisinopril (PRINIVIL, ZESTRIL) 20 mg tablet Take  by mouth daily.  amLODIPine (NORVASC) 10 mg tablet Take  by mouth daily.  bethanechol (URECHOLINE) 10 mg tablet Take 10 mg by mouth Before breakfast, lunch, and dinner.  esomeprazole (NEXIUM) 40 mg capsule Take  by mouth daily.  aspirin 81 mg tablet Take 81 mg by mouth.  ergocalciferol (VITAMIN D2) 50,000 unit capsule Take 50,000 Units by mouth Every Thursday. Chart and notes reviewed. Data reviewed. I have evaluated and examined the patient. Impression:  · Patient with a personal history of left breast cancer in 2012, status post mastectomy with reconstruction here today for annual breast exam doing very well.     Plan: · Touch base with tactile concerning lymphedema pump  · Follow-up in 3 months for lymphedema check  · Right mammogram in one year followed by breast exam in this office    Ms. Lenka White has a reminder for a \"due or due soon\" health maintenance. I have asked that she contact her primary care provider for follow-up on this health maintenance. Brett Friedman.  Shemar Pinon, FNP-C

## 2018-02-07 NOTE — MR AVS SNAPSHOT
[address] [facility] [address] [facility] 
[phone] Patient: Bonnie Tello MRN: [id number] Visit Information Date & Time Provider Department Dept. Phone Encounter #  
 2/7/2018 11:00 AM Aydee Ordonez  [address] [id number] Your Appointments 3/14/2018 11:45 AM  
Follow Up with Cristine Walker MD  
[facility] [place] ([address]) Appt Note: 4 month follow up  
 [address] [place] [address]  
  
   
 [address] [place] [id number]  
  
    
 5/9/2018 10:00 AM  
Follow Up with Aydee Ordonez NP [place] [address] ([address]) Appt Note: 3 month f/up [address] [facility] [address] [place] [address] [facility] Upcoming Health Maintenance Date Due Hepatitis C Screening 1950 DTaP/Tdap/Td series (1 - Tdap) 11/24/1972 ZOSTER VACCINE AGE 60> 9/24/2011 FOBT Q 1 YEAR AGE 50-75 6/9/2013 GLAUCOMA SCREENING Q2Y 11/24/2016 OSTEOPOROSIS SCREENING (DEXA) 11/24/2016 Pneumococcal 65+ High/Highest Risk (1 of 2 - PCV13) 11/24/2016 MEDICARE YEARLY EXAM 11/24/2016 Influenza Age 5 to Adult 8/1/2017 BREAST CANCER SCRN MAMMOGRAM 2/1/2020 Allergies as of 2/7/2018  Review Complete On: 2/7/2018 By: Aydee Ordonez NP No Known Allergies Current Immunizations  Never Reviewed No immunizations on file. Not reviewed this visit You Were Diagnosed With   
  
 Codes Comments Arm pain, diffuse, left    -  Primary ICD-10-CM: S18.506 ICD-9-CM: 729.5 Lymphedema of left arm     ICD-10-CM: I89.0 ICD-9-CM: 278.1 History of left breast cancer     ICD-10-CM: Z85.3 ICD-9-CM: V10.3 Postmastectomy lymphedema syndrome     ICD-10-CM: I97.2 ICD-9-CM: 457.0 Screening mammogram, encounter for     ICD-10-CM: Z12.31 
ICD-9-CM: V76.12 Vitals BP Pulse Temp Resp Height(growth percentile) Weight(growth percentile) 140/72 88 98 °F (36.7 °C) (Oral) 16 5' 5\" (1.651 m) 192 lb (87.1 kg) SpO2 BMI OB Status Smoking Status 98% 31.95 kg/m2 Postmenopausal Never Smoker Vitals History BMI and BSA Data Body Mass Index Body Surface Area 31.95 kg/m 2 2 m 2 Preferred Pharmacy Pharmacy Name Phone [facility], [address] Your Updated Medication List  
  
   
This list is accurate as of: 2/7/18 11:56 AM.  Always use your most recent med list.  
  
  
  
  
 Karyle Humphrey HFA IN Take  by inhalation. amLODIPine 10 mg tablet Commonly known as:  Senora Ree Take  by mouth daily. aspirin 81 mg tablet Take 81 mg by mouth. bethanechol 10 mg tablet Commonly known as:  URECHOLINE Take 10 mg by mouth Before breakfast, lunch, and dinner. cyclobenzaprine 5 mg tablet Commonly known as:  FLEXERIL Take 1 Tab by mouth nightly. desonide 0.05 % cream  
Commonly known as:  TRIDESILON  
  
 diclofenac 1 % Gel Commonly known as:  VOLTAREN Apply  to affected area every six (6) hours. Apply 4 grams to affected joint up to 4 times per day, maximum 16 grams per joint per day Dispense 5 100 gram tubes  
  
 dicyclomine 20 mg tablet Commonly known as:  BENTYL  
  
 gabapentin 100 mg capsule Commonly known as:  NEURONTIN Take 2 Caps by mouth three (3) times daily. Indications: NEUROPATHIC PAIN  
  
 HYDROcodone-acetaminophen 7.5-325 mg per tablet Commonly known as:  Rudine Torrance Take 1 Tab by mouth every eight (8) hours as needed for Pain. Max Daily Amount: 3 Tabs.  
  
 lidocaine 5 % Commonly known as:  Daria Coad Apply patch to the affected area for 12 hours a day and remove for 12 hours a day. lisinopril 20 mg tablet Commonly known as:  Rama Likens Take  by mouth daily. metoprolol tartrate 25 mg tablet Commonly known as:  LOPRESSOR Take 1 Tab by mouth two (2) times a day. naproxen 500 mg tablet Commonly known as:  NAPROSYN Take 1 Tab by mouth two (2) times daily (with meals). NexIUM 40 mg capsule Generic drug:  esomeprazole Take  by mouth daily. PROAIR HFA IN Take  by inhalation. SINGULAIR 10 mg tablet Generic drug:  montelukast  
Take 10 mg by mouth daily. SPIRIVA WITH HANDIHALER 18 mcg inhalation capsule Generic drug:  tiotropium Take 1 Cap by inhalation daily. triamterene-hydroCHLOROthiazide 37.5-25 mg per capsule Commonly known as:  DYAZIDE  
  
 VITAMIN D2 50,000 unit capsule Generic drug:  ergocalciferol Take 50,000 Units by mouth Every Thursday. To-Do List   
 02/04/2019 Imaging:  LYNDSAY MAMMO RT SCREENING INCL CAD Introducing [facility] & HEALTH SERVICES! Dwayne Rodriguez introduces [employer] patient portal. Now you can access parts of your medical record, email your doctor's office, and request medication refills online. 1. In your internet browser, go to [email or web address] 2. Click on the First Time User? Click Here link in the Sign In box. You will see the New Member Sign Up page. 3. Enter your OLX Access Code exactly as it appears below. You will not need to use this code after youve completed the sign-up process. If you do not sign up before the expiration date, you must request a new code. · OLX Access Code: [email or web address] Expires: 4/23/2018  3:46 PM 
 
4. Enter the last four digits of your Social Security Number (xxxx) and Date of Birth (mm/dd/yyyy) as indicated and click Submit. You will be taken to the next sign-up page. 5. Create a OLX ID. This will be your OLX login ID and cannot be changed, so think of one that is secure and easy to remember. 6. Create a [employer] password. You can change your password at any time. 7. Enter your Password Reset Question and Answer. This can be used at a later time if you forget your password. 8. Enter your e-mail address. You will receive e-mail notification when new information is available in [address] 9. Click Sign Up. You can now view and download portions of your medical record. 10. Click the Download Summary menu link to download a portable copy of your medical information. If you have questions, please visit the Frequently Asked Questions section of the [employer] website. Remember, [employer] is NOT to be used for urgent needs. For medical emergencies, dial 911. Now available from your iPhone and Android! Please provide this summary of care documentation to your next provider. Your primary care clinician is listed as Cesia Cotto. If you have any questions after today's visit, please call [phone].

## 2018-02-09 RX ORDER — HYDROCODONE BITARTRATE AND ACETAMINOPHEN 7.5; 325 MG/1; MG/1
1 TABLET ORAL
Qty: 21 TAB | Refills: 0 | OUTPATIENT
Start: 2018-02-09

## 2018-02-20 DIAGNOSIS — I97.2 POSTMASTECTOMY LYMPHEDEMA SYNDROME: Primary | ICD-10-CM

## 2018-02-20 NOTE — PROGRESS NOTES
Will refer patient to [facility] OT for evaluation and treatment of upper extremity lymphedema. I have touched base with the [employer] rep about her lymphedema pump and they are currently processing it.

## 2018-02-24 ENCOUNTER — APPOINTMENT (OUTPATIENT)
Dept: GENERAL RADIOLOGY | Age: 68
End: 2018-02-24
Attending: PHYSICIAN ASSISTANT
Payer: MEDICARE

## 2018-02-24 ENCOUNTER — HOSPITAL ENCOUNTER (EMERGENCY)
Age: 68
Discharge: HOME | End: 2018-02-24
Attending: EMERGENCY MEDICINE
Payer: MEDICARE

## 2018-02-24 VITALS
DIASTOLIC BLOOD PRESSURE: 91 MMHG | SYSTOLIC BLOOD PRESSURE: 172 MMHG | HEIGHT: 64 IN | OXYGEN SATURATION: 96 % | WEIGHT: 190 LBS | TEMPERATURE: 98.4 F | HEART RATE: 105 BPM | RESPIRATION RATE: 18 BRPM | BODY MASS INDEX: 32.44 KG/M2

## 2018-02-24 DIAGNOSIS — L02.619 CELLULITIS AND ABSCESS OF FOOT: Primary | ICD-10-CM

## 2018-02-24 DIAGNOSIS — L03.119 CELLULITIS AND ABSCESS OF FOOT: Primary | ICD-10-CM

## 2018-02-24 LAB
ALBUMIN SERPL-MCNC: 3.2 G/DL (ref 3.4–5)
ALBUMIN/GLOB SERPL: 0.7 {RATIO} (ref 0.8–1.7)
ALP SERPL-CCNC: 78 U/L (ref 45–117)
ALT SERPL-CCNC: 17 U/L (ref 13–56)
ANION GAP SERPL CALC-SCNC: 7 MMOL/L (ref 3–18)
AST SERPL-CCNC: 13 U/L (ref 15–37)
BASOPHILS # BLD: 0 K/UL (ref 0–0.06)
BASOPHILS NFR BLD: 0 % (ref 0–2)
BILIRUB SERPL-MCNC: 0.4 MG/DL (ref 0.2–1)
BUN SERPL-MCNC: 12 MG/DL (ref 7–18)
BUN/CREAT SERPL: 14 (ref 12–20)
CALCIUM SERPL-MCNC: 9.4 MG/DL (ref 8.5–10.1)
CHLORIDE SERPL-SCNC: 105 MMOL/L (ref 100–108)
CO2 SERPL-SCNC: 30 MMOL/L (ref 21–32)
CREAT SERPL-MCNC: 0.84 MG/DL (ref 0.6–1.3)
DIFFERENTIAL METHOD BLD: ABNORMAL
EOSINOPHIL # BLD: 0.1 K/UL (ref 0–0.4)
EOSINOPHIL NFR BLD: 1 % (ref 0–5)
ERYTHROCYTE [DISTWIDTH] IN BLOOD BY AUTOMATED COUNT: 15.3 % (ref 11.6–14.5)
GLOBULIN SER CALC-MCNC: 4.8 G/DL (ref 2–4)
GLUCOSE SERPL-MCNC: 119 MG/DL (ref 74–99)
HCT VFR BLD AUTO: 37.2 % (ref 35–45)
HGB BLD-MCNC: 11.1 G/DL (ref 12–16)
LACTATE BLD-SCNC: 1.2 MMOL/L (ref 0.4–2)
LYMPHOCYTES # BLD: 3.6 K/UL (ref 0.9–3.6)
LYMPHOCYTES NFR BLD: 34 % (ref 21–52)
MCH RBC QN AUTO: 25.3 PG (ref 24–34)
MCHC RBC AUTO-ENTMCNC: 29.8 G/DL (ref 31–37)
MCV RBC AUTO: 84.7 FL (ref 74–97)
MONOCYTES # BLD: 0.9 K/UL (ref 0.05–1.2)
MONOCYTES NFR BLD: 9 % (ref 3–10)
NEUTS SEG # BLD: 5.9 K/UL (ref 1.8–8)
NEUTS SEG NFR BLD: 56 % (ref 40–73)
PLATELET # BLD AUTO: 253 K/UL (ref 135–420)
PMV BLD AUTO: 10.4 FL (ref 9.2–11.8)
POTASSIUM SERPL-SCNC: 3.6 MMOL/L (ref 3.5–5.5)
PROT SERPL-MCNC: 8 G/DL (ref 6.4–8.2)
RBC # BLD AUTO: 4.39 M/UL (ref 4.2–5.3)
SODIUM SERPL-SCNC: 142 MMOL/L (ref 136–145)
WBC # BLD AUTO: 10.5 K/UL (ref 4.6–13.2)

## 2018-02-24 PROCEDURE — 73630 X-RAY EXAM OF FOOT: CPT

## 2018-02-24 PROCEDURE — 87040 BLOOD CULTURE FOR BACTERIA: CPT

## 2018-02-24 PROCEDURE — 87077 CULTURE AEROBIC IDENTIFY: CPT | Performed by: EMERGENCY MEDICINE

## 2018-02-24 PROCEDURE — 74011000250 HC RX REV CODE- 250: Performed by: PHYSICIAN ASSISTANT

## 2018-02-24 PROCEDURE — 83605 ASSAY OF LACTIC ACID: CPT

## 2018-02-24 PROCEDURE — 96360 HYDRATION IV INFUSION INIT: CPT

## 2018-02-24 PROCEDURE — 80053 COMPREHEN METABOLIC PANEL: CPT

## 2018-02-24 PROCEDURE — 99284 EMERGENCY DEPT VISIT MOD MDM: CPT

## 2018-02-24 PROCEDURE — 74011250637 HC RX REV CODE- 250/637: Performed by: PHYSICIAN ASSISTANT

## 2018-02-24 PROCEDURE — 74011250636 HC RX REV CODE- 250/636: Performed by: PHYSICIAN ASSISTANT

## 2018-02-24 PROCEDURE — 87077 CULTURE AEROBIC IDENTIFY: CPT

## 2018-02-24 PROCEDURE — 85025 COMPLETE CBC W/AUTO DIFF WBC: CPT

## 2018-02-24 PROCEDURE — 87205 SMEAR GRAM STAIN: CPT

## 2018-02-24 PROCEDURE — 87153 DNA/RNA SEQUENCING: CPT | Performed by: EMERGENCY MEDICINE

## 2018-02-24 RX ORDER — CLINDAMYCIN HYDROCHLORIDE 300 MG/1
300 CAPSULE ORAL 4 TIMES DAILY
Qty: 40 CAP | Refills: 0 | Status: SHIPPED | OUTPATIENT
Start: 2018-02-24 | End: 2018-03-06

## 2018-02-24 RX ORDER — HYDROCODONE BITARTRATE AND ACETAMINOPHEN 5; 325 MG/1; MG/1
1 TABLET ORAL
Status: DISCONTINUED | OUTPATIENT
Start: 2018-02-24 | End: 2018-02-25 | Stop reason: HOSPADM

## 2018-02-24 RX ORDER — OXYCODONE AND ACETAMINOPHEN 5; 325 MG/1; MG/1
1 TABLET ORAL
Status: COMPLETED | OUTPATIENT
Start: 2018-02-24 | End: 2018-02-24

## 2018-02-24 RX ORDER — BACITRACIN ZINC 500 UNIT/G
OINTMENT (GRAM) TOPICAL 2 TIMES DAILY
Status: DISCONTINUED | OUTPATIENT
Start: 2018-02-24 | End: 2018-02-25 | Stop reason: HOSPADM

## 2018-02-24 RX ORDER — NAPROXEN 500 MG/1
500 TABLET ORAL 2 TIMES DAILY WITH MEALS
Qty: 20 TAB | Refills: 0 | Status: SHIPPED | OUTPATIENT
Start: 2018-02-24 | End: 2018-03-06

## 2018-02-24 RX ORDER — OXYCODONE AND ACETAMINOPHEN 5; 325 MG/1; MG/1
1 TABLET ORAL
Qty: 6 TAB | Refills: 0 | OUTPATIENT
Start: 2018-02-24 | End: 2022-10-20

## 2018-02-24 RX ADMIN — OXYCODONE HYDROCHLORIDE AND ACETAMINOPHEN 1 TABLET: 5; 325 TABLET ORAL at 20:09

## 2018-02-24 RX ADMIN — SODIUM CHLORIDE 1000 ML: 900 INJECTION, SOLUTION INTRAVENOUS at 19:59

## 2018-02-24 RX ADMIN — BACITRACIN ZINC 1 G: 500 OINTMENT TOPICAL at 21:00

## 2018-02-25 NOTE — ED NOTES
9:16 PM    Patient was informed by emergency physician that she needed admission, further workup for adequate evaluation for her Cellulitis and Abscess, and that by refusing the above, she is at risk for further deterioration. She is awake, alert, and she understands her condition and the risks involved in leaving. She is clinically aware of her surroundings and able to ask appropriate questions, the patients significant other and the nurse present confirms she is  able to make medical decisions. She verbalized knowing the risks and understood it was recommended that she stay and could also return at any time. her Spouse, significant other was present for the discussion and also verbalized that they understood both diagnosis, risks and could return at any time. The patient was provided with warnings regarding worsening of her condition and were provided instructions to follow up with Jaime Park MD tomorrow or return to the Emergency Room as soon as possible. This information was discussed by the Emergency Room Physician Megha AUGUST and witnessed by Lawyer Darrell POWELL.

## 2018-02-25 NOTE — ED TRIAGE NOTES
Patient presents to ER C/O right foot pain x 1 week. Right dorsal surface is erythematous and edematous.

## 2018-02-25 NOTE — DISCHARGE INSTRUCTIONS
Cellulitis: Care Instructions  Your Care Instructions    Cellulitis is a skin infection. It often occurs after a break in the skin from a scrape, cut, bite, or puncture, or after a rash. The doctor has checked you carefully, but problems can develop later. If you notice any problems or new symptoms, get medical treatment right away. Follow-up care is a key part of your treatment and safety. Be sure to make and go to all appointments, and call your doctor if you are having problems. It's also a good idea to know your test results and keep a list of the medicines you take. How can you care for yourself at home? · Take your antibiotics as directed. Do not stop taking them just because you feel better. You need to take the full course of antibiotics. · Prop up the infected area on pillows to reduce pain and swelling. Try to keep the area above the level of your heart as often as you can. · If your doctor told you how to care for your wound, follow your doctor's instructions. If you did not get instructions, follow this general advice:  ¨ Wash the wound with clean water 2 times a day. Don't use hydrogen peroxide or alcohol, which can slow healing. ¨ You may cover the wound with a thin layer of petroleum jelly, such as Vaseline, and a nonstick bandage. ¨ Apply more petroleum jelly and replace the bandage as needed. · Be safe with medicines. Take pain medicines exactly as directed. ¨ If the doctor gave you a prescription medicine for pain, take it as prescribed. ¨ If you are not taking a prescription pain medicine, ask your doctor if you can take an over-the-counter medicine. To prevent cellulitis in the future  · Try to prevent cuts, scrapes, or other injuries to your skin. Cellulitis most often occurs where there is a break in the skin. · If you get a scrape, cut, mild burn, or bite, wash the wound with clean water as soon as you can to help avoid infection.  Don't use hydrogen peroxide or alcohol, which can slow healing. · If you have swelling in your legs (edema), support stockings and good skin care may help prevent leg sores and cellulitis. · Take care of your feet, especially if you have diabetes or other conditions that increase the risk of infection. Wear shoes and socks. Do not go barefoot. If you have athlete's foot or other skin problems on your feet, talk to your doctor about how to treat them. When should you call for help? Call your doctor now or seek immediate medical care if:  ? · You have signs that your infection is getting worse, such as:  ¨ Increased pain, swelling, warmth, or redness. ¨ Red streaks leading from the area. ¨ Pus draining from the area. ¨ A fever. ? · You get a rash. ? Watch closely for changes in your health, and be sure to contact your doctor if:  ? · You are not getting better after 1 day (24 hours). ? · You do not get better as expected. Where can you learn more? Go to http://laura-jessie.info/. Ronald Meehan in the search box to learn more about \"Cellulitis: Care Instructions. \"  Current as of: October 13, 2016  Content Version: 11.4  © 7857-3284 [employer]. Care instructions adapted under license by [employer] (which disclaims liability or warranty for this information). If you have questions about a medical condition or this instruction, always ask your healthcare professional. [place] [address] any warranty or liability for your use of this information. Skin Abscess: Care Instructions  Your Care Instructions    A skin abscess is a bacterial infection that forms a pocket of pus. A boil is a kind of skin abscess. The doctor may have cut an opening in the abscess so that the pus can drain out. You may have gauze in the cut so that the abscess will stay open and keep draining. You may need antibiotics. You will need to follow up with your doctor to make sure the infection has gone away.   The doctor has checked you carefully, but problems can develop later. If you notice any problems or new symptoms, get medical treatment right away. Follow-up care is a key part of your treatment and safety. Be sure to make and go to all appointments, and call your doctor if you are having problems. It's also a good idea to know your test results and keep a list of the medicines you take. How can you care for yourself at home? · Apply warm and dry compresses, a heating pad set on low, or a hot water bottle 3 or 4 times a day for pain. Keep a cloth between the heat source and your skin. · If your doctor prescribed antibiotics, take them as directed. Do not stop taking them just because you feel better. You need to take the full course of antibiotics. · Take pain medicines exactly as directed. ¨ If the doctor gave you a prescription medicine for pain, take it as prescribed. ¨ If you are not taking a prescription pain medicine, ask your doctor if you can take an over-the-counter medicine. · Keep your bandage clean and dry. Change the bandage whenever it gets wet or dirty, or at least one time a day. · If the abscess was packed with gauze:  ¨ Keep follow-up appointments to have the gauze changed or removed. If the doctor instructed you to remove the gauze, gently pull out all of the gauze when your doctor tells you to. ¨ After the gauze is removed, soak the area in warm water for 15 to 20 minutes 2 times a day, until the wound closes. When should you call for help? Call your doctor now or seek immediate medical care if:  ? · You have signs of worsening infection, such as:  ¨ Increased pain, swelling, warmth, or redness. ¨ Red streaks leading from the infected skin. ¨ Pus draining from the wound. ¨ A fever. ? Watch closely for changes in your health, and be sure to contact your doctor if:  ? · You do not get better as expected. Where can you learn more? Go to http://laura-jessie.info/.   Enter J330 in the search box to learn more about \"Skin Abscess: Care Instructions. \"  Current as of: October 13, 2016  Content Version: 11.4  © 7919-5339 [employer], [employer]. Care instructions adapted under license by "[employer]" (which disclaims liability or warranty for this information). If you have questions about a medical condition or this instruction, always ask your healthcare professional. [place] [address] any warranty or liability for your use of this information.

## 2018-02-25 NOTE — ED PROVIDER NOTES
EMERGENCY DEPARTMENT HISTORY AND PHYSICAL EXAM    Date: 2/24/2018  Patient Name: German Dye    History of Presenting Illness     Chief Complaint   Patient presents with    Skin Infection         History Provided By: Patient    Chief Complaint: Foot pain   Duration: 1-2 weeks   Timing:  Gradual  Location: web space between second and third right toes   Quality: Aching  Severity: Severe  Modifying Factors: Movement makes it worse, nothing makes it better   Associated Symptoms: none       Additional History (Context): German Dye is a 78 y.o. female with a history of asthma, HTN anemia, obesity, COPD, GERD, who presents with a 1-2 week history of right foot pain, she states she has tried epsom salt soaks but denies resolution. She denies trauma, or DM. States she has never had this happen before. Reports purulent drainage from wound. Pt denies any fevers or chills, headache, dizziness or light headedness, ENT issues, CP or discomfort, SOB, cough, n/v/d/c, abd pain, back pain, diaphoresis, melena/hematochezia, dysuria, hematuria, frequency, focal weakness/numbness/tingling, or rash. Patient has no other complaints at this time. PCP: Marly Florentino MD    Current Facility-Administered Medications   Medication Dose Route Frequency Provider Last Rate Last Dose    sodium chloride 0.9 % bolus infusion 1,000 mL  1,000 mL IntraVENous ONCE [place], [place]        HYDROcodone-acetaminophen (NORCO) 5-325 mg per tablet 1 Tab  1 Tab Oral NOW [place], [place]         Current Outpatient Prescriptions   Medication Sig Dispense Refill    metoprolol tartrate (LOPRESSOR) 25 mg tablet Take 1 Tab by mouth two (2) times a day. 60 Tab 4    gabapentin (NEURONTIN) 100 mg capsule Take 2 Caps by mouth three (3) times daily.  Indications: NEUROPATHIC PAIN 90 Cap 1    dicyclomine (BENTYL) 20 mg tablet   0    triamterene-hydrochlorothiazide (DYAZIDE) 37.5-25 mg per capsule   5    ALBUTEROL SULFATE (PROAIR HFA IN) Take by inhalation.  FLUTICASONE/SALMETEROL (ADVAIR HFA IN) Take  by inhalation.  montelukast (SINGULAIR) 10 mg tablet Take 10 mg by mouth daily.  tiotropium (SPIRIVA WITH HANDIHALER) 18 mcg inhalation capsule Take 1 Cap by inhalation daily.  lisinopril (PRINIVIL, ZESTRIL) 20 mg tablet Take  by mouth daily.  amLODIPine (NORVASC) 10 mg tablet Take  by mouth daily.  esomeprazole (NEXIUM) 40 mg capsule Take  by mouth daily.  aspirin 81 mg tablet Take 81 mg by mouth.  lidocaine (LIDODERM) 5 % Apply patch to the affected area for 12 hours a day and remove for 12 hours a day. 5 Package 2    HYDROcodone-acetaminophen (NORCO) 7.5-325 mg per tablet Take 1 Tab by mouth every eight (8) hours as needed for Pain. Max Daily Amount: 3 Tabs. 21 Tab 0    naproxen (NAPROSYN) 500 mg tablet Take 1 Tab by mouth two (2) times daily (with meals). 20 Tab 0    cyclobenzaprine (FLEXERIL) 5 mg tablet Take 1 Tab by mouth nightly. 7 Tab 0    diclofenac (VOLTAREN) 1 % gel Apply  to affected area every six (6) hours. Apply 4 grams to affected joint up to 4 times per day, maximum 16 grams per joint per day  Dispense 5 100 gram tubes 100 g 4    desonide (TRIDESILON) 0.05 % cream   0    bethanechol (URECHOLINE) 10 mg tablet Take 10 mg by mouth Before breakfast, lunch, and dinner.  ergocalciferol (VITAMIN D2) 50,000 unit capsule Take 50,000 Units by mouth Every Thursday. Past History     Past Medical History:  Past Medical History:   Diagnosis Date    Anemia     Asthma     Blood disorder     Cancer ([facility] [address]     breast, diagnosed 2006 left    Cancer [facility])     Chest pain, unspecified     The working diagnosis is chest pain. The differential diagnosis includes chest wall pain, stable angina.     Chronic obstructive pulmonary disease (HCC)     Essential hypertension     Essential hypertension, benign     Uncontrolled     GERD (gastroesophageal reflux disease)     Hypercholesterolemia     Hypertension     Neuropathy     Nonspecific abnormal electrocardiogram (ECG) (EKG)     Obesity, unspecified     Weight loss has been strongly encouraged by following dietary restrictions and an exercise routine. [facility]     as a young child    Pre-operative cardiovascular examination     Sciatica     Unspecified cerebral artery occlusion with cerebral infarction        Past Surgical History:  Past Surgical History:   Procedure Laterality Date    BREAST SURGERY PROCEDURE UNLISTED      initial surgery 2006, then left modified radical mastectomy 6/2012    HX BREAST BIOPSY  4/11/12    Left    HX BREAST BIOPSY Left        Family History:  Family History   Problem Relation Age of Onset    Cancer Mother     Cancer Father     Arthritis-osteo Other     Hypertension Other     Heart Disease Neg Hx      Negative family history of premature CAD or CVA       Social History:  Social History   Substance Use Topics    Smoking status: Never Smoker    Smokeless tobacco: Never Used    Alcohol use No       Allergies:  No Known Allergies      Review of Systems   Review of Systems   Constitutional: Negative for chills and fever. HENT: Negative for congestion, rhinorrhea and sore throat. Respiratory: Negative for cough and shortness of breath. Cardiovascular: Negative for chest pain. Gastrointestinal: Negative for abdominal pain, blood in stool, constipation, diarrhea, nausea and vomiting. Genitourinary: Negative for dysuria, frequency and hematuria. Musculoskeletal: Negative for back pain and myalgias. Skin: Positive for wound. Negative for rash. Wound between the second and third right web space    Neurological: Negative for dizziness and headaches. All other systems reviewed and are negative.     All Other Systems Negative  Physical Exam     Vitals:    02/24/18 1919 02/24/18 2000 02/24/18 2030   BP: 155/86 (!) 178/93    Pulse: (!) 103 (!) 109 96   Resp: 12 23 18   Temp: 98.4 °F (36.9 °C) SpO2: 95% 96%    Weight: 86.2 kg (190 lb)     Height: 5' 4\" (1.626 m)       Physical Exam   Constitutional: She is oriented to person, place, and time. She appears well-developed and well-nourished. No distress. HENT:   Head: Normocephalic and atraumatic. Eyes: Conjunctivae are normal.   Neck: Normal range of motion. Neck supple. Cardiovascular: Normal rate, regular rhythm and normal heart sounds. Pulmonary/Chest: Effort normal and breath sounds normal. No respiratory distress. She exhibits no tenderness. Abdominal: Soft. Bowel sounds are normal. She exhibits no distension. There is no tenderness. There is no rebound and no guarding. Musculoskeletal: She exhibits no edema or deformity. Neurological: She is alert and oriented to person, place, and time. She has normal reflexes. Skin: Skin is warm and dry. Lesion noted. She is not diaphoretic. Open Wound between 2-3 toe web space on the right foot with purulent drainage. Erythema and warmth to the touch on the dorsum on the foot covering 2-4 metatarsals     Psychiatric: She has a normal mood and affect. Her behavior is normal.   Nursing note and vitals reviewed. Diagnostic Study Results     Labs -     Recent Results (from the past 12 hour(s))   CBC WITH AUTOMATED DIFF    Collection Time: 02/24/18  7:46 PM   Result Value Ref Range    WBC 10.5 4.6 - 13.2 K/uL    RBC 4.39 4.20 - 5.30 M/uL    HGB 11.1 (L) 12.0 - 16.0 g/dL    HCT 37.2 35.0 - 45.0 %    MCV 84.7 74.0 - 97.0 FL    MCH 25.3 24.0 - 34.0 PG    MCHC 29.8 (L) 31.0 - 37.0 g/dL    RDW 15.3 (H) 11.6 - 14.5 %    PLATELET 511 788 - 648 K/uL    MPV 10.4 9.2 - 11.8 FL    NEUTROPHILS 56 40 - 73 %    LYMPHOCYTES 34 21 - 52 %    MONOCYTES 9 3 - 10 %    EOSINOPHILS 1 0 - 5 %    BASOPHILS 0 0 - 2 %    ABS. NEUTROPHILS 5.9 1.8 - 8.0 K/UL    ABS. LYMPHOCYTES 3.6 0.9 - 3.6 K/UL    ABS. MONOCYTES 0.9 0.05 - 1.2 K/UL    ABS. EOSINOPHILS 0.1 0.0 - 0.4 K/UL    ABS.  BASOPHILS 0.0 0.0 - 0.06 K/UL    DF AUTOMATED     METABOLIC PANEL, COMPREHENSIVE    Collection Time: 02/24/18  7:46 PM   Result Value Ref Range    Sodium 142 136 - 145 mmol/L    Potassium 3.6 3.5 - 5.5 mmol/L    Chloride 105 100 - 108 mmol/L    CO2 30 21 - 32 mmol/L    Anion gap 7 3.0 - 18 mmol/L    Glucose 119 (H) 74 - 99 mg/dL    BUN 12 7.0 - 18 MG/DL    Creatinine 0.84 0.6 - 1.3 MG/DL    BUN/Creatinine ratio 14 12 - 20      GFR est AA >60 >60 ml/min/1.73m2    GFR est non-AA >60 >60 ml/min/1.73m2    Calcium 9.4 8.5 - 10.1 MG/DL    Bilirubin, total 0.4 0.2 - 1.0 MG/DL    ALT (SGPT) 17 13 - 56 U/L    AST (SGOT) 13 (L) 15 - 37 U/L    Alk. phosphatase 78 45 - 117 U/L    Protein, total 8.0 6.4 - 8.2 g/dL    Albumin 3.2 (L) 3.4 - 5.0 g/dL    Globulin 4.8 (H) 2.0 - 4.0 g/dL    A-G Ratio 0.7 (L) 0.8 - 1.7     POC LACTIC ACID    Collection Time: 02/24/18  7:47 PM   Result Value Ref Range    Lactic Acid (POC) 1.2 0.4 - 2.0 mmol/L       Radiologic Studies -   XR FOOT RT MIN 3 V    (Results Pending)   No signs of osteomyelitis   CT Results  (Last 48 hours)    None        CXR Results  (Last 48 hours)    None            Medical Decision Making   I am the first provider for this patient. I reviewed the vital signs, available nursing notes, past medical history, past surgical history, family history and social history. Vital Signs-Reviewed the patient's vital signs. Pulse Oximetry Analysis -  95 % RA    Records Reviewed: Nursing Notes and Old Medical Records    Procedures: none   Procedures    Provider Notes (Medical Decision Making):     Differential: septic joint, abscess, cellulitis, osteomyelitis    Plan: Will order basic labs, fluid bolus, pain medication, lactic acid, blood cultures, wound culture, xr of right foot and reevaluate post treatment. 8:41 PM  No white count, no shift, lactic normal, afebrile, pulse has improve, discussed with Dr. Jani Yarbrough who agrees with discharge home after patient is addiment she will not be admitted.  Will clean and dress wound, and send home on antibiotics with strict precautions to return if condition worsens or she starts having fevers. Patient agrees with plan. 8:42 PM  I informed the pt that she needed further observation for adequate evaluation for her wound and that by refusing the above, she is at risk for further deterioration  She is awake, alert, and she understands her condition and the risks involved in leaving. The patient has received percocet and it has been 30 Minutes since last receiving this medication. She is clinically aware of her surroundings and able to ask appropriate questions, the patients friend and the nurse present confirms she is not clinically intoxicated and able to make medical decisions. She verbalized knowing the risks and understood it was recommended that she stay and could also return at any time. The patient's friend was present for the discussion and also verbalized that they understood both diagnosis, risks and could return at any time. They were both provided with warnings regarding worsening of her condition and were provided instructions to follow up with Keara Osorio MD tomorrow or return to the Emergency Room as soon as possible. This discussion was witnessed by nurse Leslee Perry RN. MED RECONCILIATION:  Current Facility-Administered Medications   Medication Dose Route Frequency    sodium chloride 0.9 % bolus infusion 1,000 mL  1,000 mL IntraVENous ONCE    HYDROcodone-acetaminophen (NORCO) 5-325 mg per tablet 1 Tab  1 Tab Oral NOW     Current Outpatient Prescriptions   Medication Sig    metoprolol tartrate (LOPRESSOR) 25 mg tablet Take 1 Tab by mouth two (2) times a day.  gabapentin (NEURONTIN) 100 mg capsule Take 2 Caps by mouth three (3) times daily. Indications: NEUROPATHIC PAIN    dicyclomine (BENTYL) 20 mg tablet     triamterene-hydrochlorothiazide (DYAZIDE) 37.5-25 mg per capsule     ALBUTEROL SULFATE (PROAIR HFA IN) Take  by inhalation.     FLUTICASONE/SALMETEROL (ADVAIR HFA IN) Take  by inhalation.  montelukast (SINGULAIR) 10 mg tablet Take 10 mg by mouth daily.  tiotropium (SPIRIVA WITH HANDIHALER) 18 mcg inhalation capsule Take 1 Cap by inhalation daily.  lisinopril (PRINIVIL, ZESTRIL) 20 mg tablet Take  by mouth daily.  amLODIPine (NORVASC) 10 mg tablet Take  by mouth daily.  esomeprazole (NEXIUM) 40 mg capsule Take  by mouth daily.  aspirin 81 mg tablet Take 81 mg by mouth.  lidocaine (LIDODERM) 5 % Apply patch to the affected area for 12 hours a day and remove for 12 hours a day.  HYDROcodone-acetaminophen (NORCO) 7.5-325 mg per tablet Take 1 Tab by mouth every eight (8) hours as needed for Pain. Max Daily Amount: 3 Tabs.  naproxen (NAPROSYN) 500 mg tablet Take 1 Tab by mouth two (2) times daily (with meals).  cyclobenzaprine (FLEXERIL) 5 mg tablet Take 1 Tab by mouth nightly.  diclofenac (VOLTAREN) 1 % gel Apply  to affected area every six (6) hours. Apply 4 grams to affected joint up to 4 times per day, maximum 16 grams per joint per day  Dispense 5 100 gram tubes    desonide (TRIDESILON) 0.05 % cream     bethanechol (URECHOLINE) 10 mg tablet Take 10 mg by mouth Before breakfast, lunch, and dinner.  ergocalciferol (VITAMIN D2) 50,000 unit capsule Take 50,000 Units by mouth Every Thursday. Disposition:  Home     DISCHARGE NOTE:   Pt has been reexamined. Patient has no new complaints, changes, or physical findings. Care plan outlined and precautions discussed. Results of xray and labs were reviewed with the patient. All medications were reviewed with the patient; will d/c home with pain medication  and antibiotics. All of pt's questions and concerns were addressed. Patient was instructed and agrees to follow up with wound care on monday, as well as to return to the ED upon further deterioration. Patient is ready to go home.     Follow-up Information     Follow up With Details Comments Contact Info [facility] EMER[facility]PT  As needed [address] [facility] [place] [facility]  Make an appointment for [address]          Current Discharge Medication List      START taking these medications    Details   clindamycin (CLEOCIN) 300 mg capsule Take 1 Cap by mouth four (4) times daily for 10 days. Qty: 40 Cap, Refills: 0    Associated Diagnoses: Cellulitis and abscess of foot      !! naproxen (NAPROSYN) 500 mg tablet Take 1 Tab by mouth two (2) times daily (with meals) for 10 days. Qty: 20 Tab, Refills: 0    Associated Diagnoses: Cellulitis and abscess of foot      oxyCODONE-acetaminophen (PERCOCET) 5-325 mg per tablet Take 1 Tab by mouth every four (4) hours as needed for Pain. Max Daily Amount: 6 Tabs. Qty: 6 Tab, Refills: 0    Associated Diagnoses: Cellulitis and abscess of foot       !! - Potential duplicate medications found. Please discuss with provider. CONTINUE these medications which have NOT CHANGED    Details   !! naproxen (NAPROSYN) 500 mg tablet Take 1 Tab by mouth two (2) times daily (with meals). Qty: 20 Tab, Refills: 0       !! - Potential duplicate medications found. Please discuss with provider. Diagnosis     Clinical Impression:   1.  Cellulitis and abscess of foot

## 2018-03-02 LAB
BACTERIA SPEC CULT: NORMAL
BACTERIA SPEC CULT: NORMAL
SERVICE CMNT-IMP: NORMAL
SERVICE CMNT-IMP: NORMAL

## 2018-03-05 LAB
BACTERIA ISLT: NORMAL
RESULT 1, 080154: NORMAL
SPECIMEN SOURCE: NORMAL

## 2018-03-12 ENCOUNTER — TELEPHONE (OUTPATIENT)
Dept: SURGERY | Age: 68
End: 2018-03-12

## 2018-03-12 DIAGNOSIS — I97.2 POSTMASTECTOMY LYMPHEDEMA SYNDROME: Primary | ICD-10-CM

## 2018-03-12 NOTE — TELEPHONE ENCOUNTER
Placed call to patient to notify her that Jeanie Ardon NP has put a referral in to OT in order for her to get the measurements for insurance to approve a lymphodema pump. Patient verbally understood.

## 2018-03-14 ENCOUNTER — OFFICE VISIT (OUTPATIENT)
Dept: CARDIOLOGY CLINIC | Age: 68
End: 2018-03-14

## 2018-03-14 VITALS
DIASTOLIC BLOOD PRESSURE: 66 MMHG | SYSTOLIC BLOOD PRESSURE: 127 MMHG | HEIGHT: 64 IN | BODY MASS INDEX: 33.46 KG/M2 | WEIGHT: 196 LBS | HEART RATE: 90 BPM

## 2018-03-14 DIAGNOSIS — I50.32 CHRONIC DIASTOLIC CONGESTIVE HEART FAILURE (HCC): Primary | ICD-10-CM

## 2018-03-14 DIAGNOSIS — E78.5 DYSLIPIDEMIA: ICD-10-CM

## 2018-03-14 DIAGNOSIS — G45.9 TRANSIENT CEREBRAL ISCHEMIA, UNSPECIFIED TYPE: ICD-10-CM

## 2018-03-14 DIAGNOSIS — I10 ESSENTIAL HYPERTENSION, BENIGN: ICD-10-CM

## 2018-03-14 RX ORDER — LOSARTAN POTASSIUM 100 MG/1
100 TABLET ORAL DAILY
Qty: 90 TAB | Refills: 3 | Status: SHIPPED | OUTPATIENT
Start: 2018-03-14 | End: 2019-02-13 | Stop reason: SDUPTHER

## 2018-03-14 RX ORDER — FUROSEMIDE 40 MG/1
40 TABLET ORAL DAILY
Qty: 90 TAB | Refills: 3 | Status: SHIPPED | OUTPATIENT
Start: 2018-03-14 | End: 2022-10-12 | Stop reason: SDUPTHER

## 2018-03-14 NOTE — PROGRESS NOTES
1. Have you been to the ER, urgent care clinic since your last visit? Hospitalized since your last visit? No [facility] for foot pain    2. Have you seen or consulted any other health care providers outside of the [address] [place] [address] since your last visit? Include any pap smears or colon screening. Yes, pcp    3. Since your last visit, have you had any of the following symptoms? Sob, palpitations, right leg swelling        4. Have you had any blood work, X-rays or cardiac testing? Yes, [facility]            5.  Where do you normally have your labs drawn? [facility]    6. Do you need any refills today?    no

## 2018-03-14 NOTE — PROGRESS NOTES
HISTORY OF PRESENT ILLNESS  Darin Bolton is a 78 y.o. female. Hypertension   The history is provided by the patient. This is a chronic problem. The current episode started more than 1 week ago. The problem occurs every several days. The problem has not changed since onset. Associated symptoms include shortness of breath. Pertinent negatives include no chest pain. Shortness of Breath   The history is provided by the patient. This is a chronic problem. The problem occurs frequently. The current episode started more than 1 week ago. The problem has been gradually worsening. Associated symptoms include orthopnea and leg swelling. Pertinent negatives include no fever, no ear pain, no neck pain, no cough, no sputum production, no hemoptysis, no wheezing, no PND, no chest pain, no vomiting, no rash and no claudication. Associated medical issues include heart failure. Associated medical issues do not include chronic lung disease or CAD. Review of Systems   Constitutional: Negative for chills, diaphoresis, fever, malaise/fatigue and weight loss. HENT: Negative for congestion, ear discharge, ear pain, hearing loss, nosebleeds and tinnitus. Eyes: Negative for blurred vision. Respiratory: Positive for shortness of breath. Negative for cough, hemoptysis, sputum production, wheezing and stridor. Cardiovascular: Positive for orthopnea and leg swelling. Negative for chest pain, palpitations, claudication and PND. Gastrointestinal: Negative for heartburn, nausea and vomiting. Musculoskeletal: Negative for myalgias and neck pain. Skin: Negative for itching and rash. Neurological: Negative for dizziness, tingling, tremors, focal weakness, loss of consciousness and weakness. Psychiatric/Behavioral: Negative for depression and suicidal ideas.      Family History   Problem Relation Age of Onset   25 [facility] Cancer Mother     Cancer Father     Arthritis-osteo Other     Hypertension Other     Heart Disease Neg Hx      Negative family history of premature CAD or CVA       Past Medical History:   Diagnosis Date    Anemia     Asthma     Blood disorder     Cancer ([facility] [address]     breast, diagnosed 2006 left    Cancer [facility])     Chest pain, unspecified     The working diagnosis is chest pain. The differential diagnosis includes chest wall pain, stable angina.  Chronic obstructive pulmonary disease (HCC)     Essential hypertension     Essential hypertension, benign     Uncontrolled     GERD (gastroesophageal reflux disease)     Hypercholesterolemia     Hypertension     Neuropathy     Nonspecific abnormal electrocardiogram (ECG) (EKG)     Obesity, unspecified     Weight loss has been strongly encouraged by following dietary restrictions and an exercise routine.  Polio     as a young child    Pre-operative cardiovascular examination     Sciatica     Unspecified cerebral artery occlusion with cerebral infarction        Past Surgical History:   Procedure Laterality Date    BREAST SURGERY PROCEDURE UNLISTED      initial surgery 2006, then left modified radical mastectomy 6/2012    HX BREAST BIOPSY  4/11/12    Left    HX BREAST BIOPSY Left        Social History   Substance Use Topics    Smoking status: Never Smoker    Smokeless tobacco: Never Used    Alcohol use No       No Known Allergies    Outpatient Prescriptions Marked as Taking for the 3/14/18 encounter (Office Visit) with Michael Sullivan MD   Medication Sig Dispense Refill    losartan (COZAAR) 100 mg tablet Take 1 Tab by mouth daily. 90 Tab 3    furosemide (LASIX) 40 mg tablet Take 1 Tab by mouth daily. 90 Tab 3    oxyCODONE-acetaminophen (PERCOCET) 5-325 mg per tablet Take 1 Tab by mouth every four (4) hours as needed for Pain. Max Daily Amount: 6 Tabs. 6 Tab 0    lidocaine (LIDODERM) 5 % Apply patch to the affected area for 12 hours a day and remove for 12 hours a day.  5 Package 2    HYDROcodone-acetaminophen (NORCO) 7.5-325 mg per tablet Take 1 Tab by mouth every eight (8) hours as needed for Pain. Max Daily Amount: 3 Tabs. 21 Tab 0    naproxen (NAPROSYN) 500 mg tablet Take 1 Tab by mouth two (2) times daily (with meals). 20 Tab 0    cyclobenzaprine (FLEXERIL) 5 mg tablet Take 1 Tab by mouth nightly. 7 Tab 0    metoprolol tartrate (LOPRESSOR) 25 mg tablet Take 1 Tab by mouth two (2) times a day. 60 Tab 4    gabapentin (NEURONTIN) 100 mg capsule Take 2 Caps by mouth three (3) times daily. Indications: NEUROPATHIC PAIN 90 Cap 1    dicyclomine (BENTYL) 20 mg tablet   0    diclofenac (VOLTAREN) 1 % gel Apply  to affected area every six (6) hours. Apply 4 grams to affected joint up to 4 times per day, maximum 16 grams per joint per day  Dispense 5 100 gram tubes 100 g 4    desonide (TRIDESILON) 0.05 % cream   0    ALBUTEROL SULFATE (PROAIR HFA IN) Take  by inhalation.  FLUTICASONE/SALMETEROL (ADVAIR HFA IN) Take  by inhalation.  montelukast (SINGULAIR) 10 mg tablet Take 10 mg by mouth daily.  tiotropium (SPIRIVA WITH HANDIHALER) 18 mcg inhalation capsule Take 1 Cap by inhalation daily.  amLODIPine (NORVASC) 10 mg tablet Take  by mouth daily.  bethanechol (URECHOLINE) 10 mg tablet Take 10 mg by mouth Before breakfast, lunch, and dinner.  esomeprazole (NEXIUM) 40 mg capsule Take  by mouth daily.  aspirin 81 mg tablet Take 81 mg by mouth.  ergocalciferol (VITAMIN D2) 50,000 unit capsule Take 50,000 Units by mouth Every Thursday. Visit Vitals    /66    Pulse 90    Ht 5' 4\" (1.626 m)    Wt 88.9 kg (196 lb)    BMI 33.64 kg/m2     Physical Exam   Constitutional: She is oriented to person, place, and time. She appears well-developed and well-nourished. No distress. HENT:   Head: Atraumatic. Mouth/Throat: No oropharyngeal exudate. Eyes: Conjunctivae are normal. Right eye exhibits no discharge. Left eye exhibits no discharge. No scleral icterus. Neck: Normal range of motion.  Neck supple. No JVD present. No tracheal deviation present. No thyromegaly present. Cardiovascular: Normal rate and regular rhythm. Exam reveals no gallop. No murmur heard. Pulmonary/Chest: Effort normal and breath sounds normal. No stridor. She has no wheezes. She has no rales. Abdominal: Soft. There is no tenderness. There is no rebound and no guarding. Musculoskeletal: Normal range of motion. She exhibits edema (mild LE edema). She exhibits no tenderness. Lymphadenopathy:     She has no cervical adenopathy. Neurological: She is alert and oriented to person, place, and time. She exhibits normal muscle tone. Skin: Skin is warm. She is not diaphoretic. Psychiatric: She has a normal mood and affect. Her behavior is normal.     ekg sinus rhythm with no acute st-t changes  Echo 10/2017:  SUMMARY:  Left ventricle: Systolic function was normal. Ejection fraction was  estimated in the range of 55 % to 60 %. There were no regional wall motion  abnormalities. There was mild concentric hypertrophy. Doppler parameters  were consistent with abnormal left ventricular relaxation (grade 1  diastolic dysfunction). Mitral valve: There was mild regurgitation. Tricuspid valve: There was mild regurgitation. ASSESSMENT and PLAN    ICD-10-CM ICD-9-CM    1. Chronic diastolic congestive heart failure (HCC) I50.32 428.32      428.0     NYHA class II/III   2. Dyslipidemia W11.7 999.2 METABOLIC PANEL, BASIC      MAGNESIUM   3. Essential hypertension, benign I10 401.1    4. Transient cerebral ischemia, unspecified type G45.9 435.9      Orders Placed This Encounter    METABOLIC PANEL, BASIC     Standing Status:   Future     Standing Expiration Date:   3/15/2019    MAGNESIUM     Standing Status:   Future     Standing Expiration Date:   3/15/2019    losartan (COZAAR) 100 mg tablet     Sig: Take 1 Tab by mouth daily.      Dispense:  90 Tab     Refill:  3    furosemide (LASIX) 40 mg tablet     Sig: Take 1 Tab by mouth daily.     Dispense:  90 Tab     Refill:  3     Follow-up Disposition:  Return in about 2 weeks (around 3/28/2018). current treatment plan is effective, no change in therapy  reviewed diet, exercise and weight control. Patient with longstanding htn seen for worsening dyspnea likely from dietary non discretion  Has mild CHF- also c/o cough for the past several weeks  Will discontinue lisinopril and hctz. Will start losartan and lasix. Obtain BMP/MG in 2 weeks. Advised salt restriction.

## 2018-03-23 LAB
AEROBIC ID BY MALDI, ORJ11T: NORMAL
ORGANISM ID BY MALDI, ORJ1T: NORMAL
SOURCE, RSRC62: NORMAL
SOURCE, RSRC67: NORMAL

## 2018-03-24 LAB
AEROBIC ID BY SEQ, ORI2T: NORMAL
BACTERIA SPEC CULT: ABNORMAL
GRAM STN SPEC: ABNORMAL
GRAM STN SPEC: ABNORMAL
ORG ID BY SEQUENCING, ORI1T: NORMAL
SERVICE CMNT-IMP: ABNORMAL
SPECIMEN SOURCE: NORMAL
SPECIMEN SOURCE: NORMAL

## 2018-03-28 ENCOUNTER — OFFICE VISIT (OUTPATIENT)
Dept: CARDIOLOGY CLINIC | Age: 68
End: 2018-03-28

## 2018-03-28 VITALS
HEIGHT: 64 IN | WEIGHT: 197 LBS | HEART RATE: 73 BPM | SYSTOLIC BLOOD PRESSURE: 126 MMHG | DIASTOLIC BLOOD PRESSURE: 67 MMHG | BODY MASS INDEX: 33.63 KG/M2

## 2018-03-28 DIAGNOSIS — E78.5 DYSLIPIDEMIA: ICD-10-CM

## 2018-03-28 DIAGNOSIS — I10 ESSENTIAL HYPERTENSION, BENIGN: ICD-10-CM

## 2018-03-28 DIAGNOSIS — J45.30 MILD PERSISTENT ASTHMA WITHOUT COMPLICATION: ICD-10-CM

## 2018-03-28 DIAGNOSIS — I50.32 CHRONIC DIASTOLIC CONGESTIVE HEART FAILURE (HCC): Primary | ICD-10-CM

## 2018-03-28 NOTE — PATIENT INSTRUCTIONS
High Blood Pressure: Care Instructions  Your Care Instructions    If your blood pressure is usually above 140/90, you have high blood pressure, or hypertension. That means the top number is 140 or higher or the bottom number is 90 or higher, or both. Despite what a lot of people think, high blood pressure usually doesn't cause headaches or make you feel dizzy or lightheaded. It usually has no symptoms. But it does increase your risk for heart attack, stroke, and kidney or eye damage. The higher your blood pressure, the more your risk increases. Your doctor will give you a goal for your blood pressure. Your goal will be based on your health and your age. An example of a goal is to keep your blood pressure below 140/90. Lifestyle changes, such as eating healthy and being active, are always important to help lower blood pressure. You might also take medicine to reach your blood pressure goal.  Follow-up care is a key part of your treatment and safety. Be sure to make and go to all appointments, and call your doctor if you are having problems. It's also a good idea to know your test results and keep a list of the medicines you take. How can you care for yourself at home? Medical treatment  · If you stop taking your medicine, your blood pressure will go back up. You may take one or more types of medicine to lower your blood pressure. Be safe with medicines. Take your medicine exactly as prescribed. Call your doctor if you think you are having a problem with your medicine. · Talk to your doctor before you start taking aspirin every day. Aspirin can help certain people lower their risk of a heart attack or stroke. But taking aspirin isn't right for everyone, because it can cause serious bleeding. · See your doctor regularly. You may need to see the doctor more often at first or until your blood pressure comes down.   · If you are taking blood pressure medicine, talk to your doctor before you take decongestants or anti-inflammatory medicine, such as ibuprofen. Some of these medicines can raise blood pressure. · Learn how to check your blood pressure at home. Lifestyle changes  · Stay at a healthy weight. This is especially important if you put on weight around the waist. Losing even 10 pounds can help you lower your blood pressure. · If your doctor recommends it, get more exercise. Walking is a good choice. Bit by bit, increase the amount you walk every day. Try for at least 30 minutes on most days of the week. You also may want to swim, bike, or do other activities. · Avoid or limit alcohol. Talk to your doctor about whether you can drink any alcohol. · Try to limit how much sodium you eat to less than 2,300 milligrams (mg) a day. Your doctor may ask you to try to eat less than 1,500 mg a day. · Eat plenty of fruits (such as bananas and oranges), vegetables, legumes, whole grains, and low-fat dairy products. · Lower the amount of saturated fat in your diet. Saturated fat is found in animal products such as milk, cheese, and meat. Limiting these foods may help you lose weight and also lower your risk for heart disease. · Do not smoke. Smoking increases your risk for heart attack and stroke. If you need help quitting, talk to your doctor about stop-smoking programs and medicines. These can increase your chances of quitting for good. When should you call for help? Call 911 anytime you think you may need emergency care. This may mean having symptoms that suggest that your blood pressure is causing a serious heart or blood vessel problem. Your blood pressure may be over 180/110. ? For example, call 911 if:  ? · You have symptoms of a heart attack. These may include:  ¨ Chest pain or pressure, or a strange feeling in the chest.  ¨ Sweating. ¨ Shortness of breath. ¨ Nausea or vomiting.   ¨ Pain, pressure, or a strange feeling in the back, neck, jaw, or upper belly or in one or both shoulders or arms.  ¨ Lightheadedness or sudden weakness. ¨ A fast or irregular heartbeat. ? · You have symptoms of a stroke. These may include:  ¨ Sudden numbness, tingling, weakness, or loss of movement in your face, arm, or leg, especially on only one side of your body. ¨ Sudden vision changes. ¨ Sudden trouble speaking. ¨ Sudden confusion or trouble understanding simple statements. ¨ Sudden problems with walking or balance. ¨ A sudden, severe headache that is different from past headaches. ? · You have severe back or belly pain. ?Do not wait until your blood pressure comes down on its own. Get help right away. ?Call your doctor now or seek immediate care if:  ? · Your blood pressure is much higher than normal (such as 180/110 or higher), but you don't have symptoms. ? · You think high blood pressure is causing symptoms, such as:  ¨ Severe headache. ¨ Blurry vision. ? Watch closely for changes in your health, and be sure to contact your doctor if:  ? · Your blood pressure measures 140/90 or higher at least 2 times. That means the top number is 140 or higher or the bottom number is 90 or higher, or both. ? · You think you may be having side effects from your blood pressure medicine. ? · Your blood pressure is usually normal, but it goes above normal at least 2 times. Where can you learn more? Go to http://laura-jessie.info/. Enter S255 in the search box to learn more about \"High Blood Pressure: Care Instructions. \"  Current as of: September 21, 2016  Content Version: 11.4  © 0496-1585 [employer]. Care instructions adapted under license by [employer] (which disclaims liability or warranty for this information). If you have questions about a medical condition or this instruction, always ask your healthcare professional. [place] [address] any warranty or liability for your use of this information.

## 2018-03-28 NOTE — PROGRESS NOTES
1. Have you been to the ER, urgent care clinic since your last visit? Hospitalized since your last visit? No    2. Have you seen or consulted any other health care providers outside of the [address] [place] [address] since your last visit? Include any pap smears or colon screening. No     3. Since your last visit, have you had any of the following symptoms? shortness of breath and swelling in legs/arms. 4.  Have you had any blood work, X-rays or cardiac testing? No             5.  Where do you normally have your labs drawn?   [facility]    6. Do you need any refills today?    No

## 2018-03-28 NOTE — PROGRESS NOTES
HISTORY OF PRESENT ILLNESS  Claribel Mccollum is a 78 y.o. female. Hypertension   The history is provided by the patient. This is a chronic problem. The current episode started more than 1 week ago. The problem occurs every several days. The problem has been gradually improving. Associated symptoms include shortness of breath. Pertinent negatives include no chest pain. Shortness of Breath   The history is provided by the patient. This is a chronic problem. The problem occurs intermittently. The current episode started more than 1 week ago. The problem has been gradually improving. Pertinent negatives include no fever, no ear pain, no neck pain, no cough, no sputum production, no hemoptysis, no wheezing, no PND, no orthopnea, no chest pain, no vomiting, no rash, no leg swelling and no claudication. Associated medical issues include heart failure. Associated medical issues do not include chronic lung disease or CAD. Review of Systems   Constitutional: Negative for chills, diaphoresis, fever, malaise/fatigue and weight loss. HENT: Negative for congestion, ear discharge, ear pain, hearing loss, nosebleeds and tinnitus. Eyes: Negative for blurred vision. Respiratory: Positive for shortness of breath. Negative for cough, hemoptysis, sputum production, wheezing and stridor. Cardiovascular: Negative for chest pain, palpitations, orthopnea, claudication, leg swelling and PND. Gastrointestinal: Negative for heartburn, nausea and vomiting. Musculoskeletal: Negative for myalgias and neck pain. Skin: Negative for itching and rash. Neurological: Negative for dizziness, tingling, tremors, focal weakness, loss of consciousness and weakness. Psychiatric/Behavioral: Negative for depression and suicidal ideas.      Family History   Problem Relation Age of Onset   [facility] Mother     Cancer Father     Arthritis-osteo Other     Hypertension Other     Heart Disease Neg Hx      Negative family history of premature CAD or CVA       Past Medical History:   Diagnosis Date    Anemia     Asthma     Blood disorder     Cancer ([address]     breast, diagnosed 2006 left    Cancer [facility])     Chest pain, unspecified     The working diagnosis is chest pain. The differential diagnosis includes chest wall pain, stable angina.  Chronic obstructive pulmonary disease (HCC)     Essential hypertension     Essential hypertension, benign     Uncontrolled     GERD (gastroesophageal reflux disease)     Hypercholesterolemia     Hypertension     Neuropathy     Nonspecific abnormal electrocardiogram (ECG) (EKG)     Obesity, unspecified     Weight loss has been strongly encouraged by following dietary restrictions and an exercise routine.  Polio     as a young child    Pre-operative cardiovascular examination     Sciatica     Unspecified cerebral artery occlusion with cerebral infarction        Past Surgical History:   Procedure Laterality Date    BREAST SURGERY PROCEDURE UNLISTED      initial surgery 2006, then left modified radical mastectomy 6/2012    HX BREAST BIOPSY  4/11/12    Left    HX BREAST BIOPSY Left        Social History   Substance Use Topics    Smoking status: Never Smoker    Smokeless tobacco: Never Used    Alcohol use No       No Known Allergies    Outpatient Prescriptions Marked as Taking for the 3/28/18 encounter (Office Visit) with Graham Coburn MD   Medication Sig Dispense Refill    losartan (COZAAR) 100 mg tablet Take 1 Tab by mouth daily. 90 Tab 3    furosemide (LASIX) 40 mg tablet Take 1 Tab by mouth daily. 90 Tab 3    oxyCODONE-acetaminophen (PERCOCET) 5-325 mg per tablet Take 1 Tab by mouth every four (4) hours as needed for Pain. Max Daily Amount: 6 Tabs. 6 Tab 0    lidocaine (LIDODERM) 5 % Apply patch to the affected area for 12 hours a day and remove for 12 hours a day.  5 Package 2    HYDROcodone-acetaminophen (NORCO) 7.5-325 mg per tablet Take 1 Tab by mouth every eight (8) hours as needed for Pain. Max Daily Amount: 3 Tabs. 21 Tab 0    naproxen (NAPROSYN) 500 mg tablet Take 1 Tab by mouth two (2) times daily (with meals). 20 Tab 0    cyclobenzaprine (FLEXERIL) 5 mg tablet Take 1 Tab by mouth nightly. 7 Tab 0    metoprolol tartrate (LOPRESSOR) 25 mg tablet Take 1 Tab by mouth two (2) times a day. 60 Tab 4    gabapentin (NEURONTIN) 100 mg capsule Take 2 Caps by mouth three (3) times daily. Indications: NEUROPATHIC PAIN 90 Cap 1    dicyclomine (BENTYL) 20 mg tablet   0    diclofenac (VOLTAREN) 1 % gel Apply  to affected area every six (6) hours. Apply 4 grams to affected joint up to 4 times per day, maximum 16 grams per joint per day  Dispense 5 100 gram tubes 100 g 4    desonide (TRIDESILON) 0.05 % cream   0    ALBUTEROL SULFATE (PROAIR HFA IN) Take  by inhalation.  FLUTICASONE/SALMETEROL (ADVAIR HFA IN) Take  by inhalation.  montelukast (SINGULAIR) 10 mg tablet Take 10 mg by mouth daily.  tiotropium (SPIRIVA WITH HANDIHALER) 18 mcg inhalation capsule Take 1 Cap by inhalation daily.  amLODIPine (NORVASC) 10 mg tablet Take  by mouth daily.  esomeprazole (NEXIUM) 40 mg capsule Take  by mouth daily.  aspirin 81 mg tablet Take 81 mg by mouth.  ergocalciferol (VITAMIN D2) 50,000 unit capsule Take 50,000 Units by mouth Every Thursday. Visit Vitals    /67    Pulse 73    Ht 5' 4\" (1.626 m)    Wt 89.4 kg (197 lb)    BMI 33.81 kg/m2     Physical Exam   Constitutional: She is oriented to person, place, and time. She appears well-developed and well-nourished. No distress. HENT:   Head: Atraumatic. Mouth/Throat: No oropharyngeal exudate. Eyes: Conjunctivae are normal. Right eye exhibits no discharge. Left eye exhibits no discharge. No scleral icterus. Neck: Normal range of motion. Neck supple. No JVD present. No tracheal deviation present. No thyromegaly present. Cardiovascular: Normal rate and regular rhythm.   Exam reveals no gallop. No murmur heard. Pulmonary/Chest: Effort normal. No stridor. She has wheezes (trace diffuse rhonchi). She has no rales. Abdominal: Soft. There is no tenderness. There is no rebound and no guarding. Musculoskeletal: Normal range of motion. She exhibits no edema or tenderness. Lymphadenopathy:     She has no cervical adenopathy. Neurological: She is alert and oriented to person, place, and time. She exhibits normal muscle tone. Skin: Skin is warm. She is not diaphoretic. Psychiatric: She has a normal mood and affect. Her behavior is normal.     ekg sinus rhythm with no acute st-t changes  Echo 10/2017:  SUMMARY:  Left ventricle: Systolic function was normal. Ejection fraction was  estimated in the range of 55 % to 60 %. There were no regional wall motion  abnormalities. There was mild concentric hypertrophy. Doppler parameters  were consistent with abnormal left ventricular relaxation (grade 1  diastolic dysfunction). Mitral valve: There was mild regurgitation. Tricuspid valve: There was mild regurgitation. ASSESSMENT and PLAN    ICD-10-CM ICD-9-CM    1. Chronic diastolic congestive heart failure (HCC) I50.32 428.32 MAGNESIUM     100.2 METABOLIC PANEL, BASIC    NYHA class II/III   2. Essential hypertension, benign I10 401.1    3. Dyslipidemia E78.5 272.4    4. Mild persistent asthma without complication L01.01 945.67      Orders Placed This Encounter    MAGNESIUM     Standing Status:   Future     Standing Expiration Date:   9/19/6817    METABOLIC PANEL, BASIC     Standing Status:   Future     Standing Expiration Date:   3/29/2019     Follow-up Disposition:  Return in about 6 months (around 9/28/2018). current treatment plan is effective, no change in therapy  reviewed diet, exercise and weight control.     Patient with longstanding htn seen for worsening dyspnea likely from dietary non discretion  CHF improving on current meds  Seen by pulmonary recently and was advised to be on O2  Obtain BMP/MG in 2 weeks. Advised salt restriction.   Follow up in 6 months

## 2018-03-28 NOTE — MR AVS SNAPSHOT
Carlyle Atwoodia 
 
 
 [address] [facility] 
[phone] Patient: German Dye MRN: [id number] Visit Information Date & Time Provider Department Dept. Phone Encounter #  
 3/28/2018 12:45 PM Lamine Mejía MD [facility] [id number] Follow-up Instructions Return in about 6 months (around 9/28/2018). Follow-up and Disposition History Your Appointments 5/9/2018 10:00 AM  
Follow Up with JENNIFER Cordero 33 ([address]) Appt Note: 3 month f/up [address] [id number] [address] 407 [facility]  47 [facility] 9/12/2018 12:45 PM  
Office Visit with Lamine Mejía MD  
[facility] ([address]) Appt Note: 6 month follow up  
 [address] [facility] [address]  
  
   
 [address] [id number] [address] [address] Upcoming Health Maintenance Date Due Hepatitis C Screening 1950 DTaP/Tdap/Td series (1 - Tdap) 11/24/1972 ZOSTER VACCINE AGE 60> 9/24/2011 FOBT Q 1 YEAR AGE 50-75 6/9/2013 GLAUCOMA SCREENING Q2Y 11/24/2016 Bone Densitometry (Dexa) Screening 11/24/2016 Pneumococcal 65+ High/Highest Risk (1 of 2 - PCV13) 11/24/2016 Influenza Age 5 to Adult 8/1/2017 MEDICARE YEARLY EXAM 3/14/2018 BREAST CANCER SCRN MAMMOGRAM 2/1/2020 Allergies as of 3/28/2018  Review Complete On: 3/28/2018 By: Taya Ryder No Known Allergies Current Immunizations  Never Reviewed No immunizations on file. Not reviewed this visit You Were Diagnosed With   
  
 Codes Comments Chronic diastolic congestive heart failure (HCC)    -  Primary ICD-10-CM: I50.32 
ICD-9-CM: 428.32, 428.0 NYHA class II/III  Essential hypertension, benign     ICD-10-CM: I10 
 ICD-9-CM: 401.1 Dyslipidemia     ICD-10-CM: E78.5 ICD-9-CM: 272.4 Mild persistent asthma without complication     QJW-52-GL: J45.30 ICD-9-CM: 493.90 Vitals BP Pulse Height(growth percentile) Weight(growth percentile) BMI OB Status 126/67 73 5' 4\" (1.626 m) 197 lb (89.4 kg) 33.81 kg/m2 Postmenopausal  
 Smoking Status Never Smoker Vitals History BMI and BSA Data Body Mass Index Body Surface Area  
 33.81 kg/m 2 2.01 m 2 Preferred Pharmacy Pharmacy Name Phone [address] [facility], [address] Your Updated Medication List  
  
   
This list is accurate as of 3/28/18  2:34 PM.  Always use your most recent med list.  
  
  
  
  
 ADVAIR HFA IN Take  by inhalation. amLODIPine 10 mg tablet Commonly known as:  Laya Lavell Take  by mouth daily. aspirin 81 mg tablet Take 81 mg by mouth. bethanechol 10 mg tablet Commonly known as:  URECHOLINE Take 10 mg by mouth Before breakfast, lunch, and dinner. cyclobenzaprine 5 mg tablet Commonly known as:  FLEXERIL Take 1 Tab by mouth nightly. desonide 0.05 % cream  
Commonly known as:  TRIDESILON  
  
 diclofenac 1 % Gel Commonly known as:  VOLTAREN Apply  to affected area every six (6) hours. Apply 4 grams to affected joint up to 4 times per day, maximum 16 grams per joint per day Dispense 5 100 gram tubes  
  
 dicyclomine 20 mg tablet Commonly known as:  BENTYL  
  
 furosemide 40 mg tablet Commonly known as:  LASIX Take 1 Tab by mouth daily. gabapentin 100 mg capsule Commonly known as:  NEURONTIN Take 2 Caps by mouth three (3) times daily. Indications: NEUROPATHIC PAIN  
  
 HYDROcodone-acetaminophen 7.5-325 mg per tablet Commonly known as:  Mortimer Newness Take 1 Tab by mouth every eight (8) hours as needed for Pain. Max Daily Amount: 3 Tabs.  
  
 lidocaine 5 % Commonly known as:  Haseeb Clay  
 Apply patch to the affected area for 12 hours a day and remove for 12 hours a day. losartan 100 mg tablet Commonly known as:  COZAAR Take 1 Tab by mouth daily. metoprolol tartrate 25 mg tablet Commonly known as:  LOPRESSOR Take 1 Tab by mouth two (2) times a day. naproxen 500 mg tablet Commonly known as:  NAPROSYN Take 1 Tab by mouth two (2) times daily (with meals). NexIUM 40 mg capsule Generic drug:  esomeprazole Take  by mouth daily. oxyCODONE-acetaminophen 5-325 mg per tablet Commonly known as:  PERCOCET Take 1 Tab by mouth every four (4) hours as needed for Pain. Max Daily Amount: 6 Tabs. PROAIR HFA IN Take  by inhalation. SINGULAIR 10 mg tablet Generic drug:  montelukast  
Take 10 mg by mouth daily. SPIRIVA WITH HANDIHALER 18 mcg inhalation capsule Generic drug:  tiotropium Take 1 Cap by inhalation daily. VITAMIN D2 50,000 unit capsule Generic drug:  ergocalciferol Take 50,000 Units by mouth Every Thursday. Follow-up Instructions Return in about 6 months (around 9/28/2018). To-Do List   
 03/28/2018 Lab:  MAGNESIUM   
  
 03/28/2018 Lab:  METABOLIC PANEL, BASIC   
  
 02/04/2019 10:30 AM  
  Appointment with YASMIN DANIELLE 1 at [address] [place] [address] ([phone]) OUTSIDE FILMS  - Any outside films related to the study being scheduled should be brought with you on the day of the exam.  If this cannot be done there may be a delay in the reading of the study. MEDICATIONS  - Patient must bring a complete list of all medications currently taking to include prescriptions, over-the-counter meds, herbals, vitamins & any dietary supplements  GENERAL INSTRUCTIONS  - On the day of your exam do not use any bath powder, deodorant or lotions on the armpit area. -Tenderness of breasts may cause an increase of discomfort during procedure.   If you are experiencing breast tenderness on the day of your appointment and would like to reschedule, please call [phone]. Patient Instructions High Blood Pressure: Care Instructions Your Care Instructions If your blood pressure is usually above 140/90, you have high blood pressure, or hypertension. That means the top number is 140 or higher or the bottom number is 90 or higher, or both. Despite what a lot of people think, high blood pressure usually doesn't cause headaches or make you feel dizzy or lightheaded. It usually has no symptoms. But it does increase your risk for heart attack, stroke, and kidney or eye damage. The higher your blood pressure, the more your risk increases. Your doctor will give you a goal for your blood pressure. Your goal will be based on your health and your age. An example of a goal is to keep your blood pressure below 140/90. Lifestyle changes, such as eating healthy and being active, are always important to help lower blood pressure. You might also take medicine to reach your blood pressure goal. 
Follow-up care is a key part of your treatment and safety. Be sure to make and go to all appointments, and call your doctor if you are having problems. It's also a good idea to know your test results and keep a list of the medicines you take. How can you care for yourself at home? Medical treatment · If you stop taking your medicine, your blood pressure will go back up. You may take one or more types of medicine to lower your blood pressure. Be safe with medicines. Take your medicine exactly as prescribed. Call your doctor if you think you are having a problem with your medicine. · Talk to your doctor before you start taking aspirin every day. Aspirin can help certain people lower their risk of a heart attack or stroke. But taking aspirin isn't right for everyone, because it can cause serious bleeding. · See your doctor regularly. You may need to see the doctor more often at first or until your blood pressure comes down. · If you are taking blood pressure medicine, talk to your doctor before you take decongestants or anti-inflammatory medicine, such as ibuprofen. Some of these medicines can raise blood pressure. · Learn how to check your blood pressure at home. Lifestyle changes · Stay at a healthy weight. This is especially important if you put on weight around the waist. Losing even 10 pounds can help you lower your blood pressure. · If your doctor recommends it, get more exercise. Walking is a good choice. Bit by bit, increase the amount you walk every day. Try for at least 30 minutes on most days of the week. You also may want to swim, bike, or do other activities. · Avoid or limit alcohol. Talk to your doctor about whether you can drink any alcohol. · Try to limit how much sodium you eat to less than 2,300 milligrams (mg) a day. Your doctor may ask you to try to eat less than 1,500 mg a day. · Eat plenty of fruits (such as bananas and oranges), vegetables, legumes, whole grains, and low-fat dairy products. · Lower the amount of saturated fat in your diet. Saturated fat is found in animal products such as milk, cheese, and meat. Limiting these foods may help you lose weight and also lower your risk for heart disease. · Do not smoke. Smoking increases your risk for heart attack and stroke. If you need help quitting, talk to your doctor about stop-smoking programs and medicines. These can increase your chances of quitting for good. When should you call for help? Call 911 anytime you think you may need emergency care. This may mean having symptoms that suggest that your blood pressure is causing a serious heart or blood vessel problem. Your blood pressure may be over 180/110. ? For example, call 911 if: 
? · You have symptoms of a heart attack. These may include: ¨ Chest pain or pressure, or a strange feeling in the chest. 
¨ Sweating. ¨ Shortness of breath. ¨ Nausea or vomiting. ¨ Pain, pressure, or a strange feeling in the back, neck, jaw, or upper belly or in one or both shoulders or arms. ¨ Lightheadedness or sudden weakness. ¨ A fast or irregular heartbeat. ? · You have symptoms of a stroke. These may include: 
¨ Sudden numbness, tingling, weakness, or loss of movement in your face, arm, or leg, especially on only one side of your body. ¨ Sudden vision changes. ¨ Sudden trouble speaking. ¨ Sudden confusion or trouble understanding simple statements. ¨ Sudden problems with walking or balance. ¨ A sudden, severe headache that is different from past headaches. ? · You have severe back or belly pain. ?Do not wait until your blood pressure comes down on its own. Get help right away. ?Call your doctor now or seek immediate care if: 
? · Your blood pressure is much higher than normal (such as 180/110 or higher), but you don't have symptoms. ? · You think high blood pressure is causing symptoms, such as: ¨ Severe headache. ¨ Blurry vision. ? Watch closely for changes in your health, and be sure to contact your doctor if: 
? · Your blood pressure measures 140/90 or higher at least 2 times. That means the top number is 140 or higher or the bottom number is 90 or higher, or both. ? · You think you may be having side effects from your blood pressure medicine. ? · Your blood pressure is usually normal, but it goes above normal at least 2 times. Where can you learn more? Go to http://laura-jessie.info/. Enter Q137 in the search box to learn more about \"High Blood Pressure: Care Instructions. \" Current as of: September 21, 2016 Content Version: 11.4 © 7501-2017 [employer].  Care instructions adapted under license by [employer] (which disclaims liability or warranty for this information). If you have questions about a medical condition or this instruction, always ask your healthcare professional. [address] any warranty or liability for your use of this information. Patient Instructions History Introducing [facility]! [employer] introduces [employer] patient portal. Now you can access parts of your medical record, email your doctor's office, and request medication refills online. 1. In your internet browser, go to [email or web address] 2. Click on the First Time User? Click Here link in the Sign In box. You will see the New Member Sign Up page. 3. Enter your Sequella Access Code exactly as it appears below. You will not need to use this code after youve completed the sign-up process. If you do not sign up before the expiration date, you must request a new code. · Sequella Access Code: [email or web address] Expires: 4/23/2018  4:46 PM 
 
4. Enter the last four digits of your Social Security Number (xxxx) and Date of Birth (mm/dd/yyyy) as indicated and click Submit. You will be taken to the next sign-up page. 5. Create a Sequella ID. This will be your Sequella login ID and cannot be changed, so think of one that is secure and easy to remember. 6. Create a Sequella password. You can change your password at any time. 7. Enter your Password Reset Question and Answer. This can be used at a later time if you forget your password. 8. Enter your e-mail address. You will receive e-mail notification when new information is available in [address] 9. Click Sign Up. You can now view and download portions of your medical record. 10. Click the Download Summary menu link to download a portable copy of your medical information. If you have questions, please visit the Frequently Asked Questions section of the Sequella website. Remember, Sequella is NOT to be used for urgent needs. For medical emergencies, dial 911. Now available from your iPhone and Android! Please provide this summary of care documentation to your next provider. Your primary care clinician is listed as Alisha Hermosillo. If you have any questions after today's visit, please call [phone].

## 2018-05-09 ENCOUNTER — TELEPHONE (OUTPATIENT)
Dept: SURGERY | Age: 68
End: 2018-05-09

## 2018-05-09 ENCOUNTER — OFFICE VISIT (OUTPATIENT)
Dept: SURGERY | Age: 68
End: 2018-05-09

## 2018-05-09 VITALS
TEMPERATURE: 98.1 F | BODY MASS INDEX: 33.97 KG/M2 | DIASTOLIC BLOOD PRESSURE: 70 MMHG | HEIGHT: 64 IN | HEART RATE: 85 BPM | SYSTOLIC BLOOD PRESSURE: 124 MMHG | WEIGHT: 199 LBS | RESPIRATION RATE: 20 BRPM

## 2018-05-09 DIAGNOSIS — I97.2 POSTMASTECTOMY LYMPHEDEMA SYNDROME: ICD-10-CM

## 2018-05-09 DIAGNOSIS — M79.602 ARM PAIN, DIFFUSE, LEFT: Primary | ICD-10-CM

## 2018-05-09 NOTE — PATIENT INSTRUCTIONS
Lymphedema: Care Instructions  Your Care Instructions    Lymphedema is fluid that builds up in the arms or legs. It is often caused by surgery to remove lymph nodes during cancer treatment, especially breast cancer surgery, which can cause fluid to build up in the arm. It can happen after radiation treatment to an area that involves lymph nodes. It also can be caused by a fractured bone or surgery to fix a fracture. And some medicines also can cause lymphedema. Some people get it for unknown reasons. Normally, lymph nodes trap bacteria and other substances as fluid flows through them. Then, the white cells in the body's defense, or immune, system can destroy the substances. But if there are few or no lymph nodes-or if the lymph system in an arm or leg has been damaged-fluid can build up in the affected arm or leg. You can take simple steps at home to help treat or prevent fluid buildup. Treatment may include raising the arm or leg to let gravity drain the fluid. You also can wear compression stockings or sleeves. Follow-up care is a key part of your treatment and safety. Be sure to make and go to all appointments, and call your doctor if you are having problems. It's also a good idea to know your test results and keep a list of the medicines you take. How can you care for yourself at home? · Wear a compression stocking or sleeve as your doctor suggests. It can help keep fluid from pooling in an arm or leg. Wear it during air travel. · Prop up the swollen arm or leg on a pillow anytime you sit or lie down. Try to keep it above the level of your heart. This will help reduce swelling. · Avoid crossing your legs if your legs are swollen. · Get some exercise on most days of the week. Increase the intensity of exercise slowly. Water aerobics can help reduce swelling by helping fluid move around. Wear your compression stocking or sleeve during exercise, but not during water exercise.   · See a physical therapist. He or she can teach you how to do self-massage to help fluid move around. You also can learn what activities would be best for you. · Keep your feet clean and wear clean socks or stockings every day. Check your feet often for signs of infection, such as redness or heat. Do not walk barefoot. · If you have had lymph nodes removed from under your arm:  ¨ Do not have blood drawn from the arm on the side of the lymph node surgery. ¨ Do not allow a blood pressure cuff to be placed on that arm. If you are in the hospital, make sure your nurse and other hospital staff know of your condition. ¨ Wear gloves when gardening or doing other activities that may lead to cuts on your fingers or hands. · If you have had lymph nodes removed from your groin:  ¨ Bathe your feet daily in lukewarm, not hot, water. Use a mild soap that has a moisturizer, or use a moisturizer separately. ¨ Check your feet for blisters or cuts. ¨ Wear comfortable and supportive shoes that fit properly. ¨ Wear the correct size of panty hose and stockings. Avoid garters or knee-high or thigh-high stockings. · Ask your doctor how to treat any cuts, scratches, insect bites, or other injuries that may occur. · Use sunscreen and insect repellent when outdoors to protect your skin from sunburn and insect bites. · Wear medical alert jewelry that says you have lymphedema. You can buy these at most drugstores and on the Internet. When should you call for help? Call your doctor now or seek immediate medical care if:  ? · You have signs of infection, such as:  ¨ Increased pain, swelling, warmth, or redness. ¨ Red streaks leading from the area. ¨ Pus draining from the area. ¨ A fever. ? Watch closely for changes in your health, and be sure to contact your doctor if:  ? · You have new or worse symptoms from lymphedema. ? · You do not get better as expected. Where can you learn more?   Go to http://laura-jessie.info/. Enter V398 in the search box to learn more about \"Lymphedema: Care Instructions. \"  Current as of: May 12, 2017  Content Version: 11.4  © 4676-7679 [employer], [employer]. Care instructions adapted under license by [employer] (which disclaims liability or warranty for this information). If you have questions about a medical condition or this instruction, always ask your healthcare professional. [place] [address] any warranty or liability for your use of this information.

## 2018-05-09 NOTE — PROGRESS NOTES
Timur Sung. [facility], FNP-C  PROGRESS NOTE    Subjective:    Ms. Milla Villalobos is a very pleasant 15-year-old AA female who presents today for a 3 month lymphedema check. 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 Following her last visit, I met with the representative from [employer] as well as [facility] to discuss this patient's treatment plan. It was determined in March that she would need measurements which OT could provide. I submitted a referral that day for OT secondary to postmastectomy lymphedema syndrome. Patient states that no one has called her and she still does not have her lymphedema pump. We did touch base with [employer] to get a status update on patient's referral.  She has been placed on the wait list.  Patient still complains of pain, numbness, and discomfort to left arm shoulder and axillary region consistent with lymphedema. She also states that she is currently in a pain management program and receiving medication for pain from them. Objective:    Vitals:    05/09/18 1028   BP: 124/70   Pulse: 85   Resp: 20   Temp: 98.1 °F (36.7 °C)   Weight: 90.3 kg (199 lb)   Height: 5' 4\" (1.626 m)       Physical Exam:    General: Alert and oriented x 3, cooperative, in no apparent distress   Breasts:    Left upper extremity: Significant edema from left shoulder down arm to hand.  strength weaker on the left. Right upper extremity: Minimal edema to arm and hand      Current Medications:  Current Outpatient Prescriptions   Medication Sig Dispense Refill    losartan (COZAAR) 100 mg tablet Take 1 Tab by mouth daily. 90 Tab 3    furosemide (LASIX) 40 mg tablet Take 1 Tab by mouth daily. 90 Tab 3    oxyCODONE-acetaminophen (PERCOCET) 5-325 mg per tablet Take 1 Tab by mouth every four (4) hours as needed for Pain. Max Daily Amount: 6 Tabs.  6 Tab 0    lidocaine (LIDODERM) 5 % Apply patch to the affected area for 12 hours a day and remove for 12 hours a day. 5 Package 2    naproxen (NAPROSYN) 500 mg tablet Take 1 Tab by mouth two (2) times daily (with meals). 20 Tab 0    cyclobenzaprine (FLEXERIL) 5 mg tablet Take 1 Tab by mouth nightly. 7 Tab 0    metoprolol tartrate (LOPRESSOR) 25 mg tablet Take 1 Tab by mouth two (2) times a day. 60 Tab 4    gabapentin (NEURONTIN) 100 mg capsule Take 2 Caps by mouth three (3) times daily. Indications: NEUROPATHIC PAIN 90 Cap 1    dicyclomine (BENTYL) 20 mg tablet   0    diclofenac (VOLTAREN) 1 % gel Apply  to affected area every six (6) hours. Apply 4 grams to affected joint up to 4 times per day, maximum 16 grams per joint per day  Dispense 5 100 gram tubes 100 g 4    desonide (TRIDESILON) 0.05 % cream   0    ALBUTEROL SULFATE (PROAIR HFA IN) Take  by inhalation.  FLUTICASONE/SALMETEROL (ADVAIR HFA IN) Take  by inhalation.  montelukast (SINGULAIR) 10 mg tablet Take 10 mg by mouth daily.  tiotropium (SPIRIVA WITH HANDIHALER) 18 mcg inhalation capsule Take 1 Cap by inhalation daily.  amLODIPine (NORVASC) 10 mg tablet Take  by mouth daily.  bethanechol (URECHOLINE) 10 mg tablet Take 10 mg by mouth Before breakfast, lunch, and dinner.  esomeprazole (NEXIUM) 40 mg capsule Take  by mouth daily.  aspirin 81 mg tablet Take 81 mg by mouth.  HYDROcodone-acetaminophen (NORCO) 7.5-325 mg per tablet Take 1 Tab by mouth every eight (8) hours as needed for Pain. Max Daily Amount: 3 Tabs. 21 Tab 0    ergocalciferol (VITAMIN D2) 50,000 unit capsule Take 50,000 Units by mouth Every Thursday. Chart and notes reviewed. Data reviewed. I have evaluated and examined the patient. Impression:  · Patient here for 3 month lymphedema follow up has not yet received her pump, still complains of severe left arm pain.      Plan:   · We will consult occupational therapy to determine when patient can be seen for measurements  · We will schedule follow-up after OT has been scheduled  · Please call if symptoms worsen    Ms. Carlton Gotti has a reminder for a \"due or due soon\" health maintenance. I have asked that she contact her primary care provider for follow-up on this health maintenance. Raman Duran.  [place], THERESA-C

## 2018-05-14 DIAGNOSIS — I97.2 POSTMASTECTOMY LYMPHEDEMA SYNDROME: Primary | ICD-10-CM

## 2018-05-17 ENCOUNTER — HOSPITAL ENCOUNTER (OUTPATIENT)
Dept: PHYSICAL THERAPY | Age: 68
End: 2018-05-17

## 2018-05-29 ENCOUNTER — HOSPITAL ENCOUNTER (OUTPATIENT)
Dept: PHYSICAL THERAPY | Age: 68
Discharge: HOME | End: 2018-05-29
Payer: MEDICARE

## 2018-05-29 PROCEDURE — 97166 OT EVAL MOD COMPLEX 45 MIN: CPT

## 2018-05-29 PROCEDURE — G8984 CARRY CURRENT STATUS: HCPCS

## 2018-05-29 PROCEDURE — G8985 CARRY GOAL STATUS: HCPCS

## 2018-05-29 PROCEDURE — 97535 SELF CARE MNGMENT TRAINING: CPT

## 2018-05-29 NOTE — PROGRESS NOTES
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]      In time:1610  Out time:1710  Total Treatment Time (min): 60  Total Timed Codes (min): 15  1:1 Treatment Time ([facility] only): 60  Visit #: 1 of 8       SUBJECTIVE    Pain Rating:   Current: (0-no pain 10-debilitating pain) severe and generalized, intermittent tingling, 10/10  At best: (0-no pain 10-debilitating pain) severe and generalized, 10/10  At worst: (0-no pain 10-debilitating pain) severe and generalized, 10/10   Location: left arm  Type:  severe and generalized   Better with: elevation  Worse with: prolonged periods of housecleaning      History of Present Condition:  Patient is a 78 y.o. female with a chief complaint  of L UE swelling and pain, particularly with elevation. Pt reports a history or partial mastectomy to the L breast in  with sentinel node biopsy. The pt has participated in comprehensive lymphedema management including MLD and wrapping 1-2 years ago. Patient states she is  elevating her arm throughout the day which provides minimal to no relief per report. She states that she has some social support at home. She has had compression garments in the past however, she states they do not work and she does not wear them.     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   no    Limitations/Prior level of functioning: reports of chronic fatigue and activity limitation due to poor AROM  · Fatigue Intensity: 6 on a scale of 0-10  · Distress Intensity: 6 on a scale of 0-10  Factors/Comments: asthma and low O2 sat levels    Cancer History  Medical Oncologist: cannot recall  Radiation Oncologist: cannot recall  Primary Care Physician: Everette Segovia MD     Date of first cancer diagnosis/type/stage: 2006, stage 1-2, diagnosed by Dr. Brenna Yap, reconstruction Dr. Simba Duke, Dr. Fatemeh Echeverria mastectomy    Surgery:left modified radical mastectomy with tissue reconstruction for a recurrent carcinoma in June of 2012. Radiation: unknown   Type   Field  Date of Completion 2006  Number of Treatments                   Side Effects Encountered:     Chemotherapy: NA    Dates NA    Side Effects Encountered: Other Treatment:     Clinical trial:No   Genetic Counseling:No   Date of second cancer and/or recurrence of primary cancer and subsequent treatment: NA    Precautions/Indications: SOB without exercise     Diagnostic tests/Results:  NA     Current or Previous Compression Garment(s): yes but has not worn in several months  :   []Stocking [x]Sleeve  [] Pantyhose                          [] Glove/gauntlet/toe                           []Brand:                          []mmHg:   [] Size:     Compression Pump: NA  []Brand:                                     []Date Prescribed:       OBJECTIVE:    Pt demonstrates a rectangular area of dense scar tissue with a 10 cm diameter.      Edema:     Affected Limb:   [x]Upper Extremity []R  [x] L [] Both []                                []Lower Extremity []R  [] L [] Both                               [] Chest/Abdomen                Type:  []min [x]moderate  [] severe [] pitting      Skin integrity:         Temperature [x]normal []cool  [] warm    Scars/Burns/incisions: Pt demonstrates a rectangular area of dense scar tissue with a 10 cm diameter. Sensation:       Pt reports paresthesia to the entire L UE, and lack of sensation to the L breast.    Posture:    Rounded shoulders, forward head    ROM:    Shoulder ROM   Date   Flexion 95   Extension 60   Abduction 85   ER 75   IR        Strength:  3+/5 gross L UE    Breath Pattern Assessment:     Diaphragm_____NT______________   Anterior Chest__normal______________   Accessory Muscle Use___using secondary to sob___________        QKGRBV Treatment:  Patient received an initial evaluation today followed by education as to diagnosis, precautions and treatment plan. Patient was provided with a basic home exercise program including education on MLD, compression wrapping. 45 min [x]Eval                  []Re-Eval     Rationale:    15 min Self Care/Home Management: MLD, Compression, needs for qualification for pump. Rationale: education  to improve the patients ability to understand how to reduce lymphedema and maintain reduction using various techniques at home. With   [] TE   [] TA   [] neuro   [] other: Patient Education: [x] Review HEP    [] Progressed/Changed HEP based on:   [] positioning   [] body mechanics   [] transfers   [] heat/ice application    [] other:      Pain Level (0-10 scale) post treatment: 10/10*    ASSESSMENT: Patient is a 78 y.o. female with a chief complaint  of L UE swelling and pain, particularly with elevation. Pt reports a history or partial mastectomy to the L breast in 2006 with sentinel node biopsy. The pt has participated in comprehensive lymphedema management including MLD and wrapping 1-2 years ago. She states that she has some social support at home. She has had compression garments in the past however, she states they do not work and she does not wear them at present. Patient states she is elevating her arm throughout the day which provides minimal to no relief.   She has constant pain and discomfort in the UE with report of 10/10 pain most of the time. She reports of chronic fatigue and activity limitation due to poor AROM      Patient received an initial evaluation today followed by education as to diagnosis, precautions and treatment plan. Patient was provided with a basic home exercise program including education on MLD and compression wrapping. Patient will benefit from skilled OT services to address ROM deficits, address strength deficits, analyze and address soft tissue restrictions and lymphedema to address rehab goals per plan of care  Treatment Protocol:   o Manual Lymphatic Drainage   o Soft Tissue Mobilization/MFR   o Compression Bandaging   o Skin Care/Wound care   o Decongestive Exercises/Self MFR/Strengthening   o Education   o Compression Garment    Evaluation Complexity: History MEDIUM Complexity : Expanded review of history including physical, cognitive and psychosocial  history  Examination MEDIUM Complexity : 3-5 performance deficits relating to physical, cognitive , or psychosocial skils that result in activity limitations and / or participation restrictions Clinical Decision Making MEDIUM Complexity : Patient may present with comorbidities that affect occupational performnce.  Miniml to moderate modification of tasks or assistance (eg, physical or verbal ) with assesment(s) is necessary to enable patient to complete evaluation   Overall Complexity Rating: MEDIUM    Patient would benefit from OT services for the following problems:  Problem List: Pain effecting function, Decreased range of motion, Decreased strength, Edema effecting function and Decreased ADL/functional abilities      Treatment Plan may include any combination of the following: Therapeutic exercise, Physical agent/modality, Scar management, Manual therapy, Patient education and Other    Patient / Family readiness to learn indicated by: asking questions and interest    Persons(s) to be included in education:   patient (P)    Barriers to Learning/Limitations: yes;  emotional, financial and physical    Patient Goal (s): I want a lymphedema pump    Patient Self Reported Health Status: fair    Rehabilitation Potential: fair    Goals:    Short Term Goals: To be accomplished in 2 weeks  1) Pt will be Independent with skin care routine to decrease risk of infection. 2) Patient will demonstrate decongestion of limb by 2% to improve functional ROM  3) Pt will verbalize with 100% accuracy which signs and symptoms to report immediately to physician concerning their condition. 4) Pt will order compression wraps for progression into CDT to maximize reduction of lymphedema. Long Term Goals: To be accomplished in 6 weeks  1) Pt will be Independent with compression management routine consisting of skin care, decongestive exercises, self-manual lymphatic stimulation techniques, strengthening and motion exercises, and don/doff/care of appropriate compression garment(s). 2) Pt will be Independent in don/doff/care of/wearing schedule of light compression. 3) Patient will demonstrate decongestion of limb by 4% to increase use of the left UE with ADL and IADL tasks  4) Pt will increase ROM of affected limb by 15 degrees for shoulder flexion. 5) Pt will increase strength of affected limb to increase independence with ADL tasks. Frequency / Duration: Patient to be seen 2 times per week for 6 weeks:    Patient/ Caregiver education and instruction: Diagnosis, prognosis, self care, exercises and other compression wrapping       Carry   Current  CL= 60-79%    Goal  CK= 40-59%    The severity rating is based on clinical judgment and the FOTO score.     Certification Period: 5/29/18-8/21/18  Reji Easton OT, CHT, CLT 5/29/2018 4:14 PM

## 2018-05-29 NOTE — PROGRESS NOTES
[facility]  [address]  [place], [address]  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 Assessment/ key information: Patient is a 78 y.o. female with a chief complaint  of L UE swelling and pain, particularly with elevation. Pt reports a history or partial mastectomy to the L breast in  with sentinel node biopsy. The pt has participated in comprehensive lymphedema management including MLD and wrapping 1-2 years ago. She states that she has some social support at home. She has had compression garments in the past however, she states they do not work and she does not wear them at present. Patient states she is elevating her arm throughout the day which provides minimal to no relief. She has constant pain and discomfort in the UE with report of 10/10 pain most of the time. She reports of chronic fatigue and activity limitation due to poor AROM      Patient received an initial evaluation today followed by education as to diagnosis, precautions and treatment plan. Patient was provided with a basic home exercise program including education on MLD and compression wrapping.      Patient will benefit from skilled OT services to address ROM deficits, address strength deficits, analyze and address soft tissue restrictions and lymphedema to address rehab goals per plan of care  Treatment Protocol:   o Manual Lymphatic Drainage   o Soft Tissue Mobilization/MFR   o Compression Bandaging   o Skin Care/Wound care   o Decongestive Exercises/Self MFR/Strengthening   o Education   o Compression Garment    Evaluation Complexity: History MEDIUM Complexity : Expanded review of history including physical, cognitive and psychosocial  history  Examination MEDIUM Complexity : 3-5 performance deficits relating to physical, cognitive , or psychosocial skils that result in activity limitations and / or participation restrictions Clinical Decision Making MEDIUM Complexity : Patient may present with comorbidities that affect occupational performnce. Miniml to moderate modification of tasks or assistance (eg, physical or verbal ) with assesment(s) is necessary to enable patient to complete evaluation   Overall Complexity Rating: MEDIUM    Patient would benefit from OT services for the following problems:  Problem List: Pain effecting function, Decreased range of motion, Decreased strength, Edema effecting function and Decreased ADL/functional abilities      Treatment Plan may include any combination of the following: Therapeutic exercise, Physical agent/modality, Scar management, Manual therapy, Patient education and Other    Patient / Family readiness to learn indicated by: asking questions and interest    Persons(s) to be included in education:   patient (P)    Barriers to Learning/Limitations: yes;  emotional, financial and physical    Patient Goal (s): I want a lymphedema pump    Patient Self Reported Health Status: fair    Rehabilitation Potential: fair    Goals:    Short Term Goals: To be accomplished in 2 weeks  1) Pt will be Independent with skin care routine to decrease risk of infection.   2) Patient will demonstrate decongestion of limb by 2% to improve functional ROM  3) Pt will verbalize with 100% accuracy which signs and symptoms to report immediately to physician concerning their condition. 4) Pt will order compression wraps for progression into CDT to maximize reduction of lymphedema. Long Term Goals: To be accomplished in 6 weeks  1) Pt will be Independent with compression management routine consisting of skin care, decongestive exercises, self-manual lymphatic stimulation techniques, strengthening and motion exercises, and don/doff/care of appropriate compression garment(s). 2) Pt will be Independent in don/doff/care of/wearing schedule of light compression. 3) Patient will demonstrate decongestion of limb by 4% to increase use of the left UE with ADL and IADL tasks  4) Pt will increase ROM of affected limb by 15 degrees for shoulder flexion. 5) Pt will increase strength of affected limb to increase independence with ADL tasks. Frequency / Duration: Patient to be seen 2 times per week for 6 weeks:    Patient/ Caregiver education and instruction: Diagnosis, prognosis, self care, exercises and other compression wrapping       Carry   Current  CL= 60-79%    Goal  CK= 40-59%    The severity rating is based on clinical judgment and the FOTO score. Certification Period: 5/29/18-8/21/18  Kofi Zaldivar OT, CHT, CLT 5/29/2018 4:14 PM    ________________________________________________________________________    I certify that the above Therapy Services are being furnished while the patient is under my care. I agree with the treatment plan and certify that this therapy is necessary.     Physician's Signature:____________________  Date:____________Time:__________    Please sign and return to [facility] [address]  [address]  [place], [address]  [phone] fax

## 2018-05-31 ENCOUNTER — HOSPITAL ENCOUNTER (OUTPATIENT)
Dept: PHYSICAL THERAPY | Age: 68
Discharge: HOME | End: 2018-05-31
Payer: MEDICARE

## 2018-05-31 PROCEDURE — 97110 THERAPEUTIC EXERCISES: CPT

## 2018-05-31 PROCEDURE — 97140 MANUAL THERAPY 1/> REGIONS: CPT

## 2018-05-31 NOTE — PROGRESS NOTES
OT DAILY TREATMENT NOTE - 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]    SUBJECTIVE  Pain Level (0-10 scale): 8/10  Any medication changes, allergies to medications, adverse drug reactions, diagnosis change, or new procedure performed?: [x] No    [] Yes (see summary sheet for update)  Subjective functional status/changes:   [] No changes reported    OBJECTIVE    25 min Therapeutic Exercise:  [x] See flow sheet :   Rationale: decongestive exercises to improve the patients ability to reduce lymphedema at home in the left UE    20 min Manual Therapy:  IASTM with Graston tool # 5, using sweeping and fanning technique, scapular mobs   Rationale: decrease pain, increase tissue extensibility, decrease edema  and improve lymphatic uptake to left UE      With   [] TE   [] TA   [] neuro   [] other: Patient Education: [x] Review HEP    [] Progressed/Changed HEP based on:   [] positioning   [] body mechanics   [] transfers   [] heat/ice application   [] Splint wear/care   [] Sensory re-education   [] scar management      [] other:            Other Objective/Functional Measures: Circumferences: UE GIRTH CHART measured in cm  Date: 18     Side R L   metacarpals 18.5 18   wrist 17 16.5   FA 28 29   elbow 28.5 31.5   biceps 33.2 37     34 36.75   axillary                      Pain Level (0-10 scale) post treatment: 8/10    ASSESSMENT/Changes in Function: initiated IASTM and joint mobs for left UE    Patient will continue to benefit from skilled OT services to address ROM deficits, analyze and address soft tissue restrictions and lymphedema reduction to attain remaining goals.      [x] See Plan of Care  []  See progress note/recertification  []  See Discharge Summary         Progress towards goals / Updated goals:  Short Term Goals: To be accomplished in 2 weeks  1) Pt will be Independent with skin care routine to decrease risk of infection. 2) Patient will demonstrate decongestion of limb by 2% to improve functional ROM  3) Pt will verbalize with 100% accuracy which signs and symptoms to report immediately to physician concerning their condition. 4) Pt will order compression wraps for progression into CDT to maximize reduction of lymphedema.        Long Term Goals: To be accomplished in 6 weeks  1) Pt will be Independent with compression management routine consisting of skin care, decongestive exercises, self-manual lymphatic stimulation techniques, strengthening and motion exercises, and don/doff/care of appropriate compression garment(s). 2) Pt will be Independent in don/doff/care of/wearing schedule of light compression. 3) Patient will demonstrate decongestion of limb by 4% to increase use of the left UE with ADL and IADL tasks  4) Pt will increase ROM of affected limb by 15 degrees for shoulder flexion. 5) Pt will increase strength of affected limb to increase independence with ADL tasks.     PLAN  []  Upgrade activities as tolerated     [x]  Continue plan of care  []  Update interventions per flow sheet       []  Discharge due to:_  []  Other:_      Wicho Avila OT 5/31/2018  5:10 PM    Future Appointments  Date Time Provider Jovany Monroe   6/5/2018 3:00 PM Wicho Avila OT [facility]   6/12/2018 3:00 PM Wicho Avila OT [facility]   6/14/2018 3:00 PM Wicho Avila OT [facility]   6/19/2018 3:00 PM Wicho Avila OT [facility]   6/21/2018 3:00 PM Wicho Avila OT [facility]   9/12/2018 12:45 PM Colby Smith MD [facility] DANGELO SCHED   2/4/2019 10:30 AM [facility] RM 1 [facility]

## 2018-06-05 ENCOUNTER — HOSPITAL ENCOUNTER (OUTPATIENT)
Dept: PHYSICAL THERAPY | Age: 68
Discharge: HOME | End: 2018-06-05
Payer: MEDICARE

## 2018-06-05 PROCEDURE — 97535 SELF CARE MNGMENT TRAINING: CPT

## 2018-06-05 NOTE — PROGRESS NOTES
OT DAILY TREATMENT NOTE - [facility]     Patient Name: Ap Cho  Date:2018  : 1950  [x]  Patient  Verified  Payor: VA MEDICARE / Plan: VA MEDICARE PART A & B / Product Type: Medicare /    In time:1515 Out time:1550  Total Treatment Time (min): 35  Total Timed Codes (min): 35  1:1 Treatment Time (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 \"    OBJECTIVE    5 min Therapeutic Exercise:  [x] See flow sheet :   Rationale: increase ROM and improve lymphatic uptake to improve the patients ability to left UE    30 min Manual Therapy:  MLD, MFR, IASTM to right UE   Rationale: decrease pain, increase tissue extensibility and increase lymphatic uptake to left UE      With   [] TE   [] TA   [] neuro   [] other: Patient Education: [x] Review HEP    [] Progressed/Changed HEP based on:   [] positioning   [] body mechanics   [] transfers   [] heat/ice application   [] Splint wear/care   [] Sensory re-education   [] scar management      [] other:            Other Objective/Functional Measures: Circumferences: UE GIRTH CHART measured in cm  Date: 18      Side R L   metacarpals 18.5 18   wrist 17 16.5   FA 28 29   elbow 28.5 31.5   biceps 33.2 37      34 36.75   axillary                              Pain Level (0-10 scale) post treatment: 8/10     ASSESSMENT/Changes in Function: ,patient continues to report significant pain in the left UE.     Patient will continue to benefit from skilled OT services to address ROM deficits, analyze and address soft tissue restrictions and lymphedema reduction to attain remaining goals.      [x]  See Plan of Care  []  See progress note/recertification  []  See Discharge Summary      Progress towards goals / Updated goals:  Short Term Goals: To be accomplished in 2 weeks  1) Pt will be Independent with skin care routine to decrease risk of infection. 2) Patient will demonstrate decongestion of limb by 2% to improve functional ROM  3) Pt will verbalize with 100% accuracy which signs and symptoms to report immediately to physician concerning their condition. 4) Pt will order compression wraps for progression into CDT to maximize reduction of lymphedema.          Long Term Goals: To be accomplished in 6 weeks  1) Pt will be Independent with compression management routine consisting of skin care, decongestive exercises, self-manual lymphatic stimulation techniques, strengthening and motion exercises, and don/doff/care of appropriate compression garment(s). 2) Pt will be Independent in don/doff/care of/wearing schedule of light compression. 3) Patient will demonstrate decongestion of limb by 4% to increase use of the left UE with ADL and IADL tasks  4) Pt will increase ROM of affected limb by 15 degrees for shoulder flexion. 5) Pt will increase strength of affected limb to increase independence with ADL tasks.     PLAN  []  Upgrade activities as tolerated     [x]  Continue plan of care  []  Update interventions per flow sheet       []  Discharge due to:_  []  Other:_      Viviane Moreno OT 6/5/2018  3:08 PM    Future Appointments  Date Time Provider Jovany Monroe   6/12/2018 3:00 PM Viviane Moreno OT [facility]   6/14/2018 3:00 PM Viviane Moreno OT [facility]   6/19/2018 3:00 PM Viviane Moreno OT [facility]   6/21/2018 3:00 PM Viviane Moreno OT [facility]   9/12/2018 12:45 PM MD JAILENE Crane [facility]   2/4/2019 10:30 AM [facility]SA[facility] RM 1 [facility]

## 2018-06-12 ENCOUNTER — APPOINTMENT (OUTPATIENT)
Dept: PHYSICAL THERAPY | Age: 68
End: 2018-06-12
Payer: MEDICARE

## 2018-06-14 ENCOUNTER — HOSPITAL ENCOUNTER (OUTPATIENT)
Dept: PHYSICAL THERAPY | Age: 68
Discharge: HOME | End: 2018-06-14
Payer: MEDICARE

## 2018-06-14 PROCEDURE — 97140 MANUAL THERAPY 1/> REGIONS: CPT

## 2018-06-14 NOTE — PROGRESS NOTES
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 \"    OBJECTIVE    5 min Therapeutic Exercise:  [] See flow sheet :   Rationale: increase ROM and improve lymph flow to improve the patients ability to improve lymphatic uptake. 35 min Manual Therapy: MLD trunk and bilateral UE.    Rationale: decrease pain, increase tissue extensibility, decrease edema  and improve lymphatic uptake to left UE    With   [] TE   [] TA   [] neuro   [] other: Patient Education: [x] Review HEP    [] Progressed/Changed HEP based on:   [] positioning   [] body mechanics   [] transfers   [] heat/ice application   [] Splint wear/care   [] Sensory re-education   [] scar management      [] other:            Other Objective/Functional Measures: Circumferences: UE GIRTH CHART measured in cm  Date: 18      Side R L   metacarpals 18.5 18   wrist 17 16.5   FA 28 29   elbow 28.5 31.5   biceps 33.2 37      34 36.75   axillary                             Pain Level (0-10 scale) post treatment: 8/10      ASSESSMENT/Changes in Function: Patient appears to have increased shortness of breath.      Patient will continue to benefit from skilled OT services to address ROM deficits, analyze and address soft tissue restrictions and lymphedema reduction to attain remaining goals.      [x]  See Plan of Care  []  See progress note/recertification  []  See Discharge Summary      Progress towards goals / Updated goals:  Short Term Goals: To be accomplished in 2 weeks  1) Pt will be Independent with skin care routine to decrease risk of infection. 2) Patient will demonstrate decongestion of limb by 2% to improve functional ROM  3) Pt will verbalize with 100% accuracy which signs and symptoms to report immediately to physician concerning their condition. 4) Pt will order compression wraps for progression into CDT to maximize reduction of lymphedema.          Long Term Goals: To be accomplished in 6 weeks  1) Pt will be Independent with compression management routine consisting of skin care, decongestive exercises, self-manual lymphatic stimulation techniques, strengthening and motion exercises, and don/doff/care of appropriate compression garment(s). 2) Pt will be Independent in don/doff/care of/wearing schedule of light compression. 3) Patient will demonstrate decongestion of limb by 4% to increase use of the left UE with ADL and IADL tasks  4) Pt will increase ROM of affected limb by 15 degrees for shoulder flexion.   5) Pt will increase strength of affected limb to increase independence with ADL tasks.       PLAN  []  Upgrade activities as tolerated     []  Continue plan of care  []  Update interventions per flow sheet       []  Discharge due to:_  []  Other:_      Hussain Ozuna OT 6/14/2018  3:20 PM    Future Appointments  Date Time Provider Jovany Maebl   6/19/2018 3:00 PM Hussain Ozuna OT [facility]   6[facility]21[facility]2018 3:00 [facility]   9/12/2018 12:45 PM Nick Murphy MD [address]   2/4/2019 10:30 AM [facility] RM 1 [facility]

## 2018-06-19 ENCOUNTER — HOSPITAL ENCOUNTER (OUTPATIENT)
Dept: PHYSICAL THERAPY | Age: 68
Discharge: HOME | End: 2018-06-19
Payer: MEDICARE

## 2018-06-19 PROCEDURE — 97535 SELF CARE MNGMENT TRAINING: CPT

## 2018-06-19 NOTE — PROGRESS NOTES
OT DAILY TREATMENT NOTE - 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 \"    OBJECTIVE    30 min Self Care/Home Management: insurance coverage for compression bandages,  Review of CDT for reduction and need for bandaging to qualify for pump. Rationale: education  to improve the patients ability to understand process for qualification of pump through insurance and need for bandaging to reduce lymphedema. With   [] TE   [] TA   [] neuro   [] other: Patient Education: [x] Review HEP    [] Progressed/Changed HEP based on:   [] positioning   [] body mechanics   [] transfers   [] heat/ice application   [] Splint wear/care   [] Sensory re-education   [] scar management      [] other:            Other Objective/Functional Measures: *Circumferences: UE GIRTH CHART measured in cm  Date: 18      Side R L   metacarpals 18.5 18   wrist 17 16.5   FA 28 29   elbow 28.5 31.5   biceps 33.2 37      34 36.75   axillary                            Pain Level (0-10 scale) post treatment: 8/10      ASSESSMENT/Changes in Function: patient continues to require maximum verbal reinforcement and education on qualification for pump and need for compression wrapping.   Poor understanding      Patient will continue to benefit from skilled OT services to address ROM deficits, analyze and address soft tissue restrictions and lymphedema reduction to attain remaining goals.      [x]  See Plan of Care  []  See progress note/recertification  []  See Discharge Summary      Progress towards goals / Updated goals:  Short Term Goals: To be accomplished in 2 weeks  1) Pt will be Independent with skin care routine to decrease risk of infection. 2) Patient will demonstrate decongestion of limb by 2% to improve functional ROM  3) Pt will verbalize with 100% accuracy which signs and symptoms to report immediately to physician concerning their condition. 4) Pt will order compression wraps for progression into CDT to maximize reduction of lymphedema.          Long Term Goals: To be accomplished in 6 weeks  1) Pt will be Independent with compression management routine consisting of skin care, decongestive exercises, self-manual lymphatic stimulation techniques, strengthening and motion exercises, and don/doff/care of appropriate compression garment(s). 2) Pt will be Independent in don/doff/care of/wearing schedule of light compression. 3) Patient will demonstrate decongestion of limb by 4% to increase use of the left UE with ADL and IADL tasks  4) Pt will increase ROM of affected limb by 15 degrees for shoulder flexion.   5) Pt will increase strength of affected limb to increase independence with ADL tasks.           PLAN  []  Upgrade activities as tolerated     []  Continue plan of care  []  Update interventions per flow sheet       []  Discharge due to:_  []  Other:_      Dwayne Pinedo OT 6/19/2018  5:30 PM    Future Appointments  Date Time Provider Jovany Mabel   6/21/2018 3:00 PM Dwayne Pinedo OT [facility]   9/12/2018 12:45 PM Kylie Nj MD [address]   2/4/2019 10:30 AM [facility] RM 1 [facility]

## 2018-06-21 ENCOUNTER — HOSPITAL ENCOUNTER (OUTPATIENT)
Dept: PHYSICAL THERAPY | Age: 68
Discharge: HOME | End: 2018-06-21
Payer: MEDICARE

## 2018-06-21 PROCEDURE — 97535 SELF CARE MNGMENT TRAINING: CPT

## 2018-06-22 NOTE — PROGRESS NOTES
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 \"    OBJECTIVE  20 min Self Care/Home Management: insurance coverage for compression bandages,  Review of CDT for reduction and need for bandaging to qualify for pump. Rationale: education  to improve the patients ability to understand process for qualification of pump through insurance and need for bandaging to reduce lymphedema.     With   [] TE   [] TA   [] neuro   [] other: Patient Education: [x] Review HEP    [] Progressed/Changed HEP based on:   [] positioning   [] body mechanics   [] transfers   [] heat/ice application   [] Splint wear/care   [] Sensory re-education   [] scar management      [] other:           Other Objective/Functional Measures: *Circumferences: UE GIRTH CHART measured in cm  Date: 18      Side R L   metacarpals 18.5 18   wrist 17 16.5   FA 28 29   elbow 28.5 31.5   biceps 33.2 37      34 36.75   axillary                            Pain Level (0-10 scale) post treatment: 8/10      ASSESSMENT/Changes in Function: Patient new diagnosis of COPD and increase SOB will place treatment on hold to reduce strain to symptoms as well as await patient compliance with purchase of compression bandaging.   patient continues to require maximum verbal reinforcement and education on qualification for pump and need for compression wrapping.        Patient will continue to benefit from skilled OT services to address ROM deficits, analyze and address soft tissue restrictions and lymphedema reduction to attain remaining goals.      [x]  See Plan of Care  []  See progress note/recertification  []  See Discharge Summary      Progress towards goals / Updated goals:  Short Term Goals: To be accomplished in 2 weeks  1) Pt will be Independent with skin care routine to decrease risk of infection. 2) Patient will demonstrate decongestion of limb by 2% to improve functional ROM  3) Pt will verbalize with 100% accuracy which signs and symptoms to report immediately to physician concerning their condition. 4) Pt will order compression wraps for progression into CDT to maximize reduction of lymphedema.          Long Term Goals: To be accomplished in 6 weeks  1) Pt will be Independent with compression management routine consisting of skin care, decongestive exercises, self-manual lymphatic stimulation techniques, strengthening and motion exercises, and don/doff/care of appropriate compression garment(s). 2) Pt will be Independent in don/doff/care of/wearing schedule of light compression. 3) Patient will demonstrate decongestion of limb by 4% to increase use of the left UE with ADL and IADL tasks  4) Pt will increase ROM of affected limb by 15 degrees for shoulder flexion.   5) Pt will increase strength of affected limb to increase independence with ADL tasks.      PLAN  []  Upgrade activities as tolerated     []  Continue plan of care  []  Update interventions per flow sheet       []  Discharge due to:_  []  Other:_      Hussain Ozuna OT 6/21/2018  8:10 AM    Future Appointments  Date Time Provider Jovany Monroe   9/12/2018 12:45 PM Nick Murphy MD [id number] [facility]   2/4/2019 10:30 AM [facility] RM 1 [facility]

## 2018-07-02 RX ORDER — METOPROLOL TARTRATE 25 MG/1
TABLET, FILM COATED ORAL
Qty: 60 TAB | Refills: 4 | Status: SHIPPED | OUTPATIENT
Start: 2018-07-02 | End: 2018-11-19 | Stop reason: SDUPTHER

## 2018-07-26 NOTE — PROGRESS NOTES
[facility]  [address]  [place], [address]  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 G-Codes (GO)  Carry   Current  CL= 60-79%    Goal  CK= 40-59%   D/C  CL= 60-79%    The severity rating is based on clinical judgment and the FOTO score. ASSESSMENT/RECOMMENDATIONS:  [x]Discontinue therapy: []Patient has reached or is progressing toward set goals      []Patient is non-compliant or has abdicated      [x]Due to lack of appreciable progress towards set goals, patient was unable to purchase needed DME for wrapping and therefore, CDT not achievable at this time. Will await new orders from MD for resuming therapy after patient has received equipment required to produce successful outcome for therapy.     Bernard Gomez OT 2018 9:56 AM

## 2018-09-17 ENCOUNTER — TELEPHONE (OUTPATIENT)
Dept: SURGERY | Age: 68
End: 2018-09-17

## 2018-09-17 NOTE — TELEPHONE ENCOUNTER
Received patient call on nurse line patient was requesting a new rx for lymphedma therapy, unable to reach patient both phone numbers out of service

## 2018-09-17 NOTE — TELEPHONE ENCOUNTER
Patient returned phone call requested a new order for lymphedema therepy, NP wrote order will be faxed to [facility]. Patient verbally understood.

## 2018-09-21 DIAGNOSIS — M25.561 CHRONIC PAIN OF RIGHT KNEE: ICD-10-CM

## 2018-09-21 DIAGNOSIS — G89.29 CHRONIC PAIN OF RIGHT KNEE: ICD-10-CM

## 2018-09-21 RX ORDER — LIDOCAINE 50 MG/G
PATCH TOPICAL
Qty: 5 PATCH | Refills: 2 | Status: SHIPPED | OUTPATIENT
Start: 2018-09-21 | End: 2022-08-11 | Stop reason: SDUPTHER

## 2018-10-25 ENCOUNTER — HOSPITAL ENCOUNTER (OUTPATIENT)
Dept: PHYSICAL THERAPY | Age: 68
Discharge: HOME | End: 2018-10-25
Payer: MEDICARE

## 2018-10-25 PROCEDURE — G8984 CARRY CURRENT STATUS: HCPCS

## 2018-10-25 PROCEDURE — 97535 SELF CARE MNGMENT TRAINING: CPT

## 2018-10-25 PROCEDURE — G8985 CARRY GOAL STATUS: HCPCS

## 2018-10-25 PROCEDURE — 97167 OT EVAL HIGH COMPLEX 60 MIN: CPT

## 2018-10-25 NOTE — PROGRESS NOTES
[facility] [address] [address]  [address] [place], [address] 
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  
 Prior Level of Function:  Modified independent The Plan of Care and following information is based on the information from the initial evaluation. Assessment/ key information: Patient is a 78 y.o. female with a chief complaint of weakness and welling in the left UE. Patient has a history of TIA and Pt reports a history or partial mastectomy to the L breast in  with sentinel node biopsy. The pt has participated in comprehensive lymphedema management including MLD and wrapping 1-2 years ago. She has had brief intervention at this clinic approximately 4-5months ago however,  she was not able to complete participation secondary to unable to afford wraps for compression. She has since been able to be fitted for compression sleeve and presents in it today. She reports that she has been wearing this garment for the last 2-3 months daily. Patient instructed in decongestive exercises several months ago, patient reports compliance, Elevation does not assist with reducing her swelling.   Despite her consistency with use of compression and exercise her measurements have increased as compared to the measurements taken on evaluation from 5/29/18. She has mild hyperkeratosis and dryness in the skin. Since the last evaluation patient was diagnosed with COPD and functional activity level has significantly changed as well as needing oxygen at all times. Patient received an initial evaluation today followed by education as to diagnosis, precautions and treatment plan. Patient was provided with a basic information on lymphedema, complete decongestive therapies, compression and financial obligations. Patient will benefit from skilled OT services to analyze and address soft tissue restrictions to address rehab goals per plan of care Treatment Protocol: 
 o Manual Lymphatic Drainage 
 o Soft Tissue Mobilization/MFR 
 o Compression Bandaging 
 o Skin Care/Wound care 
 o Decongestive Exercises/Self MFR/Strengthening 
 o Education 
 o Compression [employer] Evaluation Complexity: History HIGH Complexity : Extensive review of history including physical, cognitive and psychosocial history  Examination HIGH Complexity : 5 or more performance deficits relating to physical, cognitive , or psychosocial skils that result in activity limitations and / or participation restrictions Clinical Decision Making HIGH Complexity : Patient presents with comorbidities that affect occupational performance. Signifigant modification of tasks or assistance (eg, physical or verbal) with assessment (s) is necessary to enable patient to complete evaluation Overall Complexity Rating: HIGH Patient would benefit from OT services for the following problems: 
Problem List: Pain effecting function, Decreased range of motion, Decreased strength and Edema effecting function Treatment Plan may include any combination of the following: Therapeutic exercise, Scar management, Manual therapy, Patient education and Other Patient / Family readiness to learn indicated by: asking questions and interest 
 
 Persons(s) to be included in education:   patient (P) Barriers to Learning/Limitations: yes;  emotional, financial and physical 
 
Patient Goal (s): get a pumppoor Patient Self Reported Health Status: fair Rehabilitation Potential: fair Goals:   
Short Term Goals: To be accomplished in 2 weeks 1) Pt will be Independent with skin care routine to decrease risk of infection. 2) Patient will demonstrate decongestion of limb by 2% to increase of independence with donning and doffing her garment. Long Term Goals: To be accomplished in 4 weeks 1) Pt will be Independent with compression management routine consisting of skin care, decongestive exercises, self-manual lymphatic stimulation techniques, strengthening and motion exercises, and don/doff/care of appropriate compression garment(s). 2) Pt will be Independent in don/doff/care of/wearing schedule of light compression. Frequency / Duration: Patient to be seen 1 times per week for 4 weeks: 
 
Patient/ Caregiver education and instruction: Diagnosis, prognosis, self care, brace/ splint application and exercises Functional Status Measure: 
Patient's:45.3 FOTO score based on 0 - 100 point scale, with 100 being no impairment. These scores are determined by patient reported functional assessments compared against national benchmarked data. 
  
Carry  Current  CK= 40-59%  Goal  CK= 40-59% The severity rating is based on clinical judgment and the FOTO score. Certification Period: 21097/32 to 1/17/19 Ray Jos OT, CHT, CLT 10/25/2018 4:23 PM 
 
 
Ray ANYA Arguello 10/25/2018 5:30 PM 
 
________________________________________________________________________ I certify that the above Therapy Services are being furnished while the patient is under my care. I agree with the treatment plan and certify that this therapy is necessary. Physician's Signature:____________Date:_________TIME:________ ** Signature, Date and Time must be completed for valid certification ** Please sign and return to [facility] [address] [place], [address] 
[phone] fax

## 2018-10-25 NOTE — PROGRESS NOTES
Lymphedema EVAL/DAILY NOTE Patient Name: Anabelle Amador Date:10/25/2018 : 1950 [x]  Patient  Verified Payor: Marli Estrada / Plan: [address] [place] [address] HMO / Product Type: [employer] Care Medicare /   
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 
1:1 Treatment Time ( [address] only): 40 Visit #: 1 of 4 SUBJECTIVE Pain Rating:  
Current: (0-no pain 10-debilitating pain) 8 / 10 At best: (0-no pain 10-debilitating pain) 6 / 10 At worst: (0-no pain 10-debilitating pain) intermittent and 9 / 10 Location: left arm Type:  moderate and severe throbbing Better with: medication, elevation Worse with: use History of Present Condition:  Patient is a 78 y.o. female with a chief complaint of weakness and welling in the left UE. Patient has a history of TIA and Pt reports a history or partial mastectomy to the L breast in  with sentinel node biopsy. The pt has participated in comprehensive lymphedema management including MLD and wrapping 1-2 years ago. She has had brief intervention at this clinic approximately 4-6 months ago however she was not able to complete participation secondary to unable to afford wraps Home Situation (including environment, stairs, family support, if living alone, any DME used at home): lives with , two story home and patient sleeps down stairs. 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  
Motivation: fair Substance use: [x]N/A  []Alcohol []Tobacco []other:  
FABQ Score: [x]low []elevate Cognition: A & O x 3   Other:  limitations in communication secondary to  previous stroke Has your activity changed since diagnosis? yes Limitations/Prior level of functioning: Patient recently diagnosed with COPD and functional activity level has significantly changed · Fatigue Intensity: 8 on a scale of 0-10 · Distress Intensity: 7 on a scale of 0-10 Factors/Comments: increase since COPD diagnosis Cancer History Medical Oncologist: can not recal 
Radiation Oncologist: can not recall Primary Care Physician: Dianna Haider MD 
 
Date of first cancer diagnosis/type/stage: , stage 1-2, diagnosed by Dr. Valeria Galvan, reconstruction Dr. Tacho Escobar, Dr. Alonso Wiggins mastectomy Surgery:left modified radical mastectomy with tissue reconstruction for a recurrent carcinoma in 2012. Radiation: unknown Type Field Date of Completion  Number of Treatments                            Side Effects Encountered:    
Chemotherapy: NA 
                        Dates NA Side Effects Encountered: Other Treatment:  
                        Clinical trial:No 
            Genetic Counseling:No 
            Date of second cancer and/or recurrence of primary cancer and subsequent treatment: NA 
 
Precautions/Indications: COPD Diagnostic tests/Results: 
 NA 
Current or Previous Compression Garment(s):  Yes and showed up with new stocking on today issued from another company []Stocking [x]Sleeve  [] Pantyhose 
                        [x] Glove/gauntlet/toe [x]Brand:  [employer]                        [x]mmH-30   [x] Size: 7 long Compression Pump:  NA  []Brand:                                     []Date Prescribed: 
  
Conservative treatments- Compression/Exercise/Elevation in the last 4 weeks?   Patient instructed in decongestive exercises several months ago, patient reports compliance, patient presenting today with compression sleeve and gauntlet provided by unknown provider and she reports that she has been wearing the sleeve daily for the last 2-3 months. Elevation does not assist with reducing her swelling. OBJECTIVE:  
Edema:   
 Affected Limb:   [x]Upper Extremity []R  [x] L [] Both [] []Lower Extremity []R  [] L [] Both 
                             [] Chest/Abdomen ( truncal) Type:  []min [x]moderate  [] severe [] pitting Circumferences: UE GIRTH CHART measured in cm 
Date: 10/25/18 Side R L  
metacarpals 18.5 19 wrist 17 17 FA 28 30.8  
elbow 28.5 33.5  
biceps 33.2 37.5  
axillary 34 36 Skin integrity: Hyperkeratosis: mild changes noted Hyper pigmentation: na 
    Lymphorrhea:NA Hyperplasia: NA 
   Papillomatosis: NA Elephantiasis: NA Temperature [x]normal []cool  [] warm Scars/Burns/incisions: NA Wounds:  
  location:                      size:               Depth: 
 
Sensation:       Reports impairment Posture:  Poor upright posture ROM:  Shoulder ROM Date Flexion 135 Extension 65 Abduction 135 ER   
IR Strength:  3+ Breath Pattern Assessment: 
 Diaphragm___________________ Anterior Chest________________ Accessory Muscle Use______________ Todays Treatment:  Patient received an initial evaluation today followed by education as to diagnosis, precautions and treatment plan. Patient was provided with a basic information on lymphedema, complete decongestive therapies, compression and financial obligations. 30 min [x]Eval                  []Re-Eval  
 
 
10 min Self Care/Home Management: Donning and doffing compression sleeve. Rationale: education  to improve the patients ability to increase independence with garment management. With 
 [] TE 
 [] TA 
 [] neuro [] other: Patient Education: [x] Review HEP [] Progressed/Changed HEP based on:  
[] positioning   [] body mechanics   [] transfers   [] heat/ice application   
[] other:   
 
Pain Level (0-10 scale) post treatment: 8 
 
ASSESSMENT: Patient is a 78 y.o. female with a chief complaint of weakness and welling in the left UE. Patient has a history of TIA and Pt reports a history or partial mastectomy to the L breast in 2006 with sentinel node biopsy. The pt has participated in comprehensive lymphedema management including MLD and wrapping 1-2 years ago. She has had brief intervention at this clinic approximately 4-5months ago however,  she was not able to complete participation secondary to unable to afford wraps for compression. She has since been able to be fitted for compression sleeve and presents in it today. She reports that she has been wearing this garment for the last 2-3 months daily. Patient instructed in decongestive exercises several months ago, patient reports compliance, Elevation does not assist with reducing her swelling. Despite her consistency with use of compression and exercise her measurements have increased as compared to the measurements taken on evaluation from 5/29/18. She has mild hyperkeratosis and dryness in the skin. Since the last evaluation patient was diagnosed with COPD and functional activity level has significantly changed as well as needing oxygen at all times. Patient received an initial evaluation today followed by education as to diagnosis, precautions and treatment plan. Patient was provided with a basic information on lymphedema, complete decongestive therapies, compression and financial obligations. Patient will benefit from skilled OT services to analyze and address soft tissue restrictions to address rehab goals per plan of care Treatment Protocol: 
 o Manual Lymphatic Drainage 
 o Soft Tissue Mobilization/MFR 
 o Compression Bandaging o Skin Care/Wound care 
 o Decongestive Exercises/Self MFR/Strengthening 
 o Education 
 o Compression [employer] Evaluation Complexity: History HIGH Complexity : Extensive review of history including physical, cognitive and psychosocial history  Examination HIGH Complexity : 5 or more performance deficits relating to physical, cognitive , or psychosocial skils that result in activity limitations and / or participation restrictions Clinical Decision Making HIGH Complexity : Patient presents with comorbidities that affect occupational performance. Signifigant modification of tasks or assistance (eg, physical or verbal) with assessment (s) is necessary to enable patient to complete evaluation Overall Complexity Rating: HIGH Patient would benefit from OT services for the following problems: 
Problem List: Pain effecting function, Decreased range of motion, Decreased strength and Edema effecting function Treatment Plan may include any combination of the following: Therapeutic exercise, Scar management, Manual therapy, Patient education and Other Patient / Family readiness to learn indicated by: asking questions and interest 
 
Persons(s) to be included in education:   patient (P) Barriers to Learning/Limitations: yes;  emotional, financial and physical 
 
Patient Goal (s): get a pumppoor Patient Self Reported Health Status: fair Rehabilitation Potential: fair Goals:   
Short Term Goals: To be accomplished in 2 weeks 1) Pt will be Independent with skin care routine to decrease risk of infection. 2) Patient will demonstrate decongestion of limb by 2% to increase of independence with donning and doffing her garment. Long Term Goals: To be accomplished in 4 weeks 1) Pt will be Independent with compression management routine consisting of skin care, decongestive exercises, self-manual lymphatic stimulation techniques, strengthening and motion exercises, and don/doff/care of appropriate compression garment(s). 2) Pt will be Independent in don/doff/care of/wearing schedule of light compression. Frequency / Duration: Patient to be seen 1 times per week for 4 weeks: 
 
Patient/ Caregiver education and instruction: Diagnosis, prognosis, self care, brace/ splint application and exercises Functional Status Measure: 
Patient's:45.3 FOTO score based on 0 - 100 point scale, with 100 being no impairment. These scores are determined by patient reported functional assessments compared against national benchmarked data. 
  
Carry  Current  CK= 40-59%  Goal  CK= 40-59% The severity rating is based on clinical judgment and the FOTO score. Certification Period: 18820/29 to 1/17/19 Ger Mcmillan OT, CHT, CLT 10/25/2018 4:23 PM

## 2018-10-31 ENCOUNTER — OFFICE VISIT (OUTPATIENT)
Dept: CARDIOLOGY CLINIC | Age: 68
End: 2018-10-31

## 2018-10-31 VITALS
HEART RATE: 74 BPM | WEIGHT: 198 LBS | SYSTOLIC BLOOD PRESSURE: 153 MMHG | HEIGHT: 66 IN | BODY MASS INDEX: 31.82 KG/M2 | DIASTOLIC BLOOD PRESSURE: 88 MMHG

## 2018-10-31 DIAGNOSIS — R07.9 CHEST PAIN, UNSPECIFIED TYPE: Primary | ICD-10-CM

## 2018-10-31 DIAGNOSIS — E78.5 DYSLIPIDEMIA: ICD-10-CM

## 2018-10-31 DIAGNOSIS — J45.30 MILD PERSISTENT ASTHMA WITHOUT COMPLICATION: ICD-10-CM

## 2018-10-31 DIAGNOSIS — I50.32 CHRONIC DIASTOLIC CONGESTIVE HEART FAILURE (HCC): ICD-10-CM

## 2018-10-31 DIAGNOSIS — I10 ESSENTIAL HYPERTENSION, BENIGN: ICD-10-CM

## 2018-10-31 NOTE — PROGRESS NOTES
1. Have you been to the ER, urgent care clinic since your last visit? Hospitalized since your last visit?     no  2. Have you seen or consulted any other health care providers outside of the [address] [place] [address] since your last visit? Include any pap smears or colon screening. Yes Where: pcp     3. Since your last visit, have you had any of the following symptoms? chest pains.

## 2018-10-31 NOTE — PATIENT INSTRUCTIONS

## 2018-10-31 NOTE — PROGRESS NOTES
HISTORY OF PRESENT ILLNESS  Jennie Herrera is a 78 y.o. female. Hypertension    The history is provided by the patient. This is a chronic problem. The current episode started more than 1 week ago. The problem occurs every several days. The problem has been gradually improving. Associated symptoms include chest pain. Chest Pain (Angina)     The history is provided by the patient. The problem occurs rarely. The pain is at a severity of 3/10. The quality of the pain is described as sharp. The pain does not radiate. Pertinent negatives include no claudication, no cough, no diaphoresis, no dizziness, no fever, no hemoptysis, no malaise/fatigue, no nausea, no orthopnea, no palpitations, no PND, no sputum production, no vomiting and no weakness. Shortness of Breath    The history is provided by the patient. This is a chronic problem. The problem occurs intermittently. The current episode started more than 1 week ago. The problem has been gradually improving. Associated symptoms include chest pain. Pertinent negatives include no fever, no ear pain, no neck pain, no cough, no sputum production, no hemoptysis, no wheezing, no PND, no orthopnea, no vomiting, no rash, no leg swelling and no claudication. Associated medical issues include heart failure. Associated medical issues do not include chronic lung disease or CAD. Review of Systems   Constitutional: Negative for chills, diaphoresis, fever, malaise/fatigue and weight loss. HENT: Negative for congestion, ear discharge, ear pain, hearing loss, nosebleeds and tinnitus. Eyes: Negative for blurred vision. Respiratory: Negative for cough, hemoptysis, sputum production, wheezing and stridor. Cardiovascular: Positive for chest pain. Negative for palpitations, orthopnea, claudication, leg swelling and PND. Gastrointestinal: Negative for heartburn, nausea and vomiting. Musculoskeletal: Negative for myalgias and neck pain.    Skin: Negative for itching and rash. Neurological: Negative for dizziness, tingling, tremors, focal weakness, loss of consciousness and weakness. Psychiatric/Behavioral: Negative for depression and suicidal ideas. Family History   Problem Relation Age of Onset   [facility] Cancer Mother     Cancer Father     Arthritis-osteo Other     Hypertension Other     Heart Disease Neg Hx         Negative family history of premature CAD or CVA       Past Medical History:   Diagnosis Date    Anemia     Asthma     Blood disorder     Cancer [facility] [address]     breast, diagnosed 2006 left    Cancer ([facility] [address] [facility]     Chest pain, unspecified     The working diagnosis is chest pain. The differential diagnosis includes chest wall pain, stable angina.  Chronic obstructive pulmonary disease (HCC)     Essential hypertension     Essential hypertension, benign     Uncontrolled     GERD (gastroesophageal reflux disease)     Hypercholesterolemia     Hypertension     Neuropathy     Nonspecific abnormal electrocardiogram (ECG) (EKG)     Obesity, unspecified     Weight loss has been strongly encouraged by following dietary restrictions and an exercise routine.     Polio     as a young child    Pre-operative cardiovascular examination     Sciatica     Unspecified cerebral artery occlusion with cerebral infarction     TIA? no residual       Past Surgical History:   Procedure Laterality Date    BREAST SURGERY PROCEDURE UNLISTED      initial surgery 2006, then left modified radical mastectomy 6/2012    HX BREAST BIOPSY  4/11/12    Left    HX BREAST BIOPSY Left     left mastectomy       Social History     Tobacco Use    Smoking status: Never Smoker    Smokeless tobacco: Never Used   Substance Use Topics    Alcohol use: No     Alcohol/week: 0.0 oz       No Known Allergies    Outpatient Medications Marked as Taking for the 10/31/18 encounter (Office Visit) with Isaac Ramírez MD   Medication Sig Dispense Refill    lidocaine (LIDODERM) 5 % apply 1 patch to the affected area daily. Leave on for 12 hours and then off for 12 hours. 5 Patch 2    metoprolol tartrate (LOPRESSOR) 25 mg tablet take 1 tablet by mouth twice a day 60 Tab 4    losartan (COZAAR) 100 mg tablet Take 1 Tab by mouth daily. 90 Tab 3    furosemide (LASIX) 40 mg tablet Take 1 Tab by mouth daily. 90 Tab 3    oxyCODONE-acetaminophen (PERCOCET) 5-325 mg per tablet Take 1 Tab by mouth every four (4) hours as needed for Pain. Max Daily Amount: 6 Tabs. 6 Tab 0    HYDROcodone-acetaminophen (NORCO) 7.5-325 mg per tablet Take 1 Tab by mouth every eight (8) hours as needed for Pain. Max Daily Amount: 3 Tabs. 21 Tab 0    naproxen (NAPROSYN) 500 mg tablet Take 1 Tab by mouth two (2) times daily (with meals). 20 Tab 0    cyclobenzaprine (FLEXERIL) 5 mg tablet Take 1 Tab by mouth nightly. 7 Tab 0    gabapentin (NEURONTIN) 100 mg capsule Take 2 Caps by mouth three (3) times daily. Indications: NEUROPATHIC PAIN 90 Cap 1    dicyclomine (BENTYL) 20 mg tablet   0    diclofenac (VOLTAREN) 1 % gel Apply  to affected area every six (6) hours. Apply 4 grams to affected joint up to 4 times per day, maximum 16 grams per joint per day  Dispense 5 100 gram tubes 100 g 4    desonide (TRIDESILON) 0.05 % cream   0    ALBUTEROL SULFATE (PROAIR HFA IN) Take  by inhalation.  FLUTICASONE/SALMETEROL (ADVAIR HFA IN) Take  by inhalation.  montelukast (SINGULAIR) 10 mg tablet Take 10 mg by mouth daily.  tiotropium (SPIRIVA WITH HANDIHALER) 18 mcg inhalation capsule Take 1 Cap by inhalation daily.  amLODIPine (NORVASC) 10 mg tablet Take  by mouth daily.  bethanechol (URECHOLINE) 10 mg tablet Take 10 mg by mouth Before breakfast, lunch, and dinner.  esomeprazole (NEXIUM) 40 mg capsule Take  by mouth daily.  aspirin 81 mg tablet Take 81 mg by mouth.  ergocalciferol (VITAMIN D2) 50,000 unit capsule Take 50,000 Units by mouth Every Thursday.           Visit Vitals  /88   Pulse 74   Ht 5' 6\" (1.676 m)   Wt 89.8 kg (198 lb)   BMI 31.96 kg/m²     Physical Exam   Constitutional: She is oriented to person, place, and time. She appears well-developed and well-nourished. No distress. HENT:   Head: Atraumatic. Mouth/Throat: No oropharyngeal exudate. Eyes: Conjunctivae are normal. Right eye exhibits no discharge. Left eye exhibits no discharge. No scleral icterus. Neck: Normal range of motion. Neck supple. No JVD present. No tracheal deviation present. No thyromegaly present. Cardiovascular: Normal rate and regular rhythm. Exam reveals no gallop. No murmur heard. Pulmonary/Chest: Effort normal. No stridor. She has wheezes (trace diffuse rhonchi). She has no rales. Abdominal: Soft. There is no tenderness. There is no rebound and no guarding. Musculoskeletal: Normal range of motion. She exhibits no edema or tenderness. Lymphadenopathy:     She has no cervical adenopathy. Neurological: She is alert and oriented to person, place, and time. She exhibits normal muscle tone. Skin: Skin is warm. She is not diaphoretic. Psychiatric: She has a normal mood and affect. Her behavior is normal.     ekg sinus rhythm with no acute st-t changes  Echo 10/2017:  SUMMARY:  Left ventricle: Systolic function was normal. Ejection fraction was  estimated in the range of 55 % to 60 %. There were no regional wall motion  abnormalities. There was mild concentric hypertrophy. Doppler parameters  were consistent with abnormal left ventricular relaxation (grade 1  diastolic dysfunction). Mitral valve: There was mild regurgitation. Tricuspid valve: There was mild regurgitation. ASSESSMENT and PLAN    ICD-10-CM ICD-9-CM    1. Chest pain, unspecified type R07.9 786.50 AMB POC EKG ROUTINE W/ 12 LEADS, INTER & REP   2. Chronic diastolic congestive heart failure (HCC) I50.32 428.32      428.0    3. Essential hypertension, benign I10 401.1    4. Dyslipidemia E78.5 272.4    5.  Mild persistent asthma without complication C11.99 270.89      Orders Placed This Encounter    AMB POC EKG ROUTINE W/ 12 LEADS, INTER & REP     Order Specific Question:   Reason for Exam:     Answer:   chest pain     Follow-up Disposition:  Return in about 6 months (around 4/30/2019). current treatment plan is effective, no change in therapy  reviewed diet, exercise and weight control. Patient with longstanding htn seen for worsening dyspnea likely from dietary non discretion  CHF improving on current meds  Seen by pulmonary recently and was advised to be on O2    Patient seen for follow-up. Echo report reviewed with patient. Her blood pressure is borderline high likely from dietary non-discretion. Discussed at length regarding need for salt restriction. Chest pain atypical for angina. EKG reviewed. Follow-up in 6 months.

## 2018-11-01 ENCOUNTER — HOSPITAL ENCOUNTER (OUTPATIENT)
Dept: PHYSICAL THERAPY | Age: 68
Discharge: HOME | End: 2018-11-01
Payer: MEDICARE

## 2018-11-01 PROCEDURE — 97110 THERAPEUTIC EXERCISES: CPT

## 2018-11-01 NOTE — PROGRESS NOTES
OT DAILY TREATMENT NOTE - [facility]  Patient Name: Barbie Villa Date:2018 : 1950 [x]  Patient  Verified Payor: Lauryyudi Gary / Plan: [address] [place] [address] HMO / Product Type: Managed Care Medicare / In time:1400  Out time:1435 Total Treatment Time (min): 35 Total Timed Codes (min): 35 
1:1 Treatment Time ( only): 35 Visit #: 1 of *4 
 
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  
Rationale: decongestive exercises  to improve the patients ability to increase lymphatic flow in the UB With 
 [] TE 
 [] TA 
 [] neuro 
 [] other: Patient Education: [x] Review HEP [] Progressed/Changed HEP based on:  
[] positioning   [] body mechanics   [] transfers   [] heat/ice application  
[] Splint wear/care   [] Sensory re-education   [] scar management     
[] other:   
 
     
Other Objective/Functional Measures: UE GIRTH CHART measured in cm 
Date: 10/25/18   11/1/18 Side R L L  
metacarpals 18.5 19 19.5  
wrist 17 17 17.7 FA 28 30.8 31.5  
elbow 28.5 33.5 32  
biceps 33.2 37.5 37  
axillary 34 36 36.5  
        
       
 
Pain Level (0-10 scale) post treatment: 6 
 
ASSESSMENT/Changes in Function: Patient presenting without her compression sleeve on. She couldn't get it on by herself and no one was available to help. Discussed need for consistency in wearing to improve lymphedema int he left UE.  
Patient will continue to benefit from skilled OT services to address ROM deficits, analyze and address soft tissue restrictions and improving lymphatic flow to attain remaining goals. []  See Plan of Care 
[]  See progress note/recertification 
[]  See Discharge Summary Progress towards goals / Updated goals: 
Short Term Goals: To be accomplished in 2 weeks 1) Pt will be Independent with skin care routine to decrease risk of infection. Patient reports compliance 2) Patient will demonstrate decongestion of limb by 2% to increase of independence with donning and doffing her garment. 
  
Long Term Goals: To be accomplished in 4 weeks 1) Pt will be Independent with compression management routine consisting of skin care, decongestive exercises, self-manual lymphatic stimulation techniques, strengthening and motion exercises, and don/doff/care of appropriate compression garment(s). 2) Pt will be Independent in don/doff/care of/wearing schedule of light compression.  
  
 
PLAN 
[]  Upgrade activities as tolerated     [x]  Continue plan of care 
[]  Update interventions per flow sheet      
[]  Discharge due to:_ 
[]  Other:_ Anderson Gastelum OT 11/1/2018  2:06 PM 
 
Future Appointments Date Time Provider Jovany Raygozai 11/15/2018  3:00 PM Herman Gregory OT [id number]  
11/20/2018 11:00 AM Herman Gregory OT [facility]  
12/7/2018 10:30 AM THERON Ch[address]vez 70  
2/4/2019 10:30 AM [facility] RM 1 [facility]  
4/17/2019 10:45 AM Migue Anderson MD [address] [place] [address]

## 2018-11-15 ENCOUNTER — HOSPITAL ENCOUNTER (OUTPATIENT)
Dept: PHYSICAL THERAPY | Age: 68
End: 2018-11-15
Payer: MEDICARE

## 2018-11-19 RX ORDER — METOPROLOL TARTRATE 25 MG/1
TABLET, FILM COATED ORAL
Qty: 60 TAB | Refills: 6 | Status: SHIPPED | OUTPATIENT
Start: 2018-11-19 | End: 2019-06-12 | Stop reason: SDUPTHER

## 2018-11-20 ENCOUNTER — HOSPITAL ENCOUNTER (OUTPATIENT)
Dept: PHYSICAL THERAPY | Age: 68
Discharge: HOME | End: 2018-11-20
Payer: MEDICARE

## 2018-11-20 PROCEDURE — 97016 VASOPNEUMATIC DEVICE THERAPY: CPT

## 2018-11-20 PROCEDURE — 97535 SELF CARE MNGMENT TRAINING: CPT

## 2018-11-20 NOTE — PROGRESS NOTES
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  
 Prior Level of Function:  Modified independent      
  
Visits from Start of Care: *3   Missed Visits: 1 Reporting Period : 10/25/18 to 19 [x]  Patient  Verified Payor: [facility] MEDICARE / Plan: [facility] PLAN MCR / Product Type: Managed Care Medicare / In time:1100  Out time:1130 Total Treatment Time (min): 30 Total Timed Codes (min): 30 
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 Min Type Additional Details  
 [] Estim:  []Unatt       []IFC  []Premod []Other:  []w/ice   []w/heat Position: Location:  
 [] Estim: []Att    []TENS instruct  []NMES []Other:  []w/US   []w/ice   []w/heat Position: Location:  
 []  Traction: [] Cervical       []Lumbar 
                     [] Prone          []Supine []Intermittent   []Continuous Lbs: 
[] before manual 
[] after manual  
 []  Ultrasound: []Continuous   [] Pulsed []1MHz   []3MHz W/cm2: 
Location:  
 []  Iontophoresis with dexamethasone Location: [] Take home patch  
[] In clinic  
 []  Ice     []  heat 
[]  Ice massage 
[]  Laser  
[]  Anodyne Position: Location:  
 []  Laser with stim 
[]  Other:  Position: Location:  
15 [x]  Vasopneumatic Device Pressure:       [x] lo [] med [] hi  
Temperature: [x] lo [] med [] hi  
[x] Skin assessment post-treatment:  [x]intact []redness- no adverse reaction 
  []redness  adverse reaction:  
 
15 min Self Care/Home Management: garment care, pump care and use. Rationale: increase ROM and education  to improve the patients ability to care and manage lymphedema for herself. With 
 [] TE 
 [] TA 
 [] neuro 
 [] other: Patient Education: [x] Review HEP [] Progressed/Changed HEP based on:  
[] positioning   [] body mechanics   [] transfers   [] heat/ice application  
[] Splint wear/care   [] Sensory re-education   [] scar management     
[] other:   
 
     
Other Objective/Functional Measures: UE GIRTH CHART measured in cm 
Date: 10/25/18   11/1/18 11/20/18 Side R L L L  
metacarpals 18.5 19 19.5 19.5  
wrist 17 17 17.7 17.5 FA 28 30.8 31.5 32  
elbow 28.5 33.5 32 32.5  
biceps 33.2 37.5 37 38.4  
axillary 34 36 36.5 37  
          
          
  
Pain Level (0-10 Summary of Care: 
) Pt will be Independent with compression management routine consisting of skin care, decongestive exercises, self-manual lymphatic stimulation techniques, strengthening and motion exercises, and don/doff/care of appropriate compression garment(s). Goal met 2) Pt will be Independent in don/doff/care of/wearing schedule of light compression. Goal partially met, minimal assist to don sleeve secondary to fatigue. ASSESSMENT/Changes in Function: Patient compliance with garment wear questionable secondary to increase in edema in the left UE however, she did demonstrate the ability to don the garment with minimal assistance where she was not donning it previously. She has been instructed in lymphedema pump use and donning and doffing the device independently today. Maximum benefit met from OT services at this time. G-Codes (GO) Carry  Current  CK= 40-59%  Goal  CK= 40-59% The severity rating is based on clinical judgment and the FOTO score. PLAN: 
[x]Discontinue therapy: [x]Patient has reached or is progressing toward set goals []Patient is non-compliant or has abdicated 
    []Due to lack of appreciable progress towards set goals Thank you for this referral! 
 
Pati Uribe OT 11/20/2018 1:30 PM

## 2018-12-07 ENCOUNTER — OFFICE VISIT (OUTPATIENT)
Dept: ORTHOPEDIC SURGERY | Age: 68
End: 2018-12-07

## 2018-12-07 VITALS
OXYGEN SATURATION: 96 % | HEART RATE: 86 BPM | WEIGHT: 198 LBS | RESPIRATION RATE: 13 BRPM | SYSTOLIC BLOOD PRESSURE: 139 MMHG | BODY MASS INDEX: 31.82 KG/M2 | TEMPERATURE: 97 F | DIASTOLIC BLOOD PRESSURE: 74 MMHG | HEIGHT: 66 IN

## 2018-12-07 DIAGNOSIS — M25.562 LEFT KNEE PAIN, UNSPECIFIED CHRONICITY: ICD-10-CM

## 2018-12-07 DIAGNOSIS — M75.51 BURSITIS OF RIGHT SHOULDER: ICD-10-CM

## 2018-12-07 DIAGNOSIS — M25.561 PAIN IN BOTH KNEES, UNSPECIFIED CHRONICITY: Primary | ICD-10-CM

## 2018-12-07 DIAGNOSIS — M25.562 PAIN IN BOTH KNEES, UNSPECIFIED CHRONICITY: Primary | ICD-10-CM

## 2018-12-07 RX ORDER — IBUPROFEN 800 MG/1
800 TABLET ORAL
Qty: 90 TAB | Refills: 1 | Status: SHIPPED | OUTPATIENT
Start: 2018-12-07 | End: 2019-03-13 | Stop reason: SDUPTHER

## 2018-12-07 NOTE — PROGRESS NOTES
[address], [address] [place] [address]  [phone]           Patient: Lino Guo                MRN: [id number]       SSN: [id number]  YOB: 1950        AGE: 80 y.o. SEX: female  Body mass index is 31.96 kg/m². PCP: Magaly Yu MD  12/07/18      This office note has been dictated. REVIEW OF SYSTEMS:  Constitutional: Negative for fever, chills, weight loss and malaise/fatigue. HENT: Negative. Eyes: Negative. Respiratory: Negative. Cardiovascular: Negative. Gastrointestinal: No bowel incontinence or constipation. Genitourinary: No bladder incontinence or saddle anesthesia. Skin: Negative. Neurological: Negative. Endo/Heme/Allergies: Negative. Psychiatric/Behavioral: Negative. Musculoskeletal: As per HPI above. Past Medical History:   Diagnosis Date    Anemia     Asthma     Blood disorder     Cancer ([facility] [address]     breast, diagnosed 2006 left    Cancer [facility])     Chest pain, unspecified     The working diagnosis is chest pain. The differential diagnosis includes chest wall pain, stable angina.  Chronic obstructive pulmonary disease (HCC)     Essential hypertension     Essential hypertension, benign     Uncontrolled     GERD (gastroesophageal reflux disease)     Hypercholesterolemia     Hypertension     Neuropathy     Nonspecific abnormal electrocardiogram (ECG) (EKG)     Obesity, unspecified     Weight loss has been strongly encouraged by following dietary restrictions and an exercise routine.     Polio     as a young child    Pre-operative cardiovascular examination     Sciatica     Unspecified cerebral artery occlusion with cerebral infarction     TIA? no residual         Current Outpatient Medications:     metoprolol tartrate (LOPRESSOR) 25 mg tablet, take 1 tablet by mouth twice a day, Disp: 60 Tab, Rfl: 6    lidocaine (LIDODERM) 5 %, apply 1 patch to the affected area daily. Leave on for 12 hours and then off for 12 hours. , Disp: 5 Patch, Rfl: 2    losartan (COZAAR) 100 mg tablet, Take 1 Tab by mouth daily. , Disp: 90 Tab, Rfl: 3    furosemide (LASIX) 40 mg tablet, Take 1 Tab by mouth daily. , Disp: 90 Tab, Rfl: 3    oxyCODONE-acetaminophen (PERCOCET) 5-325 mg per tablet, Take 1 Tab by mouth every four (4) hours as needed for Pain. Max Daily Amount: 6 Tabs., Disp: 6 Tab, Rfl: 0    HYDROcodone-acetaminophen (NORCO) 7.5-325 mg per tablet, Take 1 Tab by mouth every eight (8) hours as needed for Pain. Max Daily Amount: 3 Tabs., Disp: 21 Tab, Rfl: 0    naproxen (NAPROSYN) 500 mg tablet, Take 1 Tab by mouth two (2) times daily (with meals). , Disp: 20 Tab, Rfl: 0    cyclobenzaprine (FLEXERIL) 5 mg tablet, Take 1 Tab by mouth nightly., Disp: 7 Tab, Rfl: 0    gabapentin (NEURONTIN) 100 mg capsule, Take 2 Caps by mouth three (3) times daily. Indications: NEUROPATHIC PAIN, Disp: 90 Cap, Rfl: 1    dicyclomine (BENTYL) 20 mg tablet, , Disp: , Rfl: 0    diclofenac (VOLTAREN) 1 % gel, Apply  to affected area every six (6) hours. Apply 4 grams to affected joint up to 4 times per day, maximum 16 grams per joint per day Dispense 5 100 gram tubes, Disp: 100 g, Rfl: 4    desonide (TRIDESILON) 0.05 % cream, , Disp: , Rfl: 0    ALBUTEROL SULFATE (PROAIR HFA IN), Take  by inhalation. , Disp: , Rfl:     FLUTICASONE/SALMETEROL (ADVAIR HFA IN), Take  by inhalation. , Disp: , Rfl:     montelukast (SINGULAIR) 10 mg tablet, Take 10 mg by mouth daily. , Disp: , Rfl:     tiotropium (SPIRIVA WITH HANDIHALER) 18 mcg inhalation capsule, Take 1 Cap by inhalation daily. , Disp: , Rfl:     amLODIPine (NORVASC) 10 mg tablet, Take  by mouth daily. , Disp: , Rfl:     bethanechol (URECHOLINE) 10 mg tablet, Take 10 mg by mouth Before breakfast, lunch, and dinner., Disp: , Rfl:     esomeprazole (NEXIUM) 40 mg capsule, Take  by mouth daily.   , Disp: , Rfl:     aspirin 81 mg tablet, Take 81 mg by mouth.  , Disp: , Rfl:     ergocalciferol (VITAMIN D2) 50,000 unit capsule, Take 50,000 Units by mouth Every Thursday. , Disp: , Rfl:     No Known Allergies    Social History     Socioeconomic History    Marital status:      Spouse name: Not on file    Number of children: Not on file    Years of education: Not on file    Highest education level: Not on file   Social Needs    Financial resource strain: Not on file    Food insecurity - worry: Not on file    Food insecurity - inability: Not on file    Transportation needs - medical: Not on file   [employer] needs - non-medical: Not on file   Occupational History    Not on file   Tobacco Use    Smoking status: Never Smoker    Smokeless tobacco: Never Used   Substance and Sexual Activity    Alcohol use: No     Alcohol/week: 0.0 oz    Drug use: No    Sexual activity: Not on file   Other Topics Concern    Not on file   Social History Narrative    ** Merged History Encounter **            Past Surgical History:   Procedure Laterality Date    BREAST SURGERY PROCEDURE UNLISTED      initial surgery 2006, then left modified radical mastectomy 6/2012    HX BREAST BIOPSY  4/11/12    Left    HX BREAST BIOPSY Left     left mastectomy           We did see Ms. Emma Davila for followup with regards to mainly complaints of her left knee today. The patient does have known arthritis of each of her knees, particularly patellofemoral.  She has had an MRI of her right knee in the past without meniscal pathology. The left knee has been bothering her without injury. She reports giving way and locking up of the knee. She has pain mainly to the inside part of the knee, as well as the front of the knee. She has trouble getting up from a chair and going up and down stairs. She has no pain at night. She has also reported having some right shoulder pain, which is worse with overhead activities and worse at night. She has had no injury to the shoulder.   She denies any numbness or tingling in the upper extremity. PHYSICAL EXAMINATION:  In general, the patient is alert and oriented x 3 in no acute distress. The patient is well-developed, well-nourished, with a normal affect. The patient is afebrile. HEENT:  Head is normocephalic and atraumatic. Pupils are equally round and reactive to light and accommodation. Extraocular eye movements are intact. Neck is supple. Trachea is midline. No JVD is present. Breathing is nonlabored. Examination of the right shoulder reveals the skin is intact. There is no ecchymosis, no warmth, and no signs of infection or cellulitis present. Range of motion is full. She does have a positive Armstrong, positive Neers, and negative drop-arm, Speed's, and Toombs's. Radial pulse is 2+ and capillary refill is within normal limits. Examination of the lower extremities reveals pain-free range of motion of the hips. There is no pain to palpation of the greater trochanteric bursae. There is negative straight leg raise. There is negative calf tenderness. There is negative Ashers. There is no evidence of DVT present. The left knee reveals the skin is intact. There is no ecchymosis and no warmth. There is negative joint effusion, negative patellar ballottement. There is pain to palpation to the medial joint line, a positive Mikey's test with a palpable click present medially. There is mild crepitus anteriorly with range of motion activities. Range of motion is well-preserved. RADIOGRAPHS:  Radiographs in the office today, including AP, tunnel, lateral, and skyline of the knees shows no acute bony abnormalities. There are moderate arthritic changes. ASSESSMENT:      1. Right shoulder subacromial bursitis/impingement syndrome. 2. Bilateral knee patellofemoral arthritis. 3. Left knee probable medial meniscus tear. PLAN:  At this point, we discussed treatment options for the shoulder. She declines a cortisone injection.   We will get her on Ibuprofen 800 mg tid with food. She was instructed on use, as well as precautions. She has tolerated this well before. We are going to order an MRI of the left knee for further evaluation to evaluate for meniscal pathology. She will see us back afterwards for review.                     JR Rojelio REN, PAJenC, ATC

## 2018-12-07 NOTE — PROGRESS NOTES
1. Have you been to the ER, urgent care clinic since your last visit? Hospitalized since your last visit? No    2. Have you seen or consulted any other health care providers outside of the [address] [place] [address] since your last visit? Include any pap smears or colon screening.  No

## 2018-12-13 ENCOUNTER — OFFICE VISIT (OUTPATIENT)
Dept: SURGERY | Age: 68
End: 2018-12-13

## 2018-12-13 ENCOUNTER — HOSPITAL ENCOUNTER (OUTPATIENT)
Dept: MAMMOGRAPHY | Age: 68
Discharge: HOME | End: 2018-12-13
Attending: NURSE PRACTITIONER
Payer: MEDICARE

## 2018-12-13 ENCOUNTER — HOSPITAL ENCOUNTER (OUTPATIENT)
Dept: ULTRASOUND IMAGING | Age: 68
Discharge: HOME | End: 2018-12-13
Attending: NURSE PRACTITIONER
Payer: MEDICARE

## 2018-12-13 VITALS
HEIGHT: 66 IN | SYSTOLIC BLOOD PRESSURE: 136 MMHG | TEMPERATURE: 98.3 F | RESPIRATION RATE: 16 BRPM | HEART RATE: 68 BPM | DIASTOLIC BLOOD PRESSURE: 78 MMHG | WEIGHT: 198 LBS | BODY MASS INDEX: 31.82 KG/M2

## 2018-12-13 DIAGNOSIS — N63.10 BREAST MASS, RIGHT: ICD-10-CM

## 2018-12-13 DIAGNOSIS — Z12.31 ENCOUNTER FOR SCREENING MAMMOGRAM FOR BREAST CANCER: ICD-10-CM

## 2018-12-13 DIAGNOSIS — N64.4 BREAST PAIN, RIGHT: ICD-10-CM

## 2018-12-13 DIAGNOSIS — N64.4 BREAST PAIN, RIGHT: Primary | ICD-10-CM

## 2018-12-13 DIAGNOSIS — N63.20 LEFT BREAST MASS: ICD-10-CM

## 2018-12-13 PROCEDURE — 76642 ULTRASOUND BREAST LIMITED: CPT

## 2018-12-13 PROCEDURE — 77061 BREAST TOMOSYNTHESIS UNI: CPT

## 2018-12-13 NOTE — PATIENT INSTRUCTIONS
Breast Pain in Teens: Care Instructions  Your Care Instructions    Breast tenderness and pain may come and go with your monthly periods (cyclic), or it may not follow any pattern (noncyclic). Breast pain is rarely caused by a serious health problem. You may need tests to find the cause. Follow-up care is a key part of your treatment and safety. Be sure to make and go to all appointments, and call your doctor if you are having problems. It's also a good idea to know your test results and keep a list of the medicines you take. How can you care for yourself at home? · If your doctor gave you medicine, take it exactly as prescribed. Call your doctor if you think you are having a problem with your medicine. · Take an over-the-counter pain medicine, such as acetaminophen (Tylenol), ibuprofen (Advil, Motrin), or naproxen (Aleve). Read and follow all instructions on the label. · Do not take two or more pain medicines at the same time unless the doctor told you to. Many pain medicines have acetaminophen, which is Tylenol. Too much acetaminophen (Tylenol) can be harmful. · Wear a supportive bra, such as a sports bra or a jog bra. · Cut down on the amount of fat in your diet. If you need help planning healthy meals, see a dietitian. · Cut down on the amount of salt in your diet. Salty food can make you retain fluid, which may cause breast pain. · Get plenty of exercise every day. Go for a walk or jog, ride your bike, or play sports with friends. · Keep a healthy sleep pattern. Go to bed at the same time every night, and get up at the same time every day. When should you call for help?   Call your doctor now or seek immediate medical care if:    · You have a fever.     · Your breast becomes red or swollen.     · Your pain spreads or gets worse.     · You have discharge from your nipple that looks like pus or blood.    Watch closely for changes in your health, and be sure to contact your doctor if:    · Your breast pain does not get better after 1 week.     · You have a lump or thickening in your breast or armpit. Where can you learn more? Go to http://laura-jessie.info/. Enter W084 in the search box to learn more about \"Breast Pain in Teens: Care Instructions. \"  Current as of: November 20, 2017  Content Version: 11.8  © 3290-8228 [employer]. Care instructions adapted under license by [employer] (which disclaims liability or warranty for this information). If you have questions about a medical condition or this instruction, always ask your healthcare professional. [address] any warranty or liability for your use of this information.

## 2018-12-13 NOTE — PROGRESS NOTES
ADDENDUM: Patient seen after mammogram which was benign. Patient was feeling a fat lobule; discussed with radiologist reading images. Benign clinical exam. Patient can follow up in one year after mammogram or sooner if needed. She can also follow up for lymphedema therapy as needed.

## 2018-12-13 NOTE — PROGRESS NOTES
Glendy Damian. [facility], FNP-C  PROGRESS NOTE    Subjective:    Ms. Genevieve Gomez is a very pleasant 79 yo AA female who presents today for a problem visit. She states that a little over a week ago, she started getting sharp pain in her right breast. She denies trauma or injury. She denies fever or drainage. She reports no weight loss, no new or lingering cough and no new or worsening back pain. Objective:    Vitals:    12/13/18 1342   BP: 136/78   Pulse: 68   Resp: 16   Temp: 98.3 °F (36.8 °C)   Weight: 89.8 kg (198 lb)   Height: 5' 6\" (1.676 m)       Physical Exam:    General: alert & oriented x 4, in no acute distress   Breasts:    Left:  no dimpling, discoloration, nipple inversion or retractions. no axillary or supraclavicular lymphadenopathy. no mass   Right:  no dimpling, discoloration, nipple inversion or retractions. no axillary or supraclavicular lymphadenopathy. Palpable mass that is tender to the touch in the 2:00 position 11cm from nipple. Movable and fibrous in character. Current Medications:  Current Outpatient Medications   Medication Sig Dispense Refill    ibuprofen (MOTRIN) 800 mg tablet Take 1 Tab by mouth every six (6) hours as needed. 90 Tab 1    metoprolol tartrate (LOPRESSOR) 25 mg tablet take 1 tablet by mouth twice a day 60 Tab 6    lidocaine (LIDODERM) 5 % apply 1 patch to the affected area daily. Leave on for 12 hours and then off for 12 hours. 5 Patch 2    losartan (COZAAR) 100 mg tablet Take 1 Tab by mouth daily. 90 Tab 3    furosemide (LASIX) 40 mg tablet Take 1 Tab by mouth daily. 90 Tab 3    oxyCODONE-acetaminophen (PERCOCET) 5-325 mg per tablet Take 1 Tab by mouth every four (4) hours as needed for Pain. Max Daily Amount: 6 Tabs. 6 Tab 0    HYDROcodone-acetaminophen (NORCO) 7.5-325 mg per tablet Take 1 Tab by mouth every eight (8) hours as needed for Pain. Max Daily Amount: 3 Tabs.  21 Tab 0    naproxen (NAPROSYN) 500 mg tablet Take 1 Tab by mouth two (2) times daily (with meals). 20 Tab 0    cyclobenzaprine (FLEXERIL) 5 mg tablet Take 1 Tab by mouth nightly. 7 Tab 0    gabapentin (NEURONTIN) 100 mg capsule Take 2 Caps by mouth three (3) times daily. Indications: NEUROPATHIC PAIN 90 Cap 1    diclofenac (VOLTAREN) 1 % gel Apply  to affected area every six (6) hours. Apply 4 grams to affected joint up to 4 times per day, maximum 16 grams per joint per day  Dispense 5 100 gram tubes 100 g 4    desonide (TRIDESILON) 0.05 % cream   0    ALBUTEROL SULFATE (PROAIR HFA IN) Take  by inhalation.  FLUTICASONE/SALMETEROL (ADVAIR HFA IN) Take  by inhalation.  montelukast (SINGULAIR) 10 mg tablet Take 10 mg by mouth daily.  tiotropium (SPIRIVA WITH HANDIHALER) 18 mcg inhalation capsule Take 1 Cap by inhalation daily.  amLODIPine (NORVASC) 10 mg tablet Take  by mouth daily.  bethanechol (URECHOLINE) 10 mg tablet Take 10 mg by mouth Before breakfast, lunch, and dinner.  esomeprazole (NEXIUM) 40 mg capsule Take  by mouth daily.  aspirin 81 mg tablet Take 81 mg by mouth.  ergocalciferol (VITAMIN D2) 50,000 unit capsule Take 50,000 Units by mouth Every Thursday.  dicyclomine (BENTYL) 20 mg tablet   0       Chart and notes reviewed. Data reviewed. I have evaluated and examined the patient. Impression:  · Patient with personal history of left breast cancer S/P mastectomy in 2012 here today with new onset right breast mastodynia. Plan:   · Diagnostic, bilateral mammogram and right diagnostic U/S today  · Will discuss results when completed  · Please call with any questions or concerns prior to next visit    Ms. Zuri Payne has a reminder for a \"due or due soon\" health maintenance. I have asked that she contact her primary care provider for follow-up on this health maintenance. Billie Gillespie.  ANDRÉS Rasmussen

## 2019-01-11 ENCOUNTER — TELEPHONE (OUTPATIENT)
Dept: SURGERY | Age: 69
End: 2019-01-11

## 2019-01-11 NOTE — TELEPHONE ENCOUNTER
Script written for Left Breast Prosthesis (2) and Bras (6). Script faxed to [facility] at [phone] on 1/11/2019. Contacted patient and verified identity using name and date of birth and informed her script had been faxed. Pt verbalized understanding.

## 2019-02-13 RX ORDER — LOSARTAN POTASSIUM 100 MG/1
TABLET ORAL
Qty: 90 TAB | Refills: 3 | Status: SHIPPED | OUTPATIENT
Start: 2019-02-13 | End: 2020-02-03

## 2019-02-19 ENCOUNTER — TELEPHONE (OUTPATIENT)
Dept: ORTHOPEDIC SURGERY | Facility: CLINIC | Age: 69
End: 2019-02-19

## 2019-02-19 NOTE — TELEPHONE ENCOUNTER
Patient called for [facility] ,and said she needs a letter stating what he is treating her for. Patient said the letter is for Medicaid and Medicare. That her insurance company called her and told her that she needed to get the letter from either Dr. Abigail Wise or Lucia Quarles. Aric Russ stating what she is being treated for. Patient said she needs the letter by this Friday 02/22/19. Patient said she will  the letter during her appointment with [facility] this Friday 2/22/19 at 2:50 PM at the [facility] location. Patient tel. [phone].

## 2019-02-22 ENCOUNTER — OFFICE VISIT (OUTPATIENT)
Dept: ORTHOPEDIC SURGERY | Age: 69
End: 2019-02-22

## 2019-02-22 VITALS
HEART RATE: 87 BPM | TEMPERATURE: 96.1 F | SYSTOLIC BLOOD PRESSURE: 132 MMHG | HEIGHT: 66 IN | WEIGHT: 198 LBS | RESPIRATION RATE: 9 BRPM | OXYGEN SATURATION: 100 % | DIASTOLIC BLOOD PRESSURE: 79 MMHG | BODY MASS INDEX: 31.82 KG/M2

## 2019-02-22 DIAGNOSIS — M25.561 CHRONIC PAIN OF BOTH KNEES: Primary | ICD-10-CM

## 2019-02-22 DIAGNOSIS — M70.62 TROCHANTERIC BURSITIS OF LEFT HIP: ICD-10-CM

## 2019-02-22 DIAGNOSIS — G89.29 CHRONIC PAIN OF BOTH KNEES: Primary | ICD-10-CM

## 2019-02-22 DIAGNOSIS — M25.562 CHRONIC PAIN OF BOTH KNEES: Primary | ICD-10-CM

## 2019-02-22 RX ORDER — DICLOFENAC SODIUM 10 MG/G
GEL TOPICAL 4 TIMES DAILY
Qty: 100 G | Refills: 4 | Status: SHIPPED | OUTPATIENT
Start: 2019-02-22 | End: 2019-04-17 | Stop reason: SDUPTHER

## 2019-02-22 NOTE — PROGRESS NOTES
1. Have you been to the ER, urgent care clinic since your last visit? Hospitalized since your last visit? No    2. Have you seen or consulted any other health care providers outside of the [address] [place] [address] since your last visit? Include any pap smears or colon screening.  No

## 2019-02-22 NOTE — PROGRESS NOTES
[address], [address] [place] [address]  [phone]           Patient: Dulce Pang                MRN: [id number]       SSN: [id number]  YOB: 1950        AGE: 80 y.o. SEX: female  Body mass index is 31.96 kg/m². PCP: Van Estrada MD  02/22/19      This office note has been dictated. REVIEW OF SYSTEMS:  Constitutional: Negative for fever, chills, weight loss and malaise/fatigue. HENT: Negative. Eyes: Negative. Respiratory: Negative. Cardiovascular: Negative. Gastrointestinal: No bowel incontinence or constipation. Genitourinary: No bladder incontinence or saddle anesthesia. Skin: Negative. Neurological: Negative. Endo/Heme/Allergies: Negative. Psychiatric/Behavioral: Negative. Musculoskeletal: As per HPI above. Past Medical History:   Diagnosis Date    Anemia     Asthma     Blood disorder     Cancer ([facility] [address]     breast, diagnosed 2006 left    Cancer [facility])     Chest pain, unspecified     The working diagnosis is chest pain. The differential diagnosis includes chest wall pain, stable angina.  Chronic obstructive pulmonary disease (HCC)     Essential hypertension     Essential hypertension, benign     Uncontrolled     GERD (gastroesophageal reflux disease)     Hypercholesterolemia     Hypertension     Neuropathy     Nonspecific abnormal electrocardiogram (ECG) (EKG)     Obesity, unspecified     Weight loss has been strongly encouraged by following dietary restrictions and an exercise routine.     Polio     as a young child    Pre-operative cardiovascular examination     Sciatica     Unspecified cerebral artery occlusion with cerebral infarction     TIA? no residual         Current Outpatient Medications:     losartan (COZAAR) 100 mg tablet, take 1 tablet by mouth once daily, Disp: 90 Tab, Rfl: 3    ibuprofen (MOTRIN) 800 mg tablet, Take 1 Tab by mouth every six (6) hours as needed. , Disp: 90 Tab, Rfl: 1    metoprolol tartrate (LOPRESSOR) 25 mg tablet, take 1 tablet by mouth twice a day, Disp: 60 Tab, Rfl: 6    lidocaine (LIDODERM) 5 %, apply 1 patch to the affected area daily. Leave on for 12 hours and then off for 12 hours. , Disp: 5 Patch, Rfl: 2    furosemide (LASIX) 40 mg tablet, Take 1 Tab by mouth daily. , Disp: 90 Tab, Rfl: 3    oxyCODONE-acetaminophen (PERCOCET) 5-325 mg per tablet, Take 1 Tab by mouth every four (4) hours as needed for Pain. Max Daily Amount: 6 Tabs., Disp: 6 Tab, Rfl: 0    HYDROcodone-acetaminophen (NORCO) 7.5-325 mg per tablet, Take 1 Tab by mouth every eight (8) hours as needed for Pain. Max Daily Amount: 3 Tabs., Disp: 21 Tab, Rfl: 0    naproxen (NAPROSYN) 500 mg tablet, Take 1 Tab by mouth two (2) times daily (with meals). , Disp: 20 Tab, Rfl: 0    cyclobenzaprine (FLEXERIL) 5 mg tablet, Take 1 Tab by mouth nightly., Disp: 7 Tab, Rfl: 0    gabapentin (NEURONTIN) 100 mg capsule, Take 2 Caps by mouth three (3) times daily. Indications: NEUROPATHIC PAIN, Disp: 90 Cap, Rfl: 1    dicyclomine (BENTYL) 20 mg tablet, , Disp: , Rfl: 0    diclofenac (VOLTAREN) 1 % gel, Apply  to affected area every six (6) hours. Apply 4 grams to affected joint up to 4 times per day, maximum 16 grams per joint per day Dispense 5 100 gram tubes, Disp: 100 g, Rfl: 4    desonide (TRIDESILON) 0.05 % cream, , Disp: , Rfl: 0    ALBUTEROL SULFATE (PROAIR HFA IN), Take  by inhalation. , Disp: , Rfl:     FLUTICASONE/SALMETEROL (ADVAIR HFA IN), Take  by inhalation. , Disp: , Rfl:     montelukast (SINGULAIR) 10 mg tablet, Take 10 mg by mouth daily. , Disp: , Rfl:     tiotropium (SPIRIVA WITH HANDIHALER) 18 mcg inhalation capsule, Take 1 Cap by inhalation daily. , Disp: , Rfl:     amLODIPine (NORVASC) 10 mg tablet, Take  by mouth daily. , Disp: , Rfl:     bethanechol (URECHOLINE) 10 mg tablet, Take 10 mg by mouth Before breakfast, lunch, and dinner., Disp: , Rfl:     esomeprazole (NEXIUM) 40 mg capsule, Take  by mouth daily. , Disp: , Rfl:     aspirin 81 mg tablet, Take 81 mg by mouth.  , Disp: , Rfl:     ergocalciferol (VITAMIN D2) 50,000 unit capsule, Take 50,000 Units by mouth Every Thursday. , Disp: , Rfl:     No Known Allergies    Social History     Socioeconomic History    Marital status:      Spouse name: Not on file    Number of children: Not on file    Years of education: Not on file    Highest education level: Not on file   Social Needs    Financial resource strain: Not on file    Food insecurity - worry: Not on file    Food insecurity - inability: Not on file    Transportation needs - medical: Not on file   [employer] needs - non-medical: Not on file   Occupational History    Not on file   Tobacco Use    Smoking status: Never Smoker    Smokeless tobacco: Never Used   Substance and Sexual Activity    Alcohol use: No     Alcohol/week: 0.0 oz    Drug use: No    Sexual activity: Not on file   Other Topics Concern    Not on file   Social History Narrative    ** Merged History Encounter **            Past Surgical History:   Procedure Laterality Date    BREAST SURGERY PROCEDURE UNLISTED      initial surgery 2006, then left modified radical mastectomy 6/2012    HX BREAST BIOPSY  4/11/12    Left    HX BREAST BIOPSY Left     left mastectomy           We did see Ms. Anabelle Vallejo for followup with regards to her bilateral lower extremities. She is reporting a little laterally-based hip discomfort on the right side with some catching at times. She does continue to have discomfort in her knees worse on the left side. It is still giving way. The MRI was not performed. She denies any radiating pain or numbness down the bilateral lower extremities. She has had no change in her bowel or bladder habits and no fevers or chills to report. Systemically, she is now on O2 for her COPD.      PHYSICAL EXAMINATION:  In general, the patient is alert and oriented x 3 in no acute distress. The patient is well-developed, well-nourished, with a normal affect. The patient is afebrile. HEENT:  Head is normocephalic and atraumatic. Pupils are equally round and reactive to light and accommodation. Extraocular eye movements are intact. Neck is supple. Trachea is midline. No JVD is present. Breathing is nonlabored. Examination of the lower extremities reveals pain-free range of motion of the hips with no catching or locking noted. She does have discomfort to palpation of the greater trochanteric bursae on the right side. There is negative straight leg raise. There is negative calf tenderness. There is negative Ashers. There is no evidence of DVT present. Each of the knees reveals the skin is intact. There is no ecchymosis and no warmth. There are no signs for infection or cellulitis present. She does have some discomfort with patellofemoral grind with some mild crepitus with range of motion activities. To the left knee, she does pain to palpation to the medial joint line, a positive Mikey's test, and a palpable click present medially. ASSESSMENT:      1. Right hip trochanteric bursitis. 2. Bilateral knee patellofemoral arthritis. 3. Left knee meniscal pathology. PLAN:  At this point, we discussed treatment options with regards to the hip. We are going to get her some Voltaren gel to apply to the hip about four times a day. If that does not work, we may try a cortisone injection to the hip upon her return. We are going to reorder the MRI for the left knee and see her back in the office afterwards for review. She will call with any questions or concerns that shall arise.                 JR Rojelio REN, PASUZETTE, ATC

## 2019-02-25 ENCOUNTER — OFFICE VISIT (OUTPATIENT)
Dept: SURGERY | Age: 69
End: 2019-02-25

## 2019-02-25 VITALS
HEART RATE: 88 BPM | HEIGHT: 66 IN | DIASTOLIC BLOOD PRESSURE: 78 MMHG | RESPIRATION RATE: 15 BRPM | TEMPERATURE: 98.9 F | BODY MASS INDEX: 32.62 KG/M2 | WEIGHT: 203 LBS | OXYGEN SATURATION: 99 % | SYSTOLIC BLOOD PRESSURE: 110 MMHG

## 2019-02-25 DIAGNOSIS — M25.60 LIMITED JOINT RANGE OF MOTION (ROM): ICD-10-CM

## 2019-02-25 DIAGNOSIS — I97.2 POSTMASTECTOMY LYMPHEDEMA SYNDROME: Primary | ICD-10-CM

## 2019-02-25 DIAGNOSIS — M79.602 ARM PAIN, DIFFUSE, LEFT: ICD-10-CM

## 2019-02-25 DIAGNOSIS — Z85.3 HISTORY OF LEFT BREAST CANCER: ICD-10-CM

## 2019-02-25 DIAGNOSIS — I89.0 LYMPHEDEMA OF LEFT ARM: ICD-10-CM

## 2019-02-25 DIAGNOSIS — R06.2 WHEEZING ON RIGHT SIDE OF CHEST ON INHALATION: ICD-10-CM

## 2019-02-25 NOTE — PROGRESS NOTES
Monty Funes. Pia Eduardo FNP-C  Office Progress Note    Patient: Sydni Posada  MRN: [id number]  SSN: [id number]   YOB: 1950  Age: 80 y.o. Sex: female     Chief Complaint   Patient presents with    Follow-up     Patient wants her left arm evaluated for lymphedema. Patient has a pump at home. Patient complains of pain of an 8 in left arm        HPI    Ms. Cinthya Corley is a very pleasant 81 yo AA female who presents today for a lymphedema follow up. She is wearing a compression sleeve and states that she wears this daily when she has assistance putting it on. She also has a Tactile, lymphedema pump that she uses every, other night when she has assistance donning and doffing machine. She continues to have limited range of motion in her left arm, hand, shoulder. She states that she is now having a hard time using her right leg and she has fallen twice. Her PCP continues to see her for this issue. She is wearing portable oxygen and states she has to use this all the time now. She continues to require assistance at home with ADL's and IADL's. She is wearing a mask today and states she has a cough. She does have some wheezing which improves with use of inhaler. Past Medical History:   Diagnosis Date    Anemia     Asthma     Blood disorder     Cancer ([address]     breast, diagnosed 2006 left    Cancer [facility])     Chest pain, unspecified     The working diagnosis is chest pain. The differential diagnosis includes chest wall pain, stable angina.  Chronic obstructive pulmonary disease (HCC)     Essential hypertension     Essential hypertension, benign     Uncontrolled     GERD (gastroesophageal reflux disease)     Hypercholesterolemia     Hypertension     Neuropathy     Nonspecific abnormal electrocardiogram (ECG) (EKG)     Obesity, unspecified     Weight loss has been strongly encouraged by following dietary restrictions and an exercise routine.     Polio     as a young child    Pre-operative cardiovascular examination     Sciatica     Unspecified cerebral artery occlusion with cerebral infarction     TIA? no residual     Past Surgical History:   Procedure Laterality Date    BREAST SURGERY PROCEDURE UNLISTED      initial surgery 2006, then left modified radical mastectomy 6/2012    HX BREAST BIOPSY  4/11/12    Left    HX BREAST BIOPSY Left     left mastectomy     No Known Allergies  Current Outpatient Medications   Medication Sig Dispense Refill    losartan (COZAAR) 100 mg tablet take 1 tablet by mouth once daily 90 Tab 3    ibuprofen (MOTRIN) 800 mg tablet Take 1 Tab by mouth every six (6) hours as needed. 90 Tab 1    metoprolol tartrate (LOPRESSOR) 25 mg tablet take 1 tablet by mouth twice a day 60 Tab 6    furosemide (LASIX) 40 mg tablet Take 1 Tab by mouth daily. 90 Tab 3    oxyCODONE-acetaminophen (PERCOCET) 5-325 mg per tablet Take 1 Tab by mouth every four (4) hours as needed for Pain. Max Daily Amount: 6 Tabs. 6 Tab 0    HYDROcodone-acetaminophen (NORCO) 7.5-325 mg per tablet Take 1 Tab by mouth every eight (8) hours as needed for Pain. Max Daily Amount: 3 Tabs. 21 Tab 0    naproxen (NAPROSYN) 500 mg tablet Take 1 Tab by mouth two (2) times daily (with meals). 20 Tab 0    cyclobenzaprine (FLEXERIL) 5 mg tablet Take 1 Tab by mouth nightly. 7 Tab 0    gabapentin (NEURONTIN) 100 mg capsule Take 2 Caps by mouth three (3) times daily. Indications: NEUROPATHIC PAIN 90 Cap 1    dicyclomine (BENTYL) 20 mg tablet   0    diclofenac (VOLTAREN) 1 % gel Apply  to affected area every six (6) hours. Apply 4 grams to affected joint up to 4 times per day, maximum 16 grams per joint per day  Dispense 5 100 gram tubes 100 g 4    desonide (TRIDESILON) 0.05 % cream   0    ALBUTEROL SULFATE (PROAIR HFA IN) Take  by inhalation.  FLUTICASONE/SALMETEROL (ADVAIR HFA IN) Take  by inhalation.  montelukast (SINGULAIR) 10 mg tablet Take 10 mg by mouth daily.       tiotropium (SPIRIVA WITH HANDIHALER) 18 mcg inhalation capsule Take 1 Cap by inhalation daily.  amLODIPine (NORVASC) 10 mg tablet Take  by mouth daily.  bethanechol (URECHOLINE) 10 mg tablet Take 10 mg by mouth Before breakfast, lunch, and dinner.  esomeprazole (NEXIUM) 40 mg capsule Take  by mouth daily.  aspirin 81 mg tablet Take 81 mg by mouth.  ergocalciferol (VITAMIN D2) 50,000 unit capsule Take 50,000 Units by mouth Every Thursday.  diclofenac (VOLTAREN) 1 % gel Apply  to affected area four (4) times daily. 100 g 4    lidocaine (LIDODERM) 5 % apply 1 patch to the affected area daily. Leave on for 12 hours and then off for 12 hours. 5 Patch 2     Social History     Socioeconomic History    Marital status:      Spouse name: Not on file    Number of children: Not on file    Years of education: Not on file    Highest education level: Not on file   Social Needs    Financial resource strain: Not on file    Food insecurity - worry: Not on file    Food insecurity - inability: Not on file    Transportation needs - medical: Not on file   [employer] needs - non-medical: Not on file   Occupational History    Not on file   Tobacco Use    Smoking status: Never Smoker    Smokeless tobacco: Never Used   Substance and Sexual Activity    Alcohol use: No     Alcohol/week: 0.0 oz    Drug use: No    Sexual activity: Not on file   Other Topics Concern    Not on file   Social History Narrative    ** Merged History Encounter **          Family History   Problem Relation Age of Onset    Cancer Mother     Cancer Father     Arthritis-osteo Other     Hypertension Other     Heart Disease Neg Hx         Negative family history of premature CAD or CVA         Review of systems:  Patient denies any reflux, emesis, change in bowel habits, difficulties voiding, hematochezia, melena, fever, unintentional weight loss, fatigue, chills, palpitations, hypertension, dizziness, or fainting.  Patient denies no new onset chest pain, abdominal pain, new, sharp or differing headache, or new onset shortness of breath. C/o wheezing, cough, edema to LUE. C/o difficulty moving and using right leg. Physical Examination    Visit Vitals  /78   Pulse 88   Temp 98.9 °F (37.2 °C) (Oral)   Resp 15   Ht 5' 6\" (1.676 m)   Wt 92.1 kg (203 lb)   SpO2 99% Comment: Patient on O2. BMI 32.77 kg/m²      Well developed well nourished female in no apparent distress  Head: normocephalic, atraumatic  Neck: supple, no masses, no adenopathy, trachea midline  Resp: left lung clear to auscultation, wheezes to right, upper lobe on inspiration, no rhonchi or rales  Cardio: Regular rate and rhythm, no murmurs, no edema or varicosities  Abdomen: soft, nontender, nondistended, normoactive bowel sounds, no hernias  Back: deferred  Extremeties: warm, well-perfused, tenderness and swelling to left hand, arm, shoulder, chest, and axilla. Limited ROM to RLE. Neuro: sensation and strength grossly intact and symmetrical  Psych: alert and oriented to person, place and time      IMPRESSION  Patient with post-mastectomy lymphedema syndrome requiring continuous lymphedema therapy in the form of compression and home pump. She is unable to perform this therapy without assistance. PLAN  No orders of the defined types were placed in this encounter. Continue current lymphedema therapy  Follow up for already scheduled mammogram and clinical breast exam  Follow up for lymphedema as needed  Please call with any questions or concerns prior to next visit    Ms. Santy Kwon has a reminder for a \"due or due soon\" health maintenance. I have asked that she contact her primary care provider for follow-up on this health maintenance. Renea Wright.  George Nash, FNP-C

## 2019-02-25 NOTE — PATIENT INSTRUCTIONS
Lymphedema: Care Instructions  Your Care Instructions    Lymphedema is fluid that builds up in the arms or legs. It is often caused by surgery to remove lymph nodes during cancer treatment, especially breast cancer surgery, which can cause fluid to build up in the arm. It can happen after radiation treatment to an area that involves lymph nodes. It also can be caused by a fractured bone or surgery to fix a fracture. And some medicines also can cause lymphedema. Some people get it for unknown reasons. Normally, lymph nodes trap bacteria and other substances as fluid flows through them. Then, the white cells in the body's defense, or immune, system can destroy the substances. But if there are few or no lymph nodes--or if the lymph system in an arm or leg has been damaged--fluid can build up in the affected arm or leg. You can take simple steps at home to help treat or prevent fluid buildup. Treatment may include raising the arm or leg to let gravity drain the fluid. You also can wear compression stockings or sleeves. Follow-up care is a key part of your treatment and safety. Be sure to make and go to all appointments, and call your doctor if you are having problems. It's also a good idea to know your test results and keep a list of the medicines you take. How can you care for yourself at home? · Wear a compression stocking or sleeve as your doctor suggests. It can help keep fluid from pooling in an arm or leg. Wear it during air travel. · Prop up the swollen arm or leg on a pillow anytime you sit or lie down. Try to keep it above the level of your heart. This will help reduce swelling. · Avoid crossing your legs if your legs are swollen. · Get some exercise on most days of the week. Increase the intensity of exercise slowly. Water aerobics can help reduce swelling by helping fluid move around. Wear your compression stocking or sleeve during exercise, but not during water exercise.   · See a physical therapist. He or she can teach you how to do self-massage to help fluid move around. You also can learn what activities would be best for you. · Keep your feet clean and wear clean socks or stockings every day. Check your feet often for signs of infection, such as redness or heat. Do not walk barefoot. · If you have had lymph nodes removed from under your arm:  ? Do not have blood drawn from the arm on the side of the lymph node surgery. ? Do not allow a blood pressure cuff to be placed on that arm. If you are in the hospital, make sure your nurse and other hospital staff know of your condition. ? Wear gloves when gardening or doing other activities that may lead to cuts on your fingers or hands. · If you have had lymph nodes removed from your groin:  ? Bathe your feet daily in lukewarm, not hot, water. Use a mild soap that has a moisturizer, or use a moisturizer separately. ? Check your feet for blisters or cuts. ? Wear comfortable and supportive shoes that fit properly. ? Wear the correct size of panty hose and stockings. Avoid garters or knee-high or thigh-high stockings. · Ask your doctor how to treat any cuts, scratches, insect bites, or other injuries that may occur. · Use sunscreen and insect repellent when outdoors to protect your skin from sunburn and insect bites. · Wear medical alert jewelry that says you have lymphedema. You can buy these at most drugstores and on the Internet. When should you call for help? Call your doctor now or seek immediate medical care if:    · You have signs of infection, such as:  ? Increased pain, swelling, warmth, or redness. ? Red streaks leading from the area. ? Pus draining from the area. ? A fever.    Watch closely for changes in your health, and be sure to contact your doctor if:    · You have new or worse symptoms from lymphedema.     · You do not get better as expected. Where can you learn more?   Go to http://laura-jessie.info/. Enter V398 in the search box to learn more about \"Lymphedema: Care Instructions. \"  Current as of: March 27, 2018  Content Version: 11.9  © 1823-9635 [employer]. Care instructions adapted under license by [employer] (which disclaims liability or warranty for this information). If you have questions about a medical condition or this instruction, always ask your healthcare professional. [place] [address] any warranty or liability for your use of this information.

## 2019-03-13 DIAGNOSIS — M25.561 PAIN IN BOTH KNEES, UNSPECIFIED CHRONICITY: ICD-10-CM

## 2019-03-13 DIAGNOSIS — M25.562 PAIN IN BOTH KNEES, UNSPECIFIED CHRONICITY: ICD-10-CM

## 2019-03-13 RX ORDER — IBUPROFEN 800 MG/1
TABLET ORAL
Qty: 90 TAB | Refills: 1 | Status: SHIPPED | OUTPATIENT
Start: 2019-03-13 | End: 2019-08-09 | Stop reason: SDUPTHER

## 2019-04-17 ENCOUNTER — OFFICE VISIT (OUTPATIENT)
Dept: CARDIOLOGY CLINIC | Age: 69
End: 2019-04-17

## 2019-04-17 VITALS
HEIGHT: 66 IN | SYSTOLIC BLOOD PRESSURE: 132 MMHG | HEART RATE: 83 BPM | BODY MASS INDEX: 32.47 KG/M2 | DIASTOLIC BLOOD PRESSURE: 71 MMHG | WEIGHT: 202 LBS

## 2019-04-17 DIAGNOSIS — I10 ESSENTIAL HYPERTENSION, BENIGN: ICD-10-CM

## 2019-04-17 DIAGNOSIS — J45.30 MILD PERSISTENT ASTHMA WITHOUT COMPLICATION: ICD-10-CM

## 2019-04-17 DIAGNOSIS — E78.5 DYSLIPIDEMIA: ICD-10-CM

## 2019-04-17 DIAGNOSIS — I50.32 CHRONIC DIASTOLIC CONGESTIVE HEART FAILURE (HCC): Primary | ICD-10-CM

## 2019-04-17 NOTE — PROGRESS NOTES
1. Have you been to the ER, urgent care clinic since your last visit? Hospitalized since your last visit? No     2. Have you seen or consulted any other health care providers outside of the [address] [place] [address] since your last visit? Include any pap smears or colon screening. No     3. Since your last visit, have you had any of the following symptoms? shortness of breath. 4.  Have you had any blood work, X-rays or cardiac testing? No         5. Where do you normally have your labs drawn? [facility]    6. Do you need any refills today?    No

## 2019-04-17 NOTE — PROGRESS NOTES
HISTORY OF PRESENT ILLNESS  Zuleika Almanza is a 80 y.o. female. Hypertension   The history is provided by the patient. This is a chronic problem. The current episode started more than 1 week ago. The problem occurs every several days. The problem has not changed since onset. Associated symptoms include shortness of breath. Pertinent negatives include no chest pain. Shortness of Breath   The history is provided by the patient. This is a chronic problem. The problem occurs intermittently. The current episode started more than 1 week ago. The problem has been gradually improving. Pertinent negatives include no ear pain, no neck pain, no wheezing, no chest pain, no rash and no leg swelling. Associated medical issues include heart failure. Associated medical issues do not include chronic lung disease or CAD. Review of Systems   Constitutional: Negative for chills and weight loss. HENT: Negative for congestion, ear discharge, ear pain, hearing loss, nosebleeds and tinnitus. Eyes: Negative for blurred vision. Respiratory: Positive for shortness of breath. Negative for wheezing and stridor. Cardiovascular: Negative for chest pain and leg swelling. Gastrointestinal: Negative for heartburn. Musculoskeletal: Negative for myalgias and neck pain. Skin: Negative for itching and rash. Neurological: Negative for tingling, tremors, focal weakness and loss of consciousness. Psychiatric/Behavioral: Negative for depression and suicidal ideas. Family History   Problem Relation Age of Onset   25 [facility] Cancer Mother     Cancer Father     Arthritis-osteo Other     Hypertension Other     Heart Disease Neg Hx         Negative family history of premature CAD or CVA       Past Medical History:   Diagnosis Date    Anemia     Asthma     Blood disorder     Cancer ([facility] [address]     breast, diagnosed 2006 left    Cancer ([facility] [address]     Chest pain, unspecified     The working diagnosis is chest pain.   The differential diagnosis includes chest wall pain, stable angina.  Chronic obstructive pulmonary disease (HCC)     Essential hypertension     Essential hypertension, benign     Uncontrolled     GERD (gastroesophageal reflux disease)     Hypercholesterolemia     Hypertension     Neuropathy     Nonspecific abnormal electrocardiogram (ECG) (EKG)     Obesity, unspecified     Weight loss has been strongly encouraged by following dietary restrictions and an exercise routine.  Polio     as a young child    Pre-operative cardiovascular examination     Sciatica     Unspecified cerebral artery occlusion with cerebral infarction     TIA? no residual       Past Surgical History:   Procedure Laterality Date    BREAST SURGERY PROCEDURE UNLISTED      initial surgery 2006, then left modified radical mastectomy 6/2012    HX BREAST BIOPSY  4/11/12    Left    HX BREAST BIOPSY Left     left mastectomy       Social History     Tobacco Use    Smoking status: Never Smoker    Smokeless tobacco: Never Used   Substance Use Topics    Alcohol use: No     Alcohol/week: 0.0 oz       No Known Allergies    Outpatient Medications Marked as Taking for the 4/17/19 encounter (Office Visit) with Sae Rees MD   Medication Sig Dispense Refill    ibuprofen (MOTRIN) 800 mg tablet take 1 tablet by mouth every 6 hours if needed 90 Tab 1    losartan (COZAAR) 100 mg tablet take 1 tablet by mouth once daily 90 Tab 3    metoprolol tartrate (LOPRESSOR) 25 mg tablet take 1 tablet by mouth twice a day 60 Tab 6    lidocaine (LIDODERM) 5 % apply 1 patch to the affected area daily. Leave on for 12 hours and then off for 12 hours. 5 Patch 2    furosemide (LASIX) 40 mg tablet Take 1 Tab by mouth daily. 90 Tab 3    oxyCODONE-acetaminophen (PERCOCET) 5-325 mg per tablet Take 1 Tab by mouth every four (4) hours as needed for Pain. Max Daily Amount: 6 Tabs.  6 Tab 0    HYDROcodone-acetaminophen (NORCO) 7.5-325 mg per tablet Take 1 Tab by mouth every eight (8) hours as needed for Pain. Max Daily Amount: 3 Tabs. 21 Tab 0    naproxen (NAPROSYN) 500 mg tablet Take 1 Tab by mouth two (2) times daily (with meals). 20 Tab 0    gabapentin (NEURONTIN) 100 mg capsule Take 2 Caps by mouth three (3) times daily. Indications: NEUROPATHIC PAIN 90 Cap 1    dicyclomine (BENTYL) 20 mg tablet   0    diclofenac (VOLTAREN) 1 % gel Apply  to affected area every six (6) hours. Apply 4 grams to affected joint up to 4 times per day, maximum 16 grams per joint per day  Dispense 5 100 gram tubes 100 g 4    desonide (TRIDESILON) 0.05 % cream   0    ALBUTEROL SULFATE (PROAIR HFA IN) Take  by inhalation.  FLUTICASONE/SALMETEROL (ADVAIR HFA IN) Take  by inhalation.  montelukast (SINGULAIR) 10 mg tablet Take 10 mg by mouth daily.  tiotropium (SPIRIVA WITH HANDIHALER) 18 mcg inhalation capsule Take 1 Cap by inhalation daily.  amLODIPine (NORVASC) 10 mg tablet Take  by mouth daily.  bethanechol (URECHOLINE) 10 mg tablet Take 10 mg by mouth Before breakfast, lunch, and dinner.  esomeprazole (NEXIUM) 40 mg capsule Take  by mouth daily.  aspirin 81 mg tablet Take 81 mg by mouth.  ergocalciferol (VITAMIN D2) 50,000 unit capsule Take 50,000 Units by mouth Every Thursday. Visit Vitals  /71   Pulse 83   Ht 5' 6\" (1.676 m)   Wt 91.6 kg (202 lb)   BMI 32.60 kg/m²     Physical Exam   Constitutional: She is oriented to person, place, and time. She appears well-developed and well-nourished. No distress. HENT:   Head: Atraumatic. Mouth/Throat: No oropharyngeal exudate. Eyes: Conjunctivae are normal. Right eye exhibits no discharge. Left eye exhibits no discharge. No scleral icterus. Neck: Normal range of motion. Neck supple. No JVD present. No tracheal deviation present. No thyromegaly present. Cardiovascular: Normal rate and regular rhythm. Exam reveals no gallop. No murmur heard.   Pulmonary/Chest: Effort normal. No stridor. She has wheezes (trace diffuse rhonchi). She has no rales. Abdominal: Soft. There is no tenderness. There is no rebound and no guarding. Musculoskeletal: Normal range of motion. She exhibits no edema or tenderness. Lymphadenopathy:     She has no cervical adenopathy. Neurological: She is alert and oriented to person, place, and time. She exhibits normal muscle tone. Skin: Skin is warm. She is not diaphoretic. Psychiatric: She has a normal mood and affect. Her behavior is normal.     ekg sinus rhythm with no acute st-t changes  Echo 10/2017:  SUMMARY:  Left ventricle: Systolic function was normal. Ejection fraction was  estimated in the range of 55 % to 60 %. There were no regional wall motion  abnormalities. There was mild concentric hypertrophy. Doppler parameters  were consistent with abnormal left ventricular relaxation (grade 1  diastolic dysfunction). Mitral valve: There was mild regurgitation. Tricuspid valve: There was mild regurgitation. ASSESSMENT and PLAN    ICD-10-CM ICD-9-CM    1. Chronic diastolic congestive heart failure (HCC) I50.32 428.32      428.0     NYHA class II   2. Essential hypertension, benign I10 401.1    3. Dyslipidemia E78.5 272.4    4. Mild persistent asthma without complication L98.64 446.76      No orders of the defined types were placed in this encounter. Follow-up and Dispositions    · Return in about 9 months (around 1/17/2020). current treatment plan is effective, no change in therapy  reviewed diet, exercise and weight control. Patient with longstanding htn seen for worsening dyspnea likely from dietary non discretion  CHF improving on current meds  Seen by pulmonary recently and currently on home O2    Patient seen for follow-up. Complains of recent worsening shortness of breath likely from the pollen allergy. No orthopnea or PND. No lower extremity edema. Claims that she is compliant with salt restriction. Follow-up in 9 months.

## 2019-04-17 NOTE — PATIENT INSTRUCTIONS
High Blood Pressure: Care Instructions  Overview    It's normal for blood pressure to go up and down throughout the day. But if it stays up, you have high blood pressure. Another name for high blood pressure is hypertension. Despite what a lot of people think, high blood pressure usually doesn't cause headaches or make you feel dizzy or lightheaded. It usually has no symptoms. But it does increase your risk of stroke, heart attack, and other problems. You and your doctor will talk about your risks of these problems based on your blood pressure. Your doctor will give you a goal for your blood pressure. Your goal will be based on your health and your age. Lifestyle changes, such as eating healthy and being active, are always important to help lower blood pressure. You might also take medicine to reach your blood pressure goal.  Follow-up care is a key part of your treatment and safety. Be sure to make and go to all appointments, and call your doctor if you are having problems. It's also a good idea to know your test results and keep a list of the medicines you take. How can you care for yourself at home? Medical treatment  · If you stop taking your medicine, your blood pressure will go back up. You may take one or more types of medicine to lower your blood pressure. Be safe with medicines. Take your medicine exactly as prescribed. Call your doctor if you think you are having a problem with your medicine. · Talk to your doctor before you start taking aspirin every day. Aspirin can help certain people lower their risk of a heart attack or stroke. But taking aspirin isn't right for everyone, because it can cause serious bleeding. · See your doctor regularly. You may need to see the doctor more often at first or until your blood pressure comes down. · If you are taking blood pressure medicine, talk to your doctor before you take decongestants or anti-inflammatory medicine, such as ibuprofen.  Some of these medicines can raise blood pressure. · Learn how to check your blood pressure at home. Lifestyle changes  · Stay at a healthy weight. This is especially important if you put on weight around the waist. Losing even 10 pounds can help you lower your blood pressure. · If your doctor recommends it, get more exercise. Walking is a good choice. Bit by bit, increase the amount you walk every day. Try for at least 30 minutes on most days of the week. You also may want to swim, bike, or do other activities. · Avoid or limit alcohol. Talk to your doctor about whether you can drink any alcohol. · Try to limit how much sodium you eat to less than 2,300 milligrams (mg) a day. Your doctor may ask you to try to eat less than 1,500 mg a day. · Eat plenty of fruits (such as bananas and oranges), vegetables, legumes, whole grains, and low-fat dairy products. · Lower the amount of saturated fat in your diet. Saturated fat is found in animal products such as milk, cheese, and meat. Limiting these foods may help you lose weight and also lower your risk for heart disease. · Do not smoke. Smoking increases your risk for heart attack and stroke. If you need help quitting, talk to your doctor about stop-smoking programs and medicines. These can increase your chances of quitting for good. When should you call for help? Call 911 anytime you think you may need emergency care. This may mean having symptoms that suggest that your blood pressure is causing a serious heart or blood vessel problem. Your blood pressure may be over 180/120.   For example, call 911 if:    · You have symptoms of a heart attack. These may include:  ? Chest pain or pressure, or a strange feeling in the chest.  ? Sweating. ? Shortness of breath. ? Nausea or vomiting. ? Pain, pressure, or a strange feeling in the back, neck, jaw, or upper belly or in one or both shoulders or arms. ? Lightheadedness or sudden weakness.   ? A fast or irregular heartbeat.     · You have symptoms of a stroke. These may include:  ? Sudden numbness, tingling, weakness, or loss of movement in your face, arm, or leg, especially on only one side of your body. ? Sudden vision changes. ? Sudden trouble speaking. ? Sudden confusion or trouble understanding simple statements. ? Sudden problems with walking or balance. ? A sudden, severe headache that is different from past headaches.     · You have severe back or belly pain.    Do not wait until your blood pressure comes down on its own. Get help right away.   Call your doctor now or seek immediate care if:    · Your blood pressure is much higher than normal (such as 180/120 or higher), but you don't have symptoms.     · You think high blood pressure is causing symptoms, such as:  ? Severe headache.  ? Blurry vision.    Watch closely for changes in your health, and be sure to contact your doctor if:    · Your blood pressure measures higher than your doctor recommends at least 2 times. That means the top number is higher or the bottom number is higher, or both.     · You think you may be having side effects from your blood pressure medicine. Where can you learn more? Go to http://laura-jessie.info/. Enter D326 in the search box to learn more about \"High Blood Pressure: Care Instructions. \"  Current as of: July 22, 2018  Content Version: 11.9  © 0289-2999 [employer]. Care instructions adapted under license by [employer] (which disclaims liability or warranty for this information). If you have questions about a medical condition or this instruction, always ask your healthcare professional. [address] any warranty or liability for your use of this information. Cloud[employer] Activation    Thank you for requesting access to [employer]. Please follow the instructions below to securely access and download your online medical record.  Cloudwise allows you to send messages to your doctor, view your test results, renew your prescriptions, schedule appointments, and more. How Do I Sign Up? 1. In your internet browser, go to [email or web address]. 2. Click on the First Time User? Click Here link in the Sign In box. You will see the New Member Sign Up page. 3. Enter your SparkBase Access Code exactly as it appears below. You will not need to use this code after youve completed the sign-up process. If you do not sign up before the expiration date, you must request a new code. Spark[employer] Access Code: [email or web address]  Expires: 2019 11:48 AM (This is the date your [employer]Base access code will )    4. Enter the last four digits of your Social Security Number (xxxx) and Date of Birth (mm/dd/yyyy) as indicated and click Submit. You will be taken to the next sign-up page. 5. Create a SparkBase ID. This will be your SparkBase login ID and cannot be changed, so think of one that is secure and easy to remember. 6. Create a SparkBase password. You can change your password at any time. 7. Enter your Password Reset Question and Answer. This can be used at a later time if you forget your password. 8. Enter your e-mail address. You will receive e-mail notification when new information is available in [address] 9. Click Sign Up. You can now view and download portions of your medical record. 10. Click the Download Summary menu link to download a portable copy of your medical information. Additional Information    If you have questions, please visit the Frequently Asked Questions section of the [employer] website at [email or web address] Remember, [employer] is NOT to be used for urgent needs. For medical emergencies, dial 911.

## 2019-04-17 NOTE — LETTER
Jacqueline Price 1950 4/17/2019 Dear Mary Delgadillo, MD Arslan Croft, MD Kecia Medrano, MD Jerel Cosby, MD Cristhian Gar, NP Delma Fleischer, MD 
 
I had the pleasure of evaluating  Ms. Rachel Duffy in office today. Below are the relevant portions of my assessment and plan of care. ICD-10-CM ICD-9-CM 1. Chronic diastolic congestive heart failure (HCC) I50.32 428.32   
  428.0 NYHA class II 2. Essential hypertension, benign I10 401.1 3. Dyslipidemia E78.5 272.4 4. Mild persistent asthma without complication V18.40 853.73 Current Outpatient Medications Medication Sig Dispense Refill  ibuprofen (MOTRIN) 800 mg tablet take 1 tablet by mouth every 6 hours if needed 90 Tab 1  
 losartan (COZAAR) 100 mg tablet take 1 tablet by mouth once daily 90 Tab 3  
 metoprolol tartrate (LOPRESSOR) 25 mg tablet take 1 tablet by mouth twice a day 60 Tab 6  
 lidocaine (LIDODERM) 5 % apply 1 patch to the affected area daily. Leave on for 12 hours and then off for 12 hours. 5 Patch 2  
 furosemide (LASIX) 40 mg tablet Take 1 Tab by mouth daily. 90 Tab 3  
 oxyCODONE-acetaminophen (PERCOCET) 5-325 mg per tablet Take 1 Tab by mouth every four (4) hours as needed for Pain. Max Daily Amount: 6 Tabs. 6 Tab 0  
 HYDROcodone-acetaminophen (NORCO) 7.5-325 mg per tablet Take 1 Tab by mouth every eight (8) hours as needed for Pain. Max Daily Amount: 3 Tabs. 21 Tab 0  
 naproxen (NAPROSYN) 500 mg tablet Take 1 Tab by mouth two (2) times daily (with meals). 20 Tab 0  
 gabapentin (NEURONTIN) 100 mg capsule Take 2 Caps by mouth three (3) times daily. Indications: NEUROPATHIC PAIN 90 Cap 1  dicyclomine (BENTYL) 20 mg tablet   0  
 diclofenac (VOLTAREN) 1 % gel Apply  to affected area every six (6) hours. Apply 4 grams to affected joint up to 4 times per day, maximum 16 grams per joint per day Dispense 5 100 gram tubes 100 g 4  
  desonide (TRIDESILON) 0.05 % cream   0  
 ALBUTEROL SULFATE (PROAIR HFA IN) Take  by inhalation.  FLUTICASONE/SALMETEROL (ADVAIR HFA IN) Take  by inhalation.  montelukast (SINGULAIR) 10 mg tablet Take 10 mg by mouth daily.  tiotropium (SPIRIVA WITH HANDIHALER) 18 mcg inhalation capsule Take 1 Cap by inhalation daily.  amLODIPine (NORVASC) 10 mg tablet Take  by mouth daily.  bethanechol (URECHOLINE) 10 mg tablet Take 10 mg by mouth Before breakfast, lunch, and dinner.  esomeprazole (NEXIUM) 40 mg capsule Take  by mouth daily.  aspirin 81 mg tablet Take 81 mg by mouth.  ergocalciferol (VITAMIN D2) 50,000 unit capsule Take 50,000 Units by mouth Every Thursday.  cyclobenzaprine (FLEXERIL) 5 mg tablet Take 1 Tab by mouth nightly. 7 Tab 0 No orders of the defined types were placed in this encounter. If you have questions, please do not hesitate to call me. I look forward to following Ms. Aurora Jones along with you.  
 
Sincerely, 
Dasia Álvarez MD

## 2019-04-26 ENCOUNTER — OFFICE VISIT (OUTPATIENT)
Dept: ORTHOPEDIC SURGERY | Age: 69
End: 2019-04-26

## 2019-04-26 VITALS
TEMPERATURE: 97.7 F | BODY MASS INDEX: 32.47 KG/M2 | RESPIRATION RATE: 20 BRPM | HEART RATE: 100 BPM | HEIGHT: 66 IN | WEIGHT: 202 LBS | OXYGEN SATURATION: 99 % | SYSTOLIC BLOOD PRESSURE: 154 MMHG | DIASTOLIC BLOOD PRESSURE: 89 MMHG

## 2019-04-26 DIAGNOSIS — M17.12 PRIMARY OSTEOARTHRITIS OF LEFT KNEE: Primary | ICD-10-CM

## 2019-04-26 RX ORDER — BETAMETHASONE SODIUM PHOSPHATE AND BETAMETHASONE ACETATE 3; 3 MG/ML; MG/ML
6 INJECTION, SUSPENSION INTRA-ARTICULAR; INTRALESIONAL; INTRAMUSCULAR; SOFT TISSUE ONCE
Qty: 1 ML | Refills: 0
Start: 2019-04-26 | End: 2019-04-26

## 2019-04-26 NOTE — PROGRESS NOTES
Janice Alvarenga is a 80 y.o. female (: 1950) presenting to address:    Chief Complaint   Patient presents with    Knee Pain     bilat       Medication list and allergies have been reviewed with Janice Alvarenga and updated as of today's date. I have gone over all Medical, Surgical and Social History with Janice Alvarenga and updated/added the information accordingly. 1. Have you been to the ER, Urgent Care or Hospitalized since your last visit? NO      2. Have you followed up with your PCP or any other Physicians since your procedure/ last office visit? NO    3 most recent PHQ Screens 2019   Little interest or pleasure in doing things Not at all   Feeling down, depressed, irritable, or hopeless Not at all   Total Score PHQ 2 0     Fall Risk Assessment, last 12 mths 2019   Able to walk? Yes   Fall in past 12 months? Yes   Fall with injury?  No   Number of falls in past 12 months 2   Fall Risk Score 2       VORB: No orders of the defined types were placed in this encounter.  Yissel Palacio PA-C/Andrew Gan

## 2019-04-26 NOTE — PROGRESS NOTES
[facility] [address]  [phone]           Patient: Magdalena Logna                MRN: [id number]       SSN: [id number]  YOB: 1950        AGE: 80 y.o. SEX: female  Body mass index is 32.6 kg/m². PCP: Bhavani Bartlett MD  04/26/19      This office note has been dictated. REVIEW OF SYSTEMS:  Constitutional: Negative for fever, chills, weight loss and malaise/fatigue. HENT: Negative. Eyes: Negative. Respiratory: Negative. Cardiovascular: Negative. Gastrointestinal: No bowel incontinence or constipation. Genitourinary: No bladder incontinence or saddle anesthesia. Skin: Negative. Neurological: Negative. Endo/Heme/Allergies: Negative. Psychiatric/Behavioral: Negative. Musculoskeletal: As per HPI above. Past Medical History:   Diagnosis Date    Anemia     Asthma     Blood disorder     Cancer ([facility] [address]     breast, diagnosed 2006 left    Cancer [facility])     Chest pain, unspecified     The working diagnosis is chest pain. The differential diagnosis includes chest wall pain, stable angina.  Chronic obstructive pulmonary disease (HCC)     Essential hypertension     Essential hypertension, benign     Uncontrolled     GERD (gastroesophageal reflux disease)     Hypercholesterolemia     Hypertension     Neuropathy     Nonspecific abnormal electrocardiogram (ECG) (EKG)     Obesity, unspecified     Weight loss has been strongly encouraged by following dietary restrictions and an exercise routine.     Polio     as a young child    Pre-operative cardiovascular examination     Sciatica     Sleep apnea     Unspecified cerebral artery occlusion with cerebral infarction     TIA? no residual         Current Outpatient Medications:     betamethasone (CELESTONE SOLUSPAN) 6 mg/mL injection, 1 mL by Intra artICUlar route once for 1 dose., Disp: 1 mL, Rfl: 0    ibuprofen (MOTRIN) 800 mg tablet, take 1 tablet by mouth every 6 hours if needed, Disp: 90 Tab, Rfl: 1    losartan (COZAAR) 100 mg tablet, take 1 tablet by mouth once daily, Disp: 90 Tab, Rfl: 3    metoprolol tartrate (LOPRESSOR) 25 mg tablet, take 1 tablet by mouth twice a day, Disp: 60 Tab, Rfl: 6    lidocaine (LIDODERM) 5 %, apply 1 patch to the affected area daily. Leave on for 12 hours and then off for 12 hours. , Disp: 5 Patch, Rfl: 2    furosemide (LASIX) 40 mg tablet, Take 1 Tab by mouth daily. , Disp: 90 Tab, Rfl: 3    oxyCODONE-acetaminophen (PERCOCET) 5-325 mg per tablet, Take 1 Tab by mouth every four (4) hours as needed for Pain. Max Daily Amount: 6 Tabs., Disp: 6 Tab, Rfl: 0    HYDROcodone-acetaminophen (NORCO) 7.5-325 mg per tablet, Take 1 Tab by mouth every eight (8) hours as needed for Pain. Max Daily Amount: 3 Tabs., Disp: 21 Tab, Rfl: 0    naproxen (NAPROSYN) 500 mg tablet, Take 1 Tab by mouth two (2) times daily (with meals). , Disp: 20 Tab, Rfl: 0    gabapentin (NEURONTIN) 100 mg capsule, Take 2 Caps by mouth three (3) times daily. Indications: NEUROPATHIC PAIN, Disp: 90 Cap, Rfl: 1    dicyclomine (BENTYL) 20 mg tablet, , Disp: , Rfl: 0    diclofenac (VOLTAREN) 1 % gel, Apply  to affected area every six (6) hours. Apply 4 grams to affected joint up to 4 times per day, maximum 16 grams per joint per day Dispense 5 100 gram tubes, Disp: 100 g, Rfl: 4    desonide (TRIDESILON) 0.05 % cream, , Disp: , Rfl: 0    ALBUTEROL SULFATE (PROAIR HFA IN), Take  by inhalation. , Disp: , Rfl:     FLUTICASONE/SALMETEROL (ADVAIR HFA IN), Take  by inhalation. , Disp: , Rfl:     montelukast (SINGULAIR) 10 mg tablet, Take 10 mg by mouth daily. , Disp: , Rfl:     tiotropium (SPIRIVA WITH HANDIHALER) 18 mcg inhalation capsule, Take 1 Cap by inhalation daily. , Disp: , Rfl:     amLODIPine (NORVASC) 10 mg tablet, Take  by mouth daily. , Disp: , Rfl:     bethanechol (URECHOLINE) 10 mg tablet, Take 10 mg by mouth Before breakfast, lunch, and dinner., Disp: , Rfl:     esomeprazole (NEXIUM) 40 mg capsule, Take  by mouth daily. , Disp: , Rfl:     aspirin 81 mg tablet, Take 81 mg by mouth.  , Disp: , Rfl:     ergocalciferol (VITAMIN D2) 50,000 unit capsule, Take 50,000 Units by mouth Every Thursday. , Disp: , Rfl:     cyclobenzaprine (FLEXERIL) 5 mg tablet, Take 1 Tab by mouth nightly., Disp: 7 Tab, Rfl: 0    No Known Allergies    Social History     Socioeconomic History    Marital status:      Spouse name: Not on file    Number of children: Not on file    Years of education: Not on file    Highest education level: Not on file   Occupational History    Not on file   Social Needs    Financial resource strain: Not on file    Food insecurity:     Worry: Not on file     Inability: Not on file    Transportation needs:     Medical: Not on file     Non-medical: Not on file   Tobacco Use    Smoking status: Never Smoker    Smokeless tobacco: Never Used   Substance and Sexual Activity    Alcohol use: No     Alcohol/week: 0.0 oz    Drug use: No    Sexual activity: Not on file   Lifestyle    Physical activity:     Days per week: Not on file     Minutes per session: Not on file    Stress: Not on file   Relationships    Social connections:     Talks on phone: Not on file     Gets together: Not on file     Attends [employer]ical service: Not on file     Active member of club or organization: Not on file     Attends meetings of clubs or organizations: Not on file     Relationship status: Not on file    Intimate partner violence:     Fear of current or ex partner: Not on file     Emotionally abused: Not on file     Physically abused: Not on file     Forced sexual activity: Not on file   Other Topics Concern    Not on file   Social History Narrative    ** Merged History Encounter **            Past Surgical History:   Procedure Laterality Date    BREAST SURGERY PROCEDURE UNLISTED      initial surgery 2006, then left modified radical mastectomy 6/2012    HX BREAST BIOPSY  4/11/12    Left    HX BREAST BIOPSY Left     left mastectomy           * Patient was identified by name and date of birth   * Agreement on procedure being performed was verified  * Risks and Benefits explained to the patient  * Procedure site verified and marked as necessary  * Patient was positioned for comfort  * Consent was signed and verified  3:36 PM    The patient was instructed on post injection care. We did see Ms. Mike Eckert for followup with regards to her left knee. The patient has known advancing arthritis of the left knee, particularly patellofemoral.  She gets some relief from cortisone. She does report that she is currently in pain management with Dr. Lucho Schofield and doing well with it. She has had no recent fevers, chills, systemic changes, or injuries to report, and no chest pain or shortness of breath. PHYSICAL EXAMINATION:  In general, the patient is alert and oriented x 3 in no acute distress. The patient is well-developed, well-nourished, with a normal affect. The patient is afebrile. HEENT:  Head is normocephalic and atraumatic. Pupils are equally round and reactive to light and accommodation. Extraocular eye movements are intact. Neck is supple. Trachea is midline. No JVD is present. Breathing is nonlabored. Examination of the lower extremities reveals pain-free range of motion of the hips. There is no pain to palpation of the greater trochanteric bursae. There is negative straight leg raise. There is negative calf tenderness. There is negative Asher's. There is no evidence of DVT present. The left knee reveals the skin is intact. There is no ecchymosis and no warmth. There is negative joint effusion and negative patellar ballottement. There are no signs for infection or cellulitis present. She does have discomfort with palpation and patellofemoral grind. There is crepitus anteriorly with range of motion activities.   There is some discomfort to palpation of the medial joint line. ASSESSMENT:  Left knee advancing osteoarthritis patellofemoral.     PLAN:  At this point, we are going to move forward with a repeat injection for the left knee today. After informed and written consent, and appropriate time out performed, under sterile conditions, with ultrasound-guided assistance, the left knee was prepped with Betadine and 6 mg of Celestone was injected to the left knee without complications. The patient tolerated the injection well. The patient was instructed on post injection care. We will see her back in the office in three months' time for evaluation. She will call with any questions or concerns that shall arise.                       JR Rojelio REN, PA-C, ATC

## 2019-05-07 ENCOUNTER — HOSPITAL ENCOUNTER (EMERGENCY)
Age: 69
Discharge: HOME | End: 2019-05-07
Attending: EMERGENCY MEDICINE
Payer: MEDICARE

## 2019-05-07 ENCOUNTER — APPOINTMENT (OUTPATIENT)
Dept: GENERAL RADIOLOGY | Age: 69
End: 2019-05-07
Attending: PHYSICIAN ASSISTANT
Payer: MEDICARE

## 2019-05-07 VITALS
BODY MASS INDEX: 33.66 KG/M2 | RESPIRATION RATE: 18 BRPM | HEART RATE: 85 BPM | SYSTOLIC BLOOD PRESSURE: 151 MMHG | DIASTOLIC BLOOD PRESSURE: 83 MMHG | HEIGHT: 65 IN | TEMPERATURE: 97.5 F | WEIGHT: 202 LBS | OXYGEN SATURATION: 96 %

## 2019-05-07 DIAGNOSIS — M54.10 RADICULOPATHY AFFECTING UPPER EXTREMITY: ICD-10-CM

## 2019-05-07 DIAGNOSIS — M25.562 PAIN IN BOTH KNEES, UNSPECIFIED CHRONICITY: ICD-10-CM

## 2019-05-07 DIAGNOSIS — M25.511 PAIN IN JOINT OF RIGHT SHOULDER: Primary | ICD-10-CM

## 2019-05-07 DIAGNOSIS — M25.561 PAIN IN BOTH KNEES, UNSPECIFIED CHRONICITY: ICD-10-CM

## 2019-05-07 LAB
ALBUMIN SERPL-MCNC: 3.1 G/DL (ref 3.4–5)
ALBUMIN/GLOB SERPL: 0.7 {RATIO} (ref 0.8–1.7)
ALP SERPL-CCNC: 82 U/L (ref 45–117)
ALT SERPL-CCNC: 13 U/L (ref 13–56)
ANION GAP SERPL CALC-SCNC: 4 MMOL/L (ref 3–18)
AST SERPL-CCNC: 15 U/L (ref 15–37)
ATRIAL RATE: 84 BPM
BASOPHILS # BLD: 0 K/UL (ref 0–0.1)
BASOPHILS NFR BLD: 0 % (ref 0–2)
BILIRUB SERPL-MCNC: 0.3 MG/DL (ref 0.2–1)
BUN SERPL-MCNC: 10 MG/DL (ref 7–18)
BUN/CREAT SERPL: 14 (ref 12–20)
CALCIUM SERPL-MCNC: 9.5 MG/DL (ref 8.5–10.1)
CALCULATED P AXIS, ECG09: 78 DEGREES
CALCULATED R AXIS, ECG10: 51 DEGREES
CALCULATED T AXIS, ECG11: 86 DEGREES
CHLORIDE SERPL-SCNC: 107 MMOL/L (ref 100–108)
CK MB CFR SERPL CALC: NORMAL % (ref 0–4)
CK MB SERPL-MCNC: <1 NG/ML (ref 5–25)
CK SERPL-CCNC: 80 U/L (ref 26–192)
CO2 SERPL-SCNC: 32 MMOL/L (ref 21–32)
CREAT SERPL-MCNC: 0.73 MG/DL (ref 0.6–1.3)
DIAGNOSIS, 93000: NORMAL
DIFFERENTIAL METHOD BLD: ABNORMAL
EOSINOPHIL # BLD: 0.1 K/UL (ref 0–0.4)
EOSINOPHIL NFR BLD: 1 % (ref 0–5)
ERYTHROCYTE [DISTWIDTH] IN BLOOD BY AUTOMATED COUNT: 15.9 % (ref 11.6–14.5)
GLOBULIN SER CALC-MCNC: 4.7 G/DL (ref 2–4)
GLUCOSE SERPL-MCNC: 93 MG/DL (ref 74–99)
HCT VFR BLD AUTO: 37.8 % (ref 35–45)
HGB BLD-MCNC: 11.4 G/DL (ref 12–16)
LYMPHOCYTES # BLD: 3.5 K/UL (ref 0.9–3.6)
LYMPHOCYTES NFR BLD: 38 % (ref 21–52)
MCH RBC QN AUTO: 26.3 PG (ref 24–34)
MCHC RBC AUTO-ENTMCNC: 30.2 G/DL (ref 31–37)
MCV RBC AUTO: 87.1 FL (ref 74–97)
MONOCYTES # BLD: 0.7 K/UL (ref 0.05–1.2)
MONOCYTES NFR BLD: 8 % (ref 3–10)
NEUTS SEG # BLD: 4.8 K/UL (ref 1.8–8)
NEUTS SEG NFR BLD: 53 % (ref 40–73)
P-R INTERVAL, ECG05: 158 MS
PLATELET # BLD AUTO: 240 K/UL (ref 135–420)
PMV BLD AUTO: 10.1 FL (ref 9.2–11.8)
POTASSIUM SERPL-SCNC: 4.5 MMOL/L (ref 3.5–5.5)
PROT SERPL-MCNC: 7.8 G/DL (ref 6.4–8.2)
Q-T INTERVAL, ECG07: 386 MS
QRS DURATION, ECG06: 90 MS
QTC CALCULATION (BEZET), ECG08: 456 MS
RBC # BLD AUTO: 4.34 M/UL (ref 4.2–5.3)
SODIUM SERPL-SCNC: 143 MMOL/L (ref 136–145)
TROPONIN I SERPL-MCNC: <0.02 NG/ML (ref 0–0.04)
VENTRICULAR RATE, ECG03: 84 BPM
WBC # BLD AUTO: 9.1 K/UL (ref 4.6–13.2)

## 2019-05-07 PROCEDURE — 73030 X-RAY EXAM OF SHOULDER: CPT

## 2019-05-07 PROCEDURE — 71046 X-RAY EXAM CHEST 2 VIEWS: CPT

## 2019-05-07 PROCEDURE — 99283 EMERGENCY DEPT VISIT LOW MDM: CPT

## 2019-05-07 PROCEDURE — 96374 THER/PROPH/DIAG INJ IV PUSH: CPT

## 2019-05-07 PROCEDURE — 74011250636 HC RX REV CODE- 250/636: Performed by: PHYSICIAN ASSISTANT

## 2019-05-07 PROCEDURE — 93005 ELECTROCARDIOGRAM TRACING: CPT

## 2019-05-07 PROCEDURE — 85025 COMPLETE CBC W/AUTO DIFF WBC: CPT

## 2019-05-07 PROCEDURE — 82550 ASSAY OF CK (CPK): CPT

## 2019-05-07 PROCEDURE — 80053 COMPREHEN METABOLIC PANEL: CPT

## 2019-05-07 RX ORDER — CYCLOBENZAPRINE HCL 5 MG
5 TABLET ORAL
Qty: 7 TAB | Refills: 0 | Status: SHIPPED | OUTPATIENT
Start: 2019-05-07 | End: 2019-09-06

## 2019-05-07 RX ORDER — KETOROLAC TROMETHAMINE 30 MG/ML
15 INJECTION, SOLUTION INTRAMUSCULAR; INTRAVENOUS
Status: COMPLETED | OUTPATIENT
Start: 2019-05-07 | End: 2019-05-07

## 2019-05-07 RX ADMIN — KETOROLAC TROMETHAMINE 15 MG: 30 INJECTION INTRAMUSCULAR; INTRAVENOUS at 12:08

## 2019-05-07 NOTE — DISCHARGE INSTRUCTIONS
Patient Education   Continue to take Ibuprofen if needed for pain. Try muscle relaxant as needed for pain that radiates down Rt arm  Return as needed or if symptoms worsen. Pinched Nerve in the Neck: Care Instructions  Your Care Instructions  A pinched nerve in the neck happens when a vertebra or disc in the upper part of your spine is damaged. This damage can happen because of an injury. Or it can just happen with age. The changes caused by the damage may put pressure on a nearby nerve root, pinching it. This causes symptoms such as sharp pain in your neck, shoulder, arm, hand, or back. You may also have tingling or numbness. Sometimes it makes your arm weaker. The symptoms are usually worse when you turn your head or strain your neck. For many people, the symptoms get better over time and finally go away. Early treatment usually includes medicines for pain and swelling. Sometimes physical therapy and special exercises may help. Follow-up care is a key part of your treatment and safety. Be sure to make and go to all appointments, and call your doctor if you are having problems. It's also a good idea to know your test results and keep a list of the medicines you take. How can you care for yourself at home? · Be safe with medicines. Read and follow all instructions on the label. ¨ If the doctor gave you a prescription medicine for pain, take it as prescribed. ¨ If you are not taking a prescription pain medicine, ask your doctor if you can take an over-the-counter medicine. · Try using a heating pad on a low or medium setting for 15 to 20 minutes every 2 or 3 hours. Try a warm shower in place of one session with the heating pad. You can also buy single-use heat wraps that last up to 8 hours. · You can also try an ice pack for 10 to 15 minutes every 2 to 3 hours. There isn't strong evidence that either heat or ice will help. But you can try them to see if they help you.   · Don't spend too long in one position. Take short breaks to move around and change positions. · Wear a seat belt and shoulder harness when you are in a car. · Sleep with a pillow under your head and neck that keeps your neck straight. · If you were given a neck brace (cervical collar) to limit neck motion, wear it as instructed for as many days as your doctor tells you to. Do not wear it longer than you were told to. Wearing a brace for too long can lead to neck stiffness and can weaken the neck muscles. · Follow your doctor's instructions for gentle neck-stretching exercises. · Do not smoke. Smoking can slow healing of your discs. If you need help quitting, talk to your doctor about stop-smoking programs and medicines. These can increase your chances of quitting for good. · Avoid strenuous work or exercise until your doctor says it is okay. When should you call for help? Call 911 anytime you think you may need emergency care. For example, call if:  ? · You are unable to move an arm or a leg at all. ?Call your doctor now or seek immediate medical care if:  ? · You have new or worse symptoms in your arms, legs, chest, belly, or buttocks. Symptoms may include:  ¨ Numbness or tingling. ¨ Weakness. ¨ Pain. ? · You lose bladder or bowel control. ? Watch closely for changes in your health, and be sure to contact your doctor if:  ? · You are not getting better as expected. Where can you learn more? Go to http://laura-jessie.info/. Enter S924 in the search box to learn more about \"Pinched Nerve in the Neck: Care Instructions. \"  Current as of: March 21, 2017  Content Version: 11.5  © 8152-9750 [employer]. Care instructions adapted under license by [employer] (which disclaims liability or warranty for this information).  If you have questions about a medical condition or this instruction, always ask your healthcare professional. [place] [address] any warranty or liability for your use of this information.

## 2019-05-07 NOTE — ED PROVIDER NOTES
EMERGENCY DEPARTMENT HISTORY AND PHYSICAL EXAM 
 
 
Date: 5/7/2019 Patient Name: Jony Cobb History of Presenting Illness Chief Complaint Patient presents with  Back Pain HPI: Jony Cobb, 80 y.o. female with history of hypertension, hyperlipidemia, COPD, asthma, and obesity presents to the ED complains of atraumatic right shoulder pain for past 2 days. She states she woke up with pain and had it has been gradually worsening. She denies fall, trauma, MVA, heavy lifting prior to onset of pain. Pain is the worst behind her right shoulder blade. She denies bruising, redness, swelling. She reports pain is worse when trying to lift her right arm. She denies pain to right elbow, forearm, wrist, hand, fingers. She tried ibuprofen yesterday for pain without relief. Patient's  reports he has been helping rests and feed her. She denies chest pain, palpitations, shortness of breath. She complains of tingling to bilateral fingers for \"a while. \" 
Pt Rt hand dominant. There are no other complaints, changes, or physical findings at this time. PCP: Vickie Bolton MD 
 
 
Past History Past Medical History: 
Past Medical History:  
Diagnosis Date  Anemia  Asthma  Blood disorder  Cancer ([address] breast, diagnosed 2006 left  [facility]  Chest pain, unspecified The working diagnosis is chest pain. The differential diagnosis includes chest wall pain, stable angina.  Chronic obstructive pulmonary disease ([address]  Essential hypertension  Essential hypertension, benign Uncontrolled  GERD (gastroesophageal reflux disease)  Hypercholesterolemia  Hypertension  Neuropathy  Nonspecific abnormal electrocardiogram (ECG) (EKG)  Obesity, unspecified Weight loss has been strongly encouraged by following dietary restrictions and an exercise routine.  Polio   
 as a young child  Pre-operative cardiovascular examination  Sciatica  Sleep apnea  Unspecified cerebral artery occlusion with cerebral infarction TIA? no residual  
 
 
Medications: No current facility-administered medications on file prior to encounter. Current Outpatient Medications on File Prior to Encounter Medication Sig Dispense Refill  ibuprofen (MOTRIN) 800 mg tablet take 1 tablet by mouth every 6 hours if needed 90 Tab 1  
 losartan (COZAAR) 100 mg tablet take 1 tablet by mouth once daily 90 Tab 3  
 metoprolol tartrate (LOPRESSOR) 25 mg tablet take 1 tablet by mouth twice a day 60 Tab 6  
 lidocaine (LIDODERM) 5 % apply 1 patch to the affected area daily. Leave on for 12 hours and then off for 12 hours. 5 Patch 2  
 furosemide (LASIX) 40 mg tablet Take 1 Tab by mouth daily. 90 Tab 3  
 oxyCODONE-acetaminophen (PERCOCET) 5-325 mg per tablet Take 1 Tab by mouth every four (4) hours as needed for Pain. Max Daily Amount: 6 Tabs. 6 Tab 0  
 HYDROcodone-acetaminophen (NORCO) 7.5-325 mg per tablet Take 1 Tab by mouth every eight (8) hours as needed for Pain. Max Daily Amount: 3 Tabs. 21 Tab 0  
 naproxen (NAPROSYN) 500 mg tablet Take 1 Tab by mouth two (2) times daily (with meals). 20 Tab 0  
 gabapentin (NEURONTIN) 100 mg capsule Take 2 Caps by mouth three (3) times daily. Indications: NEUROPATHIC PAIN 90 Cap 1  dicyclomine (BENTYL) 20 mg tablet   0  
 diclofenac (VOLTAREN) 1 % gel Apply  to affected area every six (6) hours. Apply 4 grams to affected joint up to 4 times per day, maximum 16 grams per joint per day Dispense 5 100 gram tubes 100 g 4  
 desonide (TRIDESILON) 0.05 % cream   0  
 ALBUTEROL SULFATE (PROAIR HFA IN) Take  by inhalation.  FLUTICASONE/SALMETEROL (ADVAIR HFA IN) Take  by inhalation.  montelukast (SINGULAIR) 10 mg tablet Take 10 mg by mouth daily.  tiotropium (SPIRIVA WITH HANDIHALER) 18 mcg inhalation capsule Take 1 Cap by inhalation daily.  amLODIPine (NORVASC) 10 mg tablet Take  by mouth daily.  bethanechol (URECHOLINE) 10 mg tablet Take 10 mg by mouth Before breakfast, lunch, and dinner.  esomeprazole (NEXIUM) 40 mg capsule Take  by mouth daily.  aspirin 81 mg tablet Take 81 mg by mouth.  ergocalciferol (VITAMIN D2) 50,000 unit capsule Take 50,000 Units by mouth Every Thursday. Past Surgical History: 
Past Surgical History:  
Procedure Laterality Date  BREAST SURGERY PROCEDURE UNLISTED    
 initial surgery 2006, then left modified radical mastectomy 6/2012  HX BREAST BIOPSY  4/11/12 Left  HX BREAST BIOPSY Left   
 left mastectomy Family History: 
Family History Problem Relation Age of Onset  Cancer Mother  Cancer Father  Arthritis-osteo Other  Hypertension Other  Heart Disease Neg Hx Negative family history of premature CAD or CVA Social History: 
Social History Tobacco Use  Smoking status: Never Smoker  Smokeless tobacco: Never Used Substance Use Topics  Alcohol use: No  
  Alcohol/week: 0.0 oz  Drug use: No  
 
 
Allergies: 
No Known Allergies Review of Systems Review of Systems Constitutional: Negative for chills and fever. HENT: Negative. Eyes: Negative for visual disturbance. Respiratory: Negative for cough and shortness of breath. Cardiovascular: Negative for chest pain and palpitations. Gastrointestinal: Negative for abdominal pain, nausea and vomiting. Musculoskeletal: Positive for arthralgias. Negative for back pain, gait problem, joint swelling, myalgias and neck pain. Skin: Negative. Allergic/Immunologic: Negative. Neurological: Negative for dizziness, weakness, light-headedness, numbness and headaches. Tingling bilat fingers Psychiatric/Behavioral: Negative. Physical Exam  
Physical Exam  
Constitutional: She is oriented to person, place, and time.  She appears well-developed and well-nourished. She is cooperative. Non-toxic appearance. She appears distressed. Patient appears in distress due to pain, laying on left side with right arm in neutral position HENT:  
Head: Normocephalic and atraumatic. Right Ear: External ear normal.  
Left Ear: External ear normal.  
Nose: Nose normal.  
Mouth/Throat: Mucous membranes are normal.  
Eyes: Conjunctivae, EOM and lids are normal. No scleral icterus. Neck: Normal range of motion. Neck supple. Cardiovascular: Normal rate, regular rhythm and normal heart sounds. Pulses: 
     Radial pulses are 2+ on the right side. Pulmonary/Chest: Effort normal and breath sounds normal. No respiratory distress. Abdominal: Soft. Normal appearance. There is no tenderness. There is no rebound. Musculoskeletal: She exhibits no edema. Right shoulder: She exhibits decreased range of motion and tenderness. She exhibits no swelling, no effusion, no deformity and no spasm. Decreased abduction, extension, flexion of Rt shoulder due to pain. Tenderness to Rt scapula inferior and medial aspects. No deformity Full active ROM of Rt fingers, wrist, forearm, elbow without pain. Neurological: She is alert and oriented to person, place, and time. No sensory deficit. She exhibits normal muscle tone. Skin: Skin is warm and intact. No rash noted. Psychiatric: She has a normal mood and affect. Her speech is normal and behavior is normal. Judgment and thought content normal.  
Nursing note and vitals reviewed. Diagnostic Study Results Labs - Recent Results (from the past 12 hour(s)) EKG, 12 LEAD, INITIAL Collection Time: 05/07/19 11:45 AM  
Result Value Ref Range Ventricular Rate 84 BPM  
 Atrial Rate 84 BPM  
 P-R Interval 158 ms QRS Duration 90 ms Q-T Interval 386 ms QTC Calculation (Bezet) 456 ms Calculated P Axis 78 degrees Calculated R Axis 51 degrees Calculated T Axis 86 degrees Diagnosis Normal sinus rhythm Septal infarct (cited on or before 30-APR-2014) Abnormal ECG When compared with ECG of 14-OCT-2017 12:18, No significant change was found CBC WITH AUTOMATED DIFF Collection Time: 05/07/19 11:50 AM  
Result Value Ref Range WBC 9.1 4.6 - 13.2 K/uL  
 RBC 4.34 4.20 - 5.30 M/uL  
 HGB 11.4 (L) 12.0 - 16.0 g/dL HCT 37.8 35.0 - 45.0 % MCV 87.1 74.0 - 97.0 FL  
 MCH 26.3 24.0 - 34.0 PG  
 MCHC 30.2 (L) 31.0 - 37.0 g/dL  
 RDW 15.9 (H) 11.6 - 14.5 % PLATELET 240 802 - 662 K/uL MPV 10.1 9.2 - 11.8 FL  
 NEUTROPHILS 53 40 - 73 % LYMPHOCYTES 38 21 - 52 % MONOCYTES 8 3 - 10 % EOSINOPHILS 1 0 - 5 % BASOPHILS 0 0 - 2 %  
 ABS. NEUTROPHILS 4.8 1.8 - 8.0 K/UL  
 ABS. LYMPHOCYTES 3.5 0.9 - 3.6 K/UL  
 ABS. MONOCYTES 0.7 0.05 - 1.2 K/UL  
 ABS. EOSINOPHILS 0.1 0.0 - 0.4 K/UL  
 ABS. BASOPHILS 0.0 0.0 - 0.1 K/UL  
 DF AUTOMATED METABOLIC PANEL, COMPREHENSIVE Collection Time: 05/07/19 11:50 AM  
Result Value Ref Range Sodium 143 136 - 145 mmol/L Potassium 4.5 3.5 - 5.5 mmol/L Chloride 107 100 - 108 mmol/L  
 CO2 32 21 - 32 mmol/L Anion gap 4 3.0 - 18 mmol/L Glucose 93 74 - 99 mg/dL BUN 10 7.0 - 18 MG/DL Creatinine 0.73 0.6 - 1.3 MG/DL  
 BUN/Creatinine ratio 14 12 - 20 GFR est AA >60 >60 ml/min/1.73m2 GFR est non-AA >60 >60 ml/min/1.73m2 Calcium 9.5 8.5 - 10.1 MG/DL Bilirubin, total 0.3 0.2 - 1.0 MG/DL  
 ALT (SGPT) 13 13 - 56 U/L  
 AST (SGOT) 15 15 - 37 U/L Alk. phosphatase 82 45 - 117 U/L Protein, total 7.8 6.4 - 8.2 g/dL Albumin 3.1 (L) 3.4 - 5.0 g/dL Globulin 4.7 (H) 2.0 - 4.0 g/dL A-G Ratio 0.7 (L) 0.8 - 1.7 CARDIAC PANEL,(CK, CKMB & TROPONIN) Collection Time: 05/07/19 11:50 AM  
Result Value Ref Range CK 80 26 - 192 U/L  
 CK - MB <1.0 <3.6 ng/ml CK-MB Index  0.0 - 4.0 % CALCULATION NOT PERFORMED WHEN RESULT IS BELOW LINEAR LIMIT  Troponin-I, QT <0.02 0.0 - 0.045 NG/ML  
 
 
 Radiologic Studies -  
XR CHEST PA LAT Final Result Impression: 1. No acute cardiopulmonary process. XR SHOULDER RT AP/LAT MIN 2 V    (Results Pending)  
reviewed with Dr. Flavio Ba, no acute findings CT Results  (Last 48 hours) None CXR Results  (Last 48 hours) 05/07/19 1142  XR CHEST PA LAT Final result Impression:  Impression: 1. No acute cardiopulmonary process. Narrative:  XR CHEST PA LAT Indication: shoulder pain Comparison: 10/28/2017 Findings: The lungs are clear. No effusion or pneumothorax. Cardiomediastinal silhouette  
is normal in size. EKG interpretation: (Preliminary)NSR, Rate 84, No ST elevation or depression Rhythm Strip interpretation: NSR, rate 84 Medical Decision Making Vital Signs-Reviewed the patient's vital signs. Patient Vitals for the past 12 hrs: 
 Temp Pulse Resp BP SpO2  
05/07/19 1105 97.5 °F (36.4 °C) 85 18 151/83 96 % I reviewed the vital signs, available nursing notes, past medical history, past surgical history, family history and social history. Provider Notes (Medical Decision Making): Atraumatic Rt shoulder pain, dec ROM, numbness/tingling to fingers. Possible radiculopathy. Check Xray for injury, cardiac workup due to co-morbidies and possible atypical presentation. Consult/Progress note: 
1:15 PM  Pt calm, well-appearing, in NAD. Reviewed results with pt. Pt states she will follow up with PCP for re-evaluation ED Course:  
Initial assessment performed. The patients presenting problems have been discussed, and they are in agreement with the care plan formulated and outlined with them. I have encouraged them to ask questions as they arise throughout their visit. Diagnosis Clinical Impression: 1. Pain in joint of right shoulder 2. Radiculopathy affecting upper extremity 3. Pain in both knees, unspecified chronicity Disposition: 
home PLAN: 1. Current Discharge Medication List  
  
CONTINUE these medications which have CHANGED Details  
cyclobenzaprine (FLEXERIL) 5 mg tablet Take 1 Tab by mouth three (3) times daily as needed for Muscle Spasm(s). 1-2 tabs po TID prn 
Qty: 7 Tab, Refills: 0  
  
  
 
2. Follow-up Information Follow up With Specialties Details Why Contact Info [id number] [facility] EMERGENCY DEPT Emergency Medicine  As needed, If symptoms worsen [address] [place] [id number] [phone] Nadine Alvarenga MD Family Practice Call in 2 days for re-evaluation, If symptoms worsen, As needed [address] [address] [facility] 
[phone] 3. Return to ED if worse Caryl Lopez PA-C

## 2019-05-10 ENCOUNTER — OFFICE VISIT (OUTPATIENT)
Dept: ORTHOPEDIC SURGERY | Age: 69
End: 2019-05-10

## 2019-05-10 VITALS
OXYGEN SATURATION: 98 % | SYSTOLIC BLOOD PRESSURE: 123 MMHG | BODY MASS INDEX: 33.49 KG/M2 | HEART RATE: 81 BPM | TEMPERATURE: 97.7 F | WEIGHT: 201 LBS | RESPIRATION RATE: 20 BRPM | HEIGHT: 65 IN | DIASTOLIC BLOOD PRESSURE: 81 MMHG

## 2019-05-10 DIAGNOSIS — M54.2 NECK PAIN: Primary | ICD-10-CM

## 2019-05-10 DIAGNOSIS — M75.41 IMPINGEMENT SYNDROME OF RIGHT SHOULDER: ICD-10-CM

## 2019-05-10 RX ORDER — CELECOXIB 100 MG/1
1 CAPSULE ORAL 2 TIMES DAILY
Refills: 0 | COMMUNITY
Start: 2019-04-28 | End: 2019-09-06

## 2019-05-10 RX ORDER — CLOPIDOGREL BISULFATE 75 MG/1
1 TABLET ORAL DAILY
COMMUNITY
End: 2022-08-11

## 2019-05-10 RX ORDER — PRAVASTATIN SODIUM 40 MG/1
1 TABLET ORAL
Refills: 0 | COMMUNITY
Start: 2019-04-25

## 2019-05-10 NOTE — PROGRESS NOTES
[facility]  [address], [place]   Phone: [phone]  Fax: [phone]        PROGRESS NOTE      HISTORY OF PRESENT ILLNESS:  The patient is a 80 y.o. female and was seen today for follow up of low back pain into the BLE (R>L) extending anteriorly into the plantar aspect of the feet. Pt states her current symptoms are similar to when she previously saw me in 7/2014 but are progressing. Pt was discharged from pain management with Dr. Trey Vicente. Pt is followed by Dr. Bari Sandifer for breast cancer and underwent surgery 5-6 years ago. She did receive radiation therapy. Pt denies h/o lumbar spinal surgery. Pt underwent L4/5 epidural on 5/16/17 with slight relief. She has not recently attended physical therapy/chiropractor and discontinued her HEP. Pt has taken Neurontin and Cymbalta in the past, noting more relief with Cymbalta. ER note from Ines Zeng PA-C dated 5/9/17 indicates patient presented for right foot pain. A previous EMG per report revealed evidence to suggest chronic S1 radiculopathy on the right side. Lumbar spine MRI dated 5/18/15 per report revealed straightened lumbar lordosis. No malalignment otherwise. No vertebral body compression deformity or edema. Multilevel degenerative discogenic disease, with mild to moderate central canal narrowing L4-5, fairly similar in extent to the previous exam. Varying degrees of bilateral neural foraminal encroachment at several levels. Preliminary reading of lumbar plain films revealed anterior osteophytes at L2, L3, L4, L5. No acute pathology identified. At her last clinical appointment, patient was s/p L4/5 epidural from 5/16/17 noting slight relief. She was not interested in additional blocks at that time. I restarted her on Cymbalta 30 mg qd. The patient returns today with new c/o right shoulder pain. She denies much in the way of neck pain at this time.  She rates her pain 10/10, previously 7/10. Last seen by me 7/3/17. Scheduled to f/u in 1 month. Pt is being treated with Oxycodone by Dr. Ortiz Lorenz. Note from [place], [place] dated 4/26/19 indicating patient was seen with c/o left knee pain. ER note from [place], [place] dated 5/7/19 indicating patient presented for progressive atraumatic right shoulder pain x 2 days. XR showed no acute findings. Treated with Flexeril at that time. Right shoulder XR dated 5/7/19 reviewed. Per report, thin margin across the neck of the humerus with some lucency of the more proximal marrow. This has suggestive of a possible lytic lesion. Additional evaluation with CT or MRI may be appropriate.  reviewed. Body mass index is 33.45 kg/m². PCP: Nadine Alvarenga MD      Past Medical History:   Diagnosis Date    Anemia     Asthma     Blood disorder     Cancer [facility])     breast, diagnosed 2006 left    Cancer [facility])     Chest pain, unspecified     The working diagnosis is chest pain. The differential diagnosis includes chest wall pain, stable angina.  Chronic obstructive pulmonary disease (HCC)     Essential hypertension     Essential hypertension, benign     Uncontrolled     GERD (gastroesophageal reflux disease)     Hypercholesterolemia     Hypertension     Neuropathy     Nonspecific abnormal electrocardiogram (ECG) (EKG)     Obesity, unspecified     Weight loss has been strongly encouraged by following dietary restrictions and an exercise routine.     Polio     as a young child    Pre-operative cardiovascular examination     Sciatica     Sleep apnea     Unspecified cerebral artery occlusion with cerebral infarction     TIA? no residual        Social History     Socioeconomic History    Marital status:      Spouse name: Not on file    Number of children: Not on file    Years of education: Not on file    Highest education level: Not on file   Occupational History    Not on file   Social Needs    Financial resource strain: Not on file  Food insecurity:     Worry: Not on file     Inability: Not on file    Transportation needs:     Medical: Not on file     Non-medical: Not on file   Tobacco Use    Smoking status: Never Smoker    Smokeless tobacco: Never Used   Substance and Sexual Activity    Alcohol use: No     Alcohol/week: 0.0 oz    Drug use: No    Sexual activity: Not on file   Lifestyle    Physical activity:     Days per week: Not on file     Minutes per session: Not on file    Stress: Not on file   Relationships    Social connections:     Talks on phone: Not on file     Gets together: Not on file     Attends [employer] service: Not on file     Active member of club or organization: Not on file     Attends meetings of clubs or organizations: Not on file     Relationship status: Not on file    Intimate partner violence:     Fear of current or ex partner: Not on file     Emotionally abused: Not on file     Physically abused: Not on file     Forced sexual activity: Not on file   Other Topics Concern    Not on file   Social History Narrative    ** Merged History Encounter **            Current Outpatient Medications   Medication Sig Dispense Refill    pravastatin (PRAVACHOL) 40 mg tablet Take 1 Tab by mouth nightly. 0    celecoxib (CELEBREX) 100 mg capsule Take 1 Cap by mouth two (2) times a day.  0    clopidogrel (PLAVIX) 75 mg tab Take 1 Tab by mouth daily.  cyclobenzaprine (FLEXERIL) 5 mg tablet Take 1 Tab by mouth three (3) times daily as needed for Muscle Spasm(s). 1-2 tabs po TID prn 7 Tab 0    ibuprofen (MOTRIN) 800 mg tablet take 1 tablet by mouth every 6 hours if needed 90 Tab 1    losartan (COZAAR) 100 mg tablet take 1 tablet by mouth once daily 90 Tab 3    metoprolol tartrate (LOPRESSOR) 25 mg tablet take 1 tablet by mouth twice a day 60 Tab 6    lidocaine (LIDODERM) 5 % apply 1 patch to the affected area daily. Leave on for 12 hours and then off for 12 hours.  5 Patch 2    furosemide (LASIX) 40 mg tablet Take 1 Tab by mouth daily. 90 Tab 3    oxyCODONE-acetaminophen (PERCOCET) 5-325 mg per tablet Take 1 Tab by mouth every four (4) hours as needed for Pain. Max Daily Amount: 6 Tabs. 6 Tab 0    HYDROcodone-acetaminophen (NORCO) 7.5-325 mg per tablet Take 1 Tab by mouth every eight (8) hours as needed for Pain. Max Daily Amount: 3 Tabs. 21 Tab 0    naproxen (NAPROSYN) 500 mg tablet Take 1 Tab by mouth two (2) times daily (with meals). 20 Tab 0    gabapentin (NEURONTIN) 100 mg capsule Take 2 Caps by mouth three (3) times daily. Indications: NEUROPATHIC PAIN 90 Cap 1    dicyclomine (BENTYL) 20 mg tablet Take 20 mg by mouth every six (6) hours. 0    diclofenac (VOLTAREN) 1 % gel Apply  to affected area every six (6) hours. Apply 4 grams to affected joint up to 4 times per day, maximum 16 grams per joint per day  Dispense 5 100 gram tubes 100 g 4    desonide (TRIDESILON) 0.05 % cream   0    ALBUTEROL SULFATE (PROAIR HFA IN) Take 2 Puffs by inhalation every four (4) hours as needed.  FLUTICASONE/SALMETEROL (ADVAIR HFA IN) Take 2 Puffs by inhalation every twelve (12) hours.  montelukast (SINGULAIR) 10 mg tablet Take 10 mg by mouth daily.  tiotropium (SPIRIVA WITH HANDIHALER) 18 mcg inhalation capsule Take 1 Cap by inhalation daily.  amLODIPine (NORVASC) 10 mg tablet Take  by mouth daily.  bethanechol (URECHOLINE) 10 mg tablet Take 10 mg by mouth Before breakfast, lunch, and dinner.  esomeprazole (NEXIUM) 40 mg capsule Take  by mouth daily.  aspirin 81 mg tablet Take 81 mg by mouth.  ergocalciferol (VITAMIN D2) 50,000 unit capsule Take 50,000 Units by mouth Every Thursday.          No Known Allergies       PHYSICAL EXAMINATION    Visit Vitals  /81   Pulse 81   Temp 97.7 °F (36.5 °C) (Oral)   Resp 20   Ht 5' 5\" (1.651 m)   Wt 201 lb (91.2 kg)   SpO2 98% Comment: o2 @ 3LPMvia NC   BMI 33.45 kg/m²       CONSTITUTIONAL: NAD, A&O x 3  SENSATION: paraesthesias all digits SHAHEED  NEURO: Deonna's is negative bilaterally. RANGE OF MOTION: The patient has full passive range of motion in all four extremities  Positive impingement right shoulder     Shoulder AB/Flex Elbow Flex Wrist Ext Elbow Ext Wrist Flex Hand Intrin Tone   Right +4/5 +4/5 +4/5 +4/5 +4/5 +4/5 +4/5   Left +4/5 +4/5 +4/5 +4/5 +4/5 +4/5 +4/5               RADIOGRAPHS  Preliminary reading of cervical plain films:  Mild degenerative changes in the lower cervical spine. No acute pathology identified. These are being sent out for official reading by Dr. Carlotta Coon. ASSESSMENT   Diagnoses and all orders for this visit:    1. Neck pain  -     AMB POC XRAY, SPINE, CERVICAL; 2 OR 3    2. Impingement syndrome of right shoulder          IMPRESSION AND PLAN:  Patient returns with new c/o right shoulder pain. She denies much in the way of neck pain at this time. Clinically, the patient demonstrates sxs consistent with right shoulder pathology. I will set her up for an MRI of the right shoulder (per radiologist request) and refer her to Dr. Blandon Patient for further evaluation and treatment. Pain medications declined as she is already in pain management. Patient is neurologically intact. I will see the patient back prn. Written by Ratna Huertas, as dictated by Orestes Sánchez MD  I examined the patient, reviewed and agree with the note.

## 2019-05-10 NOTE — PROGRESS NOTES
Georgi Keenan presents today for   Chief Complaint   Patient presents with    Neck Pain    Follow-up       Is someone accompanying this pt? No    Is the patient using any DME equipment during OV? Oxygen via NC @ 3LPM    Depression Screening:  3 most recent PHQ Screens 5/10/2019   Little interest or pleasure in doing things Not at all   Feeling down, depressed, irritable, or hopeless Not at all   Total Score PHQ 2 0       Learning Assessment:  Learning Assessment 3/14/2018   PRIMARY LEARNER Patient   PRIMARY LANGUAGE ENGLISH   LEARNER PREFERENCE PRIMARY READING   ANSWERED BY patient   RELATIONSHIP SELF       Abuse Screening:  Abuse Screening Questionnaire 5/10/2019   Do you ever feel afraid of your partner? N   Are you in a relationship with someone who physically or mentally threatens you? N   Is it safe for you to go home? Y       Fall Risk  Fall Risk Assessment, last 12 mths 5/10/2019   Able to walk? Yes   Fall in past 12 months? Yes   Fall with injury? No   Number of falls in past 12 months 2   Fall Risk Score 2       OPIOID RISK TOOL  No flowsheet data found. Pt currently taking Antiplatelet therapy? Plavix     Coordination of Care:  1. Have you been to the ER, urgent care clinic since your last visit? [facility] ED  Hospitalized since your last visit? 2. Have you seen or consulted any other health care providers outside of the [address] [place] [address] since your last visit? No Include any pap smears or colon screening.  No    Last  Checked 05/10/19

## 2019-05-10 NOTE — LETTER
5/10/19 Patient: Jermaine Nichols YOB: 1950 Date of Visit: 5/10/2019 Markell Talbot MD 
[address] [place] [address] [address] [place] [address] VIA Facsimile: [phone] Dear Markell Talbot MD, Thank you for referring Ms. Luis Marie to [address] [place] [address] for evaluation. My notes for this consultation are attached. If you have questions, please do not hesitate to call me. I look forward to following your patient along with you. Sincerely, Cecilio Darling MD

## 2019-05-10 NOTE — PATIENT INSTRUCTIONS

## 2019-05-13 ENCOUNTER — DOCUMENTATION ONLY (OUTPATIENT)
Dept: ORTHOPEDIC SURGERY | Age: 69
End: 2019-05-13

## 2019-05-13 NOTE — PROGRESS NOTES
MRI Right Shoulder without contrast is scheduled at [address], [phone], on 05/18/19, arrive 11:45AM, test 12:00PM. No [facility] pre-authorization required per [address] [place] [address] [address] on 05/13/19 at 2:46PM, Call Reference# [id number]. [employer] pre-authorization [id number], effective 05/13/19-07/11/19.

## 2019-05-22 ENCOUNTER — OFFICE VISIT (OUTPATIENT)
Dept: ORTHOPEDIC SURGERY | Age: 69
End: 2019-05-22

## 2019-05-22 VITALS
BODY MASS INDEX: 33.49 KG/M2 | HEART RATE: 96 BPM | WEIGHT: 201 LBS | HEIGHT: 65 IN | DIASTOLIC BLOOD PRESSURE: 74 MMHG | SYSTOLIC BLOOD PRESSURE: 128 MMHG | OXYGEN SATURATION: 98 % | RESPIRATION RATE: 24 BRPM | TEMPERATURE: 97.3 F

## 2019-05-22 DIAGNOSIS — M54.12 CERVICAL RADICULOPATHY: ICD-10-CM

## 2019-05-22 DIAGNOSIS — M75.101 ROTATOR CUFF SYNDROME, RIGHT: Primary | ICD-10-CM

## 2019-05-22 RX ORDER — DICLOFENAC SODIUM 50 MG/1
50 TABLET, DELAYED RELEASE ORAL 2 TIMES DAILY WITH MEALS
Qty: 120 TAB | Refills: 2 | Status: SHIPPED | OUTPATIENT
Start: 2019-05-22 | End: 2019-07-31 | Stop reason: SDUPTHER

## 2019-05-22 NOTE — PROGRESS NOTES
Patient: Martha Montague                MRN: [id number]       SSN: [id number]  YOB: 1950        AGE: 80 y.o. SEX: female  Body mass index is 33.45 kg/m². PCP: Sharita Archuleta MD  05/22/19    Chief Complaint: Right shoulder/neck/arm pain    HISTORY OF PRESENT ILLNESS:  Abilio Louis is a 80year-old female who presents to the office today with right shoulder pain. The pain has been bothering her for about a month. She has pain in and around the scapula. She also has pain that radiates down her arm and some numbness and tingling in her right hand. He also has some neck pain. The pain is worse with overhead activities and motion. She had an MRI done. Past Medical History:   Diagnosis Date    Anemia     Asthma     Blood disorder     Cancer ([address]     breast, diagnosed 2006 left    Cancer [facility])     Chest pain, unspecified     The working diagnosis is chest pain. The differential diagnosis includes chest wall pain, stable angina.  Chronic obstructive pulmonary disease (HCC)     Essential hypertension     Essential hypertension, benign     Uncontrolled     GERD (gastroesophageal reflux disease)     Hypercholesterolemia     Hypertension     Neuropathy     Nonspecific abnormal electrocardiogram (ECG) (EKG)     Obesity, unspecified     Weight loss has been strongly encouraged by following dietary restrictions and an exercise routine.     Polio     as a young child    Pre-operative cardiovascular examination     Sciatica     Sleep apnea     Unspecified cerebral artery occlusion with cerebral infarction     TIA? no residual       Family History   Problem Relation Age of Onset    Cancer Mother     Cancer Father     Arthritis-osteo Other     Hypertension Other     Heart Disease Neg Hx         Negative family history of premature CAD or CVA       Current Outpatient Medications   Medication Sig Dispense Refill    diclofenac EC (VOLTAREN) 50 mg EC tablet Take 1 Tab by mouth two (2) times daily (with meals). 120 Tab 2    pravastatin (PRAVACHOL) 40 mg tablet Take 1 Tab by mouth nightly. 0    celecoxib (CELEBREX) 100 mg capsule Take 1 Cap by mouth two (2) times a day.  0    clopidogrel (PLAVIX) 75 mg tab Take 1 Tab by mouth daily.  cyclobenzaprine (FLEXERIL) 5 mg tablet Take 1 Tab by mouth three (3) times daily as needed for Muscle Spasm(s). 1-2 tabs po TID prn 7 Tab 0    ibuprofen (MOTRIN) 800 mg tablet take 1 tablet by mouth every 6 hours if needed 90 Tab 1    losartan (COZAAR) 100 mg tablet take 1 tablet by mouth once daily 90 Tab 3    metoprolol tartrate (LOPRESSOR) 25 mg tablet take 1 tablet by mouth twice a day 60 Tab 6    lidocaine (LIDODERM) 5 % apply 1 patch to the affected area daily. Leave on for 12 hours and then off for 12 hours. 5 Patch 2    furosemide (LASIX) 40 mg tablet Take 1 Tab by mouth daily. 90 Tab 3    oxyCODONE-acetaminophen (PERCOCET) 5-325 mg per tablet Take 1 Tab by mouth every four (4) hours as needed for Pain. Max Daily Amount: 6 Tabs. 6 Tab 0    HYDROcodone-acetaminophen (NORCO) 7.5-325 mg per tablet Take 1 Tab by mouth every eight (8) hours as needed for Pain. Max Daily Amount: 3 Tabs. 21 Tab 0    naproxen (NAPROSYN) 500 mg tablet Take 1 Tab by mouth two (2) times daily (with meals). 20 Tab 0    gabapentin (NEURONTIN) 100 mg capsule Take 2 Caps by mouth three (3) times daily. Indications: NEUROPATHIC PAIN 90 Cap 1    dicyclomine (BENTYL) 20 mg tablet Take 20 mg by mouth every six (6) hours. 0    diclofenac (VOLTAREN) 1 % gel Apply  to affected area every six (6) hours. Apply 4 grams to affected joint up to 4 times per day, maximum 16 grams per joint per day  Dispense 5 100 gram tubes 100 g 4    desonide (TRIDESILON) 0.05 % cream   0    ALBUTEROL SULFATE (PROAIR HFA IN) Take 2 Puffs by inhalation every four (4) hours as needed.       FLUTICASONE/SALMETEROL (ADVAIR HFA IN) Take 2 Puffs by inhalation every twelve (12) hours.      montelukast (SINGULAIR) 10 mg tablet Take 10 mg by mouth daily.  tiotropium (SPIRIVA WITH HANDIHALER) 18 mcg inhalation capsule Take 1 Cap by inhalation daily.  amLODIPine (NORVASC) 10 mg tablet Take  by mouth daily.  bethanechol (URECHOLINE) 10 mg tablet Take 10 mg by mouth Before breakfast, lunch, and dinner.  esomeprazole (NEXIUM) 40 mg capsule Take  by mouth daily.  aspirin 81 mg tablet Take 81 mg by mouth.  ergocalciferol (VITAMIN D2) 50,000 unit capsule Take 50,000 Units by mouth Every Thursday.          No Known Allergies    Past Surgical History:   Procedure Laterality Date    BREAST SURGERY PROCEDURE UNLISTED      initial surgery 2006, then left modified radical mastectomy 6/2012    HX BREAST BIOPSY  4/11/12    Left    HX BREAST BIOPSY Left     left mastectomy       Social History     Socioeconomic History    Marital status:      Spouse name: Not on file    Number of children: Not on file    Years of education: Not on file    Highest education level: Not on file   Occupational History    Not on file   Social Needs    Financial resource strain: Not on file    Food insecurity:     Worry: Not on file     Inability: Not on file    Transportation needs:     Medical: Not on file     Non-medical: Not on file   Tobacco Use    Smoking status: Never Smoker    Smokeless tobacco: Never Used   Substance and Sexual Activity    Alcohol use: No     Alcohol/week: 0.0 oz    Drug use: No    Sexual activity: Not on file   Lifestyle    Physical activity:     Days per week: Not on file     Minutes per session: Not on file    Stress: Not on file   Relationships    Social connections:     Talks on phone: Not on file     Gets together: Not on file     Attends [employer] service: Not on file     Active member of club or organization: Not on file     Attends meetings of clubs or organizations: Not on file     Relationship status: Not on file    Intimate partner violence:     Fear of current or ex partner: Not on file     Emotionally abused: Not on file     Physically abused: Not on file     Forced sexual activity: Not on file   Other Topics Concern    Not on file   Social History Narrative    ** Merged History Encounter **            REVIEW OF SYSTEMS:      CON: negative for recent weight loss/gain, fever, or chills  EYE: negative for double or blurry vision  ENT: negative for hoarseness  RS:   negative for cough, URI, SOB  CV:  negative for chest pain, palpitations  GI:    negative for blood in stool, nausea/vomiting  :  negative for blood in urine  MS: As per HPI  Other systems reviewed and noted below. PHYSICAL EXAMINATION:  Visit Vitals  /74   Pulse 96   Temp 97.3 °F (36.3 °C)   Resp 24   Ht 5' 5\" (1.651 m)   Wt 201 lb (91.2 kg)   SpO2 98%   BMI 33.45 kg/m²     Body mass index is 33.45 kg/m². GENERAL: Alert and oriented x3, in no acute distress, well-developed, well-nourished. HEENT: Normocephalic, atraumatic. RESP: Non labored breathing with equal chest rise on inspiration. CV: Well perfused extremities. No cyanosis or clubbing noted. ABDOMEN: Soft, non-tender, non-distended. PHYSICAL EXAM:  Physical exam of the neck with full range of motion. Negative Spurling's bilaterally. She is mildly tender to palpation in the paraspinal and periscapular musculature. Examination of the right shoulder with full shoulder range of motion, but pain and weakness with rotator cuff strength testing. She is tender to palpation along the medial border of the scapula. She does have positive impingement and pain with impingement. Examination of the right hand with no atrophy. Negative Tinel's over the carpal tunnel. Full wrist and hand range of motion. Mildly positive Phalen's over the carpal tunnel. IMAGING:  An MRI of the right shoulder was reviewed. This does not show any full thickness rotator cuff tears.       ASSESSMENT AND PLAN: Estella Jean is a 80year-old female with right shoulder pain likely from her rotator cuff, although it does not have any full thickness tears and some cervical radiculopathy. I recommended physical therapy and an antiinflammatory for both.               Electronically signed by: Paco Hamilton MD

## 2019-05-28 DIAGNOSIS — M75.41 IMPINGEMENT SYNDROME OF RIGHT SHOULDER: ICD-10-CM

## 2019-06-03 ENCOUNTER — APPOINTMENT (OUTPATIENT)
Dept: PHYSICAL THERAPY | Age: 69
End: 2019-06-03
Payer: MEDICARE

## 2019-06-04 ENCOUNTER — HOSPITAL ENCOUNTER (OUTPATIENT)
Dept: PHYSICAL THERAPY | Age: 69
Discharge: HOME | End: 2019-06-04
Payer: MEDICARE

## 2019-06-04 PROCEDURE — 97140 MANUAL THERAPY 1/> REGIONS: CPT

## 2019-06-04 PROCEDURE — 97110 THERAPEUTIC EXERCISES: CPT

## 2019-06-04 PROCEDURE — 97162 PT EVAL MOD COMPLEX 30 MIN: CPT

## 2019-06-04 NOTE — PROGRESS NOTES
PT DAILY TREATMENT NOTE 10-18    Patient Name: Carlyn Abarca  Date:2019  : 1950  [x]  Patient  Verified  Payor: [facility] MEDICAID / Plan: [facility] / Product Type: Managed Care Medicaid /    In time:203  Out time:251  Total Treatment Time (min): 48  Visit #: 1 of 16    Medicare/BCBS Only   Total Timed Codes (min):  23 1:1 Treatment Time:  48       Treatment Area: Radiculopathy, cervical region [M54.12]  Pain in right shoulder [M25.511]    Physical Therapy Evaluation Cervical Spine    SUBJECTIVE    Any medication changes, allergies to medications, adverse drug reactions, diagnosis change, or new procedure performed?: [x] No    [] Yes (see summary sheet for update)    Subjective functional status/changes:     PLOF: RHD, (I) Functional ADLs, (I) Self-Care ADLs, Does Not Work, (I) Driving  Current Functional Status: Difficulty with self-care and household ADLs, Difficulty with overhead ADLs  Work Hx: Does Not Work  Living Situation: Lives with   Comorbidities: Breast Cancer (~12 years ago - Remission; Left UE 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 Patient reports increase in pain with non-specific shoulder AROM but denies change in symptoms with cervical AROM. Patient reports inability to sleep on the right side. Symptom relief only noted with medication use with patient denying 24 hour pain pattern. Patient denies headaches and chest pain.  Patient as well reports recent onset of left digit IV-V N/T with patient with PMH significant for breast cancer with removal of left lymph nodes with left UE lymphadema. Pain Intensity (0-10, VAS): Current 10, Worst 10, Best 4    Patient Goals: \"I want this pain gone\"    OBJECTIVE    BP: 148/82 mmHg  Posture: Protective posturing of the right UE, Right scapular depression, Right thenar eminence atrophy    Shoulder/Scapular Screen: [] WNL    [] Abnormal:    Active Movements: [] N/A   [] Too acute   [] Other:  ROM AROM Comments   Forward flexion 55 Right scapular pain   Extension 20 Right scapular pain   SB right 25 Right scapular pain   SB left  20    Rotation right 55 Right scapular pain   Rotation left 60       Shoulder ROM:  [] Unable to assess at this time                                             AROM                                                               PROM   Left Right  Left Right   Flexion 74 75 (p!) Flexion  100   Extension   Extension     Abduction 50 65 (p!) Scaption/ABD     ER @ 0 Degrees   ER @ 0 Degrees     Functional ER Pt decline Pt decline ER @ 90 Degrees     Functional IR Pt decline Pt decline IR @ 90 Degrees       Shoulder Strength:   [] Unable to assess at this time                                                                            L (1-5) R (1-5)   Flexors 2+ 2+   Abductors 2+ 2+   External Rotators  5   Internal Rotators  5   Elbow Flexion  5   Elbow Extension  5     Palpation: TTP to right mid-scapular region and right infra-/suprascapular fossa, TTP right greater tuberosity    Neurological Screen (myotome/dermatome/reflexes): [] WNL   Light Touch Sensation: Inconsistent results   Deep Tendon Reflexes (C5-C7): 1+ L/R Biceps, 1+ L/R Brachioradialis, 0+ L/R Triceps    Upper Limb Tension Tests: Unable to assess due to symptom irritability    Cervical Special Tests:        Distraction: (-)       Compression: (+) [Right scapular pain]     Diaphragmatic Breathing: [] Normal    [] Abnormal    Joint Mobility Assessment   Cervical:    Cervical Lateral Side Glide: Non-specific hypomobility bilaterally    Right First Rib:  Worsening of right scapular pain   *Limited right scapular mobility    **Patient with high symptom irritability with poor tolerance to objective examination with non-specific pain reporting    OBJECTIVE    25 min []Eval                  []Re-Eval      15 min Therapeutic Exercise:  [x] See flow sheet : Patient educated regarding completion of prescribed HEP and provided with written HEP instructions, Patient educated regarding diagnosis and PT PoC   Rationale: increase ROM and increase strength to improve the patients ability to improve ease with overhead ADLs    8 min Manual Therapy:    Left Sidelying, Right Scapular Medial/Lateral Glide  Left Sidelying, Right Scapular Elevation/Depression   Rationale: decrease pain, increase ROM and increase tissue extensibility to improve ease with sleep     With   [] TE   [] TA   [] neuro   [] other: Patient Education: [x] Review HEP    [] Progressed/Changed HEP based on:   [] positioning   [] body mechanics   [] transfers   [] heat/ice application    [] other:      Other Objective/Functional Measures: See objective above. Pain Level (0-10 scale) post treatment: 10    ASSESSMENT/Changes in Function: Patient presents with s/s consistent with right cervical radicular pain with radiation consistent with the lower cervical spine with this therapist with difficulty differentiating cervical v. Shoulder as primary pathology secondary to patient a poor historian. Insidious onset of symptoms reported ~2 months with worsening since onset with patient reporting progressive weakness of the right UE with N/T along digits IV-V. At time of initial evaluation patient noted to present with high symptom irritability and severity thus limiting validity of examination and ability to differentiate symptom origin. To further assess and treat cervical and shoulder region with deliniation regarding symptom origin with respect to treatment response.  Questionable prognosis secondary to high symptom irritability with non-specific pain pattern with patient to be referred back to MD if appropriate progression towards created goals not demonstrated. Patient will continue to benefit from skilled PT services to modify and progress therapeutic interventions, address functional mobility deficits, address ROM deficits, address strength deficits, analyze and address soft tissue restrictions, analyze and cue movement patterns and analyze and modify body mechanics/ergonomics to attain remaining goals. [x]  See Plan of Care  []  See progress note/recertification  []  See Discharge Summary         Progress towards goals / Updated goals:    Short Term Goals: To be accomplished in 3-4 weeks:  1. Patient will subjectively report full compliance with prescribed HEP. Eval: HEP provided  2. Patient will demonstrate right shoulder flexion AROM >/= 100 degrees to improve ease with overhead lifting. Eval: Right Shoulder Flexion AROM = 75 deg (pain)  3. Patient will demonstrate right shoulder flexion MMT >/= 4/5 to improve ease with household ADLs. Eval: Right Shoulder Flexion MMT = 2+/5    Long Term Goals: To be accomplished in 7-8 weeks:  1. Patient will demonstrate a significant functional improvement as demonstrated by a score of >/= 54 on FOTO. Eval: FOTO = 30  2. Patient will demonstrate functional right shoulder ER to occiput to improve ease with self care ADLs. Eval: Right Shoulder Functional ER = NT  3. Patient will subjectively report >/= 60% improvement to improve overall quality of life.   Eval: 0%    PLAN  [x]  Upgrade activities as tolerated     []  Continue plan of care  []  Update interventions per flow sheet       []  Discharge due to:_  []  Other:_      Bettye Jaime PT 6/4/2019  7:56 AM    Future Appointments   Date Time Provider Jovany Monroe   6/4/2019  2:00 PM Belen Dang, PT [facility]   7/24/2019 11:00 AM MATA Chan [facility]   8/9/2019 11:20 AM Kasandra Woody PA-C [facility]   12/6/2019 11:00 AM [facility] RM 2 [facility]   12/13/2019  1:30 PM Mariah Stone NP [facility]ED   1/8/2020 10:00 AM Audie Mckeon MD [id number] [facility]

## 2019-06-04 NOTE — PROGRESS NOTES
[facility]              [address]        [place], [address] [place]   [phone] fax    Plan of Care/ Statement of Necessity for Physical Therapy Services    Patient name: Shannon Jung Start of Care: 2019   Referral source: Warren Salgado MD : 1950    Medical Diagnosis: Radiculopathy, cervical region [M54.12]  Pain in right shoulder [M25.511]  Payor: [facility] MEDICAID / [facility] / Product Type: Managed Care Medicaid /  Onset Date:~2 months prior to evaluation    Treatment Diagnosis: Right UE Radicular Pain   Prior Hospitalization: see medical history Provider#: [id number]   Medications: Verified on Patient summary List    Comorbidities: Breast Cancer (~12 years ago - Remission; 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 Assessment:    Patient presents with s/s consistent with right cervical radicular pain with radiation consistent with the lower cervical spine with this therapist with difficulty differentiating cervical v. Shoulder as primary pathology secondary to patient a poor historian. Insidious onset of symptoms reported ~2 months with worsening since onset with patient reporting progressive weakness of the right UE with N/T along digits IV-V. At time of initial evaluation patient noted to present with high symptom irritability and severity thus limiting validity of examination and ability to differentiate symptom origin. To further assess and treat cervical and shoulder region with deliniation regarding symptom origin with respect to treatment response.  Questionable prognosis secondary to high symptom irritability with non-specific pain pattern with patient to be referred back to MD if appropriate progression towards created goals not demonstrated.     Patient will continue to benefit from skilled PT services to modify and progress therapeutic interventions, address functional mobility deficits, address ROM deficits, address strength deficits, analyze and address soft tissue restrictions, analyze and cue movement patterns and analyze and modify body mechanics/ergonomics to attain remaining goals.     Key Information:    BP: 148/82 mmHg  Posture: Protective posturing of the right UE, Right scapular depression, Right thenar eminence atrophy     Shoulder ROM:  [] Unable to assess at this time                                             AROM                                            Left Right   Flexion 74 75 (p!)   Extension       Abduction 50 65 (p!)   ER @ 0 Degrees       Functional ER Pt decline Pt decline   Functional IR Pt decline Pt decline     Palpation: TTP to right mid-scapular region and right infra-/suprascapular fossa, TTP right greater tuberosity     Neurological Screen (myotome/dermatome/reflexes): [] WNL              Light Touch Sensation: Inconsistent results              Deep Tendon Reflexes (C5-C7): 1+ L/R Biceps, 1+ L/R Brachioradialis, 0+ L/R Triceps     Upper Limb Tension Tests: Unable to assess due to symptom irritability     Cervical Special Tests:               Distraction: (-)       Compression: (+) [Right scapular pain]         **Patient with high symptom irritability with poor tolerance to objective examination with non-specific pain reporting    Evaluation Complexity History MEDIUM  Complexity : 1-2 comorbidities / personal factors will impact the outcome/ POC ; Examination MEDIUM Complexity : 3 Standardized tests and measures addressing body structure, function, activity limitation and / or participation in recreation  ;Presentation MEDIUM Complexity : Evolving with changing characteristics  ; Clinical Decision Making MEDIUM Complexity : FOTO score of 26-74  Overall Complexity Rating: LOW   Problem List: pain affecting function, decrease ROM, decrease strength, decrease ADL/ functional abilitiies, decrease activity tolerance, decrease flexibility/ joint mobility and decrease transfer abilities   Treatment Plan may include any combination of the following: Therapeutic exercise, Therapeutic activities, Neuromuscular re-education, Physical agent/modality, Manual therapy, Patient education, Self Care training and Functional mobility training  Patient / Family readiness to learn indicated by: asking questions, trying to perform skills and interest  Persons(s) to be included in education: patient (P)  Barriers to Learning/Limitations: None  Patient Goal (s): I want this pain gone  Patient Self Reported Health Status: fair  Rehabilitation Potential: fair    Short Term Goals: To be accomplished in 3-4 weeks:  1. Patient will subjectively report full compliance with prescribed HEP. Eval: HEP provided  2. Patient will demonstrate right shoulder flexion AROM >/= 100 degrees to improve ease with overhead lifting. Eval: Right Shoulder Flexion AROM = 75 deg (pain)  3. Patient will demonstrate right shoulder flexion MMT >/= 4/5 to improve ease with household ADLs. Eval: Right Shoulder Flexion MMT = 2+/5    Long Term Goals: To be accomplished in 7-8 weeks:  1. Patient will demonstrate a significant functional improvement as demonstrated by a score of >/= 54 on FOTO. Eval: FOTO = 30  2. Patient will demonstrate functional right shoulder ER to occiput to improve ease with self care ADLs. Eval: Right Shoulder Functional ER = NT  3. Patient will subjectively report >/= 60% improvement to improve overall quality of life. Eval: 0%    Frequency / Duration: Patient to be seen 2 times per week for 8 weeks.     Patient/ Caregiver education and instruction: Diagnosis, prognosis, self care, activity modification and exercises   [x]  Plan of care has been reviewed with PTA      Certification Period: 6/4/2019 - 8/2/2019  Jyoti White, PT 6/4/2019 7:56 AM  ________________________________________________________________________    I certify that the above Therapy Services are being furnished while the patient is under my care. I agree with the treatment plan and certify that this therapy is necessary.     Physician's Signature:____________Date:_________TIME:________    ** Signature, Date and Time must be completed for valid certification **  Please sign and return to [facility] [address]              [address]        [place], [address] [place]   [phone] fax

## 2019-06-05 ENCOUNTER — OFFICE VISIT (OUTPATIENT)
Dept: ORTHOPEDIC SURGERY | Age: 69
End: 2019-06-05

## 2019-06-05 VITALS
TEMPERATURE: 98.3 F | DIASTOLIC BLOOD PRESSURE: 92 MMHG | RESPIRATION RATE: 14 BRPM | HEART RATE: 94 BPM | OXYGEN SATURATION: 98 % | WEIGHT: 204.2 LBS | SYSTOLIC BLOOD PRESSURE: 125 MMHG | BODY MASS INDEX: 34.02 KG/M2 | HEIGHT: 65 IN

## 2019-06-05 DIAGNOSIS — M75.101 ROTATOR CUFF SYNDROME, RIGHT: Primary | ICD-10-CM

## 2019-06-05 DIAGNOSIS — M54.12 CERVICAL RADICULOPATHY: ICD-10-CM

## 2019-06-05 DIAGNOSIS — M89.8X1 PAIN OF RIGHT SCAPULA: ICD-10-CM

## 2019-06-05 RX ORDER — DICLOFENAC SODIUM 10 MG/G
2 GEL TOPICAL 4 TIMES DAILY
Qty: 100 G | Refills: 2 | Status: SHIPPED | OUTPATIENT
Start: 2019-06-05 | End: 2019-07-10 | Stop reason: SDUPTHER

## 2019-06-05 NOTE — PROGRESS NOTES
Georgi Keenan presents today for   Chief Complaint   Patient presents with    Shoulder Pain     right    Follow-up     2 month       Is someone accompanying this pt? no    Is the patient using any DME equipment during OV? no    Depression Screening:  3 most recent PHQ Screens 6/5/2019   Little interest or pleasure in doing things Not at all   Feeling down, depressed, irritable, or hopeless Not at all   Total Score PHQ 2 0       Learning Assessment:  Learning Assessment 3/14/2018   PRIMARY LEARNER Patient   PRIMARY LANGUAGE ENGLISH   LEARNER PREFERENCE PRIMARY READING   ANSWERED BY patient   RELATIONSHIP SELF       Abuse Screening:  Abuse Screening Questionnaire 5/10/2019   Do you ever feel afraid of your partner? N   Are you in a relationship with someone who physically or mentally threatens you? N   Is it safe for you to go home? Y       Fall Risk  Fall Risk Assessment, last 12 mths 6/5/2019   Able to walk? Yes   Fall in past 12 months? Yes   Fall with injury? No   Number of falls in past 12 months 2   Fall Risk Score 2       OPIOID RISK TOOL  No flowsheet data found. Pt currently taking Antiplatelet therapy? no    Coordination of Care:  1. Have you been to the ER, urgent care clinic since your last visit? Yes, \[facility]\"" ED  Hospitalized since your last visit? NO    2. Have you seen or consulted any other health care providers outside of the [address] [place] [address] Julio since your last visit? NO Include any pap smears or colon screening.  NO

## 2019-06-05 NOTE — PATIENT INSTRUCTIONS

## 2019-06-06 ENCOUNTER — HOSPITAL ENCOUNTER (OUTPATIENT)
Dept: PHYSICAL THERAPY | Age: 69
Discharge: HOME | End: 2019-06-06
Payer: MEDICARE

## 2019-06-06 PROCEDURE — 97140 MANUAL THERAPY 1/> REGIONS: CPT

## 2019-06-06 PROCEDURE — 97110 THERAPEUTIC EXERCISES: CPT

## 2019-06-10 ENCOUNTER — OFFICE VISIT (OUTPATIENT)
Dept: SURGERY | Age: 69
End: 2019-06-10

## 2019-06-10 ENCOUNTER — TELEPHONE (OUTPATIENT)
Dept: SURGERY | Age: 69
End: 2019-06-10

## 2019-06-10 VITALS
BODY MASS INDEX: 34.28 KG/M2 | SYSTOLIC BLOOD PRESSURE: 144 MMHG | OXYGEN SATURATION: 95 % | DIASTOLIC BLOOD PRESSURE: 80 MMHG | HEART RATE: 92 BPM | TEMPERATURE: 98.3 F | RESPIRATION RATE: 22 BRPM | WEIGHT: 206 LBS

## 2019-06-10 DIAGNOSIS — M79.2 NEUROPATHIC PAIN OF FINGER, RIGHT: ICD-10-CM

## 2019-06-10 DIAGNOSIS — I89.0 LYMPHEDEMA OF LEFT ARM: Primary | ICD-10-CM

## 2019-06-10 DIAGNOSIS — M79.2 NEUROPATHIC PAIN OF FINGER, LEFT: ICD-10-CM

## 2019-06-10 DIAGNOSIS — M89.8X1 PAIN OF RIGHT SCAPULA: ICD-10-CM

## 2019-06-10 RX ORDER — GABAPENTIN 100 MG/1
100 CAPSULE ORAL 3 TIMES DAILY
Qty: 90 CAP | Refills: 0 | Status: SHIPPED | OUTPATIENT
Start: 2019-06-10 | End: 2019-06-10 | Stop reason: DRUGHIGH

## 2019-06-10 RX ORDER — GABAPENTIN 100 MG/1
CAPSULE ORAL
Qty: 90 CAP | Refills: 1 | Status: SHIPPED | OUTPATIENT
Start: 2019-06-10 | End: 2019-07-18 | Stop reason: SDUPTHER

## 2019-06-10 NOTE — PROGRESS NOTES
Kevin Donovan. THERESA Scales-C  Office Progress Note    Patient: Danna Soni  MRN: [id number]  SSN: [id number]   YOB: 1950  Age: 80 y.o. Sex: female     Chief Complaint   Patient presents with    Follow-up     lymphedema  f/up c/o tingling in both hands, pain and swelling in left arm x 2 weeks       HPI    Ms. Aline Young is a very pleasant 81 yo female who presents today for a problem visit. She is c/o severe pain to pinky fingers of both hands and severe pain to right shoulder blade. She is not wearing her compression sleeve today and states it causes her fingers to hurt too much when she wears it. She has her Tactile, lymphedema pump and says she uses it 2 hours twice a week. She also has an active prescription for gabapentin and takes that [place] 1" once a day. Chart review shows last gabapentin refill to be 2017. We discussed the mechanism of action of a lymphedema pump and gabapentin. Based on the type of pain she describes, she is not using her available tools to control these symptoms. She says she did not realize how these interventions work, but she will start today being more compliant with regularity. She also describes her left shoulder and scapula pain. She is currently in PT for treatment. Her complaints sound consistent with muscle spasm, so I asked her to try heat/ice, massage and possibly TENS therapy while at PT to see if this helps. Past Medical History:   Diagnosis Date    Anemia     Asthma     Blood disorder     Cancer ([facility] [address]     breast, diagnosed 2006 left    Cancer [facility])     Chest pain, unspecified     The working diagnosis is chest pain. The differential diagnosis includes chest wall pain, stable angina.     Chronic obstructive pulmonary disease (HCC)     Essential hypertension     Essential hypertension, benign     Uncontrolled     GERD (gastroesophageal reflux disease)     Hypercholesterolemia     Hypertension     Neuropathy     Nonspecific abnormal electrocardiogram (ECG) (EKG)     Obesity, unspecified     Weight loss has been strongly encouraged by following dietary restrictions and an exercise routine.  Polio     as a young child    Pre-operative cardiovascular examination     Sciatica     Sleep apnea     Unspecified cerebral artery occlusion with cerebral infarction     TIA? no residual     Past Surgical History:   Procedure Laterality Date    BREAST SURGERY PROCEDURE UNLISTED      initial surgery 2006, then left modified radical mastectomy 6/2012    HX BREAST BIOPSY  4/11/12    Left    HX BREAST BIOPSY Left     left mastectomy     No Known Allergies  Current Outpatient Medications   Medication Sig Dispense Refill    pravastatin (PRAVACHOL) 40 mg tablet Take 1 Tab by mouth nightly. 0    celecoxib (CELEBREX) 100 mg capsule Take 1 Cap by mouth two (2) times a day.  0    clopidogrel (PLAVIX) 75 mg tab Take 1 Tab by mouth daily.  cyclobenzaprine (FLEXERIL) 5 mg tablet Take 1 Tab by mouth three (3) times daily as needed for Muscle Spasm(s). 1-2 tabs po TID prn 7 Tab 0    ibuprofen (MOTRIN) 800 mg tablet take 1 tablet by mouth every 6 hours if needed 90 Tab 1    losartan (COZAAR) 100 mg tablet take 1 tablet by mouth once daily 90 Tab 3    metoprolol tartrate (LOPRESSOR) 25 mg tablet take 1 tablet by mouth twice a day 60 Tab 6    lidocaine (LIDODERM) 5 % apply 1 patch to the affected area daily. Leave on for 12 hours and then off for 12 hours. 5 Patch 2    furosemide (LASIX) 40 mg tablet Take 1 Tab by mouth daily. 90 Tab 3    oxyCODONE-acetaminophen (PERCOCET) 5-325 mg per tablet Take 1 Tab by mouth every four (4) hours as needed for Pain. Max Daily Amount: 6 Tabs. 6 Tab 0    gabapentin (NEURONTIN) 100 mg capsule Take 2 Caps by mouth three (3) times daily. Indications: NEUROPATHIC PAIN 90 Cap 1    dicyclomine (BENTYL) 20 mg tablet Take 20 mg by mouth every six (6) hours.   0    diclofenac (VOLTAREN) 1 % gel Apply  to affected area every six (6) hours. Apply 4 grams to affected joint up to 4 times per day, maximum 16 grams per joint per day  Dispense 5 100 gram tubes 100 g 4    desonide (TRIDESILON) 0.05 % cream   0    ALBUTEROL SULFATE (PROAIR HFA IN) Take 2 Puffs by inhalation every four (4) hours as needed.  FLUTICASONE/SALMETEROL (ADVAIR HFA IN) Take 2 Puffs by inhalation every twelve (12) hours.  montelukast (SINGULAIR) 10 mg tablet Take 10 mg by mouth daily.  tiotropium (SPIRIVA WITH HANDIHALER) 18 mcg inhalation capsule Take 1 Cap by inhalation daily.  amLODIPine (NORVASC) 10 mg tablet Take  by mouth daily.  bethanechol (URECHOLINE) 10 mg tablet Take 10 mg by mouth Before breakfast, lunch, and dinner.  esomeprazole (NEXIUM) 40 mg capsule Take  by mouth daily.  aspirin 81 mg tablet Take 81 mg by mouth.  ergocalciferol (VITAMIN D2) 50,000 unit capsule Take 50,000 Units by mouth Every Thursday.  diclofenac (VOLTAREN) 1 % gel Apply 2 g to affected area four (4) times daily. 100 g 2    diclofenac EC (VOLTAREN) 50 mg EC tablet Take 1 Tab by mouth two (2) times daily (with meals). 120 Tab 2    HYDROcodone-acetaminophen (NORCO) 7.5-325 mg per tablet Take 1 Tab by mouth every eight (8) hours as needed for Pain. Max Daily Amount: 3 Tabs. (Patient not taking: Reported on 6/5/2019) 21 Tab 0    naproxen (NAPROSYN) 500 mg tablet Take 1 Tab by mouth two (2) times daily (with meals).  20 Tab 0     Social History     Socioeconomic History    Marital status:      Spouse name: Not on file    Number of children: Not on file    Years of education: Not on file    Highest education level: Not on file   Occupational History    Not on file   Social Needs    Financial resource strain: Not on file    Food insecurity:     Worry: Not on file     Inability: Not on file    Transportation needs:     Medical: Not on file     Non-medical: Not on file   Tobacco Use    Smoking status: Never Smoker    Smokeless tobacco: Never Used   Substance and Sexual Activity    Alcohol use: No     Alcohol/week: 0.0 oz    Drug use: No    Sexual activity: Not on file   Lifestyle    Physical activity:     Days per week: Not on file     Minutes per session: Not on file    Stress: Not on file   Relationships    Social connections:     Talks on phone: Not on file     Gets together: Not on file     Attends [employer] service: Not on file     Active member of club or organization: Not on file     Attends meetings of clubs or organizations: Not on file     Relationship status: Not on file    Intimate partner violence:     Fear of current or ex partner: Not on file     Emotionally abused: Not on file     Physically abused: Not on file     Forced sexual activity: Not on file   Other Topics Concern    Not on file   Social History Narrative    ** Merged History Encounter **          Family History   Problem Relation Age of Onset    Cancer Mother     Cancer Father     Arthritis-osteo Other     Hypertension Other     Heart Disease Neg Hx         Negative family history of premature CAD or CVA         Review of systems:  Patient denies any reflux, emesis, change in bowel habits, difficulties voiding, hematochezia, melena, fever, unintentional weight loss, fatigue, chills, palpitations, hypertension, wheezing, dizziness, or fainting. Patient denies new onset chest pain, abdominal pain, new, sharp or differing headache, new or lingering cough,or new onset shortness of breath.  + Pins and needles pain to both hands and fingers. +pain to right shoulder and shoulder blade.       Physical Examination    Visit Vitals  /80   Pulse 92   Temp 98.3 °F (36.8 °C)   Resp 22   Wt 93.4 kg (206 lb)   SpO2 95%   BMI 34.28 kg/m²      Well developed well nourished female in no apparent distress  Head: normocephalic, atraumatic  Neck: supple, no masses, no adenopathy, trachea midline  Resp: clear to auscultation bilaterally, no wheezes, rhonchi or rales  Cardio: Regular rate and rhythm  Extremeties: warm, well-perfused,normal gait/station. +1 edema to left arm, hand. Limited  strength bilaterally. Psych: alert and oriented to person, place and time      IMPRESSION  Patient with neuropathic bilateral hand and finger pain and muscular pain of right shoulder and scapula. PLAN  No orders of the defined types were placed in this encounter. Take gabapentin TID as ordered; follow up with PCP for monthly management  Use lymphedema pump for 1 hour every evening  Continue PT   Add heat/ice, massage and TENS therapy to current regimen  Follow up for breast care as already scheduled  Please call with any questions or concerns prior to next visit    Ms. Rickie Villalobos has a reminder for a \"due or due soon\" health maintenance. I have asked that she contact her primary care provider for follow-up on this health maintenance. Chelsea Vo.  [address] [place] [address], FNP-C

## 2019-06-10 NOTE — TELEPHONE ENCOUNTER
Patient requested refill of gabapentin, verbal order given per NP 100mg po 1 cap tid, given 1 months supply, advised patient to follow up with pcp for another refill of gabapentin

## 2019-06-10 NOTE — PATIENT INSTRUCTIONS
Neuropathic Pain: Care Instructions  Your Care Instructions    Neuropathic pain is caused by pressure on or damage to your nerves. It's often simply called nerve pain. Some people feel this type of pain all the time. For others, it comes and goes. Diabetes, shingles, or an injury can cause nerve pain. Many people say the pain feels sharp, burning, or stabbing. But some people feel it as a dull ache. In some cases, it makes your skin very sensitive. So touch, pressure, and other sensations that did not hurt before may now cause pain. It's important to know that this kind of pain is real and can affect your quality of life. It's also important to know that treatment can help. Treatment includes pain medicines, exercise, and physical therapy. Medicines can help reduce the number of pain signals that travel over the nerves. This can make the painful areas less sensitive. It can also help you sleep better and improve your mood. But medicines are only one part of successful treatment. Most people do best with more than one kind of treatment. Your doctor may recommend that you try cognitive-behavioral therapy and stress management. Or, if needed, you may decide to try to quit smoking, lower your blood pressure, or better control blood sugar. These kinds of healthy changes can also make a difference. If you feel that your treatment is not working, talk to your doctor. And be sure to tell your doctor if you think you might be depressed or anxious. These are common problems that can also be treated. Follow-up care is a key part of your treatment and safety. Be sure to make and go to all appointments, and call your doctor if you are having problems. It's also a good idea to know your test results and keep a list of the medicines you take. How can you care for yourself at home? · Be safe with medicines. Read and follow all instructions on the label.   ? If the doctor gave you a prescription medicine for pain, take it as prescribed. ? If you are not taking a prescription pain medicine, ask your doctor if you can take an over-the-counter medicine. · Save hard tasks for days when you have less pain. Follow a hard task with an easy task. And remember to take breaks. · Relax, and reduce stress. You may want to try deep breathing or meditation. These can help. · Keep moving. Gentle, daily exercise can help reduce pain. Your doctor or physical therapist can tell you what type of exercise is best for you. This may include walking, swimming, and stationary biking. It may also include stretches and range-of-motion exercises. · Try heat, cold packs, and massage. · Get enough sleep. Constant pain can make you more tired. If the pain makes it hard to sleep, talk with your doctor. · Think positively. Your thoughts can affect your pain. Do fun things to distract yourself from the pain. See a movie, read a book, listen to music, or spend time with a friend. · Keep a pain diary. Try to write down how strong your pain is and what it feels like. Also try to notice and write down how your moods, thoughts, sleep, activities, and medicine affect your pain. These notes can help you and your doctor find the best ways to treat your pain. Reducing constipation caused by pain medicine  Pain medicines often cause constipation. To reduce constipation:  · Include fruits, vegetables, beans, and whole grains in your diet each day. These foods are high in fiber. · Drink plenty of fluids, enough so that your urine is light yellow or clear like water. If you have kidney, heart, or liver disease and have to limit fluids, talk with your doctor before you increase the amount of fluids you drink. · Get some exercise every day. Build up slowly to 30 to 60 minutes a day on 5 or more days of the week. · Take a fiber supplement, such as Citrucel or Metamucil, every day if needed. Read and follow all instructions on the label.   · Schedule time each day for a bowel movement. Having a daily routine may help. Take your time and do not strain when having a bowel movement. · Ask your doctor about a laxative. The goal is to have one easy bowel movement every 1 to 2 days. Do not let constipation go untreated for more than 3 days. When should you call for help? Call your doctor now or seek immediate medical care if:    · You feel sad, anxious, or hopeless for more than a few days. This could mean you are depressed. Depression is common in people who have a lot of pain. But it can be treated.     · You have trouble with bowel movements, such as:  ? No bowel movement in 3 days. ? Blood in the anal area, in your stool, or on the toilet paper. ? Diarrhea for more than 24 hours.    Watch closely for changes in your health, and be sure to contact your doctor if:    · Your pain is getting worse.     · You can't sleep because of pain.     · You are very worried or anxious about your pain.     · You have trouble taking your pain medicine.     · You have any concerns about your pain medicine or its side effects.     · You have vomiting or cramps for more than 2 hours. Where can you learn more? Go to http://laura-jessie.info/. Enter B781 in the search box to learn more about \"Neuropathic Pain: Care Instructions. \"  Current as of: Akila 3, 2018  Content Version: 11.9  © 8742-9793 [employer]. Care instructions adapted under license by [employer] (which disclaims liability or warranty for this information). If you have questions about a medical condition or this instruction, always ask your healthcare professional. [place] [address] any warranty or liability for your use of this information.

## 2019-06-11 ENCOUNTER — HOSPITAL ENCOUNTER (OUTPATIENT)
Dept: PHYSICAL THERAPY | Age: 69
Discharge: HOME | End: 2019-06-11
Payer: MEDICARE

## 2019-06-11 PROCEDURE — 97110 THERAPEUTIC EXERCISES: CPT

## 2019-06-11 PROCEDURE — 97140 MANUAL THERAPY 1/> REGIONS: CPT

## 2019-06-11 NOTE — PROGRESS NOTES
PT DAILY TREATMENT NOTE 10-18    Patient Name: Jesus Alberto Jasso  Date:2019  : 1950  [x]  Patient  Verified  Payor: [facility] MEDICAID / [facility] Product Type: Managed Care Medicaid /    In time:955  Out time:1023  Total Treatment Time (min): 28  Visit #: 3 of 16    Medicare/BCBS Only   Total Timed Codes (min):  28 1:1 Treatment Time:  23       Treatment Area: Radiculopathy, cervical region [M54.12]  Pain in right shoulder [M25.511]    SUBJECTIVE  Pain Level (0-10 scale): 8  Any medication changes, allergies to medications, adverse drug reactions, diagnosis change, or new procedure performed?: [x] No    [] Yes (see summary sheet for update)  Subjective functional status/changes:   [] No changes reported  Patient reported stiff right shoulder pain    OBJECTIVE    20 min Therapeutic Exercise:  [] See flow sheet :   Rationale: increase ROM and increase strength to improve the patients ability to perform ADLs      8 min Manual Therapy:  ST mobs, PROM right shd and MFR right UE   Rationale: decrease pain, increase ROM, increase tissue extensibility and decrease trigger points to perform ADLs          With   [] TE   [] TA   [] neuro   [] other: Patient Education: [x] Review HEP    [] Progressed/Changed HEP based on:   [] positioning   [] body mechanics   [] transfers   [] heat/ice application    [] other:      Other Objective/Functional Measures: patient very tender along medial border of scapula  Mm guarding and protective posturing requiring frequent VCing throughout treatment      Pain Level (0-10 scale) post treatment: 9    ASSESSMENT/Changes in Function: patient demonstrated very protective posturing, holding right shd close to side and elevated. Discussed need to relax posture and continue ROM exercises to decrease mm guarding today - patient verbalized understanding.     Patient will continue to benefit from skilled PT services to modify and progress therapeutic interventions, address functional mobility deficits, address ROM deficits, address strength deficits, analyze and address soft tissue restrictions, analyze and cue movement patterns, analyze and modify body mechanics/ergonomics and assess and modify postural abnormalities to attain remaining goals. [x]  See Plan of Care  []  See progress note/recertification  []  See Discharge Summary         Progress towards goals / Updated goals:  Short Term Goals: To be accomplished in 3-4 weeks:  1. Patient will subjectively report full compliance with prescribed HEP. Eval: HEP provided  2. Patient will demonstrate right shoulder flexion AROM >/= 100 degrees to improve ease with overhead lifting. Eval: Right Shoulder Flexion AROM = 75 deg (pain)  3. Patient will demonstrate right shoulder flexion MMT >/= 4/5 to improve ease with household ADLs. Eval: Right Shoulder Flexion MMT = 2+/5     Long Term Goals: To be accomplished in 7-8 weeks:  1. Patient will demonstrate a significant functional improvement as demonstrated by a score of >/= 54 on FOTO. Eval: FOTO = 30  2. Patient will demonstrate functional right shoulder ER to occiput to improve ease with self care ADLs. Eval: Right Shoulder Functional ER = NT  3. Patient will subjectively report >/= 60% improvement to improve overall quality of life.   Eval: 0%           PLAN  []  Upgrade activities as tolerated     [x]  Continue plan of care  []  Update interventions per flow sheet       []  Discharge due to:_  []  Other:_      Andree Mcardle, PTA 6/11/2019  10:06 AM    Future Appointments   Date Time Provider Jovany Raygozai   6/13/2019 11:00 AM Rojelio, [address]  [facility]   6/18/2019 11:30 AM Ruthie Johnson PTA [facility]   6/20/2019 11:30 AM Salma Hoskins PTA [facility]   6/25/2019 11:00 AM Mary Alice Grace PT [facility]   6/27/2019 11:30 AM Salma Hoskins PTA [facility]   7/1/2019 11:30 AM Salma Hoskins PTA [facility]   7/3/2019 11:30 AM Salma Hoskins, PTA [facility]   7/8/2019 11:30 AM Arnulfo Sonido, PT [facility]   7/11/2019 11:30 AM Yue Faustin, PTA [facility]   8/9/2019 11:20 AM Adry Egan PA-C [facility]   12/6/2019 11:00 AM [facility] RM 2 [facility]   12/13/2019  1:30 PM Harleen Chandler NP [facility]   1/8/2020 10:00 AM Sae Rees [place] [id number] [facility]

## 2019-06-12 RX ORDER — METOPROLOL TARTRATE 25 MG/1
TABLET, FILM COATED ORAL
Qty: 60 TAB | Refills: 6 | Status: SHIPPED | OUTPATIENT
Start: 2019-06-12 | End: 2020-03-06

## 2019-06-13 ENCOUNTER — HOSPITAL ENCOUNTER (OUTPATIENT)
Dept: PHYSICAL THERAPY | Age: 69
Discharge: HOME | End: 2019-06-13
Payer: MEDICARE

## 2019-06-13 PROCEDURE — 97112 NEUROMUSCULAR REEDUCATION: CPT

## 2019-06-13 PROCEDURE — 97110 THERAPEUTIC EXERCISES: CPT

## 2019-06-13 PROCEDURE — 97140 MANUAL THERAPY 1/> REGIONS: CPT

## 2019-06-13 NOTE — PROGRESS NOTES
PT DAILY TREATMENT NOTE 10-18    Patient Name: Jacqueline Price  Date:2019  : 1950  [x]  Patient  Verified  Payor: [facility] MEDICAID / [facility] Product Type: Managed Care Medicaid /    In time:1100  Out time:1126  Total Treatment Time (min): 26  Visit #: 4 of 16    Medicare/BCBS Only   Total Timed Codes (min):  26 1:1 Treatment Time:  26       Treatment Area: Radiculopathy, cervical region [M54.12]  Pain in right shoulder [M25.511]    SUBJECTIVE  Pain Level (0-10 scale): 8  Any medication changes, allergies to medications, adverse drug reactions, diagnosis change, or new procedure performed?: [x] No    [] Yes (see summary sheet for update)  Subjective functional status/changes:   [] No changes reported  \"It feels good today\"    OBJECTIVE    16 min Therapeutic Exercise:  [x] See flow sheet: Emphasis placed on improving available shoulder AROM and UE strength   Rationale: increase ROM and increase strength to improve the patients ability to improve ease with overhead reaching. 10 min Manual Therapy:    Left Sidelying, Right Scapular Medial/Lateral Glide  Left Sidelying, Right Scapular Elevation/Depression  Supine, Right Shoulder Passive Physiological PROM - Flexion/Scaption/Abduction   Rationale: decrease pain, increase ROM and increase tissue extensibility to improve ease with sleep        With   [] TE   [] TA   [] neuro   [] other: Patient Education: [x] Review HEP    [] Progressed/Changed HEP based on:   [] positioning   [] body mechanics   [] transfers   [] heat/ice application    [] other:      Other Objective/Functional Measures:   Right Shoulder Flexion AROM = 106 deg (pain)     Pain Level (0-10 scale) post treatment: 7-8    ASSESSMENT/Changes in Function: Significant objective improvement in available right shoulder AROM with patient subjectively with self-perception of reduced symptoms since onset of care.  Patient noted with reduced symptom irritability upon completion of right shoulder PROM but this therapist with continued caution with exercise completion secondary to continued high pain intensity reported. Patient will continue to benefit from skilled PT services to modify and progress therapeutic interventions, address functional mobility deficits, address ROM deficits, address strength deficits, analyze and address soft tissue restrictions, analyze and cue movement patterns and analyze and modify body mechanics/ergonomics to attain remaining goals. []  See Plan of Care  []  See progress note/recertification  []  See Discharge Summary         Progress towards goals / Updated goals:    Short Term Goals: To be accomplished in 3-4 weeks:  1. Patient will subjectively report full compliance with prescribed HEP. Eval: HEP provided  Current: Progressing, HEP initiated without full compliance, 6/13/2019  2. Patient will demonstrate right shoulder flexion AROM >/= 100 degrees to improve ease with overhead lifting. Eval: Right Shoulder Flexion AROM = 75 deg (pain)  Current: Met, Right Shoulder Flexion AROM = 106 deg (pain), 6/13/2019  3. Patient will demonstrate right shoulder flexion MMT >/= 4/5 to improve ease with household ADLs. Eval: Right Shoulder Flexion MMT = 2+/5     Long Term Goals: To be accomplished in 7-8 weeks:  1. Patient will demonstrate a significant functional improvement as demonstrated by a score of >/= 54 on FOTO. Eval: FOTO = 30  2. Patient will demonstrate functional right shoulder ER to occiput to improve ease with self care ADLs. Eval: Right Shoulder Functional ER = NT  3. Patient will subjectively report >/= 60% improvement to improve overall quality of life.   Eval: 0%       PLAN  [x]  Upgrade activities as tolerated     [x]  Continue plan of care  []  Update interventions per flow sheet       []  Discharge due to:_  []  Other:_      Jonnathan Ayala, PT 6/13/2019  8:52 AM    Future Appointments   Date Time Provider Jovany Monroe 6/13/2019 11:00 AM Caryle Spring, PT [facility]   6/18/2019 11:30 AM Gayla Villegas, PTA [facility]   6/20/2019 11:30 AM London Jose, PTA [facility]   6/25/2019 11:00 AM Caryle Spring, PT [facility]   6/27/2019 11:30 AM Algchon Jose, PTA [facility]   7/1/2019 11:30 AM Algchon Jose, PTA [facility]   7/3/2019 11:30 AM Algchon Jose, PTA [facility]   7/8/2019 11:30 AM Caryle Spring, PT [facility]   7/11/2019 11:30 AM London Jose, PTA [facility]   8/9/2019 11:20 AM Massiel Louise PA-C [facility]   12/6/2019 11:00 AM [facility] RM 2 [facility]   12/13/2019  1:30 PM Quiana Blunt NP [facility]   1/8/2020 10:00 AM Belgica Lazcano MD [id number] [facility]

## 2019-06-18 ENCOUNTER — HOSPITAL ENCOUNTER (OUTPATIENT)
Dept: PHYSICAL THERAPY | Age: 69
Discharge: HOME | End: 2019-06-18
Payer: MEDICARE

## 2019-06-18 PROCEDURE — 97140 MANUAL THERAPY 1/> REGIONS: CPT

## 2019-06-18 PROCEDURE — 97110 THERAPEUTIC EXERCISES: CPT

## 2019-06-18 NOTE — PROGRESS NOTES
PT DAILY TREATMENT NOTE 10-18    Patient Name: Crystal Zaldivar  Date:2019  : 1950  [x]  Patient  Verified  Payor: [facility] MEDICAID / [facility] / Product Type: Managed Care Medicaid /    In time:4:31  Out time:5:00  Total Treatment Time (min): 29  Visit #: 5 of 16    Medicare/BCBS Only   Total Timed Codes (min):  29 1:1 Treatment Time:  29       Treatment Area: Radiculopathy, cervical region [M54.12]  Pain in right shoulder [M25.511]    SUBJECTIVE  Pain Level (0-10 scale): 8  Any medication changes, allergies to medications, adverse drug reactions, diagnosis change, or new procedure performed?: [x] No    [] Yes (see summary sheet for update)  Subjective functional status/changes:   [] No changes reported  Pt reports she has per pain constant.      OBJECTIVE        Min Type Additional Details    [] Estim:  []Unatt       []IFC  []Premod                        []Other:  []w/ice   []w/heat  Position:  Location:    [] Estim: []Att    []TENS instruct  []NMES                    []Other:  []w/US   []w/ice   []w/heat  Position:  Location:    []  Traction: [] Cervical       []Lumbar                       [] Prone          []Supine                       []Intermittent   []Continuous Lbs:  [] before manual  [] after manual    []  Ultrasound: []Continuous   [] Pulsed                           []1MHz   []3MHz W/cm2:  Location:    []  Iontophoresis with dexamethasone         Location: [] Take home patch   [] In clinic    []  Ice     []  heat  []  Ice massage  []  Laser   []  Anodyne Position:  Location:    []  Laser with stim  []  Other:  Position:  Location:    []  Vasopneumatic Device Pressure:       [] lo [] med [] hi   Temperature: [] lo [] med [] hi   [] Skin assessment post-treatment:  []intact []redness- no adverse reaction    []redness  adverse reaction:       19 min Therapeutic Exercise:  [x] See flow sheet :   Rationale: increase ROM and increase strength to improve the patients ability to increase ease with ADLs. 10 min Manual Therapy:  STJ mobs, GHJ mobs, PROM, manual cervical distraction. Rationale: decrease pain, increase ROM, increase tissue extensibility and decrease trigger points to increase tolerance to activities. With   [] TE   [] TA   [] neuro   [] other: Patient Education: [x] Review HEP    [] Progressed/Changed HEP based on:   [] positioning   [] body mechanics   [] transfers   [] heat/ice application    [] other:      Other Objective/Functional Measures: Right Shoulder Flexion AROM = 128 deg (pain),     Pain Level (0-10 scale) post treatment: 8    ASSESSMENT/Changes in Function: Pt very guarded with manual. Pt has limited PROM in all planes of right UE. Pt has progressed well with right shoulder AROM. Patient will continue to benefit from skilled PT services to modify and progress therapeutic interventions, address functional mobility deficits, address ROM deficits, address strength deficits and analyze and address soft tissue restrictions to attain remaining goals. []  See Plan of Care  []  See progress note/recertification  []  See Discharge Summary         Progress towards goals / Updated goals:  Short Term Goals: To be accomplished in 3-4 weeks:  1. Patient will subjectively report full compliance with prescribed HEP. Eval: HEP provided  Current: Progressing, HEP initiated without full compliance, 6/13/2019  2. Patient will demonstrate right shoulder flexion AROM >/= 100 degrees to improve ease with overhead lifting. Eval: Right Shoulder Flexion AROM = 75 deg (pain)  Current: Met, Right Shoulder Flexion AROM = 128 deg (pain), 6/18/2019  3. Patient will demonstrate right shoulder flexion MMT >/= 4/5 to improve ease with household ADLs. Eval: Right Shoulder Flexion MMT = 2+/5  Current: Progressing; right shoulder flexion MMT = 3/5. 6/18/19     Long Term Goals: To be accomplished in 7-8 weeks:  1.  Patient will demonstrate a significant functional improvement as demonstrated by a score of >/= 54 on FOTO. Eval: FOTO = 30  2. Patient will demonstrate functional right shoulder ER to occiput to improve ease with self care ADLs. Eval: Right Shoulder Functional ER = NT  3. Patient will subjectively report >/= 60% improvement to improve overall quality of life.   Eval: 0%           PLAN  []  Upgrade activities as tolerated     [x]  Continue plan of care  []  Update interventions per flow sheet       []  Discharge due to:_  []  Other:_      Luis Romeo PTA 6/18/2019  4:30 PM    Future Appointments   Date Time Provider Jovany Monroe   6/20/2019 11:30 AM Jluis Kelly PTA [facility]   6/25/2019 11:00 AM Bhavin Ricci, PT [facility]   6/27/2019 11:30 AM Jluis Kelly, PTA [facility]   7/1/2019 11:30 AM Jluis Kelly, PTA [facility]   7/3/2019 11:30 AM Jluis Kelly, PTA [facility]   7/8/2019 11:30 AM Bhavin Ricci, PT [facility]   7/11/2019 11:30 AM Jluis Kelly, PTA [facility]   8/9/2019 11:20 AM THERON Wolfe [address]   12/6/2019 11:00 AM [facility] RM 2 [facility]   12/13/2019  1:30 PM Laurie Chavez NP [facility]   1/8/2020 10:00 AM Pito Powell  [facility]

## 2019-06-20 ENCOUNTER — HOSPITAL ENCOUNTER (OUTPATIENT)
Dept: PHYSICAL THERAPY | Age: 69
Discharge: HOME | End: 2019-06-20
Payer: MEDICARE

## 2019-06-20 PROCEDURE — 97110 THERAPEUTIC EXERCISES: CPT

## 2019-06-20 PROCEDURE — 97140 MANUAL THERAPY 1/> REGIONS: CPT

## 2019-06-20 NOTE — PROGRESS NOTES
PT DAILY TREATMENT NOTE 10-18    Patient Name: Rhett Jara  Date:2019  : 1950  [x]  Patient  Verified  Payor: [facility] MEDICAID / Plan: [facility] / Product Type: Managed Care Medicaid /    In time:11:26  Out time:11:56  Total Treatment Time (min): 30  Visit #: 6 of 16    Medicare/BCBS Only   Total Timed Codes (min):  30 1:1 Treatment Time:  30       Treatment Area: Radiculopathy, cervical region [M54.12]  Pain in right shoulder [M25.511]    SUBJECTIVE  Pain Level (0-10 scale): 7  Any medication changes, allergies to medications, adverse drug reactions, diagnosis change, or new procedure performed?: [x] No    [] Yes (see summary sheet for update)  Subjective functional status/changes:   [] No changes reported  Pt reports that cooking, bathing, and dressing are activities to increase her shoulder pain.      OBJECTIVE        Min Type Additional Details    [] Estim:  []Unatt       []IFC  []Premod                        []Other:  []w/ice   []w/heat  Position:  Location:    [] Estim: []Att    []TENS instruct  []NMES                    []Other:  []w/US   []w/ice   []w/heat  Position:  Location:    []  Traction: [] Cervical       []Lumbar                       [] Prone          []Supine                       []Intermittent   []Continuous Lbs:  [] before manual  [] after manual    []  Ultrasound: []Continuous   [] Pulsed                           []1MHz   []3MHz W/cm2:  Location:    []  Iontophoresis with dexamethasone         Location: [] Take home patch   [] In clinic    []  Ice     []  heat  []  Ice massage  []  Laser   []  Anodyne Position:  Location:    []  Laser with stim  []  Other:  Position:  Location:    []  Vasopneumatic Device Pressure:       [] lo [] med [] hi   Temperature: [] lo [] med [] hi   [] Skin assessment post-treatment:  []intact []redness- no adverse reaction    []redness  adverse reaction:           20 min Therapeutic Exercise:  [x] See flow sheet :   Rationale: increase ROM and increase strength to improve the patients ability to increase ease with ADLs.    10 min Manual Therapy:  STJ mobs, GHJ mobs, PROM, manual cervical distraction. Rationale: decrease pain, increase ROM, increase tissue extensibility and decrease trigger points to increase tolerance to activities. With   [] TE   [] TA   [] neuro   [] other: Patient Education: [x] Review HEP    [] Progressed/Changed HEP based on:   [] positioning   [] body mechanics   [] transfers   [] heat/ice application    [] other:      Other Objective/Functional Measures: poor scapular retraction in sitting. Pain Level (0-10 scale) post treatment: 8    ASSESSMENT/Changes in Function: Limited with being able to progress pt with due to high reporting of pain and complaints of movement causes more of an increase in pain. Pt provided little effort when performing exercises. Patient will continue to benefit from skilled PT services to modify and progress therapeutic interventions, address functional mobility deficits, address ROM deficits, address strength deficits and analyze and address soft tissue restrictions to attain remaining goals. []  See Plan of Care  []  See progress note/recertification  []  See Discharge Summary         Progress towards goals / Updated goals:  Short Term Goals: To be accomplished in 3-4 weeks:  1. Patient will subjectively report full compliance with prescribed HEP. Eval: HEP provided  Current: Progressing, HEP initiated without full compliance, 6/13/2019  2. Patient will demonstrate right shoulder flexion AROM >/= 100 degrees to improve ease with overhead lifting. Eval: Right Shoulder Flexion AROM = 75 deg (pain)  Current: Met, Right Shoulder Flexion AROM = 128 deg (pain), 6/18/2019  3. Patient will demonstrate right shoulder flexion MMT >/= 4/5 to improve ease with household ADLs.   Eval: Right Shoulder Flexion MMT = 2+/5  Current: Progressing; right shoulder flexion MMT = 3/5. 6/18/19     Long Term Goals: To be accomplished in 7-8 weeks:  1. Patient will demonstrate a significant functional improvement as demonstrated by a score of >/= 54 on FOTO. Eval: FOTO = 30  2. Patient will demonstrate functional right shoulder ER to occiput to improve ease with self care ADLs. Eval: Right Shoulder Functional ER = NT  3. Patient will subjectively report >/= 60% improvement to improve overall quality of life.   Eval: 0%           PLAN  []  Upgrade activities as tolerated     [x]  Continue plan of care  []  Update interventions per flow sheet       []  Discharge due to:_  []  Other:_      Saadia Rivas, NBA 6/20/2019  11:27 AM    Future Appointments   Date Time Provider Jovany Monroe   6/20/2019 11:30 AM Génesis Gene, PTA [facility]   6/25/2019 11:00 AM Lorri Lester, PT [facility]   6/27/2019 11:30 AM Génesis Gene, PTA [facility]   7/1/2019 11:30 AM Génesis Gene, PTA [facility]   7/3/2019 11:30 AM Génesis Gene, PTA [facility]   7/8/2019 11:30 AM Lorri Lester, PT [facility]   7/11/2019 11:30 AM Génesis Gene, PTA [facility]   8/9/2019 11:20 AM Deward Habermann, PA-C [address]   12/6/2019 11:00 AM [facility] RM 2 [facility]   12/13/2019  1:30 PM Gavin Avila NP [facility]   1/8/2020 10:00 AM Larissa Dominguez MD [id number] [place] [address]

## 2019-06-25 ENCOUNTER — HOSPITAL ENCOUNTER (OUTPATIENT)
Dept: PHYSICAL THERAPY | Age: 69
Discharge: HOME | End: 2019-06-25
Payer: MEDICARE

## 2019-06-25 PROCEDURE — 97110 THERAPEUTIC EXERCISES: CPT

## 2019-06-25 PROCEDURE — 97140 MANUAL THERAPY 1/> REGIONS: CPT

## 2019-06-25 NOTE — PROGRESS NOTES
PT DAILY TREATMENT NOTE 10-18    Patient Name: Jolanda Jeans  Date:2019  : 1950  [x]  Patient  Verified  Payor: [facility] MEDICAID / [facility] / Product Type: Managed Care Medicaid /    In time:1100  Out time:1131  Total Treatment Time (min): 31  Visit #: 7 of 16    Medicare/BCBS Only   Total Timed Codes (min):  31 1:1 Treatment Time:  31       Treatment Area: Radiculopathy, cervical region [M54.12]  Pain in right shoulder [M25.511]    SUBJECTIVE  Pain Level (0-10 scale): 7  Any medication changes, allergies to medications, adverse drug reactions, diagnosis change, or new procedure performed?: [x] No    [] Yes (see summary sheet for update)  Subjective functional status/changes:   [] No changes reported  Patient reports self perception of improved ease with functional use of the right arm. OBJECTIVE    23 min Therapeutic Exercise:  [x] See flow sheet: Emphasis placed on improving available shoulder AROM and UE strength   Rationale: increase ROM and increase strength to improve the patients ability to improve ease with overhead reaching.     8 min Manual Therapy:    Supine, Right Shoulder Passive Physiological PROM - Flexion/Scaption/Abduction, ER (90 deg abd), IR (90 deg abd)   Rationale: decrease pain, increase ROM and increase tissue extensibility to improve ease with sleep    With   [] TE   [] TA   [] neuro   [] other: Patient Education: [x] Review HEP    [] Progressed/Changed HEP based on:   [] positioning   [] body mechanics   [] transfers   [] heat/ice application    [] other:      Other Objective/Functional Measures:   Right Shoulder Functional ER = C5     Pain Level (0-10 scale) post treatment: 7    ASSESSMENT/Changes in Function: Significant improvement in available right shoulder AROM and strength with patient demonstrating maintenance of correct scapulohumeral mechanics with progression of within clinic strengthening.      Patient will continue to benefit from skilled PT services to modify and progress therapeutic interventions, address functional mobility deficits, address ROM deficits, address strength deficits, analyze and address soft tissue restrictions, analyze and cue movement patterns and analyze and modify body mechanics/ergonomics to attain remaining goals. []  See Plan of Care  []  See progress note/recertification  []  See Discharge Summary         Progress towards goals / Updated goals:    Short Term Goals: To be accomplished in 3-4 weeks:  1. Patient will subjectively report full compliance with prescribed HEP. Eval: HEP provided  Current: Progressing, HEP initiated without full compliance, 6/13/2019  2. Patient will demonstrate right shoulder flexion AROM >/= 100 degrees to improve ease with overhead lifting. Eval: Right Shoulder Flexion AROM = 75 deg (pain)  Current: Met, Right Shoulder Flexion AROM = 128 deg (pain), 6/18/2019  3. Patient will demonstrate right shoulder flexion MMT >/= 4/5 to improve ease with household ADLs. Eval: Right Shoulder Flexion MMT = 2+/5  Current: Progressing, Right Shoulder Flexion MMT = 3+/5, 6/25/2019     Long Term Goals: To be accomplished in 7-8 weeks:  1. Patient will demonstrate a significant functional improvement as demonstrated by a score of >/= 54 on FOTO. Eval: FOTO = 30  2. Patient will demonstrate functional right shoulder ER to occiput to improve ease with self care ADLs. Eval: Right Shoulder Functional ER = NT  Current: Met, Right Shoulder Functional ER = C5, 6/25/2019  3. Patient will subjectively report >/= 60% improvement to improve overall quality of life.   Eval: 0%    PLAN  [x]  Upgrade activities as tolerated     [x]  Continue plan of care  []  Update interventions per flow sheet       []  Discharge due to:_  []  Other:_      Mal Neff, PT 6/25/2019  7:52 AM    Future Appointments   Date Time Provider Jovany Monroe   6/25/2019 11:00 AM Shelbi Villa, PT [facility]   6/27/2019 11:30 AM Lizandro Vieira, PTA [facility]   7/1/2019 11:30 AM Lizandro Vieira, PTA [facility]   7/3/2019 11:30 AM Lizandro Vieira, PTA [facility]   7/8/2019 11:30 AM Jorge Hairston, PT [facility]   7/11/2019 11:30 AM Lizandro Vieira, PTA [facility]   8/9/2019 11:20 AM Jorden Jain PA-C [facility]   12/6/2019 11:00 AM [facility] RM 2 [facility]   12/13/2019  1:30 PM Omar Joseph NP [facility] SCHED   1/8/2020 10:00 AM Kimberly Gonzales [place] [id number] [facility]

## 2019-06-27 ENCOUNTER — HOSPITAL ENCOUNTER (OUTPATIENT)
Dept: PHYSICAL THERAPY | Age: 69
Discharge: HOME | End: 2019-06-27
Payer: MEDICARE

## 2019-06-27 PROCEDURE — 97110 THERAPEUTIC EXERCISES: CPT | Performed by: PHYSICAL THERAPIST

## 2019-06-27 PROCEDURE — 97140 MANUAL THERAPY 1/> REGIONS: CPT | Performed by: PHYSICAL THERAPIST

## 2019-06-27 NOTE — PROGRESS NOTES
PT DAILY TREATMENT NOTE 10-18    Patient Name: Diane Cota  Date:2019  : 1950  [x]  Patient  Verified  Payor: [facility] MEDICAID / [facility] Product Type: Managed Care Medicaid /    In time:11:30  Out time:12:00  Total Treatment Time (min): 30  Visit #: 8 of 16    Medicare/BCBS Only   Total Timed Codes (min):  30 1:1 Treatment Time:  25       Treatment Area: Radiculopathy, cervical region [M54.12]  Pain in right shoulder [M25.511]    SUBJECTIVE  Pain Level (0-10 scale): 6/10  Any medication changes, allergies to medications, adverse drug reactions, diagnosis change, or new procedure performed?: [x] No    [] Yes (see summary sheet for update)  Subjective functional status/changes:   [] No changes reported  Patient reports she has a doctor's appointment with her \"breathing doctor\" and has to cut her therapy short today. OBJECTIVE      20 min Therapeutic Exercise:  [] See flow sheet :   Rationale: increase ROM and increase strength to improve the patients ability to increase their functional activity level. 10 min Manual Therapy:  GH joint mobs supine, grade II, III inferior and posterior mobs. Sidelying scapular mobs. STM perscapular   Rationale: decrease pain, increase ROM and increase tissue extensibility to increase ease of motion to improve function. With   [] TE   [] TA   [] neuro   [] other: Patient Education: [x] Review HEP    [] Progressed/Changed HEP based on:   [] positioning   [] body mechanics   [] transfers   [] heat/ice application    [] other:      Other Objective/Functional Measures: Patient has significant tenderness along the medial border of the right scapula. Pain in the right shoulder with manual stretching into forward elevation. Pain Level (0-10 scale) post treatment: 0/10    ASSESSMENT/Changes in Function: Patient continues with pain and limited functional mobility of the right shoulder.     Patient will continue to benefit from skilled PT services to modify and progress therapeutic interventions, address functional mobility deficits, address ROM deficits, address strength deficits, analyze and address soft tissue restrictions, analyze and cue movement patterns and analyze and modify body mechanics/ergonomics to attain remaining goals. [x]  See Plan of Care  []  See progress note/recertification  []  See Discharge Summary         Progress towards goals / Updated goals:  Short Term Goals: To be accomplished in 3-4 weeks:  1. Patient will subjectively report full compliance with prescribed HEP. Eval: HEP provided  Current: Progressing, HEP initiated without full compliance, 6/13/2019  2. Patient will demonstrate right shoulder flexion AROM >/= 100 degrees to improve ease with overhead lifting. Eval: Right Shoulder Flexion AROM = 75 deg (pain)  Current: Met, Right Shoulder Flexion AROM = 128 deg (pain), 6/18/2019  3. Patient will demonstrate right shoulder flexion MMT >/= 4/5 to improve ease with household ADLs. Eval: Right Shoulder Flexion MMT = 2+/5  Current: Progressing, Right Shoulder Flexion MMT = 3+/5, 6/25/2019     Long Term Goals: To be accomplished in 7-8 weeks:  1. Patient will demonstrate a significant functional improvement as demonstrated by a score of >/= 54 on FOTO. Eval: FOTO = 30  2. Patient will demonstrate functional right shoulder ER to occiput to improve ease with self care ADLs. Eval: Right Shoulder Functional ER = NT  Current: Met, Right Shoulder Functional ER = C5, 6/25/2019  3. Patient will subjectively report >/= 60% improvement to improve overall quality of life.   Eval: 0%    PLAN  [x]  Upgrade activities as tolerated     [x]  Continue plan of care  []  Update interventions per flow sheet       []  Discharge due to:_  []  Other:_      Aris Chris, PT 6/27/2019  12:00 PM    Future Appointments   Date Time Provider Jovany Monroe   7/1/2019 11:30 AM Brittny Craft PTA [facility]   7/3/2019 11:30 AM Myriam Me, [place] [facility]   7/8/2019 11:30 AM Kimi Para, PT [facility]   7/11/2019 11:30 AM Myriam Me, PTA [facility]   8/9/2019 11:20 AM THERON Ayala Krishna 70   12/6/2019 11:00 AM [facility] RM 2 [facility]   12/13/2019  1:30 PM Naomi Darby NP [facility] SCHED   1/8/2020 10:00 AM Kelsey Aguilera  [facility]

## 2019-07-01 ENCOUNTER — HOSPITAL ENCOUNTER (OUTPATIENT)
Dept: PHYSICAL THERAPY | Age: 69
Discharge: HOME | End: 2019-07-01
Payer: MEDICARE

## 2019-07-01 PROCEDURE — 97112 NEUROMUSCULAR REEDUCATION: CPT

## 2019-07-01 PROCEDURE — 97110 THERAPEUTIC EXERCISES: CPT

## 2019-07-01 NOTE — PROGRESS NOTES
PT DAILY TREATMENT NOTE 10-18    Patient Name: Kushal Enrique  Date:2019  : 1950  [x]  Patient  Verified  Payor: [facility] MEDICAID / [facility] / Product Type: Managed Care Medicaid /    In time:11:26  Out time:11:51  Total Treatment Time (min): 25  Visit #: 9 of 16    Medicare/BCBS Only   Total Timed Codes (min):  25 1:1 Treatment Time:  25       Treatment Area: Radiculopathy, cervical region [M54.12]  Pain in right shoulder [M25.511]    SUBJECTIVE  Pain Level (0-10 scale): 5  Any medication changes, allergies to medications, adverse drug reactions, diagnosis change, or new procedure performed?: [x] No    [] Yes (see summary sheet for update)  Subjective functional status/changes:   [] No changes reported  Pt reports she has returned back to sleeping in her bed.      OBJECTIVE        Min Type Additional Details    [] Estim:  []Unatt       []IFC  []Premod                        []Other:  []w/ice   []w/heat  Position:  Location:    [] Estim: []Att    []TENS instruct  []NMES                    []Other:  []w/US   []w/ice   []w/heat  Position:  Location:    []  Traction: [] Cervical       []Lumbar                       [] Prone          []Supine                       []Intermittent   []Continuous Lbs:  [] before manual  [] after manual    []  Ultrasound: []Continuous   [] Pulsed                           []1MHz   []3MHz W/cm2:  Location:    []  Iontophoresis with dexamethasone         Location: [] Take home patch   [] In clinic    []  Ice     []  heat  []  Ice massage  []  Laser   []  Anodyne Position:  Location:    []  Laser with stim  []  Other:  Position:  Location:    []  Vasopneumatic Device Pressure:       [] lo [] med [] hi   Temperature: [] lo [] med [] hi   [] Skin assessment post-treatment:  []intact []redness- no adverse reaction    []redness  adverse reaction:       10 min Therapeutic Exercise:  [x] See flow sheet :   Rationale: increase ROM and increase strength to improve the patients ability to increase tolerance to activities. 15 min Neuromuscular Re-education:  [x]  See flow sheet :scapular stability exercises. Rationale: increase ROM and increase strength  to improve the patients ability to increase ease with ADLS. With   [] TE   [] TA   [] neuro   [] other: Patient Education: [x] Review HEP    [] Progressed/Changed HEP based on:   [] positioning   [] body mechanics   [] transfers   [] heat/ice application    [] other:      Other Objective/Functional Measures: Pt reports 90% improvement since [facility]. Pain Level (0-10 scale) post treatment: 5    ASSESSMENT/Changes in Function: Pt plans to DC [place]. Pt reports that she has improved her shoulder greatly, but continues to feel limited with reaching or lifting objects. Patient will continue to benefit from skilled PT services to modify and progress therapeutic interventions, address functional mobility deficits, address ROM deficits, address strength deficits and analyze and address soft tissue restrictions to attain remaining goals. []  See Plan of Care  []  See progress note/recertification  []  See Discharge Summary         Progress towards goals / Updated goals:  Short Term Goals: To be accomplished in 3-4 weeks:  1. Patient will subjectively report full compliance with prescribed HEP. Eval: HEP provided  Current: Progressing, HEP initiated without full compliance, 6/13/2019  2. Patient will demonstrate right shoulder flexion AROM >/= 100 degrees to improve ease with overhead lifting. Eval: Right Shoulder Flexion AROM = 75 deg (pain)  Current: Met, Right Shoulder Flexion AROM = 128 deg (pain), 6/18/2019  3. Patient will demonstrate right shoulder flexion MMT >/= 4/5 to improve ease with household ADLs. Eval: Right Shoulder Flexion MMT = 2+/5  Current: Progressing, Right Shoulder Flexion MMT = 3+/5, 6/25/2019     Long Term Goals: To be accomplished in 7-8 weeks:  1.  Patient will demonstrate a significant functional improvement as demonstrated by a score of >/= 54 on FOTO. Eval: FOTO = 30  2. Patient will demonstrate functional right shoulder ER to occiput to improve ease with self care ADLs. Eval: Right Shoulder Functional ER = NT  Current: Met, Right Shoulder Functional ER = C5, 6/25/2019  3. Patient will subjectively report >/= 60% improvement to improve overall quality of life.   Eval: 0%  Current: Goal met: Pt reports 90% improvement since [facility]. 7/1/19        PLAN  []  Upgrade activities as tolerated     [x]  Continue plan of care  []  Update interventions per flow sheet       []  Discharge due to:_  []  Other:_      Alaina Little PTA 7/1/2019  11:19 AM    Future Appointments   Date Time Provider Jovany Monroe   7/1/2019 11:30 AM Alisson Ontiveros PTA [facility]   7/3/2019 11:30 AM Alisson Ontiveros PTA [facility]   7/8/2019 11:30 AM Nesha Espinal PT [facility]   7/11/2019 11:30 AM Alisson Ontiveros PTA [facility]   8/9/2019 11:20 AM Beryle Browns, PA-C [address]   12/6/2019 11:00 AM [facility]YND[facility] RM 2 [facility]   12/13/2019  1:30 PM Dixie Blackmon NP [facility] SCHED   1/8/2020 10:00 AM Willis Bar MD [id number] [facility]

## 2019-07-03 ENCOUNTER — HOSPITAL ENCOUNTER (OUTPATIENT)
Dept: PHYSICAL THERAPY | Age: 69
Discharge: HOME | End: 2019-07-03
Payer: MEDICARE

## 2019-07-03 PROCEDURE — 97530 THERAPEUTIC ACTIVITIES: CPT

## 2019-07-03 NOTE — PROGRESS NOTES
[facility]              [address]        [place], [address] [place]   [phone] fax      Discharge Summary  Patient name: Devan Gordon Start of Care: 2019   Referral source: Jamil Smith MD : 1950   Medical/Treatment Diagnosis: Radiculopathy, cervical region [M54.12]  Pain in right shoulder [M25.511]  Payor: [facility] MEDICAID / [facility] / Product Type: Managed Care Medicaid /  Onset Date:~2 months prior to evaluation     Prior Hospitalization: see medical history Provider#: [id number]   Medications: Verified on Patient Summary List    Comorbidities: Breast Cancer (~12 years ago - Remission; 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 Patient will subjectively report full compliance with prescribed HEP. Eval: HEP provided  At DC: Progressing, HEP initiated without full compliance  2. Patient will demonstrate right shoulder flexion AROM >/= 100 degrees to improve ease with overhead lifting. Eval: Right Shoulder Flexion AROM = 75 deg (pain)  At DC: Met, Right Shoulder Flexion AROM = 128 deg (pain)  3. Patient will demonstrate right shoulder flexion MMT >/= 4/5 to improve ease with household ADLs. Eval: Right Shoulder Flexion MMT = 2+/5  At DC: Progressing, Right Shoulder Flexion MMT = 3+/5     Long Term Goals: To be accomplished in 7-8 weeks:  1. Patient will demonstrate a significant functional improvement as demonstrated by a score of >/= 54 on FOTO. Eval: FOTO = 30  At DC: Progressing: FOTO = 49  2.  Patient will demonstrate functional right shoulder ER to occiput to improve ease with self care ADLs. Eval: Right Shoulder Functional ER = NT  At DC: Met, Right Shoulder Functional ER = C5  3. Patient will subjectively report >/= 60% improvement to improve overall quality of life. Eval: 0%  At DC: Goal met: Pt reports 90% improvement since [facility]    ASSESSMENT/RECOMMENDATIONS:    Pt has progressed toward LTGs. Pt reports feeling 90% improvement since [facility]. Pt is able to perform shoulder flexion AROM to 128 deg. Pt has progressed overall strength of right shoulder, but continues to be limited with overhead reaching. At this time patient suitable for discharge, with patient in agreement with PoC, with continuation of prescribed HEP to allow for continued independent functional improvements.     [x]Discontinue therapy: [x]Patient has reached or is progressing toward set goals      []Patient is non-compliant or has abdicated      []Due to lack of appreciable progress towards set goals    Chelsie Jefferson, PT 7/3/2019 1:26 PM    NOTE TO PHYSICIAN:  Please complete the following and fax to: [facility] at [facility] at [phone]  . Retain this original for your records. If you are unable to process this request in   24 hours, please contact our office.      [] I have read the above report and request that my patient continue therapy with the following changes/special instructions:  [] I have read the above report and request that my patient be discharged from therapy    Physician's Signature:____________Date:_________TIME:________    ** Signature, Date and Time must be completed for valid certification **

## 2019-07-03 NOTE — PROGRESS NOTES
PT DAILY TREATMENT NOTE 10-18    Patient Name: Diane Cota  Date:7/3/2019  : 1950  [x]  Patient  Verified  Payor: [facility] MEDICAID / [facility] / Product Type: Managed Care Medicaid /    In time:11:40  Out time:11:55  Total Treatment Time (min): 15  Visit #: 10 of 16    Medicare/BCBS Only   Total Timed Codes (min):  15 1:1 Treatment Time:  15       Treatment Area: Radiculopathy, cervical region [M54.12]  Pain in right shoulder [M25.511]    SUBJECTIVE  Pain Level (0-10 scale): 4  Any medication changes, allergies to medications, adverse drug reactions, diagnosis change, or new procedure performed?: [x] No    [] Yes (see summary sheet for update)  Subjective functional status/changes:   [] No changes reported  Pt reports that she feels better, but reports that she continues to feel limited reaching overhead.      OBJECTIVE        Min Type Additional Details    [] Estim:  []Unatt       []IFC  []Premod                        []Other:  []w/ice   []w/heat  Position:  Location:    [] Estim: []Att    []TENS instruct  []NMES                    []Other:  []w/US   []w/ice   []w/heat  Position:  Location:    []  Traction: [] Cervical       []Lumbar                       [] Prone          []Supine                       []Intermittent   []Continuous Lbs:  [] before manual  [] after manual    []  Ultrasound: []Continuous   [] Pulsed                           []1MHz   []3MHz W/cm2:  Location:    []  Iontophoresis with dexamethasone         Location: [] Take home patch   [] In clinic    []  Ice     []  heat  []  Ice massage  []  Laser   []  Anodyne Position:  Location:    []  Laser with stim  []  Other:  Position:  Location:    []  Vasopneumatic Device Pressure:       [] lo [] med [] hi   Temperature: [] lo [] med [] hi   [] Skin assessment post-treatment:  []intact []redness- no adverse reaction    []redness  adverse reaction:         15 min Therapeutic Activity:  [x]  See flow sheet :assesed goals and reviewed FOTO with pt. Rationale: increase ROM and increase strength  to improve the patients ability to increase ease with ADLs. With   [] TE   [] TA   [] neuro   [] other: Patient Education: [x] Review HEP    [] Progressed/Changed HEP based on:   [] positioning   [] body mechanics   [] transfers   [] heat/ice application    [] other:      Other Objective/Functional Measures: Held on performing exercises due to pt reporting having an ashma      Pain Level (0-10 scale) post treatment: 4    ASSESSMENT/Changes in Function: Pt has progressed toward LTGs. Pt reports feeling 90% improvement since [facility]. Pt is able to perform shoulder flexion to 128 deg. Pt has progressed overall strength of right shoulder, but continues to be limited with overhead reaching. []  See Plan of Care  []  See progress note/recertification  [x]  See Discharge Summary         Progress towards goals / Updated goals:  Short Term Goals: To be accomplished in 3-4 weeks:  1. Patient will subjectively report full compliance with prescribed HEP. Eval: HEP provided  Current: Progressing, HEP initiated without full compliance, 6/13/2019  2. Patient will demonstrate right shoulder flexion AROM >/= 100 degrees to improve ease with overhead lifting. Eval: Right Shoulder Flexion AROM = 75 deg (pain)  Current: Met, Right Shoulder Flexion AROM = 128 deg (pain), 6/18/2019  3. Patient will demonstrate right shoulder flexion MMT >/= 4/5 to improve ease with household ADLs. Eval: Right Shoulder Flexion MMT = 2+/5  Current: Progressing, Right Shoulder Flexion MMT = 3+/5, 6/25/2019     Long Term Goals: To be accomplished in 7-8 weeks:  1. Patient will demonstrate a significant functional improvement as demonstrated by a score of >/= 54 on FOTO. Eval: FOTO = 30  Current: Progressing: FOTO = 49. 7/3/19  2. Patient will demonstrate functional right shoulder ER to occiput to improve ease with self care ADLs.   Eval: Right Shoulder Functional ER = NT  Current: Met, Right Shoulder Functional ER = C5, 6/25/2019  3. Patient will subjectively report >/= 60% improvement to improve overall quality of life.   Eval: 0%  Current: Goal met: Pt reports 90% improvement since [facility]. 7/1/19           PLAN  []  Upgrade activities as tolerated     []  Continue plan of care  []  Update interventions per flow sheet       [x]  Discharge due to:_Per pt request.   []  Other:_      Shaye Bond, PTA 7/3/2019  11:42 AM    Future Appointments   Date Time Provider Jovany Raygozai   7/8/2019 11:30 AM Valente Hayes, PT [facility]   7/11/2019 11:30 AM Papi Montero PTA [facility]   8/9/2019  9:50 AM THERON Nevarez Krishna [address]   12/6/2019 11:00 AM [facility]YND[facility] RM 2 [facility]   12/13/2019  1:30 PM Kae Opitz, NP [facility] SCHED   1/8/2020 10:00 AM Michelle Aguirre MD [id number] [facility]

## 2019-07-08 ENCOUNTER — APPOINTMENT (OUTPATIENT)
Dept: PHYSICAL THERAPY | Age: 69
End: 2019-07-08
Payer: MEDICARE

## 2019-07-10 ENCOUNTER — OFFICE VISIT (OUTPATIENT)
Dept: SURGERY | Age: 69
End: 2019-07-10

## 2019-07-10 VITALS
DIASTOLIC BLOOD PRESSURE: 78 MMHG | WEIGHT: 207 LBS | SYSTOLIC BLOOD PRESSURE: 132 MMHG | RESPIRATION RATE: 16 BRPM | HEIGHT: 65 IN | BODY MASS INDEX: 34.49 KG/M2 | HEART RATE: 88 BPM | TEMPERATURE: 97.4 F | OXYGEN SATURATION: 98 %

## 2019-07-10 DIAGNOSIS — B37.2 SKIN YEAST INFECTION: Primary | ICD-10-CM

## 2019-07-10 RX ORDER — NYSTATIN 100000 U/G
CREAM TOPICAL 2 TIMES DAILY
Qty: 30 G | Refills: 0 | Status: SHIPPED | OUTPATIENT
Start: 2019-07-10 | End: 2019-12-10

## 2019-07-10 NOTE — PROGRESS NOTES
Barbera Brunner. Samantha Brewer, FNP-C  PROGRESS NOTE        Subjective:  Ms. Ashtyn Miller is a very pleasant 81 yo female who presents today for a problem visit. She called the office this morning c/o a burning rash beneath both breasts. She states she started noticing some burning and itching a few days ago and her  looked under her breasts last night and saw a red rash. She denies ever having something like this. She does not regularly wear a bra. She was educated about skin to skin contact and moist environments as being conducive to fungal, skin infections. Objective:  Vitals:    07/10/19 1147   BP: 132/78   Pulse: 88   Resp: 16   Temp: 97.4 °F (36.3 °C)   TempSrc: Oral   SpO2: 98%   Weight: 93.9 kg (207 lb)   Height: 5' 5\" (1.651 m)       Physical Exam:    General: in no apparent distress, alert, oriented times 3, afebrile and cooperative   Breast : bilateral, inframammary folds with bright pink, excoriated, skin. Positive for itching and burning. Current Medications:  Current Outpatient Medications   Medication Sig Dispense Refill    metoprolol tartrate (LOPRESSOR) 25 mg tablet take 1 tablet by mouth twice a day 60 Tab 6    gabapentin (NEURONTIN) 100 mg capsule take 2 capsules by mouth three times a day 90 Cap 1    pravastatin (PRAVACHOL) 40 mg tablet Take 1 Tab by mouth nightly. 0    celecoxib (CELEBREX) 100 mg capsule Take 1 Cap by mouth two (2) times a day.  0    clopidogrel (PLAVIX) 75 mg tab Take 1 Tab by mouth daily.  cyclobenzaprine (FLEXERIL) 5 mg tablet Take 1 Tab by mouth three (3) times daily as needed for Muscle Spasm(s). 1-2 tabs po TID prn 7 Tab 0    ibuprofen (MOTRIN) 800 mg tablet take 1 tablet by mouth every 6 hours if needed 90 Tab 1    losartan (COZAAR) 100 mg tablet take 1 tablet by mouth once daily 90 Tab 3    lidocaine (LIDODERM) 5 % apply 1 patch to the affected area daily. Leave on for 12 hours and then off for 12 hours.  5 Patch 2    furosemide (LASIX) 40 mg tablet Take 1 Tab by mouth daily. 90 Tab 3    oxyCODONE-acetaminophen (PERCOCET) 5-325 mg per tablet Take 1 Tab by mouth every four (4) hours as needed for Pain. Max Daily Amount: 6 Tabs. 6 Tab 0    naproxen (NAPROSYN) 500 mg tablet Take 1 Tab by mouth two (2) times daily (with meals). 20 Tab 0    dicyclomine (BENTYL) 20 mg tablet Take 20 mg by mouth every six (6) hours. 0    diclofenac (VOLTAREN) 1 % gel Apply  to affected area every six (6) hours. Apply 4 grams to affected joint up to 4 times per day, maximum 16 grams per joint per day  Dispense 5 100 gram tubes 100 g 4    desonide (TRIDESILON) 0.05 % cream   0    ALBUTEROL SULFATE (PROAIR HFA IN) Take 2 Puffs by inhalation every four (4) hours as needed.  FLUTICASONE/SALMETEROL (ADVAIR HFA IN) Take 2 Puffs by inhalation every twelve (12) hours.  montelukast (SINGULAIR) 10 mg tablet Take 10 mg by mouth daily.  tiotropium (SPIRIVA WITH HANDIHALER) 18 mcg inhalation capsule Take 1 Cap by inhalation daily.  amLODIPine (NORVASC) 10 mg tablet Take  by mouth daily.  esomeprazole (NEXIUM) 40 mg capsule Take  by mouth daily.  aspirin 81 mg tablet Take 81 mg by mouth.  ergocalciferol (VITAMIN D2) 50,000 unit capsule Take 50,000 Units by mouth Every Thursday.  diclofenac EC (VOLTAREN) 50 mg EC tablet Take 1 Tab by mouth two (2) times daily (with meals). 120 Tab 2    HYDROcodone-acetaminophen (NORCO) 7.5-325 mg per tablet Take 1 Tab by mouth every eight (8) hours as needed for Pain. Max Daily Amount: 3 Tabs. (Patient not taking: Reported on 6/5/2019) 21 Tab 0    bethanechol (URECHOLINE) 10 mg tablet Take 10 mg by mouth Before breakfast, lunch, and dinner. Chart and notes reviewed. Data reviewed. I have evaluated and examined the patient. Impression:    · Patient with yeast infection to bilateral, inframammary folds. Plan:  · Nystatin cream. Stop using when rash clears.   · Please call with any questions or concerns prior to next visit    Ms. Lindsay Jensen has a reminder for a \"due or due soon\" health maintenance. I have asked that she contact her primary care provider for follow-up on this health maintenance. Ashely Kay.  Sylvie Plascencia, THEODORAP-C

## 2019-07-10 NOTE — PATIENT INSTRUCTIONS
Candidiasis: Care Instructions  Your Care Instructions  Candidiasis (say \"jec-wyr-AY-uh-eloisa\") is a yeast infection. Yeast normally lives in your body. But it can cause problems if your body's defenses don't work as they should. Some medicines can increase your chance of getting a yeast infection. These include antibiotics, steroids, and cancer drugs. And some diseases like AIDS and diabetes can make you more likely to get yeast infections. There are different types of yeast infections. Haley Neighbors is a yeast infection in the mouth. It usually occurs in people with weak immune systems. It causes white patches inside the mouth and throat. Yeast infections of the skin usually occur in skin folds where the skin stays moist. They cause red, oozing patches on your skin. Babies can get these infections under the diaper. People who often wear gloves can get them on their hands. Many women get vaginal yeast infections. They are most common when women take antibiotics. These infections can cause the vagina to itch and burn. They also cause white discharge that looks like cottage cheese. In rare cases, yeast infects the blood. This can cause serious disease. This kind of infection is treated with medicine given through a needle into a vein (IV). After you start treatment, a yeast infection usually goes away quickly. But if your immune system is weak, the infection may come back. Tell your doctor if you get yeast infections often. Follow-up care is a key part of your treatment and safety. Be sure to make and go to all appointments, and call your doctor if you are having problems. It's also a good idea to know your test results and keep a list of the medicines you take. How can you care for yourself at home? · Take your medicines exactly as prescribed. Call your doctor if you think you are having a problem with your medicine. · Use antibiotics only as directed by your doctor. · Eat yogurt with live cultures.  It has bacteria called lactobacillus. It may help prevent some types of yeast infections. · Keep your skin clean and dry. Put powder on moist places. · If you are using a cream or suppository to treat a vaginal yeast infection, don't use condoms or a diaphragm. Use a different type of birth control. · Eat a healthy diet and get regular exercise. This will help keep your immune system strong. When should you call for help? Watch closely for changes in your health, and be sure to contact your doctor if:    · You do not get better as expected. Where can you learn more? Go to http://laura-jessie.info/. Enter S245 in the search box to learn more about \"Candidiasis: Care Instructions. \"  Current as of: May 14, 2018  Content Version: 11.9  © 1051-6800 [employer]. Care instructions adapted under license by [employer] (which disclaims liability or warranty for this information). If you have questions about a medical condition or this instruction, always ask your healthcare professional. [address] any warranty or liability for your use of this information.

## 2019-07-10 NOTE — PROGRESS NOTES
ROOM # [id number] Isabelle Newton presents today for   Chief Complaint   Patient presents with    Follow-up    Rash         Visit Vitals  /78 (BP 1 Location: Left arm, BP Patient Position: Sitting)   Pulse 88   Temp 97.4 °F (36.3 °C) (Oral)   Resp 16   Ht 5' 5\" (1.651 m)   Wt 207 lb (93.9 kg)   SpO2 98%   BMI 34.45 kg/m²         Advance Directive:  1. Do you have an advance directive in place? Patient Reply: No    2. If not, would you like material regarding how to put one in place? Patient Reply: No    Coordination of Care:  1. Have you been to the ER, urgent care clinic since your last visit? Hospitalized since your last visit?  No

## 2019-07-11 ENCOUNTER — APPOINTMENT (OUTPATIENT)
Dept: PHYSICAL THERAPY | Age: 69
End: 2019-07-11
Payer: MEDICARE

## 2019-07-18 DIAGNOSIS — M79.2 NEUROPATHIC PAIN: Primary | ICD-10-CM

## 2019-07-22 RX ORDER — GABAPENTIN 100 MG/1
CAPSULE ORAL
Qty: 90 CAP | Refills: 1 | Status: SHIPPED | OUTPATIENT
Start: 2019-07-22 | End: 2020-09-16 | Stop reason: DRUGHIGH

## 2019-07-23 ENCOUNTER — TELEPHONE (OUTPATIENT)
Dept: SURGERY | Age: 69
End: 2019-07-23

## 2019-07-23 NOTE — TELEPHONE ENCOUNTER
Patient called stating the rash under her breast has not gotten any better she has been continuing using the nystatin cream and prescribed, she feels it has gotten bigger and is red and continuing to itch, notified Christo Bynum NP who advised patient to follow up with her pcp. Patient verbally understood.

## 2019-07-31 ENCOUNTER — OFFICE VISIT (OUTPATIENT)
Dept: ORTHOPEDIC SURGERY | Age: 69
End: 2019-07-31

## 2019-07-31 VITALS
WEIGHT: 207 LBS | OXYGEN SATURATION: 97 % | SYSTOLIC BLOOD PRESSURE: 128 MMHG | BODY MASS INDEX: 34.49 KG/M2 | DIASTOLIC BLOOD PRESSURE: 63 MMHG | HEIGHT: 65 IN | RESPIRATION RATE: 16 BRPM | HEART RATE: 71 BPM

## 2019-07-31 DIAGNOSIS — M75.101 ROTATOR CUFF SYNDROME, RIGHT: ICD-10-CM

## 2019-07-31 RX ORDER — DICLOFENAC SODIUM 50 MG/1
50 TABLET, DELAYED RELEASE ORAL 2 TIMES DAILY WITH MEALS
Qty: 120 TAB | Refills: 2 | Status: SHIPPED | OUTPATIENT
Start: 2019-07-31 | End: 2019-09-06

## 2019-07-31 RX ORDER — DICLOFENAC SODIUM 10 MG/G
2 GEL TOPICAL EVERY 6 HOURS
Qty: 100 G | Refills: 4 | Status: SHIPPED | OUTPATIENT
Start: 2019-07-31 | End: 2019-09-06

## 2019-07-31 NOTE — PROGRESS NOTES
Melodie Guthrie  1950   Chief Complaint   Patient presents with    Shoulder Pain     right shoulder pain        HISTORY OF PRESENT ILLNESS   Melodie Guthrie is a 80 y.o. female who presents today for reevaluation of right shoulder pain. She has finished PT at this point and is doing very well her shoulder pain is much better. She has continued her HEP. All her questions were answered today. Patient denies any fever, chills, chest pain, shortness of breath or calf pain. There are no new illness or injuries to report since last seen in the office. No changes in medications, allergies, social or family history. PHYSICAL EXAM:   Visit Vitals  /63 (BP 1 Location: Right arm, BP Patient Position: Sitting)   Pulse 71   Resp 16   Ht 5' 5\" (1.651 m)   Wt 207 lb (93.9 kg)   LMP  (LMP Unknown)   SpO2 97%   BMI 34.45 kg/m²     The patient is a well-developed, well-nourished female   in no acute distress. The patient is alert and oriented times three. Mood and affect are normal.  LYMPHATIC: lymph nodes are not enlarged and are within normal limits  SKIN: normal in color and non tender to palpation. There are no bruises or abrasions noted. NEUROLOGICAL: Motor sensory exam is within normal limits. Reflexes are equal bilaterally. There is normal sensation to pinprick and light touch  MUSCULOSKELETAL:  Examination Right shoulder   Skin Intact   AC joint tenderness -   Biceps tenderness -   Forward flexion/Elevation    Active abduction    Glenohumeral abduction 90   External rotation ROM 70   Internal rotation ROM Lower buttock   Apprehension Not assessed   Tristons Relocation Not assessed   Jerk Not assessed   Load and Shift Not assessed   Obriens Not assessed   Speeds Not assessed   Impingement sign -   Supraspinatus/Empty Can -, 5/5   External Rotation Strength -, 5/5   Lift Off/Belly Press -, 5/5   Neurovascular Intact     IMPRESSION:      ICD-10-CM ICD-9-CM    1.  Rotator cuff syndrome, right M75.101 726.10 diclofenac EC (VOLTAREN) 50 mg EC tablet        PLAN:   Pt right shoulder pain improved since last visit. Her shoulder pain is improved with PT and rx NSAID. She will continue her HEP and NSAID  Will reorder diclofenac for her today. She is in pain management and receives percocet.   RTC PRN      THERON Ryan [facility]

## 2019-08-09 ENCOUNTER — OFFICE VISIT (OUTPATIENT)
Dept: ORTHOPEDIC SURGERY | Age: 69
End: 2019-08-09

## 2019-08-09 VITALS
TEMPERATURE: 96.8 F | BODY MASS INDEX: 34.49 KG/M2 | HEART RATE: 87 BPM | WEIGHT: 207 LBS | OXYGEN SATURATION: 96 % | DIASTOLIC BLOOD PRESSURE: 65 MMHG | SYSTOLIC BLOOD PRESSURE: 111 MMHG | HEIGHT: 65 IN

## 2019-08-09 DIAGNOSIS — R60.9 EDEMA, UNSPECIFIED TYPE: ICD-10-CM

## 2019-08-09 DIAGNOSIS — M17.12 PRIMARY OSTEOARTHRITIS OF LEFT KNEE: Primary | ICD-10-CM

## 2019-08-09 DIAGNOSIS — M79.605 LEFT LEG PAIN: ICD-10-CM

## 2019-08-09 RX ORDER — BETAMETHASONE SODIUM PHOSPHATE AND BETAMETHASONE ACETATE 3; 3 MG/ML; MG/ML
6 INJECTION, SUSPENSION INTRA-ARTICULAR; INTRALESIONAL; INTRAMUSCULAR; SOFT TISSUE ONCE
Qty: 1 ML | Refills: 0
Start: 2019-08-09 | End: 2019-08-09

## 2019-08-09 NOTE — PROGRESS NOTES
[address], [address] [place] [address]  [phone]           Patient: Elías Gray                MRN: [id number]       SSN: [id number]  YOB: 1950        AGE: 80 y.o. SEX: female  Body mass index is 34.45 kg/m². PCP: Francoise Morales MD  08/09/19      This office note has been dictated. REVIEW OF SYSTEMS:  Constitutional: Negative for fever, chills, weight loss and malaise/fatigue. HENT: Negative. Eyes: Negative. Respiratory: Negative. Cardiovascular: Negative. Gastrointestinal: No bowel incontinence or constipation. Genitourinary: No bladder incontinence or saddle anesthesia. Skin: Negative. Neurological: Negative. Endo/Heme/Allergies: Negative. Psychiatric/Behavioral: Negative. Musculoskeletal: As per HPI above. Past Medical History:   Diagnosis Date    Anemia     Asthma     Blood disorder     Cancer ([facility] [address]     breast, diagnosed 2006 left    Cancer [facility])     Chest pain, unspecified     The working diagnosis is chest pain. The differential diagnosis includes chest wall pain, stable angina.  Chronic obstructive pulmonary disease (HCC)     Essential hypertension     Essential hypertension, benign     Uncontrolled     GERD (gastroesophageal reflux disease)     Hypercholesterolemia     Hypertension     Neuropathy     Nonspecific abnormal electrocardiogram (ECG) (EKG)     Obesity, unspecified     Weight loss has been strongly encouraged by following dietary restrictions and an exercise routine.  Polio     as a young child    Pre-operative cardiovascular examination     Sciatica     Sleep apnea     Unspecified cerebral artery occlusion with cerebral infarction     TIA? no residual         Current Outpatient Medications:     diclofenac EC (VOLTAREN) 50 mg EC tablet, Take 1 Tab by mouth two (2) times daily (with meals). , Disp: 120 Tab, Rfl: 2    diclofenac (VOLTAREN) 1 % gel, Apply 2 g to affected area every six (6) hours. Apply 4 grams to affected joint up to 4 times per day, maximum 16 grams per joint per day Dispense 5 100 gram tubes, Disp: 100 g, Rfl: 4    gabapentin (NEURONTIN) 100 mg capsule, take 2 capsules by mouth three times a day, Disp: 90 Cap, Rfl: 1    nystatin (MYCOSTATIN) topical cream, Apply  to affected area two (2) times a day., Disp: 30 g, Rfl: 0    metoprolol tartrate (LOPRESSOR) 25 mg tablet, take 1 tablet by mouth twice a day, Disp: 60 Tab, Rfl: 6    pravastatin (PRAVACHOL) 40 mg tablet, Take 1 Tab by mouth nightly., Disp: , Rfl: 0    celecoxib (CELEBREX) 100 mg capsule, Take 1 Cap by mouth two (2) times a day., Disp: , Rfl: 0    clopidogrel (PLAVIX) 75 mg tab, Take 1 Tab by mouth daily. , Disp: , Rfl:     cyclobenzaprine (FLEXERIL) 5 mg tablet, Take 1 Tab by mouth three (3) times daily as needed for Muscle Spasm(s). 1-2 tabs po TID prn, Disp: 7 Tab, Rfl: 0    ibuprofen (MOTRIN) 800 mg tablet, take 1 tablet by mouth every 6 hours if needed, Disp: 90 Tab, Rfl: 1    losartan (COZAAR) 100 mg tablet, take 1 tablet by mouth once daily, Disp: 90 Tab, Rfl: 3    lidocaine (LIDODERM) 5 %, apply 1 patch to the affected area daily. Leave on for 12 hours and then off for 12 hours. , Disp: 5 Patch, Rfl: 2    furosemide (LASIX) 40 mg tablet, Take 1 Tab by mouth daily. , Disp: 90 Tab, Rfl: 3    HYDROcodone-acetaminophen (NORCO) 7.5-325 mg per tablet, Take 1 Tab by mouth every eight (8) hours as needed for Pain. Max Daily Amount: 3 Tabs., Disp: 21 Tab, Rfl: 0    naproxen (NAPROSYN) 500 mg tablet, Take 1 Tab by mouth two (2) times daily (with meals). , Disp: 20 Tab, Rfl: 0    dicyclomine (BENTYL) 20 mg tablet, Take 20 mg by mouth every six (6) hours. , Disp: , Rfl: 0    desonide (TRIDESILON) 0.05 % cream, , Disp: , Rfl: 0    ALBUTEROL SULFATE (PROAIR HFA IN), Take 2 Puffs by inhalation every four (4) hours as needed. , Disp: , Rfl:     FLUTICASONE/SALMETEROL (ADVAIR HFA IN), Take 2 Puffs by inhalation every twelve (12) hours. , Disp: , Rfl:     montelukast (SINGULAIR) 10 mg tablet, Take 10 mg by mouth daily. , Disp: , Rfl:     tiotropium (SPIRIVA WITH HANDIHALER) 18 mcg inhalation capsule, Take 1 Cap by inhalation daily. , Disp: , Rfl:     amLODIPine (NORVASC) 10 mg tablet, Take  by mouth daily. , Disp: , Rfl:     bethanechol (URECHOLINE) 10 mg tablet, Take 10 mg by mouth Before breakfast, lunch, and dinner., Disp: , Rfl:     esomeprazole (NEXIUM) 40 mg capsule, Take  by mouth daily. , Disp: , Rfl:     aspirin 81 mg tablet, Take 81 mg by mouth.  , Disp: , Rfl:     ergocalciferol (VITAMIN D2) 50,000 unit capsule, Take 50,000 Units by mouth Every Thursday. , Disp: , Rfl:     oxyCODONE-acetaminophen (PERCOCET) 5-325 mg per tablet, Take 1 Tab by mouth every four (4) hours as needed for Pain.  Max Daily Amount: 6 Tabs., Disp: 6 Tab, Rfl: 0    No Known Allergies    Social History     Socioeconomic History    Marital status:      Spouse name: Not on file    Number of children: Not on file    Years of education: Not on file    Highest education level: Not on file   Occupational History    Not on file   Social Needs    Financial resource strain: Not on file    Food insecurity:     Worry: Not on file     Inability: Not on file    Transportation needs:     Medical: Not on file     Non-medical: Not on file   Tobacco Use    Smoking status: Never Smoker    Smokeless tobacco: Never Used   Substance and Sexual Activity    Alcohol use: No     Alcohol/week: 0.0 standard drinks    Drug use: No    Sexual activity: Not on file   Lifestyle    Physical activity:     Days per week: Not on file     Minutes per session: Not on file    Stress: Not on file   Relationships    Social connections:     Talks on phone: Not on file     Gets together: Not on file     Attends Yazidism service: Not on file     Active member of club or organization: Not on file     Attends meetings of clubs or organizations: Not on file     Relationship status: Not on file    Intimate partner violence:     Fear of current or ex partner: Not on file     Emotionally abused: Not on file     Physically abused: Not on file     Forced sexual activity: Not on file   Other Topics Concern    Not on file   Social History Narrative    ** Merged History Encounter **            Past Surgical History:   Procedure Laterality Date    BREAST SURGERY PROCEDURE UNLISTED      initial surgery 2006, then left modified radical mastectomy 6/2012    HX BREAST BIOPSY  4/11/12    Left    HX BREAST BIOPSY Left     left mastectomy           * Patient was identified by name and date of birth   * Agreement on procedure being performed was verified  * Risks and Benefits explained to the patient  * Procedure site verified and marked as necessary  * Patient was positioned for comfort  * Consent was signed and verified  10:20 AM    The patient was instructed on post injection care. We did see Ms. Marcella Fonseca today in the office for followup with regards to her bilateral knees. The patient has known advancing arthritis of her knees. She is having a little bit more left knee pain than right knee pain at this point. She has had a couple of stumbles without injury. She states the left knee gives way on her at times. She has trouble getting up from a chair and going up and down stairs. She has had injections in the past, which gave her good relief. She is reporting a little bit of swelling of her ankles. It looks a little bit better in the morning when she wakes up. It worsens as the day goes on. She denies chest pain or shortness of breath. PHYSICAL EXAMINATION:  In general, the patient is alert and oriented x 3 in no acute distress. The patient is well-developed, well-nourished, with a normal affect. The patient is afebrile. HEENT:  Head is normocephalic and atraumatic.   Pupils are equally round and reactive to light and accommodation. Extraocular eye movements are intact. Neck is supple. Trachea is midline. No JVD is present. Breathing is nonlabored. Examination of the lower extremities reveals pain-free range of motion of the hips. There is no pain to palpation of the greater trochanteric bursae. There is negative straight leg raise. There is negative calf tenderness. There is negative Asher's. There is no evidence of DVT present. She does have mild edema to the ankles themselves. It is nonpitting. There is no erythema and no signs for cellulitis. Each knee reveals the skin is intact. There is no ecchymosis, no warmth, and no signs of infection or cellulitis present. She does have discomfort to palpation to the medial joint line of each of the knees, as well as patellofemoral grind and crepitus anteriorly with range of motion activities of each of the knees a little bit worse on the left side. RADIOGRAPHS:  Review of previous radiographs does confirm advancing arthritis of the knees worse in the medial patellofemoral articulation. ASSESSMENT:      1. Bilateral knee advanced arthritis left greater than right. 2. Lower extremity edema, venous insufficiency. PLAN:  At this point, we are going to get her a referral over to Dr. Kwan Gallagher with Vascular to assess for venous insufficiency. Today, in the office, we are going to move forward with a cortisone injection for the left knee. Under aseptic conditions, and after informed and written consent, with ultrasound-guided assistance, the left knee was prepped with Betadine and 6 mg of Celestone was injected without complications. The patient tolerated the injection well. The patient was instructed on post injection care. We will see her back in the office in about three months' time for evaluation.                     JR Rojelio REN, PAJenC, ATC

## 2019-09-05 ENCOUNTER — HOSPITAL ENCOUNTER (EMERGENCY)
Age: 69
Discharge: HOME | End: 2019-09-06
Attending: EMERGENCY MEDICINE
Payer: MEDICARE

## 2019-09-05 ENCOUNTER — APPOINTMENT (OUTPATIENT)
Dept: GENERAL RADIOLOGY | Age: 69
End: 2019-09-05
Attending: EMERGENCY MEDICINE
Payer: MEDICARE

## 2019-09-05 DIAGNOSIS — M54.50 ACUTE LEFT-SIDED LOW BACK PAIN WITHOUT SCIATICA: Primary | ICD-10-CM

## 2019-09-05 PROCEDURE — 99283 EMERGENCY DEPT VISIT LOW MDM: CPT

## 2019-09-05 PROCEDURE — 74011250637 HC RX REV CODE- 250/637: Performed by: EMERGENCY MEDICINE

## 2019-09-05 PROCEDURE — 72110 X-RAY EXAM L-2 SPINE 4/>VWS: CPT

## 2019-09-05 PROCEDURE — 81001 URINALYSIS AUTO W/SCOPE: CPT

## 2019-09-05 RX ORDER — IBUPROFEN 400 MG/1
800 TABLET ORAL ONCE
Status: COMPLETED | OUTPATIENT
Start: 2019-09-05 | End: 2019-09-05

## 2019-09-05 RX ADMIN — IBUPROFEN 800 MG: 400 TABLET, FILM COATED ORAL at 23:51

## 2019-09-06 VITALS
TEMPERATURE: 98.8 F | HEIGHT: 65 IN | OXYGEN SATURATION: 95 % | BODY MASS INDEX: 33.66 KG/M2 | DIASTOLIC BLOOD PRESSURE: 69 MMHG | WEIGHT: 202 LBS | SYSTOLIC BLOOD PRESSURE: 129 MMHG | RESPIRATION RATE: 16 BRPM | HEART RATE: 94 BPM

## 2019-09-06 LAB
APPEARANCE UR: CLEAR
BACTERIA URNS QL MICRO: ABNORMAL /HPF
BILIRUB UR QL: NEGATIVE
COLOR UR: YELLOW
EPITH CASTS URNS QL MICRO: ABNORMAL /LPF (ref 0–5)
GLUCOSE UR STRIP.AUTO-MCNC: NEGATIVE MG/DL
HGB UR QL STRIP: NEGATIVE
KETONES UR QL STRIP.AUTO: NEGATIVE MG/DL
LEUKOCYTE ESTERASE UR QL STRIP.AUTO: ABNORMAL
MUCOUS THREADS URNS QL MICRO: ABNORMAL /LPF
NITRITE UR QL STRIP.AUTO: NEGATIVE
PH UR STRIP: 7 [PH] (ref 5–8)
PROT UR STRIP-MCNC: NEGATIVE MG/DL
RBC #/AREA URNS HPF: ABNORMAL /HPF (ref 0–5)
SP GR UR REFRACTOMETRY: 1.01 (ref 1–1.03)
UROBILINOGEN UR QL STRIP.AUTO: 1 EU/DL (ref 0.2–1)
WBC URNS QL MICRO: ABNORMAL /HPF (ref 0–4)

## 2019-09-06 RX ORDER — CYCLOBENZAPRINE HCL 5 MG
5 TABLET ORAL
Qty: 12 TAB | Refills: 0 | OUTPATIENT
Start: 2019-09-06 | End: 2020-07-09

## 2019-09-06 RX ORDER — DICLOFENAC SODIUM 10 MG/G
4 GEL TOPICAL EVERY 6 HOURS
Qty: 100 G | Refills: 4 | OUTPATIENT
Start: 2019-09-06 | End: 2022-10-20

## 2019-09-06 NOTE — ED PROVIDER NOTES
Steph Montgomery is a 80 y.o. Female with a past medical history of hypertension, hyperlipidemia, GERD, COPD, and TIA coming in with back pain. Patient states that she has had right lumbar back pain for a long time. She states of last 2 days her left lumbar back is been hurting. She states is a constant, achy pain over the last 2 days. She denies any radiation. She denies any saddle anesthesia. She denies any leg weakness or numbness. She denies any fevers or chills. Denies any IV drug abuse. She does state that she fell on her left side few months ago, however did not have any pain until last 2 days. She states that she took some ibuprofen this morning which helped her symptoms greatly, however she has not taken any since. Denies any other complaints at this time           Past Medical History:   Diagnosis Date    Anemia     Asthma     Blood disorder     Cancer ([address]     breast, diagnosed 2006 left    Cancer [facility])     Chest pain, unspecified     The working diagnosis is chest pain. The differential diagnosis includes chest wall pain, stable angina.  Chronic obstructive pulmonary disease (HCC)     Essential hypertension     Essential hypertension, benign     Uncontrolled     GERD (gastroesophageal reflux disease)     Hypercholesterolemia     Hypertension     Neuropathy     Nonspecific abnormal electrocardiogram (ECG) (EKG)     Obesity, unspecified     Weight loss has been strongly encouraged by following dietary restrictions and an exercise routine.     Polio     as a young child    Pre-operative cardiovascular examination     Sciatica     Sleep apnea     Unspecified cerebral artery occlusion with cerebral infarction     TIA? no residual       Past Surgical History:   Procedure Laterality Date    BREAST SURGERY PROCEDURE UNLISTED      initial surgery 2006, then left modified radical mastectomy 6/2012    HX BREAST BIOPSY  4/11/12    Left    HX BREAST BIOPSY Left     left mastectomy         Family History:   Problem Relation Age of Onset    Cancer Mother     Cancer Father     Arthritis-osteo Other     Hypertension Other     Heart Disease Neg Hx         Negative family history of premature CAD or CVA       Social History     Socioeconomic History    Marital status:      Spouse name: Not on file    Number of children: Not on file    Years of education: Not on file    Highest education level: Not on file   Occupational History    Not on file   Social Needs    Financial resource strain: Not on file    Food insecurity:     Worry: Not on file     Inability: Not on file    Transportation needs:     Medical: Not on file     Non-medical: Not on file   Tobacco Use    Smoking status: Never Smoker    Smokeless tobacco: Never Used   Substance and Sexual Activity    Alcohol use: No     Alcohol/week: 0.0 standard drinks    Drug use: No    Sexual activity: Not on file   Lifestyle    Physical activity:     Days per week: Not on file     Minutes per session: Not on file    Stress: Not on file   Relationships    Social connections:     Talks on phone: Not on file     Gets together: Not on file     Attends [employer] service: Not on file     Active member of club or organization: Not on file     Attends meetings of clubs or organizations: Not on file     Relationship status: Not on file    Intimate partner violence:     Fear of current or ex partner: Not on file     Emotionally abused: Not on file     Physically abused: Not on file     Forced sexual activity: Not on file   Other Topics Concern    Not on file   Social History Narrative    ** Merged History Encounter **              ALLERGIES: Patient has no known allergies. Review of Systems   Constitutional: Negative. Negative for chills and fever. HENT: Negative. Eyes: Negative. Respiratory: Negative. Negative for shortness of breath. Cardiovascular: Negative. Negative for chest pain.    Gastrointestinal: Negative. Negative for abdominal pain, nausea and vomiting. Genitourinary: Negative. Negative for difficulty urinating and dysuria. Musculoskeletal: Positive for back pain. Negative for myalgias and neck pain. Skin: Negative. Negative for rash. Neurological: Negative. Negative for dizziness, weakness, light-headedness and numbness. Psychiatric/Behavioral: Negative. All other systems reviewed and are negative. Vitals:    09/05/19 2306   BP: 145/55   Pulse: 96   Resp: 16   Temp: 98.8 °F (37.1 °C)   SpO2: 96%   Weight: 91.6 kg (202 lb)   Height: 5' 5\" (1.651 m)            Physical Exam   Constitutional: She is oriented to person, place, and time. She appears well-developed and well-nourished. No distress. HENT:   Head: Normocephalic and atraumatic. Mouth/Throat: Oropharynx is clear and moist.   Eyes: Pupils are equal, round, and reactive to light. EOM are normal.   Neck: Trachea normal and normal range of motion. Neck supple. Cardiovascular: Normal rate, regular rhythm, S1 normal and S2 normal.   Pulmonary/Chest: Effort normal. No accessory muscle usage. No respiratory distress. Abdominal: Soft. Normal appearance. She exhibits no distension. There is no tenderness. There is no rigidity. Musculoskeletal: Normal range of motion. She exhibits no edema or tenderness. Tender to palpation over the left lumbar paraspinal muscles. No midline point tenderness, step-off, or deformity. Neurological: She is alert and oriented to person, place, and time. She has normal strength. No cranial nerve deficit or sensory deficit. Coordination normal.   Normal lower extremity strength and sensation in all dermatomes. +2 and equal patellar and Achilles reflexes bilaterally. Skin: Skin is warm and intact. No rash noted. Psychiatric: She has a normal mood and affect. Her speech is normal and behavior is normal.   Vitals reviewed.        MDM  Number of Diagnoses or Management Options  Acute left-sided low back pain without sciatica:   Diagnosis management comments: Krystin Hammonds is a 80 y.o. Female coming in with 2 days of left lumbar back pain. No red flags for spinal cord compression or cauda equina. Low suspicion of infectious process such as epidural abscess or discitis. Patient's postvoid residual is negative and no concern for urinary retention or neurogenic bladder. Exam reassuring. Will treat patient symptomatically with NSAIDs and muscle relaxer and refer to Ortho for outpatient follow-up. Counseled extensively on return precautions for worsening symptoms, numbness, weakness, fever, or other new or worsening symptoms. Patient and  verbalized understanding of the discharge instructions and follow-up plan. Procedures    Vitals:  Patient Vitals for the past 12 hrs:   Temp Pulse Resp BP SpO2   09/05/19 2306 98.8 °F (37.1 °C) 96 16 145/55 96 %       Medications ordered:   Medications   ibuprofen (MOTRIN) tablet 800 mg (800 mg Oral Given 9/5/19 9401)         Lab findings:  Recent Results (from the past 12 hour(s))   URINALYSIS W/ RFLX MICROSCOPIC    Collection Time: 09/05/19 11:40 PM   Result Value Ref Range    Color YELLOW      Appearance CLEAR      Specific gravity 1.013 1.005 - 1.030      pH (UA) 7.0 5.0 - 8.0      Protein NEGATIVE  NEG mg/dL    Glucose NEGATIVE  NEG mg/dL    Ketone NEGATIVE  NEG mg/dL    Bilirubin NEGATIVE  NEG      Blood NEGATIVE  NEG      Urobilinogen 1.0 0.2 - 1.0 EU/dL    Nitrites NEGATIVE  NEG      Leukocyte Esterase TRACE (A) NEG     URINE MICROSCOPIC ONLY    Collection Time: 09/05/19 11:40 PM   Result Value Ref Range    WBC 4 to 10 0 - 4 /hpf    RBC 0 to 3 0 - 5 /hpf    Epithelial cells 2+ 0 - 5 /lpf    Bacteria 1+ (A) NEG /hpf    Mucus FEW (A) NEG /lpf       Disposition:  Diagnosis:   1.  Acute left-sided low back pain without sciatica        Disposition: Discharge    Follow-up Information     Follow up With Specialties Details Why [address] [place] [address], Kal Gilmore MD Orthopedic Surgery Schedule an appointment as soon as possible for a visit for office follow up [id number] [facility] [phone]      [id number] [facility] EMERGENCY DEPT Emergency Medicine  As needed, If symptoms worsen [address] [facility]  [phone]           Patient's Medications   Start Taking    No medications on file   Continue Taking    ALBUTEROL SULFATE (PROAIR HFA IN)    Take 2 Puffs by inhalation every four (4) hours as needed. AMLODIPINE (NORVASC) 10 MG TABLET    Take  by mouth daily. ASPIRIN 81 MG TABLET    Take 81 mg by mouth. BETHANECHOL (URECHOLINE) 10 MG TABLET    Take 10 mg by mouth Before breakfast, lunch, and dinner. CLOPIDOGREL (PLAVIX) 75 MG TAB    Take 1 Tab by mouth daily. DESONIDE (TRIDESILON) 0.05 % CREAM        DICYCLOMINE (BENTYL) 20 MG TABLET    Take 20 mg by mouth every six (6) hours. ERGOCALCIFEROL (VITAMIN D2) 50,000 UNIT CAPSULE    Take 50,000 Units by mouth Every Thursday. ESOMEPRAZOLE (NEXIUM) 40 MG CAPSULE    Take  by mouth daily. FLUTICASONE/SALMETEROL (ADVAIR HFA IN)    Take 2 Puffs by inhalation every twelve (12) hours. FUROSEMIDE (LASIX) 40 MG TABLET    Take 1 Tab by mouth daily. GABAPENTIN (NEURONTIN) 100 MG CAPSULE    take 2 capsules by mouth three times a day    IBUPROFEN (MOTRIN) 800 MG TABLET    take 1 tablet by mouth every 6 hours if needed    LIDOCAINE (LIDODERM) 5 %    apply 1 patch to the affected area daily. Leave on for 12 hours and then off for 12 hours. LOSARTAN (COZAAR) 100 MG TABLET    take 1 tablet by mouth once daily    METOPROLOL TARTRATE (LOPRESSOR) 25 MG TABLET    take 1 tablet by mouth twice a day    MONTELUKAST (SINGULAIR) 10 MG TABLET    Take 10 mg by mouth daily. NYSTATIN (MYCOSTATIN) TOPICAL CREAM    Apply  to affected area two (2) times a day.     OXYCODONE-ACETAMINOPHEN (PERCOCET) 5-325 MG PER TABLET    Take 1 Tab by mouth every four (4) hours as needed for Pain. Max Daily Amount: 6 Tabs. PRAVASTATIN (PRAVACHOL) 40 MG TABLET    Take 1 Tab by mouth nightly. TIOTROPIUM (SPIRIVA WITH HANDIHALER) 18 MCG INHALATION CAPSULE    Take 1 Cap by inhalation daily. These Medications have changed    Modified Medication Previous Medication    CYCLOBENZAPRINE (FLEXERIL) 5 MG TABLET cyclobenzaprine (FLEXERIL) 5 mg tablet       Take 1 Tab by mouth three (3) times daily as needed for Muscle Spasm(s). 1-2 tabs po TID prn    Take 1 Tab by mouth three (3) times daily as needed for Muscle Spasm(s). 1-2 tabs po TID prn    DICLOFENAC (VOLTAREN) 1 % GEL diclofenac (VOLTAREN) 1 % gel       Apply 4 g to affected area every six (6) hours. Apply 4 grams to affected joint up to 4 times per day, maximum 16 grams per joint per day Dispense 5 100 gram tubes    Apply 2 g to affected area every six (6) hours. Apply 4 grams to affected joint up to 4 times per day, maximum 16 grams per joint per day Dispense 5 100 gram tubes   Stop Taking    CELECOXIB (CELEBREX) 100 MG CAPSULE    Take 1 Cap by mouth two (2) times a day. DICLOFENAC EC (VOLTAREN) 50 MG EC TABLET    Take 1 Tab by mouth two (2) times daily (with meals). HYDROCODONE-ACETAMINOPHEN (NORCO) 7.5-325 MG PER TABLET    Take 1 Tab by mouth every eight (8) hours as needed for Pain. Max Daily Amount: 3 Tabs. NAPROXEN (NAPROSYN) 500 MG TABLET    Take 1 Tab by mouth two (2) times daily (with meals).

## 2019-09-06 NOTE — DISCHARGE INSTRUCTIONS
Patient Education        Back Pain: Care Instructions  Your Care Instructions    Back pain has many possible causes. It is often related to problems with muscles and ligaments of the back. It may also be related to problems with the nerves, discs, or bones of the back. Moving, lifting, standing, sitting, or sleeping in an awkward way can strain the back. Sometimes you don't notice the injury until later. Arthritis is another common cause of back pain. Although it may hurt a lot, back pain usually improves on its own within several weeks. Most people recover in 12 weeks or less. Using good home treatment and being careful not to stress your back can help you feel better sooner. Follow-up care is a key part of your treatment and safety. Be sure to make and go to all appointments, and call your doctor if you are having problems. It's also a good idea to know your test results and keep a list of the medicines you take. How can you care for yourself at home? · Sit or lie in positions that are most comfortable and reduce your pain. Try one of these positions when you lie down:  ? Lie on your back with your knees bent and supported by large pillows. ? Lie on the floor with your legs on the seat of a sofa or chair. ? Lie on your side with your knees and hips bent and a pillow between your legs. ? Lie on your stomach if it does not make pain worse. · Do not sit up in bed, and avoid soft couches and twisted positions. Bed rest can help relieve pain at first, but it delays healing. Avoid bed rest after the first day of back pain. · Change positions every 30 minutes. If you must sit for long periods of time, take breaks from sitting. Get up and walk around, or lie in a comfortable position. · Try using a heating pad on a low or medium setting for 15 to 20 minutes every 2 or 3 hours. Try a warm shower in place of one session with the heating pad. · You can also try an ice pack for 10 to 15 minutes every 2 to 3 hours. Put a thin cloth between the ice pack and your skin. · Take pain medicines exactly as directed. ? If the doctor gave you a prescription medicine for pain, take it as prescribed. ? If you are not taking a prescription pain medicine, ask your doctor if you can take an over-the-counter medicine. · Take short walks several times a day. You can start with 5 to 10 minutes, 3 or 4 times a day, and work up to longer walks. Walk on level surfaces and avoid hills and stairs until your back is better. · Return to work and other activities as soon as you can. Continued rest without activity is usually not good for your back. · To prevent future back pain, do exercises to stretch and strengthen your back and stomach. Learn how to use good posture, safe lifting techniques, and proper body mechanics. When should you call for help? Call your doctor now or seek immediate medical care if:    · You have new or worsening numbness in your legs.     · You have new or worsening weakness in your legs. (This could make it hard to stand up.)     · You lose control of your bladder or bowels.    Watch closely for changes in your health, and be sure to contact your doctor if:    · You have a fever, lose weight, or don't feel well.     · You do not get better as expected. Where can you learn more? Go to http://laura-jessie.info/. Enter P341 in the search box to learn more about \"Back Pain: Care Instructions. \"  Current as of: September 20, 2018  Content Version: 12.1  © 0966-5829 Healthwise[employer]. Care instructions adapted under license by [employer] (which disclaims liability or warranty for this information). If you have questions about a medical condition or this instruction, always ask your healthcare professional. [place] [address] any warranty or liability for your use of this information.          Patient Education        Learning About Relief for Back Pain  What is back tension and strain? Back strain happens when you overstretch, or pull, a muscle in your back. You may hurt your back in an accident or when you exercise or lift something. Most back pain will get better with rest and time. You can take care of yourself at home to help your back heal.  What can you do first to relieve back pain? When you first feel back pain, try these steps:  · Walk. Take a short walk (10 to 20 minutes) on a level surface (no slopes, hills, or stairs) every 2 to 3 hours. Walk only distances you can manage without pain, especially leg pain. · Relax. Find a comfortable position for rest. Some people are comfortable on the floor or a medium-firm bed with a small pillow under their head and another under their knees. Some people prefer to lie on their side with a pillow between their knees. Don't stay in one position for too long. · Try heat or ice. Try using a heating pad on a low or medium setting, or take a warm shower, for 15 to 20 minutes every 2 to 3 hours. Or you can buy single-use heat wraps that last up to 8 hours. You can also try an ice pack for 10 to 15 minutes every 2 to 3 hours. You can use an ice pack or a bag of frozen vegetables wrapped in a thin towel. There is not strong evidence that either heat or ice will help, but you can try them to see if they help. You may also want to try switching between heat and cold. · Take pain medicine exactly as directed. ¨ If the doctor gave you a prescription medicine for pain, take it as prescribed. ¨ If you are not taking a prescription pain medicine, ask your doctor if you can take an over-the-counter medicine. What else can you do? · Stretch and exercise. Exercises that increase flexibility may relieve your pain and make it easier for your muscles to keep your spine in a good, neutral position. And don't forget to keep walking. · Do self-massage.  You can use self-massage to unwind after work or school or to energize yourself in the morning. You can easily massage your feet, hands, or neck. Self-massage works best if you are in comfortable clothes and are sitting or lying in a comfortable position. Use oil or lotion to massage bare skin. · Reduce stress. Back pain can lead to a vicious [place]: Distress about the pain tenses the muscles in your back, which in turn causes more pain. Learn how to relax your mind and your muscles to lower your stress. Where can you learn more? Go to http://laura-jessie.info/. Enter S119 in the search box to learn more about \"Learning About Relief for Back Pain. \"  Current as of: March 21, 2017  Content Version: 11.5  © 2754-3385 [employer]wise, [employer]. Care instructions adapted under license by [employer] (which disclaims liability or warranty for this information). If you have questions about a medical condition or this instruction, always ask your healthcare professional. [address] any warranty or liability for your use of this information.

## 2019-09-12 ENCOUNTER — OFFICE VISIT (OUTPATIENT)
Dept: ORTHOPEDIC SURGERY | Age: 69
End: 2019-09-12

## 2019-09-12 VITALS
HEIGHT: 65 IN | SYSTOLIC BLOOD PRESSURE: 136 MMHG | BODY MASS INDEX: 34.49 KG/M2 | DIASTOLIC BLOOD PRESSURE: 76 MMHG | HEART RATE: 110 BPM | WEIGHT: 207 LBS

## 2019-09-12 DIAGNOSIS — M54.16 LUMBAR NEURITIS: ICD-10-CM

## 2019-09-12 DIAGNOSIS — M51.36 DDD (DEGENERATIVE DISC DISEASE), LUMBAR: ICD-10-CM

## 2019-09-12 DIAGNOSIS — M47.817 LUMBOSACRAL SPONDYLOSIS WITHOUT MYELOPATHY: Primary | ICD-10-CM

## 2019-09-12 RX ORDER — METHYLPREDNISOLONE 4 MG/1
TABLET ORAL
Qty: 1 DOSE PACK | Refills: 0 | Status: SHIPPED | OUTPATIENT
Start: 2019-09-12 | End: 2020-02-19 | Stop reason: ALTCHOICE

## 2019-09-12 NOTE — PROGRESS NOTES
[facility]  [address], [place]   Phone: [phone]  Fax: [phone]        PROGRESS NOTE      HISTORY OF PRESENT ILLNESS:  The patient is a 80 y.o. female and was seen today for follow up of new c/o right shoulder pain. She denies much in the way of neck pain at this time. Initially had c/o low back pain into the BLE (R>L) extending anteriorly into the plantar aspect of the feet. Pt states her current symptoms are similar to when she previously saw me in 7/2014 but are progressing. Pt was discharged from pain management with Dr. Catrachita Gambino. Pt is followed by Dr. Jovany Morel for breast cancer and underwent surgery 5-6 years ago. She did receive radiation therapy. Pt denies h/o lumbar spinal surgery. Pt underwent L4/5 epidural on 5/16/17 with slight relief. She has not recently attended physical therapy/chiropractor and discontinued her HEP. Pt has taken Neurontin and Cymbalta in the past, noting more relief with Cymbalta. ER note from Joanne Delarosa PA-C dated 5/9/17 indicates patient presented for right foot pain. Note from [place], [place] dated 4/26/19 indicating patient was seen with c/o left knee pain. ER note from [place], [place] dated 5/7/19 indicating patient presented for progressive atraumatic right shoulder pain x 2 days. XR showed no acute findings. Treated with Flexeril at that time. A previous EMG per report revealed evidence to suggest chronic S1 radiculopathy on the right side. Lumbar spine MRI dated 5/18/15 per report revealed straightened lumbar lordosis. No malalignment otherwise. No vertebral body compression deformity or edema. Multilevel degenerative discogenic disease, with mild to moderate central canal narrowing L4-5, fairly similar in extent to the previous exam. Varying degrees of bilateral neural foraminal encroachment at several levels. Preliminary reading of lumbar plain films revealed anterior osteophytes at L2, L3, L4, L5. No acute pathology identified. Preliminary reading of cervical plain films: Mild degenerative changes in the lower cervical spine. No acute pathology identified. Right shoulder XR dated 5/7/19 reviewed. Per report, thin margin across the neck of the humerus with some lucency of the more proximal marrow. This has suggestive of a possible lytic lesion. Additional evaluation with CT or MRI may be appropriate. At her last clinical appointment, patient returned with new c/o right shoulder pain. She denied much in the way of neck pain at that time. Clinically, the patient demonstrated sxs consistent with right shoulder pathology. I set her up for an MRI of the right shoulder (per radiologist request) and referred her to Dr. Ellie Munoz for further evaluation and treatment. Pain medications declined as she was already in pain management. The patient returns today with progressive low back pain extending distally to the BLE, with increase in pain x 2 months. She continues to rate her pain 10/10. Pt reports she has had PT for her shoulder, but denies PT for the lumbar spine. Pt is still being treated with Oxycodone by Dr. Darek Rodriguez for general arthritis. Pt denies change in bowel or bladder habits. Note from Sarah Paul dated 5/22/19 indicating patient was seen with c/o right shouder pain with radicular sxs in the right hand. Worse with overhead activities. Thought pain was most likely due to rotator cuff tear. Treated with antiinflammatories. Note from Ernie Garcia MD dated 8/8/19 indicating patient was seen with c/o back pain. Referred to us. Note from Tato Blake dated 8/9/19 indicating patient was seen with c/o left knee pain. ER note from Dr. Cm Brewer dated 9/5/19 indicating patient presented for chronic right sided low back pain. Amos Mina a month prior of onset pain. Hx of left sided low back pain. Treated with muscle relaxers and NSAIDs. Lumbar spine XR dated 9/6/19 reviewed. Per report, mild degenerative disc disease.  reviewed. Body mass index is 34.45 kg/m². PCP: Enio Stafford MD      Past Medical History:   Diagnosis Date    Anemia     Asthma     Blood disorder     Cancer [facility])     breast, diagnosed 2006 left    Cancer [facility])     Chest pain, unspecified     The working diagnosis is chest pain. The differential diagnosis includes chest wall pain, stable angina.  Chronic obstructive pulmonary disease (HCC)     Essential hypertension     Essential hypertension, benign     Uncontrolled     GERD (gastroesophageal reflux disease)     Hypercholesterolemia     Hypertension     Neuropathy     Nonspecific abnormal electrocardiogram (ECG) (EKG)     Obesity, unspecified     Weight loss has been strongly encouraged by following dietary restrictions and an exercise routine.     Polio     as a young child    Pre-operative cardiovascular examination     Sciatica     Sleep apnea     Unspecified cerebral artery occlusion with cerebral infarction     TIA? no residual        Social History     Socioeconomic History    Marital status:      Spouse name: Not on file    Number of children: Not on file    Years of education: Not on file    Highest education level: Not on file   Occupational History    Not on file   Social Needs    Financial resource strain: Not on file    Food insecurity:     Worry: Not on file     Inability: Not on file    Transportation needs:     Medical: Not on file     Non-medical: Not on file   Tobacco Use    Smoking status: Never Smoker    Smokeless tobacco: Never Used   Substance and Sexual Activity    Alcohol use: No     Alcohol/week: 0.0 standard drinks    Drug use: No    Sexual activity: Not on file   Lifestyle    Physical activity:     Days per week: Not on file     Minutes per session: Not on file    Stress: Not on file   Relationships    Social connections:     Talks on phone: Not on file     Gets together: Not on file     Attends Denominational service: Not on file Active member of club or organization: Not on file     Attends meetings of clubs or organizations: Not on file     Relationship status: Not on file    Intimate partner violence:     Fear of current or ex partner: Not on file     Emotionally abused: Not on file     Physically abused: Not on file     Forced sexual activity: Not on file   Other Topics Concern    Not on file   Social History Narrative    ** Merged History Encounter **            Current Outpatient Medications   Medication Sig Dispense Refill    cyclobenzaprine (FLEXERIL) 5 mg tablet Take 1 Tab by mouth three (3) times daily as needed for Muscle Spasm(s). 1-2 tabs po TID prn 12 Tab 0    diclofenac (VOLTAREN) 1 % gel Apply 4 g to affected area every six (6) hours. Apply 4 grams to affected joint up to 4 times per day, maximum 16 grams per joint per day Dispense 5 100 gram tubes 100 g 4    ibuprofen (MOTRIN) 800 mg tablet take 1 tablet by mouth every 6 hours if needed 90 Tab 1    gabapentin (NEURONTIN) 100 mg capsule take 2 capsules by mouth three times a day 90 Cap 1    nystatin (MYCOSTATIN) topical cream Apply  to affected area two (2) times a day. 30 g 0    metoprolol tartrate (LOPRESSOR) 25 mg tablet take 1 tablet by mouth twice a day 60 Tab 6    pravastatin (PRAVACHOL) 40 mg tablet Take 1 Tab by mouth nightly. 0    clopidogrel (PLAVIX) 75 mg tab Take 1 Tab by mouth daily.  losartan (COZAAR) 100 mg tablet take 1 tablet by mouth once daily 90 Tab 3    lidocaine (LIDODERM) 5 % apply 1 patch to the affected area daily. Leave on for 12 hours and then off for 12 hours. 5 Patch 2    furosemide (LASIX) 40 mg tablet Take 1 Tab by mouth daily. 90 Tab 3    oxyCODONE-acetaminophen (PERCOCET) 5-325 mg per tablet Take 1 Tab by mouth every four (4) hours as needed for Pain. Max Daily Amount: 6 Tabs. 6 Tab 0    dicyclomine (BENTYL) 20 mg tablet Take 20 mg by mouth every six (6) hours.   0    desonide (TRIDESILON) 0.05 % cream   0    ALBUTEROL SULFATE (PROAIR HFA IN) Take 2 Puffs by inhalation every four (4) hours as needed.  FLUTICASONE/SALMETEROL (ADVAIR HFA IN) Take 2 Puffs by inhalation every twelve (12) hours.  montelukast (SINGULAIR) 10 mg tablet Take 10 mg by mouth daily.  tiotropium (SPIRIVA WITH HANDIHALER) 18 mcg inhalation capsule Take 1 Cap by inhalation daily.  amLODIPine (NORVASC) 10 mg tablet Take  by mouth daily.  bethanechol (URECHOLINE) 10 mg tablet Take 10 mg by mouth Before breakfast, lunch, and dinner.  esomeprazole (NEXIUM) 40 mg capsule Take  by mouth daily.  aspirin 81 mg tablet Take 81 mg by mouth.  ergocalciferol (VITAMIN D2) 50,000 unit capsule Take 50,000 Units by mouth Every Thursday. No Known Allergies       PHYSICAL EXAMINATION    Visit Vitals  /76   Pulse (!) 110   Ht 5' 5\" (1.651 m)   Wt 207 lb (93.9 kg)   LMP  (LMP Unknown)   BMI 34.45 kg/m²       CONSTITUTIONAL: NAD, A&O x 3  SENSATION: Decreased sensation to light touch circumferentially LLE. Sensation to light touch otherwise intact. RANGE OF MOTION: The patient has full passive range of motion in all four extremities. MOTOR:  Straight Leg Raise: Negative, bilateral               Hip Flex Knee Ext Knee Flex Ankle DF GTE Ankle PF Tone   Right +4/5 +4/5 +4/5 +4/5 +4/5 +4/5 +4/5   Left +4/5 +4/5 +4/5 +4/5 +4/5 +4/5 +4/5     Ambulates with a single point cane. ASSESSMENT   Diagnoses and all orders for this visit:    1. Lumbosacral spondylosis without myelopathy    2. Lumbar neuritis    3. DDD (degenerative disc disease), lumbar          IMPRESSION AND PLAN:  The patient returns today with progressive low back pain extending distally to the BLE, with increase in pain x 2 months. I will start her on Medrol Dosepak. I gave her a prescription for Motrin following completion of the Medrol Dosepak. Pt declined PT. Patient is neurologically intact.  I will see the patient back in 1 month's time or earlier if needed. Written by Chapin Fletcher, as dictated by Jessica Das MD  I examined the patient, reviewed and agree with the note.

## 2019-09-12 NOTE — LETTER
9/12/19 Patient: Rosie Corrigan YOB: 1950 Date of Visit: 9/12/2019 Jenaro Erickson MD 
[address] [place], [place] [id number] [place] [address] VIA Facsimile: [phone] Dear Jenaro Erickson MD, Thank you for referring Ms. Tabatha Lockwood to [address] [place] [address] for evaluation. My notes for this consultation are attached. If you have questions, please do not hesitate to call me. I look forward to following your patient along with you. Sincerely, Jairo aJne MD

## 2019-09-17 ENCOUNTER — TELEPHONE (OUTPATIENT)
Dept: ORTHOPEDIC SURGERY | Age: 69
End: 2019-09-17

## 2019-09-17 NOTE — TELEPHONE ENCOUNTER
Patient called for Bari Hernandez. Patient said she has some questions to ask about her medications. Patient would like to know if she can take Oxycodone medication with a Steroid medication. Patient also mentioned Motrin and Advil medications. Patient would like a call back at tel. [phone].

## 2019-10-10 DIAGNOSIS — M25.562 PAIN IN BOTH KNEES, UNSPECIFIED CHRONICITY: ICD-10-CM

## 2019-10-10 DIAGNOSIS — M25.561 PAIN IN BOTH KNEES, UNSPECIFIED CHRONICITY: ICD-10-CM

## 2019-10-11 ENCOUNTER — OFFICE VISIT (OUTPATIENT)
Dept: ORTHOPEDIC SURGERY | Age: 69
End: 2019-10-11

## 2019-10-11 VITALS
BODY MASS INDEX: 34.49 KG/M2 | HEART RATE: 93 BPM | WEIGHT: 207 LBS | HEIGHT: 65 IN | DIASTOLIC BLOOD PRESSURE: 82 MMHG | RESPIRATION RATE: 16 BRPM | OXYGEN SATURATION: 93 % | SYSTOLIC BLOOD PRESSURE: 142 MMHG

## 2019-10-11 DIAGNOSIS — M51.36 DDD (DEGENERATIVE DISC DISEASE), LUMBAR: ICD-10-CM

## 2019-10-11 DIAGNOSIS — M47.817 LUMBOSACRAL SPONDYLOSIS WITHOUT MYELOPATHY: Primary | ICD-10-CM

## 2019-10-11 DIAGNOSIS — M54.16 LUMBAR NEURITIS: ICD-10-CM

## 2019-10-11 RX ORDER — IBUPROFEN 800 MG/1
TABLET ORAL
Qty: 90 TAB | Refills: 1 | Status: SHIPPED | OUTPATIENT
Start: 2019-10-11 | End: 2020-03-08

## 2019-10-11 NOTE — LETTER
10/11/19 Patient: Caty Forbes YOB: 1950 Date of Visit: 10/11/2019 Clau Omer MD 
[address] [place], [place] [id number] [place] [address] VIA Facsimile: [phone] Dear Clau Omer MD, Thank you for referring Ms. Kate Perez to [address] [place] [address] for evaluation. My notes for this consultation are attached. If you have questions, please do not hesitate to call me. I look forward to following your patient along with you. Sincerely, Colt Christian MD

## 2019-10-11 NOTE — PROGRESS NOTES
[facility]  [address], [place]   Phone: [phone]  Fax: [phone]        PROGRESS NOTE      HISTORY OF PRESENT ILLNESS:  The patient is a 80 y.o. female and was seen today for follow up of progressive low back pain extending distally to the BLE, with increase in pain x 2 months. Previously had c/o right shoulder pain. She denies much in the way of neck pain at this time. Initially had c/o low back pain into the BLE (R>L) extending anteriorly into the plantar aspect of the feet. Pt states her current symptoms are similar to when she previously saw me in 7/2014 but are progressing. Pt was discharged from pain management with Dr. El Monterroso. Pt is followed by Dr. Annette Leon for breast cancer and underwent surgery 5-6 years ago. She did receive radiation therapy. Pt denies h/o lumbar spinal surgery. Pt underwent L4/5 epidural on 5/16/17 with slight relief. She has not recently attended physical therapy/chiropractor and discontinued her HEP. Pt has taken Neurontin and Cymbalta in the past, noting more relief with Cymbalta. ER note from Herman Jones PA-C dated 5/9/17 indicates patient presented for right foot pain. Note from Radha Friend dated 4/26/19 indicating patient was seen with c/o left knee pain. ER note from Radha Wright dated 5/7/19 indicating patient presented for progressive atraumatic right shoulder pain x 2 days. XR showed no acute findings. Treated with Flexeril at that time. Note from Sarah Paul dated 5/22/19 indicating patient was seen with c/o right shouder pain with radicular sxs in the right hand. Worse with overhead activities. Thought pain was most likely due to rotator cuff tear. Treated with antiinflammatories. Note from Navjot Coburn MD dated 8/8/19 indicating patient was seen with c/o back pain. Referred to us. Note from [place] dated 8/9/19 indicating patient was seen with c/o left knee pain.  ER note from Dr. Jesus Cho Zulma Shin dated 9/5/19 indicating patient presented for chronic right sided low back pain. Priscilla[place]er [place] a month prior of onset pain. Hx of left sided low back pain. Treated with muscle relaxers and NSAIDs. A previous EMG per report revealed evidence to suggest chronic S1 radiculopathy on the right side. Lumbar spine MRI dated 5/18/15 per report revealed straightened lumbar lordosis. No malalignment otherwise. No vertebral body compression deformity or edema. Multilevel degenerative discogenic disease, with mild to moderate central canal narrowing L4-5, fairly similar in extent to the previous exam. Varying degrees of bilateral neural foraminal encroachment at several levels. Preliminary reading of lumbar plain films revealed anterior osteophytes at L2, L3, L4, L5. No acute pathology identified. Preliminary reading of cervical plain films: Mild degenerative changes in the lower cervical spine. No acute pathology identified. Right shoulder XR dated 5/7/19 reviewed. Per report, thin margin across the neck of the humerus with some lucency of the more proximal marrow. This has suggestive of a possible lytic lesion. Additional evaluation with CT or MRI may be appropriate. Lumbar spine XR dated 9/6/19 reviewed. Per report, mild degenerative disc disease. At her last clinical appointment, the patient returned with progressive low back pain extending distally to the BLE, with increase in pain x 2 months. I started her on Medrol Dosepak. I gave her a prescription for Motrin following completion of the Medrol Dosepak. Pt declined PT. The patient returns today with pain location and distribution remain unchanged. She rates her pain 6/10, previously 10/10. Despite numbers, she reports she is pain free. Pt completed MDP with benefit. Pt is still being treated with Oxycodone by Dr. Carly Crane for general arthritis. Pt denies change in bowel or bladder habits.  reviewed. Body mass index is 34.45 kg/m².       PCP: Raphael Gifford MD      Past Medical History:   Diagnosis Date    Anemia     Asthma     Blood disorder     Cancer ([facility] [address]     breast, diagnosed 2006 left    Cancer [facility])     Chest pain, unspecified     The working diagnosis is chest pain. The differential diagnosis includes chest wall pain, stable angina.  Chronic obstructive pulmonary disease (HCC)     Essential hypertension     Essential hypertension, benign     Uncontrolled     GERD (gastroesophageal reflux disease)     Hypercholesterolemia     Hypertension     Neuropathy     Nonspecific abnormal electrocardiogram (ECG) (EKG)     Obesity, unspecified     Weight loss has been strongly encouraged by following dietary restrictions and an exercise routine.     Polio     as a young child    Pre-operative cardiovascular examination     Sciatica     Sleep apnea     Unspecified cerebral artery occlusion with cerebral infarction     TIA? no residual        Social History     Socioeconomic History    Marital status:      Spouse name: Not on file    Number of children: Not on file    Years of education: Not on file    Highest education level: Not on file   Occupational History    Not on file   Social Needs    Financial resource strain: Not on file    Food insecurity:     Worry: Not on file     Inability: Not on file    Transportation needs:     Medical: Not on file     Non-medical: Not on file   Tobacco Use    Smoking status: Never Smoker    Smokeless tobacco: Never Used   Substance and Sexual Activity    Alcohol use: No     Alcohol/week: 0.0 standard drinks    Drug use: No    Sexual activity: Not on file   Lifestyle    Physical activity:     Days per week: Not on file     Minutes per session: Not on file    Stress: Not on file   Relationships    Social connections:     Talks on phone: Not on file     Gets together: Not on file     Attends Hinduism service: Not on file     Active member of club or organization: Not on file     Attends meetings of clubs or organizations: Not on file     Relationship status: Not on file    Intimate partner violence:     Fear of current or ex partner: Not on file     Emotionally abused: Not on file     Physically abused: Not on file     Forced sexual activity: Not on file   Other Topics Concern    Not on file   Social History Narrative    ** Merged History Encounter **            Current Outpatient Medications   Medication Sig Dispense Refill    ibuprofen (MOTRIN) 800 mg tablet take 1 tablet by mouth every 6 hours if needed for pain 90 Tab 1    methylPREDNISolone (MEDROL DOSEPACK) 4 mg tablet Per dose pack instructions 1 Dose Pack 0    cyclobenzaprine (FLEXERIL) 5 mg tablet Take 1 Tab by mouth three (3) times daily as needed for Muscle Spasm(s). 1-2 tabs po TID prn 12 Tab 0    diclofenac (VOLTAREN) 1 % gel Apply 4 g to affected area every six (6) hours. Apply 4 grams to affected joint up to 4 times per day, maximum 16 grams per joint per day Dispense 5 100 gram tubes 100 g 4    gabapentin (NEURONTIN) 100 mg capsule take 2 capsules by mouth three times a day 90 Cap 1    nystatin (MYCOSTATIN) topical cream Apply  to affected area two (2) times a day. 30 g 0    metoprolol tartrate (LOPRESSOR) 25 mg tablet take 1 tablet by mouth twice a day 60 Tab 6    pravastatin (PRAVACHOL) 40 mg tablet Take 1 Tab by mouth nightly. 0    clopidogrel (PLAVIX) 75 mg tab Take 1 Tab by mouth daily.  losartan (COZAAR) 100 mg tablet take 1 tablet by mouth once daily 90 Tab 3    lidocaine (LIDODERM) 5 % apply 1 patch to the affected area daily. Leave on for 12 hours and then off for 12 hours. 5 Patch 2    furosemide (LASIX) 40 mg tablet Take 1 Tab by mouth daily. 90 Tab 3    oxyCODONE-acetaminophen (PERCOCET) 5-325 mg per tablet Take 1 Tab by mouth every four (4) hours as needed for Pain. Max Daily Amount: 6 Tabs. 6 Tab 0    dicyclomine (BENTYL) 20 mg tablet Take 20 mg by mouth every six (6) hours.   0    desonide (TRIDESILON) 0.05 % cream   0    ALBUTEROL SULFATE (PROAIR HFA IN) Take 2 Puffs by inhalation every four (4) hours as needed.  FLUTICASONE/SALMETEROL (ADVAIR HFA IN) Take 2 Puffs by inhalation every twelve (12) hours.  montelukast (SINGULAIR) 10 mg tablet Take 10 mg by mouth daily.  tiotropium (SPIRIVA WITH HANDIHALER) 18 mcg inhalation capsule Take 1 Cap by inhalation daily.  amLODIPine (NORVASC) 10 mg tablet Take  by mouth daily.  bethanechol (URECHOLINE) 10 mg tablet Take 10 mg by mouth Before breakfast, lunch, and dinner.  esomeprazole (NEXIUM) 40 mg capsule Take  by mouth daily.  aspirin 81 mg tablet Take 81 mg by mouth.  ergocalciferol (VITAMIN D2) 50,000 unit capsule Take 50,000 Units by mouth Every Thursday. No Known Allergies       PHYSICAL EXAMINATION    Visit Vitals  /82   Pulse 93   Resp 16   Ht 5' 5\" (1.651 m)   Wt 207 lb (93.9 kg)   LMP  (LMP Unknown)   SpO2 93%   BMI 34.45 kg/m²       CONSTITUTIONAL: NAD, A&O x 3  SENSATION: Intact to light touch throughout  RANGE OF MOTION: The patient has full passive range of motion in all four extremities. MOTOR:  Straight Leg Raise: Negative, bilateral               Hip Flex Knee Ext Knee Flex Ankle DF GTE Ankle PF Tone   Right +4/5 +4/5 +4/5 +4/5 +4/5 +4/5 +4/5   Left +4/5 +4/5 +4/5 +4/5 +4/5 +4/5 +4/5       ASSESSMENT   Diagnoses and all orders for this visit:    1. Lumbosacral spondylosis without myelopathy    2. Lumbar neuritis    3. DDD (degenerative disc disease), lumbar          IMPRESSION AND PLAN:  The patient does not think her remaining pain complaints are severe enough to warrant additional workup/treatment at this time. Patient is neurologically intact. I will see the patient back prn      Written by Madeline Gan, as dictated by Charli Tucker MD  I examined the patient, reviewed and agree with the note.

## 2019-11-15 ENCOUNTER — OFFICE VISIT (OUTPATIENT)
Dept: ORTHOPEDIC SURGERY | Age: 69
End: 2019-11-15

## 2019-11-15 VITALS
HEART RATE: 104 BPM | RESPIRATION RATE: 16 BRPM | HEIGHT: 65 IN | DIASTOLIC BLOOD PRESSURE: 79 MMHG | TEMPERATURE: 97 F | WEIGHT: 206 LBS | BODY MASS INDEX: 34.32 KG/M2 | SYSTOLIC BLOOD PRESSURE: 131 MMHG

## 2019-11-15 DIAGNOSIS — M17.12 PRIMARY OSTEOARTHRITIS OF LEFT KNEE: Primary | ICD-10-CM

## 2019-11-15 RX ORDER — BETAMETHASONE SODIUM PHOSPHATE AND BETAMETHASONE ACETATE 3; 3 MG/ML; MG/ML
6 INJECTION, SUSPENSION INTRA-ARTICULAR; INTRALESIONAL; INTRAMUSCULAR; SOFT TISSUE ONCE
Qty: 1 ML | Refills: 0
Start: 2019-11-15 | End: 2019-11-15

## 2019-11-15 NOTE — PROGRESS NOTES
1. Have you been to the ER, urgent care clinic since your last visit? Hospitalized since your last visit? No    2. Have you seen or consulted any other health care providers outside of the [address] [place] [address] since your last visit? Include any pap smears or colon screening.  No

## 2019-11-15 NOTE — PROGRESS NOTES
[address] [place] [address]  [phone]           Patient: Servando Balderas                MRN: [id number]       SSN: [id number]  YOB: 1950        AGE: 80 y.o. SEX: female  Body mass index is 34.28 kg/m². PCP: Yuki Auguste MD  11/15/19      This office note has been dictated. REVIEW OF SYSTEMS:  Constitutional: Negative for fever, chills, weight loss and malaise/fatigue. HENT: Negative. Eyes: Negative. Respiratory: Negative. Cardiovascular: Negative. Gastrointestinal: No bowel incontinence or constipation. Genitourinary: No bladder incontinence or saddle anesthesia. Skin: Negative. Neurological: Negative. Endo/Heme/Allergies: Negative. Psychiatric/Behavioral: Negative. Musculoskeletal: As per HPI above. Past Medical History:   Diagnosis Date    Anemia     Asthma     Blood disorder     Cancer ([facility] [address]     breast, diagnosed 2006 left    Cancer [facility])     Chest pain, unspecified     The working diagnosis is chest pain. The differential diagnosis includes chest wall pain, stable angina.  Chronic obstructive pulmonary disease (HCC)     Essential hypertension     Essential hypertension, benign     Uncontrolled     GERD (gastroesophageal reflux disease)     Hypercholesterolemia     Hypertension     Neuropathy     Nonspecific abnormal electrocardiogram (ECG) (EKG)     Obesity, unspecified     Weight loss has been strongly encouraged by following dietary restrictions and an exercise routine.     Polio     as a young child    Pre-operative cardiovascular examination     Sciatica     Sleep apnea     Unspecified cerebral artery occlusion with cerebral infarction     TIA? no residual         Current Outpatient Medications:     ibuprofen (MOTRIN) 800 mg tablet, take 1 tablet by mouth every 6 hours if needed for pain, Disp: 90 Tab, Rfl: 1    methylPREDNISolone (MEDROL DOSEPACK) 4 mg tablet, Per dose pack instructions, Disp: 1 Dose Pack, Rfl: 0    cyclobenzaprine (FLEXERIL) 5 mg tablet, Take 1 Tab by mouth three (3) times daily as needed for Muscle Spasm(s). 1-2 tabs po TID prn, Disp: 12 Tab, Rfl: 0    diclofenac (VOLTAREN) 1 % gel, Apply 4 g to affected area every six (6) hours. Apply 4 grams to affected joint up to 4 times per day, maximum 16 grams per joint per day Dispense 5 100 gram tubes, Disp: 100 g, Rfl: 4    gabapentin (NEURONTIN) 100 mg capsule, take 2 capsules by mouth three times a day, Disp: 90 Cap, Rfl: 1    nystatin (MYCOSTATIN) topical cream, Apply  to affected area two (2) times a day., Disp: 30 g, Rfl: 0    metoprolol tartrate (LOPRESSOR) 25 mg tablet, take 1 tablet by mouth twice a day, Disp: 60 Tab, Rfl: 6    pravastatin (PRAVACHOL) 40 mg tablet, Take 1 Tab by mouth nightly., Disp: , Rfl: 0    clopidogrel (PLAVIX) 75 mg tab, Take 1 Tab by mouth daily. , Disp: , Rfl:     losartan (COZAAR) 100 mg tablet, take 1 tablet by mouth once daily, Disp: 90 Tab, Rfl: 3    lidocaine (LIDODERM) 5 %, apply 1 patch to the affected area daily. Leave on for 12 hours and then off for 12 hours. , Disp: 5 Patch, Rfl: 2    furosemide (LASIX) 40 mg tablet, Take 1 Tab by mouth daily. , Disp: 90 Tab, Rfl: 3    oxyCODONE-acetaminophen (PERCOCET) 5-325 mg per tablet, Take 1 Tab by mouth every four (4) hours as needed for Pain. Max Daily Amount: 6 Tabs., Disp: 6 Tab, Rfl: 0    dicyclomine (BENTYL) 20 mg tablet, Take 20 mg by mouth every six (6) hours. , Disp: , Rfl: 0    desonide (TRIDESILON) 0.05 % cream, , Disp: , Rfl: 0    ALBUTEROL SULFATE (PROAIR HFA IN), Take 2 Puffs by inhalation every four (4) hours as needed. , Disp: , Rfl:     FLUTICASONE/SALMETEROL (ADVAIR HFA IN), Take 2 Puffs by inhalation every twelve (12) hours. , Disp: , Rfl:     montelukast (SINGULAIR) 10 mg tablet, Take 10 mg by mouth daily. , Disp: , Rfl:     tiotropium (SPIRIVA WITH HANDIHALER) 18 mcg inhalation capsule, Take 1 Cap by inhalation daily. , Disp: , Rfl:     amLODIPine (NORVASC) 10 mg tablet, Take  by mouth daily. , Disp: , Rfl:     bethanechol (URECHOLINE) 10 mg tablet, Take 10 mg by mouth Before breakfast, lunch, and dinner., Disp: , Rfl:     esomeprazole (NEXIUM) 40 mg capsule, Take  by mouth daily. , Disp: , Rfl:     aspirin 81 mg tablet, Take 81 mg by mouth.  , Disp: , Rfl:     ergocalciferol (VITAMIN D2) 50,000 unit capsule, Take 50,000 Units by mouth Every Thursday. , Disp: , Rfl:     No Known Allergies    Social History     Socioeconomic History    Marital status:      Spouse name: Not on file    Number of children: Not on file    Years of education: Not on file    Highest education level: Not on file   Occupational History    Not on file   Social Needs    Financial resource strain: Not on file    Food insecurity:     Worry: Not on file     Inability: Not on file    Transportation needs:     Medical: Not on file     Non-medical: Not on file   Tobacco Use    Smoking status: Never Smoker    Smokeless tobacco: Never Used   Substance and Sexual Activity    Alcohol use: No     Alcohol/week: 0.0 standard drinks    Drug use: No    Sexual activity: Not on file   Lifestyle    Physical activity:     Days per week: Not on file     Minutes per session: Not on file    Stress: Not on file   Relationships    Social connections:     Talks on phone: Not on file     Gets together: Not on file     Attends Worship service: Not on file     Active member of club or organization: Not on file     Attends meetings of clubs or organizations: Not on file     Relationship status: Not on file    Intimate partner violence:     Fear of current or ex partner: Not on file     Emotionally abused: Not on file     Physically abused: Not on file     Forced sexual activity: Not on file   Other Topics Concern    Not on file   Social History Narrative    ** Merged History Encounter **            Past Surgical History:   Procedure Laterality Date    BREAST SURGERY PROCEDURE UNLISTED      initial surgery 2006, then left modified radical mastectomy 6/2012    HX BREAST BIOPSY  4/11/12    Left    HX BREAST BIOPSY Left     left mastectomy           * Patient was identified by name and date of birth   * Agreement on procedure being performed was verified  * Risks and Benefits explained to the patient  * Procedure site verified and marked as necessary  * Patient was positioned for comfort  * Consent was signed and verified  11:42 AM    The patient was instructed on post injection care. We did see Ms. Claude Fendt for followup with regards to bilateral knee arthritis. The left is more symptomatic for her today. She has trouble getting up from a chair and going up and down stairs. She has decreased walking tolerance. She has had no recent fevers, chills, systemic changes, or injuries to report. PHYSICAL EXAMINATION:  In general, the patient is alert and oriented x 3 in no acute distress. The patient is well-developed, well-nourished, with a normal affect. The patient is afebrile. HEENT:  Head is normocephalic and atraumatic. Pupils are equally round and reactive to light and accommodation. Extraocular eye movements are intact. Neck is supple. Trachea is midline. No JVD is present. Breathing is nonlabored. Examination of the lower extremities reveals pain-free range of motion of the hips. There is no pain to palpation of the greater trochanteric bursae. There is negative straight leg raise. There is negative calf tenderness. There is negative Asher's. There is no evidence of DVT present. The left knee reveals the skin is intact. There is no ecchymosis and no warmth. She does have discomfort to palpation to the medial joint line, as well as patellofemoral grind and crepitus anteriorly with range of motion activities. There is minimal edema distally.      ASSESSMENT:  Bilateral knee osteoarthritis, left more symptomatic than the right knee. PLAN:  At this point, we are going to move forward with a cortisone injection for the left knee today. After informed and written consent with an appropriate time out performed, and under sterile conditions, with ultrasound-guided assistance, the left knee was prepped with Betadine and 6 mg of Celestone was injected without complications. The patient tolerated the injection well. The patient was instructed on post injection care. We will see her back in the office in about three months' time for evaluation. She will call with any questions or concerns that shall arise.                        JR Rojelio REN, PA-C, ATC

## 2019-11-19 ENCOUNTER — OFFICE VISIT (OUTPATIENT)
Dept: ORTHOPEDIC SURGERY | Age: 69
End: 2019-11-19

## 2019-11-19 VITALS
SYSTOLIC BLOOD PRESSURE: 132 MMHG | HEART RATE: 89 BPM | WEIGHT: 206 LBS | BODY MASS INDEX: 34.32 KG/M2 | RESPIRATION RATE: 18 BRPM | HEIGHT: 65 IN | DIASTOLIC BLOOD PRESSURE: 77 MMHG | TEMPERATURE: 96.8 F

## 2019-11-19 DIAGNOSIS — B35.3 TINEA PEDIS OF RIGHT FOOT: Primary | ICD-10-CM

## 2019-11-19 DIAGNOSIS — M79.671 RIGHT FOOT PAIN: ICD-10-CM

## 2019-11-19 DIAGNOSIS — R20.8 DECREASED SENSATION OF LOWER EXTREMITY: ICD-10-CM

## 2019-11-19 RX ORDER — TOLNAFTATE 10 MG/G
CREAM TOPICAL 2 TIMES DAILY
Qty: 1 TUBE | Refills: 1 | Status: SHIPPED | OUTPATIENT
Start: 2019-11-19 | End: 2019-12-10

## 2019-11-19 NOTE — PROGRESS NOTES
1. Have you been to the ER, urgent care clinic since your last visit? Hospitalized since your last visit? No    2. Have you seen or consulted any other health care providers outside of the [address] [place] [address] since your last visit? Include any pap smears or colon screening.  No

## 2019-11-19 NOTE — PROGRESS NOTES
AMBULATORY PROGRESS NOTE      Patient: Carmen Pugh             MRN: [id number]     SSN: [id number] Body mass index is 34.28 kg/m². YOB: 1950     AGE: 80 y.o. SEX: female    PCP: Emilie Maria MD     IMPRESSION/DIAGNOSIS AND TREATMENT PLAN     DIAGNOSES  1. Tinea pedis of right foot    2. Right foot pain    3. Decreased sensation of lower extremity        Orders Placed This Encounter    [78765] Foot Min 3V    REFERRAL TO NEUROLOGY    EMG ONE EXTREMITY LOWER RT    EMG ONE EXTREMITY LOWER LT    NCV/LAT MOTOR PER NERVE LOW/RT    NCV/LAT MOTOR PER NERVE LOW/LT    tolnaftate (TINACTIN) 1 % topical cream      Carmen Pugh understands her diagnoses and the proposed plan. So, my overall impression is that she has a peripheral neuropathy confirmed on monofilament testing but also has a history of peripheral neuropathy. She is having more discomfort from her peripheral neuropathy. As such, we will refer her to Dr. Dick Wilson to help treat her for this neuropathy. She did have x-ray of her right foot, three views, AP, lateral, and oblique, which show some mild osteoarthritic changes seen to the right calcaneal cuboid region, high arch type foot on this weightbearing films, three views. Otherwise, I see no fracture, subluxation, or dislocation. There is some osteopenia and metatarsus adductus. The plan is listed as below. She has a fungal infection in her second webspace. So, she was diagnosed with tenia pedis. We will try Tinactin ointment and reassess her in three weeks. Plan:    1) Tinactin 1% cream: BID; 1 tube, 1 refill. 2) Referral to Neurology. 3) EMG/NCS of the BLE. 4) Continue activity modification as directed. RTO - 3 weeks     HPI AND EXAMINATION     Carmen Pugh IS A 80 y.o. female who presents to my outpatient office complaining of right foot pain.  Ms. Pola Chen reports that she has been experiencing pain and itching in her right foot web spaces. She adds that she also experiences a feeling as though she has something wrapped around her legs. She denies h/o DM or back issues. She reports that she went to the ED and had x-rays and MRIs and reports that she was unable to receive a diagnosis. The patient denies any recent falls, twists, or trauma. She states that she is healthy, other than h/o breast cancer. She reports that she has vascular surgery coming up for her legs on December 9th. The patient has h/o breast cancer. Visit Vitals  /77   Pulse 89   Temp 96.8 °F (36 °C) (Oral)   Resp 18   Ht 5' 5\" (1.651 m)   Wt 206 lb (93.4 kg)   BMI 34.28 kg/m²     Appearance: Alert, well appearing and pleasant patient who is in no distress, oriented to person, place/time, and who follows commands. This patient is accompanied in the examination room by her self. Dementia: no dementia  Psychiatric: Affect and mood are appropriate. Patient arrives to office via: with assistive device: cane  HEENT: Head normocephalic & atraumatic. Both pupils are round, non icteric sclera   Eye: EOM are intact and sclera are clear    Neck: ROM WNL and JVD neck is not present     Hearings Intact, does not require hearing aid device  Respiratory: Breathing is unlabored without accessory chest muscle use  Cardiovascular/Peripheral Vascular: 1+ DP/PT pulses    ANKLE/FOOT right    Gait: uses cane  Tenderness: Mild tenderness with rubbing of the #2 and #3 toes together    Mild tenderness over the ATFL  Cutaneous: Peeling of the skin in the 2nd web space and slightly in 3rd, possible fungal infection    Dry blister in the web space, no pus  Joint Motion: WNL. Joint / Tendon Stability: No Ankle or Subtalar instability or joint laxity. No peroneal sublux ability or dislocation  Alignment: Forefoot, Midfoot, Hindfoot WNL.    Neuro Motor: NL   Decreased monofilament testing to the bilateral legs  Vascular: 1+ DP/PT pulses        Trace pre-tibial edema  Lymphatics: No extremity lymphedema, No calf swelling, no tenderness to calf muscles. CHART REVIEW     Past Medical History:   Diagnosis Date    Anemia     Asthma     Blood disorder     Cancer ([address]     breast, diagnosed 2006 left    Cancer [facility])     Chest pain, unspecified     The working diagnosis is chest pain. The differential diagnosis includes chest wall pain, stable angina.  Chronic obstructive pulmonary disease (HCC)     Essential hypertension     Essential hypertension, benign     Uncontrolled     GERD (gastroesophageal reflux disease)     Hypercholesterolemia     Hypertension     Neuropathy     Nonspecific abnormal electrocardiogram (ECG) (EKG)     Obesity, unspecified     Weight loss has been strongly encouraged by following dietary restrictions and an exercise routine.  Polio     as a young child    Pre-operative cardiovascular examination     Sciatica     Sleep apnea     Unspecified cerebral artery occlusion with cerebral infarction     TIA? no residual     Current Outpatient Medications   Medication Sig    tolnaftate (TINACTIN) 1 % topical cream Apply  to affected area two (2) times a day.  ibuprofen (MOTRIN) 800 mg tablet take 1 tablet by mouth every 6 hours if needed for pain    methylPREDNISolone (MEDROL DOSEPACK) 4 mg tablet Per dose pack instructions    cyclobenzaprine (FLEXERIL) 5 mg tablet Take 1 Tab by mouth three (3) times daily as needed for Muscle Spasm(s). 1-2 tabs po TID prn    diclofenac (VOLTAREN) 1 % gel Apply 4 g to affected area every six (6) hours. Apply 4 grams to affected joint up to 4 times per day, maximum 16 grams per joint per day Dispense 5 100 gram tubes    gabapentin (NEURONTIN) 100 mg capsule take 2 capsules by mouth three times a day    nystatin (MYCOSTATIN) topical cream Apply  to affected area two (2) times a day.     metoprolol tartrate (LOPRESSOR) 25 mg tablet take 1 tablet by mouth twice a day    pravastatin (PRAVACHOL) 40 mg tablet Take 1 Tab by mouth nightly.  clopidogrel (PLAVIX) 75 mg tab Take 1 Tab by mouth daily.  losartan (COZAAR) 100 mg tablet take 1 tablet by mouth once daily    lidocaine (LIDODERM) 5 % apply 1 patch to the affected area daily. Leave on for 12 hours and then off for 12 hours.  furosemide (LASIX) 40 mg tablet Take 1 Tab by mouth daily.  oxyCODONE-acetaminophen (PERCOCET) 5-325 mg per tablet Take 1 Tab by mouth every four (4) hours as needed for Pain. Max Daily Amount: 6 Tabs.  dicyclomine (BENTYL) 20 mg tablet Take 20 mg by mouth every six (6) hours.  desonide (TRIDESILON) 0.05 % cream     ALBUTEROL SULFATE (PROAIR HFA IN) Take 2 Puffs by inhalation every four (4) hours as needed.  FLUTICASONE/SALMETEROL (ADVAIR HFA IN) Take 2 Puffs by inhalation every twelve (12) hours.  montelukast (SINGULAIR) 10 mg tablet Take 10 mg by mouth daily.  tiotropium (SPIRIVA WITH HANDIHALER) 18 mcg inhalation capsule Take 1 Cap by inhalation daily.  amLODIPine (NORVASC) 10 mg tablet Take  by mouth daily.  bethanechol (URECHOLINE) 10 mg tablet Take 10 mg by mouth Before breakfast, lunch, and dinner.  esomeprazole (NEXIUM) 40 mg capsule Take  by mouth daily.  aspirin 81 mg tablet Take 81 mg by mouth.  ergocalciferol (VITAMIN D2) 50,000 unit capsule Take 50,000 Units by mouth Every Thursday. No current facility-administered medications for this visit.       No Known Allergies  Past Surgical History:   Procedure Laterality Date    BREAST SURGERY PROCEDURE UNLISTED      initial surgery 2006, then left modified radical mastectomy 6/2012    HX BREAST BIOPSY  4/11/12    Left    HX BREAST BIOPSY Left     left mastectomy     Social History     Occupational History    Not on file   Tobacco Use    Smoking status: Never Smoker    Smokeless tobacco: Never Used   Substance and Sexual Activity    Alcohol use: No     Alcohol/week: 0.0 standard drinks    Drug use: No    Sexual activity: Not on file     Family History   Problem Relation Age of Onset    Cancer Mother     Cancer Father     Arthritis-osteo Other     Hypertension Other     Heart Disease Neg Hx         Negative family history of premature CAD or CVA        REVIEW OF SYSTEMS : 11/19/2019  ALL BELOW ARE Negative except : SEE HPI     Constitutional: Negative for fever, chills and weight loss. Neg Weight Loss  Cardiovascular: Negative for chest pain, claudication and leg swelling. SOB, SERRANO   Gastrointestinal/Urological: Negative for  pain, N/V/D/C, Blood in stool or urine,dysuria                         Hematuria, Incontinence, pelvic pain  Musculoskeletal: see HPI. Neurological: Negative for dizziness and weakness, headaches,Visual Changes             Confusion,  Or Seizures,   Psychiatric/Behavioral: Negative for depression, memory loss and substance abuse. Extremities:  Negative for hair changes, rash or skin lesion changes. Hematologic: Negative for Bleeding problems, bruising, pallor or swollen lymph nodes. Peripheral Vascular: No calf pain, vascular vein tenderness to calf pain              No calf throbbing, posterior knee throbbing pain     DIAGNOSTIC IMAGING     No notes on file    Please see above section of this report. I have personally reviewed the results of the above study. The interpretation of this study is my professional opinion. Written by Mitul Rivera, as dictated by Dr. Genna Glaser. I, Dr. Genna Glaser, confirm that all documentation is accurate.

## 2019-11-20 NOTE — TELEPHONE ENCOUNTER
Spoke with [facility] regarding order for lymphodema pump measurements, patient is on a wait list and will be contacted once there is a opening.
-continue coreg, lisino[dotty, imdur

## 2019-12-06 ENCOUNTER — HOSPITAL ENCOUNTER (OUTPATIENT)
Dept: MAMMOGRAPHY | Age: 69
Discharge: HOME | End: 2019-12-06
Attending: NURSE PRACTITIONER
Payer: MEDICARE

## 2019-12-06 DIAGNOSIS — Z12.31 ENCOUNTER FOR SCREENING MAMMOGRAM FOR BREAST CANCER: ICD-10-CM

## 2019-12-06 PROCEDURE — 77063 BREAST TOMOSYNTHESIS BI: CPT

## 2019-12-10 ENCOUNTER — OFFICE VISIT (OUTPATIENT)
Dept: ORTHOPEDIC SURGERY | Age: 69
End: 2019-12-10

## 2019-12-10 VITALS
SYSTOLIC BLOOD PRESSURE: 131 MMHG | DIASTOLIC BLOOD PRESSURE: 64 MMHG | HEART RATE: 90 BPM | WEIGHT: 206 LBS | HEIGHT: 65 IN | TEMPERATURE: 98.3 F | RESPIRATION RATE: 16 BRPM | BODY MASS INDEX: 34.32 KG/M2

## 2019-12-10 DIAGNOSIS — B35.3 TINEA PEDIS OF RIGHT FOOT: ICD-10-CM

## 2019-12-10 DIAGNOSIS — R20.8 DECREASED SENSATION OF LOWER EXTREMITY: Primary | ICD-10-CM

## 2019-12-10 RX ORDER — KETOCONAZOLE 200 MG/1
200 TABLET ORAL
Qty: 4 TAB | Refills: 0 | Status: CANCELLED | OUTPATIENT
Start: 2019-12-10

## 2019-12-10 RX ORDER — KETOCONAZOLE 2 G/100G
AEROSOL, FOAM TOPICAL 2 TIMES DAILY
Qty: 100 G | Refills: 0 | Status: SHIPPED | OUTPATIENT
Start: 2019-12-10

## 2019-12-10 NOTE — PROGRESS NOTES
AMBULATORY PROGRESS NOTE      Patient: Anni Montgomery             MRN: [id number]     SSN: [id number] Body mass index is 34.28 kg/m². YOB: 1950     AGE: 77 y.o. SEX: female    PCP: Arian Fernandez MD     IMPRESSION/DIAGNOSIS AND TREATMENT PLAN     DIAGNOSES  1. Decreased sensation of lower extremity    2. Tinea pedis of right foot        Orders Placed This Encounter    [facility]    Ketoconazole 2 % topical foam      Anni Montgomery understands her diagnoses and the proposed plan. As the oral medication has too many side effects and affects her QT interval and affects a few other medications, I will not give her the Ketoconazole by mouth. I will give her a cream, 2%. So, she will stop the Tinactin. I will reassess her in several weeks. Plan:    1)    Ketoconazole 2 % topical foam     2) Follow up with Dr. Eliza Guerrier as planned. 3) Referral to different neurologist for EMG/NCS (due to insurance issues). 4) Continue activity modification as directed. RTO - after EMG/NCS or 7 weeks     HPI AND EXAMINATION     Anni Montgomery IS A 77 y.o. female who presents to my outpatient office for follow up of decreased sensation of the lower extremity and tinea pedis of the right foot. At the last visit, I prescribed Tinactin 1% cream, provided a referral to Neurology, ordered EMG/NCS of the BLE, and instructed the patient to continue activity modification as directed. Since we saw her last, Ms. Benito Isaac states that she is still experiencing burning and itching pain in her feet. She notes that her pain is better than it was at the last visit, though. She notes that she was unable to get the EMG/NCS conducted due to financial reasons. She reports that she was instructed to pay $100 just to schedule an appointment. She reports that she has been experiencing burning pain for the past year.  She notes that she has seen Dr. Eliza Guerrier in the past for vascular issues and will be seeing him on January 6th to undergo a vascular procedure. She states that she has had an MRI of her right foot in the past.      The patient has h/o breast cancer. Visit Vitals  /64 (BP 1 Location: Left arm, BP Patient Position: Sitting)   Pulse 90   Temp 98.3 °F (36.8 °C) (Oral)   Resp 16   Ht 5' 5\" (1.651 m)   Wt 206 lb (93.4 kg)   BMI 34.28 kg/m²     Appearance: Alert, well appearing and pleasant patient who is in no distress, oriented to person, place/time, and who follows commands. This patient is accompanied in the examination room by her self. Dementia: no dementia  Psychiatric: Affect and mood are appropriate. Patient arrives to office via: with assistive device: cane  HEENT: Head normocephalic & atraumatic. Both pupils are round, non icteric sclera   Eye: EOM are intact and sclera are clear    Neck: ROM WNL and JVD neck is not present     Hearings Intact, does not require hearing aid device  Respiratory: Breathing is unlabored without accessory chest muscle use  Cardiovascular/Peripheral Vascular: 1+ DP/PT pulses    ANKLE/FOOT right    Gait: uses cane  Tenderness: Mild tenderness with rubbing of the #2 and #3 toes together    Mild tenderness over the ATFL  Cutaneous: Peeling of the skin in the 2nd web space and slightly in 3rd, possible fungal infection    Moisture in webspaces #2,3,4  Joint Motion: WNL. Joint / Tendon Stability: No Ankle or Subtalar instability or joint laxity. No peroneal sublux ability or dislocation  Alignment: Forefoot, Midfoot, Hindfoot WNL. Neuro Motor: NL   Decreased monofilament testing to the bilateral legs  Vascular: 1+ DP/PT pulses        Trace pre-tibial edema  Lymphatics: No extremity lymphedema, No calf swelling, no tenderness to calf muscles.     CHART REVIEW     Past Medical History:   Diagnosis Date    Anemia     Asthma     Blood disorder     Cancer ([facility] [address]     breast, diagnosed 2006 left    Cancer ([facility] [address]     Chest pain, unspecified The working diagnosis is chest pain. The differential diagnosis includes chest wall pain, stable angina.  Chronic obstructive pulmonary disease (HCC)     Essential hypertension     Essential hypertension, benign     Uncontrolled     GERD (gastroesophageal reflux disease)     Hypercholesterolemia     Hypertension     Neuropathy     Nonspecific abnormal electrocardiogram (ECG) (EKG)     Obesity, unspecified     Weight loss has been strongly encouraged by following dietary restrictions and an exercise routine.  Polio     as a young child    Pre-operative cardiovascular examination     Sciatica     Sleep apnea     Unspecified cerebral artery occlusion with cerebral infarction     TIA? no residual     Current Outpatient Medications   Medication Sig    Ketoconazole 2 % topical foam Apply  to affected area two (2) times a day.  ibuprofen (MOTRIN) 800 mg tablet take 1 tablet by mouth every 6 hours if needed for pain    methylPREDNISolone (MEDROL DOSEPACK) 4 mg tablet Per dose pack instructions    cyclobenzaprine (FLEXERIL) 5 mg tablet Take 1 Tab by mouth three (3) times daily as needed for Muscle Spasm(s). 1-2 tabs po TID prn    diclofenac (VOLTAREN) 1 % gel Apply 4 g to affected area every six (6) hours. Apply 4 grams to affected joint up to 4 times per day, maximum 16 grams per joint per day Dispense 5 100 gram tubes    gabapentin (NEURONTIN) 100 mg capsule take 2 capsules by mouth three times a day    metoprolol tartrate (LOPRESSOR) 25 mg tablet take 1 tablet by mouth twice a day    pravastatin (PRAVACHOL) 40 mg tablet Take 1 Tab by mouth nightly.  clopidogrel (PLAVIX) 75 mg tab Take 1 Tab by mouth daily.  losartan (COZAAR) 100 mg tablet take 1 tablet by mouth once daily    lidocaine (LIDODERM) 5 % apply 1 patch to the affected area daily. Leave on for 12 hours and then off for 12 hours.  furosemide (LASIX) 40 mg tablet Take 1 Tab by mouth daily.     oxyCODONE-acetaminophen (PERCOCET) 5-325 mg per tablet Take 1 Tab by mouth every four (4) hours as needed for Pain. Max Daily Amount: 6 Tabs.  dicyclomine (BENTYL) 20 mg tablet Take 20 mg by mouth every six (6) hours.  desonide (TRIDESILON) 0.05 % cream     ALBUTEROL SULFATE (PROAIR HFA IN) Take 2 Puffs by inhalation every four (4) hours as needed.  FLUTICASONE/SALMETEROL (ADVAIR HFA IN) Take 2 Puffs by inhalation every twelve (12) hours.  montelukast (SINGULAIR) 10 mg tablet Take 10 mg by mouth daily.  tiotropium (SPIRIVA WITH HANDIHALER) 18 mcg inhalation capsule Take 1 Cap by inhalation daily.  amLODIPine (NORVASC) 10 mg tablet Take  by mouth daily.  bethanechol (URECHOLINE) 10 mg tablet Take 10 mg by mouth Before breakfast, lunch, and dinner.  esomeprazole (NEXIUM) 40 mg capsule Take  by mouth daily.  aspirin 81 mg tablet Take 81 mg by mouth.  ergocalciferol (VITAMIN D2) 50,000 unit capsule Take 50,000 Units by mouth Every Thursday. No current facility-administered medications for this visit. No Known Allergies  Past Surgical History:   Procedure Laterality Date    BREAST SURGERY PROCEDURE UNLISTED      initial surgery 2006, then left modified radical mastectomy 6/2012    HX BREAST BIOPSY  4/11/12    Left    HX BREAST BIOPSY Left     left mastectomy     Social History     Occupational History    Not on file   Tobacco Use    Smoking status: Never Smoker    Smokeless tobacco: Never Used   Substance and Sexual Activity    Alcohol use: No     Alcohol/week: 0.0 standard drinks    Drug use: No    Sexual activity: Not on file     Family History   Problem Relation Age of Onset    Cancer Mother     Cancer Father     Arthritis-osteo Other     Hypertension Other     Heart Disease Neg Hx         Negative family history of premature CAD or CVA        REVIEW OF SYSTEMS : 12/10/2019  ALL BELOW ARE Negative except : SEE HPI     Constitutional: Negative for fever, chills and weight loss.  Neg Weight Loss  Cardiovascular: Negative for chest pain, claudication and leg swelling. SOB, SERRANO   Gastrointestinal/Urological: Negative for  pain, N/V/D/C, Blood in stool or urine,dysuria                         Hematuria, Incontinence, pelvic pain  Musculoskeletal: see HPI. Neurological: Negative for dizziness and weakness, headaches,Visual Changes             Confusion,  Or Seizures,   Psychiatric/Behavioral: Negative for depression, memory loss and substance abuse. Extremities:  Negative for hair changes, rash or skin lesion changes. Hematologic: Negative for Bleeding problems, bruising, pallor or swollen lymph nodes. Peripheral Vascular: No calf pain, vascular vein tenderness to calf pain              No calf throbbing, posterior knee throbbing pain     DIAGNOSTIC IMAGING     No notes on file    Please see above section of this report. I have personally reviewed the results of the above study. The interpretation of this study is my professional opinion. Written by Kerry Posadas, as dictated by Dr. Vanessa Lei. I, Dr. Vanessa Lei, confirm that all documentation is accurate.

## 2019-12-10 NOTE — PROGRESS NOTES
1. Have you been to the ER, urgent care clinic since your last visit? Hospitalized since your last visit? No    2. Have you seen or consulted any other health care providers outside of the [address] [place] [address] since your last visit? Include any pap smears or colon screening.  No

## 2020-01-14 ENCOUNTER — OFFICE VISIT (OUTPATIENT)
Dept: SURGERY | Age: 70
End: 2020-01-14

## 2020-01-14 VITALS
OXYGEN SATURATION: 98 % | DIASTOLIC BLOOD PRESSURE: 77 MMHG | HEART RATE: 86 BPM | BODY MASS INDEX: 34.32 KG/M2 | SYSTOLIC BLOOD PRESSURE: 141 MMHG | TEMPERATURE: 98 F | HEIGHT: 65 IN | WEIGHT: 206 LBS | RESPIRATION RATE: 16 BRPM

## 2020-01-14 DIAGNOSIS — Z85.3 HISTORY OF LEFT BREAST CANCER: Primary | ICD-10-CM

## 2020-01-14 DIAGNOSIS — I97.2 POSTMASTECTOMY LYMPHEDEMA SYNDROME: Primary | ICD-10-CM

## 2020-01-14 DIAGNOSIS — Z85.3 HISTORY OF LEFT BREAST CANCER: ICD-10-CM

## 2020-01-14 NOTE — PATIENT INSTRUCTIONS
High-Fiber Diet: Care Instructions Your Care Instructions A high-fiber diet may help you relieve constipation and feel less bloated. Your doctor and dietitian will help you make a high-fiber eating plan based on your personal needs. The plan will include the things you like to eat. It will also make sure that you get 30 grams of fiber a day. Before you make changes to the way you eat, be sure to talk with your doctor or dietitian. Follow-up care is a key part of your treatment and safety. Be sure to make and go to all appointments, and call your doctor if you are having problems. It's also a good idea to know your test results and keep a list of the medicines you take. How can you care for yourself at home? · You can increase how much fiber you get if you eat more of certain foods. These foods include: 
? Whole-grain breads and cereals. ? Fruits, such as pears, apples, and peaches. Eat the skins, peels, and seeds, if you can. 
? Vegetables, such as broccoli, cabbage, spinach, carrots, asparagus, and squash. ? Starchy vegetables. These include potatoes with skins, kidney beans, and lima beans. · Take a fiber supplement every day if your doctor recommends it. Examples are Benefiber, Citrucel, FiberCon, and Metamucil. Ask your doctor how much to take. · Drink plenty of fluids, enough so that your urine is light yellow or clear like water. If you have kidney, heart, or liver disease and have to limit fluids, talk with your doctor before you increase the amount of fluids you drink. · Get some exercise every day. Exercise helps stool move through the colon. It also helps prevent constipation. · Keep a food diary. Try to notice and write down what foods cause gas, pain, or other symptoms. Then you can avoid these foods. Where can you learn more? Go to http://laura-jessie.info/. Enter K578 in the search box to learn more about \"High-Fiber Diet: Care Instructions. \" 
 Current as of: November 7, 2018 Content Version: 12.2 © 2979-0262 [employer]. Care instructions adapted under license by [employer] (which disclaims liability or warranty for this information). If you have questions about a medical condition or this instruction, always ask your healthcare professional. [address] any warranty or liability for your use of this information. Breast Self-Exam: Care Instructions Your Care Instructions A breast self-exam is when you check your breasts for lumps or changes. This regular exam helps you learn how your breasts normally look and feel. Most breast problems or changes are not because of cancer. Breast self-exam is not a substitute for a mammogram. Having regular breast exams by your doctor and regular mammograms improve your chances of finding any problems with your breasts. Some women set a time each month to do a step-by-step breast self-exam. Other women like a less formal system. They might look at their breasts as they brush their teeth, or feel their breasts once in a while in the shower. If you notice a change in your breast, tell your doctor. Follow-up care is a key part of your treatment and safety. Be sure to make and go to all appointments, and call your doctor if you are having problems. It's also a good idea to know your test results and keep a list of the medicines you take. How do you do a breast self-exam? 
· The best time to examine your breasts is usually one week after your menstrual period begins. Your breasts should not be tender then. If you do not have periods, you might do your exam on a day of the month that is easy to remember. · To examine your breasts: ? Remove all your clothes above the waist and lie down. When you are lying down, your breast tissue spreads evenly over your chest wall, which makes it easier to feel all your breast tissue. ? Use the padsnot the fingertipsof the 3 middle fingers of your left hand to check your right breast. Move your fingers slowly in small coin-sized circles that overlap. ? Use three levels of pressure to feel of all your breast tissue. Use light pressure to feel the tissue close to the skin surface. Use medium pressure to feel a little deeper. Use firm pressure to feel your tissue close to your breastbone and ribs. Use each pressure level to feel your breast tissue before moving on to the next spot. ? Check your entire breast, moving up and down as if following a strip from the collarbone to the bra line, and from the armpit to the ribs. Repeat until you have covered the entire breast. 
? Repeat this procedure for your left breast, using the pads of the 3 middle fingers of your right hand. · To examine your breasts while in the shower: 
? Place one arm over your head and lightly soap your breast on that side. ? Using the pads of your fingers, gently move your hand over your breast (in the strip pattern described above), feeling carefully for any lumps or changes. ? Repeat for the other breast. 
· Have your doctor inspect anything you notice to see if you need further testing. Where can you learn more? Go to http://laura-jessie.info/. Enter P148 in the search box to learn more about \"Breast Self-Exam: Care Instructions. \" Current as of: December 19, 2018 Content Version: 12.2 © 1669-3180 [employer]. Care instructions adapted under license by [employer] (which disclaims liability or warranty for this information). If you have questions about a medical condition or this instruction, always ask your healthcare professional. [address] any warranty or liability for your use of this information.

## 2020-01-14 NOTE — PROGRESS NOTES
[facility]  General Surgery    Subjective:  Patient presents today without complaints. She denies any unintentional weight loss or breast pain or nipple drainage or discharge. I reviewed the mammography of the right breast independently on the monitor. I agree with the BI-RADS 1 findings. Objective:  Vitals:    01/14/20 1335   BP: 141/77   Pulse: 86   Resp: 16   Temp: 98 °F (36.7 °C)   TempSrc: Oral   SpO2: 98%   Weight: 93.4 kg (206 lb)   Height: 5' 5\" (1.651 m)       Physical Exam:    General: Awake and alert, oriented x4, no apparent distress   Breasts:    Left: No dimpling, discoloration   No axillary or supraclavicular lymphadenopathy. No mass   Right: No dimpling, discoloration, nipple inversion or retractions. No axillary or supraclavicular lymphadenopathy. No mass    Current Medications:  Current Outpatient Medications   Medication Sig Dispense Refill    Ketoconazole 2 % topical foam Apply  to affected area two (2) times a day. 100 g 0    ibuprofen (MOTRIN) 800 mg tablet take 1 tablet by mouth every 6 hours if needed for pain 90 Tab 1    cyclobenzaprine (FLEXERIL) 5 mg tablet Take 1 Tab by mouth three (3) times daily as needed for Muscle Spasm(s). 1-2 tabs po TID prn 12 Tab 0    diclofenac (VOLTAREN) 1 % gel Apply 4 g to affected area every six (6) hours. Apply 4 grams to affected joint up to 4 times per day, maximum 16 grams per joint per day Dispense 5 100 gram tubes 100 g 4    gabapentin (NEURONTIN) 100 mg capsule take 2 capsules by mouth three times a day 90 Cap 1    metoprolol tartrate (LOPRESSOR) 25 mg tablet take 1 tablet by mouth twice a day 60 Tab 6    pravastatin (PRAVACHOL) 40 mg tablet Take 1 Tab by mouth nightly. 0    losartan (COZAAR) 100 mg tablet take 1 tablet by mouth once daily 90 Tab 3    lidocaine (LIDODERM) 5 % apply 1 patch to the affected area daily. Leave on for 12 hours and then off for 12 hours.  5 Patch 2    furosemide (LASIX) 40 mg tablet Take 1 Tab by mouth daily. 90 Tab 3    oxyCODONE-acetaminophen (PERCOCET) 5-325 mg per tablet Take 1 Tab by mouth every four (4) hours as needed for Pain. Max Daily Amount: 6 Tabs. 6 Tab 0    dicyclomine (BENTYL) 20 mg tablet Take 20 mg by mouth every six (6) hours. 0    desonide (TRIDESILON) 0.05 % cream   0    ALBUTEROL SULFATE (PROAIR HFA IN) Take 2 Puffs by inhalation every four (4) hours as needed.  FLUTICASONE/SALMETEROL (ADVAIR HFA IN) Take 2 Puffs by inhalation every twelve (12) hours.  montelukast (SINGULAIR) 10 mg tablet Take 10 mg by mouth daily.  tiotropium (SPIRIVA WITH HANDIHALER) 18 mcg inhalation capsule Take 1 Cap by inhalation daily.  amLODIPine (NORVASC) 10 mg tablet Take  by mouth daily.  bethanechol (URECHOLINE) 10 mg tablet Take 10 mg by mouth Before breakfast, lunch, and dinner.  esomeprazole (NEXIUM) 40 mg capsule Take  by mouth daily.  aspirin 81 mg tablet Take 81 mg by mouth.  ergocalciferol (VITAMIN D2) 50,000 unit capsule Take 50,000 Units by mouth Every Thursday.  methylPREDNISolone (MEDROL DOSEPACK) 4 mg tablet Per dose pack instructions 1 Dose Pack 0    clopidogrel (PLAVIX) 75 mg tab Take 1 Tab by mouth daily. Chart and notes reviewed. Data reviewed. I have evaluated and examined the patient. Impression and plan:  Patient is 7-1/2 years out from left mastectomy for left breast cancer. Continue monthly self breast exam  Follow-up in 1 year for clinical breast exam  Please call a visit in the interim with any new questions or concerns.      Laya Preciado MD

## 2020-01-27 ENCOUNTER — OFFICE VISIT (OUTPATIENT)
Dept: ORTHOPEDIC SURGERY | Age: 70
End: 2020-01-27

## 2020-01-27 VITALS
DIASTOLIC BLOOD PRESSURE: 60 MMHG | HEIGHT: 65 IN | WEIGHT: 206 LBS | BODY MASS INDEX: 34.32 KG/M2 | OXYGEN SATURATION: 97 % | RESPIRATION RATE: 16 BRPM | TEMPERATURE: 96.5 F | SYSTOLIC BLOOD PRESSURE: 130 MMHG | HEART RATE: 102 BPM

## 2020-01-27 DIAGNOSIS — R20.8 DECREASED SENSATION OF LOWER EXTREMITY: ICD-10-CM

## 2020-01-27 DIAGNOSIS — B35.3 TINEA PEDIS OF RIGHT FOOT: Primary | ICD-10-CM

## 2020-01-27 NOTE — PROGRESS NOTES
AMBULATORY PROGRESS NOTE      Patient: Crystal Zaldivar             MRN: [id number]     SSN: [id number] Body mass index is 34.28 kg/m². YOB: 1950     AGE: 77 y.o. SEX: female    PCP: Edilia Lanes, MD     IMPRESSION/DIAGNOSIS AND TREATMENT PLAN     DIAGNOSES  1. Tinea pedis of right foot    2. Decreased sensation of lower extremity        No orders of the defined types were placed in this encounter. Crystal Zaldivar understands her diagnoses and the proposed plan. Plan:    1)  Follow up with neurologist for EMG nerve study. 2) Patient wished to continue her current treatment. RTO - after EMG nerve study     HPI AND EXAMINATION     Crystal Zaldivar IS A 77 y.o. female who presents to my outpatient office for follow up of decreased sensation of the lower extremity and tinea pedis of the right foot. At the last visit, I prescribed Ketoconazole 2% topical foam, followed up with Dr. Megan Garcia, provided a referral to a different neurologist for EMG study due to insurance issues, and instructed the patient to continue activity modification as directed. Since we saw her last, Ms. Linda Mendoza states that she still experiencing pain and numbness in both of her feet. She recently had vascular laser surgery with Dr. Ivone Lyles. She admits to having swelling in her right leg that is prevalent with any socks that she wears. The pt denies any hx of diabetes. The patient has h/o breast cancer. Visit Vitals  /60 (BP 1 Location: Right arm, BP Patient Position: Sitting)   Pulse (!) 102   Temp 96.5 °F (35.8 °C) (Oral)   Resp 16   Ht 5' 5\" (1.651 m)   Wt 206 lb (93.4 kg)   SpO2 97%   BMI 34.28 kg/m²     Appearance: Alert, well appearing and pleasant patient who is in no distress, oriented to person, place/time, and who follows commands. This patient is accompanied in the examination room by her self. Dementia: no dementia  Psychiatric: Affect and mood are appropriate.  Patient arrives to office via: with assistive device: cane  HEENT: Head normocephalic & atraumatic. Both pupils are round, non icteric sclera   Eye: EOM are intact and sclera are clear    Neck: ROM WNL      Hearings Intact   Respiratory: Breathing is unlabored without accessory chest muscle use  Cardiovascular/Peripheral Vascular: 1+ DP/PT pulses    ANKLE/FOOT right    Gait: uses cane  Tenderness: Mild tenderness with rubbing of the #2 and #3 toes together    Mild tenderness over the ATFL    Saphenous vein entry point  Cutaneous: Peeling of the skin in the 2nd web space and slightly in 3rd, possible fungal infection    Moisture in webspaces #2,3,4  Joint Motion: WNL. Joint / Tendon Stability: No Ankle or Subtalar instability or joint laxity. No peroneal sublux ability or dislocation  Alignment: Forefoot, Midfoot, Hindfoot WNL. Neuro Motor: NL   Decreased monofilament testing to the bilateral legs   Decreased monofilament testing to right foot  Vascular: 1+ DP/PT pulses        Trace pre-tibial edema        1+ pitting edema  Lymphatics: No extremity lymphedema, No calf swelling, no tenderness to calf muscles. CHART REVIEW     Past Medical History:   Diagnosis Date    Anemia     Asthma     Blood disorder     Cancer ([facility] [address]     breast, diagnosed 2006 left    Cancer [facility])     Chest pain, unspecified     The working diagnosis is chest pain. The differential diagnosis includes chest wall pain, stable angina.  Chronic obstructive pulmonary disease (HCC)     Essential hypertension     Essential hypertension, benign     Uncontrolled     GERD (gastroesophageal reflux disease)     Hypercholesterolemia     Hypertension     Neuropathy     Nonspecific abnormal electrocardiogram (ECG) (EKG)     Obesity, unspecified     Weight loss has been strongly encouraged by following dietary restrictions and an exercise routine.     Polio     as a young child    Pre-operative cardiovascular examination     Sciatica     Sleep apnea     Unspecified cerebral artery occlusion with cerebral infarction     TIA? no residual     Current Outpatient Medications   Medication Sig    Ketoconazole 2 % topical foam Apply  to affected area two (2) times a day.  ibuprofen (MOTRIN) 800 mg tablet take 1 tablet by mouth every 6 hours if needed for pain    methylPREDNISolone (MEDROL DOSEPACK) 4 mg tablet Per dose pack instructions    cyclobenzaprine (FLEXERIL) 5 mg tablet Take 1 Tab by mouth three (3) times daily as needed for Muscle Spasm(s). 1-2 tabs po TID prn    diclofenac (VOLTAREN) 1 % gel Apply 4 g to affected area every six (6) hours. Apply 4 grams to affected joint up to 4 times per day, maximum 16 grams per joint per day Dispense 5 100 gram tubes    gabapentin (NEURONTIN) 100 mg capsule take 2 capsules by mouth three times a day    metoprolol tartrate (LOPRESSOR) 25 mg tablet take 1 tablet by mouth twice a day    pravastatin (PRAVACHOL) 40 mg tablet Take 1 Tab by mouth nightly.  clopidogrel (PLAVIX) 75 mg tab Take 1 Tab by mouth daily.  losartan (COZAAR) 100 mg tablet take 1 tablet by mouth once daily    lidocaine (LIDODERM) 5 % apply 1 patch to the affected area daily. Leave on for 12 hours and then off for 12 hours.  furosemide (LASIX) 40 mg tablet Take 1 Tab by mouth daily.  oxyCODONE-acetaminophen (PERCOCET) 5-325 mg per tablet Take 1 Tab by mouth every four (4) hours as needed for Pain. Max Daily Amount: 6 Tabs.  dicyclomine (BENTYL) 20 mg tablet Take 20 mg by mouth every six (6) hours.  desonide (TRIDESILON) 0.05 % cream     ALBUTEROL SULFATE (PROAIR HFA IN) Take 2 Puffs by inhalation every four (4) hours as needed.  FLUTICASONE/SALMETEROL (ADVAIR HFA IN) Take 2 Puffs by inhalation every twelve (12) hours.  montelukast (SINGULAIR) 10 mg tablet Take 10 mg by mouth daily.  tiotropium (SPIRIVA WITH HANDIHALER) 18 mcg inhalation capsule Take 1 Cap by inhalation daily.     amLODIPine (NORVASC) 10 mg tablet Take  by mouth daily.  bethanechol (URECHOLINE) 10 mg tablet Take 10 mg by mouth Before breakfast, lunch, and dinner.  esomeprazole (NEXIUM) 40 mg capsule Take  by mouth daily.  aspirin 81 mg tablet Take 81 mg by mouth.  ergocalciferol (VITAMIN D2) 50,000 unit capsule Take 50,000 Units by mouth Every Thursday. No current facility-administered medications for this visit. No Known Allergies  Past Surgical History:   Procedure Laterality Date    BREAST SURGERY PROCEDURE UNLISTED      initial surgery 2006, then left modified radical mastectomy 6/2012    HX BREAST BIOPSY  4/11/12    Left    HX BREAST BIOPSY Left     left mastectomy     Social History     Occupational History    Not on file   Tobacco Use    Smoking status: Never Smoker    Smokeless tobacco: Never Used   Substance and Sexual Activity    Alcohol use: No     Alcohol/week: 0.0 standard drinks    Drug use: No    Sexual activity: Not on file     Family History   Problem Relation Age of Onset    Cancer Mother     Cancer Father     Arthritis-osteo Other     Hypertension Other     Heart Disease Neg Hx         Negative family history of premature CAD or CVA        REVIEW OF SYSTEMS : 1/27/2020  ALL BELOW ARE Negative except : SEE HPI         REVIEW OF SYSTEMS : Total of 12 systems reviewed as follows:            CONSTITUTIONAL: No weight loss. PSYCHOLOGICAL : No Feelings of anxiety, depression, agitation  EYES: No blurred vision and no eye discharge. NO eye pain, double vision  ENT: No nasal discharge. No ear pain. CARDIOVASCULAR: No chest pain and no diaphoresis. RESPIRATORY: No cough, no hemoptysis. GI: No vomiting, no diarrhea   : No urinary frequency and no dysuria. MUSCULOSKELETAL: see HPI  SKIN: No rashes. NEURO: Negative for : dizziness,weakness, headaches     Negative for : visual changes or confusion, or seizures,   ENDOCRINE: No polyphagia and no polydipsia.    HEMATOLOGY: No bleeding tendencies. DIAGNOSTIC IMAGING     No notes on file    Please see above section of this report. I have personally reviewed the results of the above study. The interpretation of this study is my professional opinion. Written by Caleb Villavicencio, as dictated by Dr. Ysabel Davila. I, Dr. Ysabel Davila, confirm that all documentation is accurate.

## 2020-01-27 NOTE — PROGRESS NOTES
1. Have you been to the ER, urgent care clinic since your last visit? Hospitalized since your last visit? No    2. Have you seen or consulted any other health care providers outside of the [address] [place] [address] since your last visit? Include any pap smears or colon screening.  No

## 2020-02-03 RX ORDER — LOSARTAN POTASSIUM 100 MG/1
TABLET ORAL
Qty: 90 TAB | Refills: 3 | Status: SHIPPED | OUTPATIENT
Start: 2020-02-03 | End: 2022-10-12 | Stop reason: SDUPTHER

## 2020-02-19 ENCOUNTER — OFFICE VISIT (OUTPATIENT)
Dept: CARDIOLOGY CLINIC | Age: 70
End: 2020-02-19

## 2020-02-19 VITALS
SYSTOLIC BLOOD PRESSURE: 138 MMHG | HEART RATE: 80 BPM | WEIGHT: 207 LBS | BODY MASS INDEX: 34.49 KG/M2 | HEIGHT: 65 IN | DIASTOLIC BLOOD PRESSURE: 92 MMHG

## 2020-02-19 DIAGNOSIS — J45.30 MILD PERSISTENT ASTHMA WITHOUT COMPLICATION: ICD-10-CM

## 2020-02-19 DIAGNOSIS — I50.32 CHRONIC DIASTOLIC CONGESTIVE HEART FAILURE (HCC): Primary | ICD-10-CM

## 2020-02-19 DIAGNOSIS — E78.5 DYSLIPIDEMIA: ICD-10-CM

## 2020-02-19 DIAGNOSIS — I10 ESSENTIAL HYPERTENSION, BENIGN: ICD-10-CM

## 2020-02-19 RX ORDER — ESOMEPRAZOLE MAGNESIUM 40 MG/1
40 CAPSULE, DELAYED RELEASE ORAL DAILY
Qty: 30 CAP | Refills: 3 | Status: SHIPPED | OUTPATIENT
Start: 2020-02-19 | End: 2020-02-24 | Stop reason: SDUPTHER

## 2020-02-19 NOTE — PATIENT INSTRUCTIONS
Heart Failure: Care Instructions  Your Care Instructions    Heart failure occurs when your heart does not pump as much blood as the body needs. Failure does not mean that the heart has stopped pumping but rather that it is not pumping as well as it should. Over time, this causes fluid buildup in your lungs and other parts of your body. Fluid buildup can cause shortness of breath, fatigue, swollen ankles, and other problems. By taking medicines regularly, reducing sodium (salt) in your diet, checking your weight every day, and making lifestyle changes, you can feel better and live longer. Follow-up care is a key part of your treatment and safety. Be sure to make and go to all appointments, and call your doctor if you are having problems. It's also a good idea to know your test results and keep a list of the medicines you take. How can you care for yourself at home? Medicines    · Be safe with medicines. Take your medicines exactly as prescribed. Call your doctor if you think you are having a problem with your medicine.     · Do not take any vitamins, over-the-counter medicine, or herbal products without talking to your doctor first. Tg Estebanmargaret not take ibuprofen (Advil or Motrin) and naproxen (Aleve) without talking to your doctor first. They could make your heart failure worse.     · You may take some of the following medicine. ? Angiotensin-converting enzyme inhibitors (ACEIs) or angiotensin II receptor blockers (ARBs) reduce the heart's workload, lower blood pressure, and reduce swelling. Taking an ACEI or ARB may lower your chance of needing to be hospitalized. ? Beta-blockers can slow heart rate, decrease blood pressure, and improve your condition. Taking a beta-blocker may lower your chance of needing to be hospitalized. ? Diuretics, also called water pills, reduce swelling.    You will get more details on the specific medicines your doctor prescribes.   Diet    · Your doctor may suggest that you limit sodium. Your doctor can tell you how much sodium is right for you. An example is less than 3,000 mg a day. This includes all the salt you eat in cooking or in packaged foods. People get most of their sodium from processed foods. Fast food and restaurant meals also tend to be very high in sodium.     · Ask your doctor how much liquid you can drink each day. You may have to limit liquids.    Weight    · Weigh yourself without clothing at the same time each day. Record your weight. Call your doctor if you have a sudden weight gain, such as more than 2 to 3 pounds in a day or 5 pounds in a week. (Your doctor may suggest a different range of weight gain.) A sudden weight gain may mean that your heart failure is getting worse.    Activity level    · Start light exercise (if your doctor says it is okay). Even if you can only do a small amount, exercise will help you get stronger, have more energy, and manage your weight and your stress. Walking is an easy way to get exercise. Start out by walking a little more than you did before. Bit by bit, increase the amount you walk.     · When you exercise, watch for signs that your heart is working too hard. You are pushing yourself too hard if you cannot talk while you are exercising. If you become short of breath or dizzy or have chest pain, stop, sit down, and rest.     · If you feel \"wiped out\" the day after you exercise, walk slower or for a shorter distance until you can work up to a better pace.     · Get enough rest at night. Sleeping with 1 or 2 pillows under your upper body and head may help you breathe easier.    Lifestyle changes    · Do not smoke. Smoking can make a heart condition worse. If you need help quitting, talk to your doctor about stop-smoking programs and medicines. These can increase your chances of quitting for good.  Quitting smoking may be the most important step you can take to protect your heart.     · Limit alcohol to 2 drinks a day for men and 1 drink a day for women. Too much alcohol can cause health problems.     · Avoid getting sick from colds and the flu. Get a pneumococcal vaccine shot. If you have had one before, ask your doctor whether you need another dose. Get a flu shot each year. If you must be around people with colds or the flu, wash your hands often. When should you call for help? Call 911 if you have symptoms of sudden heart failure such as:    · You have severe trouble breathing.     · You cough up pink, foamy mucus.     · You have a new irregular or rapid heartbeat.    Call your doctor now or seek immediate medical care if:    · You have new or increased shortness of breath.     · You are dizzy or lightheaded, or you feel like you may faint.     · You have sudden weight gain, such as more than 2 to 3 pounds in a day or 5 pounds in a week. (Your doctor may suggest a different range of weight gain.)     · You have increased swelling in your legs, ankles, or feet.     · You are suddenly so tired or weak that you cannot do your usual activities.    Watch closely for changes in your health, and be sure to contact your doctor if you develop new symptoms. Where can you learn more? Go to http://laura-jessie.info/. Enter S546 in the search box to learn more about \"Heart Failure: Care Instructions. \"  Current as of: April 9, 2019  Content Version: 12.2  © 4809-8517 [employer]â„[employer] Network. Care instructions adapted under license by [employer] (which disclaims liability or warranty for this information). If you have questions about a medical condition or this instruction, always ask your healthcare professional. [address] any warranty or liability for your use of this information. [employer] Activation    Thank you for requesting access to [employer]. Please follow the instructions below to securely access and download your online medical record.  [employer] allows you to send messages to your doctor, view your test results, renew your prescriptions, schedule appointments, and more. How Do I Sign Up? 1. In your internet browser, go to [email or web address]. 2. Click on the First Time User? Click Here link in the Sign In box. You will see the New Member Sign Up page. 3. Enter your [employer] Access Code exactly as it appears below. You will not need to use this code after youve completed the sign-up process. If you do not sign up before the expiration date, you must request a new code. [employer] Access Code: [email or web address]  Expires: 2020 11:54 AM (This is the date your [employer] access code will )    4. Enter the last four digits of your Social Security Number (xxxx) and Date of Birth (mm/dd/yyyy) as indicated and click Submit. You will be taken to the next sign-up page. 5. Create a [employer] ID. This will be your [employer] login ID and cannot be changed, so think of one that is secure and easy to remember. 6. Create a [employer] password. You can change your password at any time. 7. Enter your Password Reset Question and Answer. This can be used at a later time if you forget your password. 8. Enter your e-mail address. You will receive e-mail notification when new information is available in [address] 9. Click Sign Up. You can now view and download portions of your medical record. 10. Click the Download Summary menu link to download a portable copy of your medical information. Additional Information    If you have questions, please visit the Frequently Asked Questions section of the [employer] website at [email or web address] Remember, [employer] is NOT to be used for urgent needs. For medical emergencies, dial 911.

## 2020-02-19 NOTE — PROGRESS NOTES
HISTORY OF PRESENT ILLNESS  Jacqueline Price is a 77 y.o. female. Hypertension   The history is provided by the patient. This is a chronic problem. The current episode started more than 1 week ago. The problem occurs every several days. The problem has not changed since onset. Associated symptoms include shortness of breath. Pertinent negatives include no chest pain. Shortness of Breath   The history is provided by the patient. This is a chronic problem. The problem occurs intermittently. The current episode started more than 1 week ago. The problem has been gradually improving. Pertinent negatives include no ear pain, no neck pain, no wheezing, no chest pain, no rash and no leg swelling. Associated medical issues include heart failure. Associated medical issues do not include chronic lung disease or CAD. Review of Systems   Constitutional: Negative for chills and weight loss. HENT: Negative for congestion, ear discharge, ear pain, hearing loss, nosebleeds and tinnitus. Eyes: Negative for blurred vision. Respiratory: Positive for shortness of breath. Negative for wheezing and stridor. Cardiovascular: Negative for chest pain and leg swelling. Gastrointestinal: Negative for heartburn. Musculoskeletal: Negative for myalgias and neck pain. Skin: Negative for itching and rash. Neurological: Negative for tingling, tremors, focal weakness and loss of consciousness. Psychiatric/Behavioral: Negative for depression and suicidal ideas. Family History   Problem Relation Age of Onset   [facility] Julio Cancer Mother     Cancer Father     Arthritis-osteo Other     Hypertension Other     Heart Disease Neg Hx         Negative family history of premature CAD or CVA       Past Medical History:   Diagnosis Date    Anemia     Asthma     Blood disorder     Cancer ([facility] [address]     breast, diagnosed 2006 left    Cancer ([facility] [address]     Chest pain, unspecified     The working diagnosis is chest pain.   The differential diagnosis includes chest wall pain, stable angina.  Chronic obstructive pulmonary disease (HCC)     Essential hypertension     Essential hypertension, benign     Uncontrolled     GERD (gastroesophageal reflux disease)     Hypercholesterolemia     Hypertension     Neuropathy     Nonspecific abnormal electrocardiogram (ECG) (EKG)     Obesity, unspecified     Weight loss has been strongly encouraged by following dietary restrictions and an exercise routine.  Polio     as a young child    Pre-operative cardiovascular examination     Sciatica     Sleep apnea     Unspecified cerebral artery occlusion with cerebral infarction     TIA? no residual       Past Surgical History:   Procedure Laterality Date    BREAST SURGERY PROCEDURE UNLISTED      initial surgery 2006, then left modified radical mastectomy 6/2012    HX BREAST BIOPSY  4/11/12    Left    HX BREAST BIOPSY Left     left mastectomy       Social History     Tobacco Use    Smoking status: Never Smoker    Smokeless tobacco: Never Used   Substance Use Topics    Alcohol use: No     Alcohol/week: 0.0 standard drinks       No Known Allergies    Outpatient Medications Marked as Taking for the 2/19/20 encounter (Office Visit) with Vanessa Chiu MD   Medication Sig Dispense Refill    esomeprazole (NEXIUM) 40 mg capsule Take 1 Cap by mouth daily. 30 Cap 3    losartan (COZAAR) 100 mg tablet take 1 tablet by mouth once daily 90 Tab 3    Ketoconazole 2 % topical foam Apply  to affected area two (2) times a day. 100 g 0    ibuprofen (MOTRIN) 800 mg tablet take 1 tablet by mouth every 6 hours if needed for pain 90 Tab 1    diclofenac (VOLTAREN) 1 % gel Apply 4 g to affected area every six (6) hours.  Apply 4 grams to affected joint up to 4 times per day, maximum 16 grams per joint per day Dispense 5 100 gram tubes 100 g 4    gabapentin (NEURONTIN) 100 mg capsule take 2 capsules by mouth three times a day 90 Cap 1    metoprolol tartrate (LOPRESSOR) 25 mg tablet take 1 tablet by mouth twice a day 60 Tab 6    pravastatin (PRAVACHOL) 40 mg tablet Take 1 Tab by mouth nightly. 0    clopidogrel (PLAVIX) 75 mg tab Take 1 Tab by mouth daily.  lidocaine (LIDODERM) 5 % apply 1 patch to the affected area daily. Leave on for 12 hours and then off for 12 hours. 5 Patch 2    furosemide (LASIX) 40 mg tablet Take 1 Tab by mouth daily. 90 Tab 3    oxyCODONE-acetaminophen (PERCOCET) 5-325 mg per tablet Take 1 Tab by mouth every four (4) hours as needed for Pain. Max Daily Amount: 6 Tabs. 6 Tab 0    desonide (TRIDESILON) 0.05 % cream   0    ALBUTEROL SULFATE (PROAIR HFA IN) Take 2 Puffs by inhalation every four (4) hours as needed.  FLUTICASONE/SALMETEROL (ADVAIR HFA IN) Take 2 Puffs by inhalation every twelve (12) hours.  montelukast (SINGULAIR) 10 mg tablet Take 10 mg by mouth daily.  tiotropium (SPIRIVA WITH HANDIHALER) 18 mcg inhalation capsule Take 1 Cap by inhalation daily.  amLODIPine (NORVASC) 10 mg tablet Take  by mouth daily.  esomeprazole (NEXIUM) 40 mg capsule Take  by mouth daily.  aspirin 81 mg tablet Take 81 mg by mouth.  ergocalciferol (VITAMIN D2) 50,000 unit capsule Take 50,000 Units by mouth Every Thursday. Visit Vitals  BP (!) 138/92   Pulse 80   Ht 5' 5\" (1.651 m)   Wt 93.9 kg (207 lb)   LMP  (LMP Unknown)   BMI 34.45 kg/m²     Physical Exam   Constitutional: She is oriented to person, place, and time. She appears well-developed and well-nourished. No distress. HENT:   Head: Atraumatic. Mouth/Throat: No oropharyngeal exudate. Eyes: Conjunctivae are normal. Right eye exhibits no discharge. Left eye exhibits no discharge. No scleral icterus. Neck: Normal range of motion. Neck supple. No JVD present. No tracheal deviation present. No thyromegaly present. Cardiovascular: Normal rate and regular rhythm. Exam reveals no gallop. No murmur heard.   Pulmonary/Chest: Effort normal. No stridor. She has no wheezes. She has no rales. Abdominal: Soft. There is no abdominal tenderness. There is no rebound and no guarding. Musculoskeletal: Normal range of motion. General: No tenderness or edema. Lymphadenopathy:     She has no cervical adenopathy. Neurological: She is alert and oriented to person, place, and time. She exhibits normal muscle tone. Skin: Skin is warm. She is not diaphoretic. Psychiatric: She has a normal mood and affect. Her behavior is normal.     ekg sinus rhythm with no acute st-t changes  Echo 10/2017:  SUMMARY:  Left ventricle: Systolic function was normal. Ejection fraction was  estimated in the range of 55 % to 60 %. There were no regional wall motion  abnormalities. There was mild concentric hypertrophy. Doppler parameters  were consistent with abnormal left ventricular relaxation (grade 1  diastolic dysfunction). Mitral valve: There was mild regurgitation. Tricuspid valve: There was mild regurgitation. ASSESSMENT and PLAN    ICD-10-CM ICD-9-CM    1. Chronic diastolic congestive heart failure (HCC) I50.32 428.32      428.0    2. Essential hypertension, benign I10 401.1    3. Dyslipidemia E78.5 272.4    4. Mild persistent asthma without complication A54.51 096.72      Orders Placed This Encounter    esomeprazole (NEXIUM) 40 mg capsule     Sig: Take 1 Cap by mouth daily. Dispense:  30 Cap     Refill:  3     Follow-up and Dispositions    · Return in about 1 year (around 2/19/2021). current treatment plan is effective, no change in therapy  reviewed diet, exercise and weight control. Patient is a 81-year-old female with longstanding history of hypertension, chronic diastolic CHF, asthma seen for follow-up. No new symptoms. No lower extremity edema. Currently on optimal medical therapy. Continue current medications and follow-up in 1 year.

## 2020-02-19 NOTE — PROGRESS NOTES
1. Have you been to the ER, urgent care clinic since your last visit? Hospitalized since your last visit?     no  2. Have you seen or consulted any other health care providers outside of the [address] [place] [address] since your last visit? Include any pap smears or colon screening. Yes Where: pcp     3. Since your last visit, have you had any of the following symptoms? chest pains.

## 2020-02-24 RX ORDER — ESOMEPRAZOLE MAGNESIUM 40 MG/1
40 CAPSULE, DELAYED RELEASE ORAL DAILY
COMMUNITY
End: 2020-02-26

## 2020-02-25 RX ORDER — ESOMEPRAZOLE MAGNESIUM 40 MG/1
40 CAPSULE, DELAYED RELEASE ORAL DAILY
Qty: 30 CAP | Refills: 3 | Status: SHIPPED | OUTPATIENT
Start: 2020-02-25 | End: 2020-02-26

## 2020-02-26 RX ORDER — ESOMEPRAZOLE MAGNESIUM 40 MG/1
40 CAPSULE, DELAYED RELEASE ORAL DAILY
Qty: 90 CAP | Refills: 3 | Status: SHIPPED | OUTPATIENT
Start: 2020-02-26 | End: 2021-12-08 | Stop reason: ALTCHOICE

## 2020-02-28 ENCOUNTER — OFFICE VISIT (OUTPATIENT)
Dept: ORTHOPEDIC SURGERY | Age: 70
End: 2020-02-28

## 2020-02-28 VITALS
WEIGHT: 207.6 LBS | SYSTOLIC BLOOD PRESSURE: 147 MMHG | RESPIRATION RATE: 16 BRPM | BODY MASS INDEX: 34.59 KG/M2 | TEMPERATURE: 96.8 F | OXYGEN SATURATION: 98 % | HEIGHT: 65 IN | DIASTOLIC BLOOD PRESSURE: 75 MMHG | HEART RATE: 92 BPM

## 2020-02-28 DIAGNOSIS — M17.12 PRIMARY OSTEOARTHRITIS OF LEFT KNEE: Primary | ICD-10-CM

## 2020-02-28 DIAGNOSIS — M17.11 ARTHRITIS OF RIGHT KNEE: ICD-10-CM

## 2020-02-28 RX ORDER — BETAMETHASONE SODIUM PHOSPHATE AND BETAMETHASONE ACETATE 3; 3 MG/ML; MG/ML
6 INJECTION, SUSPENSION INTRA-ARTICULAR; INTRALESIONAL; INTRAMUSCULAR; SOFT TISSUE ONCE
Qty: 1 ML | Refills: 0
Start: 2020-02-28 | End: 2020-02-28

## 2020-02-28 NOTE — PROGRESS NOTES
[facility] [address]  [phone]           Patient: Mal Mays                MRN: [id number]       SSN: [id number]  YOB: 1950        AGE: 77 y.o. SEX: female  Body mass index is 34.55 kg/m². PCP: Devin Velazco MD  02/28/20      This office note has been dictated. REVIEW OF SYSTEMS:  Constitutional: Negative for fever, chills, weight loss and malaise/fatigue. HENT: Negative. Eyes: Negative. Respiratory: Negative. Cardiovascular: Negative. Gastrointestinal: No bowel incontinence or constipation. Genitourinary: No bladder incontinence or saddle anesthesia. Skin: Negative. Neurological: Negative. Endo/Heme/Allergies: Negative. Psychiatric/Behavioral: Negative. Musculoskeletal: As per HPI above. Past Medical History:   Diagnosis Date    Anemia     Asthma     Blood disorder     Cancer ([facility] [address]     breast, diagnosed 2006 left    Cancer [facility])     Chest pain, unspecified     The working diagnosis is chest pain. The differential diagnosis includes chest wall pain, stable angina.  Chronic obstructive pulmonary disease (HCC)     Essential hypertension     Essential hypertension, benign     Uncontrolled     GERD (gastroesophageal reflux disease)     Hypercholesterolemia     Hypertension     Neuropathy     Nonspecific abnormal electrocardiogram (ECG) (EKG)     Obesity, unspecified     Weight loss has been strongly encouraged by following dietary restrictions and an exercise routine.  Polio     as a young child    Pre-operative cardiovascular examination     Sciatica     Sleep apnea     Unspecified cerebral artery occlusion with cerebral infarction     TIA? no residual         Current Outpatient Medications:     NEXIUM 40 mg capsule, Take 1 Cap by mouth daily. , Disp: 90 Cap, Rfl: 3    losartan (COZAAR) 100 mg tablet, take 1 tablet by mouth once daily, Disp: 90 Tab, Rfl: 3    Ketoconazole 2 % topical foam, Apply  to affected area two (2) times a day., Disp: 100 g, Rfl: 0    ibuprofen (MOTRIN) 800 mg tablet, take 1 tablet by mouth every 6 hours if needed for pain, Disp: 90 Tab, Rfl: 1    cyclobenzaprine (FLEXERIL) 5 mg tablet, Take 1 Tab by mouth three (3) times daily as needed for Muscle Spasm(s). 1-2 tabs po TID prn, Disp: 12 Tab, Rfl: 0    diclofenac (VOLTAREN) 1 % gel, Apply 4 g to affected area every six (6) hours. Apply 4 grams to affected joint up to 4 times per day, maximum 16 grams per joint per day Dispense 5 100 gram tubes, Disp: 100 g, Rfl: 4    gabapentin (NEURONTIN) 100 mg capsule, take 2 capsules by mouth three times a day, Disp: 90 Cap, Rfl: 1    metoprolol tartrate (LOPRESSOR) 25 mg tablet, take 1 tablet by mouth twice a day, Disp: 60 Tab, Rfl: 6    pravastatin (PRAVACHOL) 40 mg tablet, Take 1 Tab by mouth nightly., Disp: , Rfl: 0    clopidogrel (PLAVIX) 75 mg tab, Take 1 Tab by mouth daily. , Disp: , Rfl:     lidocaine (LIDODERM) 5 %, apply 1 patch to the affected area daily. Leave on for 12 hours and then off for 12 hours. , Disp: 5 Patch, Rfl: 2    furosemide (LASIX) 40 mg tablet, Take 1 Tab by mouth daily. , Disp: 90 Tab, Rfl: 3    oxyCODONE-acetaminophen (PERCOCET) 5-325 mg per tablet, Take 1 Tab by mouth every four (4) hours as needed for Pain. Max Daily Amount: 6 Tabs., Disp: 6 Tab, Rfl: 0    dicyclomine (BENTYL) 20 mg tablet, Take 20 mg by mouth every six (6) hours. , Disp: , Rfl: 0    desonide (TRIDESILON) 0.05 % cream, , Disp: , Rfl: 0    ALBUTEROL SULFATE (PROAIR HFA IN), Take 2 Puffs by inhalation every four (4) hours as needed. , Disp: , Rfl:     FLUTICASONE/SALMETEROL (ADVAIR HFA IN), Take 2 Puffs by inhalation every twelve (12) hours. , Disp: , Rfl:     montelukast (SINGULAIR) 10 mg tablet, Take 10 mg by mouth daily. , Disp: , Rfl:     tiotropium (SPIRIVA WITH HANDIHALER) 18 mcg inhalation capsule, Take 1 Cap by inhalation daily. , Disp: , Rfl:     amLODIPine (NORVASC) 10 mg tablet, Take  by mouth daily. , Disp: , Rfl:     bethanechol (URECHOLINE) 10 mg tablet, Take 10 mg by mouth Before breakfast, lunch, and dinner., Disp: , Rfl:     aspirin 81 mg tablet, Take 81 mg by mouth.  , Disp: , Rfl:     ergocalciferol (VITAMIN D2) 50,000 unit capsule, Take 50,000 Units by mouth Every Thursday. , Disp: , Rfl:     No Known Allergies    Social History     Socioeconomic History    Marital status:      Spouse name: Not on file    Number of children: Not on file    Years of education: Not on file    Highest education level: Not on file   Occupational History    Not on file   Social Needs    Financial resource strain: Not on file    Food insecurity:     Worry: Not on file     Inability: Not on file    Transportation needs:     Medical: Not on file     Non-medical: Not on file   Tobacco Use    Smoking status: Never Smoker    Smokeless tobacco: Never Used   Substance and Sexual Activity    Alcohol use: No     Alcohol/week: 0.0 standard drinks    Drug use: No    Sexual activity: Not on file   Lifestyle    Physical activity:     Days per week: Not on file     Minutes per session: Not on file    Stress: Not on file   Relationships    Social connections:     Talks on phone: Not on file     Gets together: Not on file     Attends Jehovah's witness service: Not on file     Active member of club or organization: Not on file     Attends meetings of clubs or organizations: Not on file     Relationship status: Not on file    Intimate partner violence:     Fear of current or ex partner: Not on file     Emotionally abused: Not on file     Physically abused: Not on file     Forced sexual activity: Not on file   Other Topics Concern    Not on file   Social History Narrative    ** Merged History Encounter **            Past Surgical History:   Procedure Laterality Date    BREAST SURGERY PROCEDURE UNLISTED      initial surgery 2006, then left modified radical mastectomy 6/2012    HX BREAST BIOPSY  4/11/12    Left    HX BREAST BIOPSY Left     left mastectomy           * Patient was identified by name and date of birth   * Agreement on procedure being performed was verified  * Risks and Benefits explained to the patient  * Procedure site verified and marked as necessary  * Patient was positioned for comfort  * Consent was signed and verified  11:18 AM    The patient was instructed on post injection care. We did see Ms. Deloris Duggan for followup with regards to bilateral knee advancing osteoarthritis. The patient does have known patellofemoral arthritis of each of his knees. She has had no recent fevers, chills, systemic changes, or injuries to report. She is being treated by Vascular for some edema and venous insufficiency in the lower extremity, which does cause her a little discomfort. With regards to the knee, she has decreased walking tolerance, trouble getting up from a chair and going up and down stairs. She does continue in pain management. PHYSICAL EXAMINATION:  In general, the patient is alert and oriented x 3 in no acute distress. The patient is well-developed, well-nourished, with a normal affect. The patient is afebrile. HEENT:  Head is normocephalic and atraumatic. Pupils are equally round and reactive to light and accommodation. Extraocular eye movements are intact. Neck is supple. Trachea is midline. No JVD is present. Breathing is nonlabored. Examination of the lower extremities reveals pain-free range of motion of the hips. There is no pain to palpation of the greater trochanteric bursae. There is negative straight leg raise. There is negative calf tenderness. There is negative Asher's. There is no evidence of DVT present. There is mild edema distally without skin breakdown. Each of the knees reveal the skin is intact.   There is no erythema, no ecchymosis, no warmth, and no signs of infection or cellulitis present. Findings are consistent with advanced patellofemoral arthritis and crepitus anteriorly with range of motion activities. ASSESSMENT:  Bilateral knee osteoarthritis, patellofemoral.     PLAN:  At this point, we are going to move forward with a repeat cortisone injection for each of the knees today. After informed and written consent with an appropriate time out performed, and under sterile conditions, with ultrasound-guided assistance, each knee was prepped with Betadine and 6 mg of Celestone was injected into each knee without complications. The patient tolerated the injections well. The patient was instructed on post injection care. We will see her back in the office in about three months' time for evaluation. I asked her to get ahold of the family doctor regarding being on some fluid pills possibly, as well as the continuation of wearing her VICTOR M stockings.                   JR Rojelio REN, PAJenC, ATC

## 2020-02-28 NOTE — PROGRESS NOTES
1. Have you been to the ER, urgent care clinic since your last visit? Hospitalized since your last visit? No    2. Have you seen or consulted any other health care providers outside of the [address] [place] [address] since your last visit? Include any pap smears or colon screening.  No

## 2020-03-06 RX ORDER — METOPROLOL TARTRATE 25 MG/1
TABLET, FILM COATED ORAL
Qty: 60 TAB | Refills: 6 | Status: SHIPPED | OUTPATIENT
Start: 2020-03-06 | End: 2020-03-26

## 2020-03-08 DIAGNOSIS — M25.561 PAIN IN BOTH KNEES, UNSPECIFIED CHRONICITY: ICD-10-CM

## 2020-03-08 DIAGNOSIS — M25.562 PAIN IN BOTH KNEES, UNSPECIFIED CHRONICITY: ICD-10-CM

## 2020-03-08 RX ORDER — IBUPROFEN 800 MG/1
TABLET ORAL
Qty: 90 TAB | Refills: 1 | Status: SHIPPED | OUTPATIENT
Start: 2020-03-08 | End: 2020-10-26

## 2020-03-09 ENCOUNTER — TELEPHONE (OUTPATIENT)
Dept: CARDIOLOGY CLINIC | Age: 70
End: 2020-03-09

## 2020-03-09 NOTE — TELEPHONE ENCOUNTER
Spoke with Pharmacy regarding medications nexium, to soon to refill will be ready on 3/13/2020. Spoke with patient and She voices understanding and acceptance of this advice and will call back if any further questions or concerns.

## 2020-03-26 RX ORDER — METOPROLOL TARTRATE 25 MG/1
TABLET, FILM COATED ORAL
Qty: 60 TAB | Refills: 6 | Status: SHIPPED | OUTPATIENT
Start: 2020-03-26 | End: 2021-02-24 | Stop reason: DRUGHIGH

## 2020-03-27 DIAGNOSIS — M75.101 ROTATOR CUFF SYNDROME, RIGHT: ICD-10-CM

## 2020-03-30 RX ORDER — DICLOFENAC SODIUM 50 MG/1
TABLET, DELAYED RELEASE ORAL
Qty: 120 TAB | Refills: 2 | Status: SHIPPED | OUTPATIENT
Start: 2020-03-30 | End: 2020-05-13

## 2020-05-13 DIAGNOSIS — M75.101 ROTATOR CUFF SYNDROME, RIGHT: ICD-10-CM

## 2020-05-13 RX ORDER — DICLOFENAC SODIUM 50 MG/1
TABLET, DELAYED RELEASE ORAL
Qty: 120 TAB | Refills: 2 | Status: SHIPPED | OUTPATIENT
Start: 2020-05-13 | End: 2022-08-11

## 2020-06-12 ENCOUNTER — OFFICE VISIT (OUTPATIENT)
Dept: ORTHOPEDIC SURGERY | Age: 70
End: 2020-06-12

## 2020-06-12 VITALS — WEIGHT: 209 LBS | HEIGHT: 65 IN | TEMPERATURE: 97.2 F | BODY MASS INDEX: 34.82 KG/M2

## 2020-06-12 DIAGNOSIS — R60.0 EDEMA OF RIGHT LOWER LEG: ICD-10-CM

## 2020-06-12 DIAGNOSIS — M17.11 ARTHRITIS OF RIGHT KNEE: ICD-10-CM

## 2020-06-12 DIAGNOSIS — M17.12 PRIMARY OSTEOARTHRITIS OF LEFT KNEE: Primary | ICD-10-CM

## 2020-06-12 RX ORDER — BETAMETHASONE SODIUM PHOSPHATE AND BETAMETHASONE ACETATE 3; 3 MG/ML; MG/ML
6 INJECTION, SUSPENSION INTRA-ARTICULAR; INTRALESIONAL; INTRAMUSCULAR; SOFT TISSUE ONCE
Qty: 1 ML | Refills: 0
Start: 2020-06-12 | End: 2020-06-12

## 2020-06-12 NOTE — PROGRESS NOTES
[address]  [phone]           Patient: Heather Jack                MRN: [id number]       SSN: [id number]  YOB: 1950        AGE: 77 y.o. SEX: female  Body mass index is 34.78 kg/m². PCP: Eunice Araya MD  06/12/20            REVIEW OF SYSTEMS:  Constitutional: Negative for fever, chills, weight loss and malaise/fatigue. HENT: Negative. Eyes: Negative. Respiratory: Negative. Cardiovascular: Negative. Gastrointestinal: No bowel incontinence or constipation. Genitourinary: No bladder incontinence or saddle anesthesia. Skin: Negative. Neurological: Negative. Endo/Heme/Allergies: Negative. Psychiatric/Behavioral: Negative. Musculoskeletal: As per HPI above. Past Medical History:   Diagnosis Date    Anemia     Asthma     Blood disorder     Cancer ([facility] [address]     breast, diagnosed 2006 left    Cancer [facility])     Chest pain, unspecified     The working diagnosis is chest pain. The differential diagnosis includes chest wall pain, stable angina.  Chronic obstructive pulmonary disease (HCC)     Essential hypertension     Essential hypertension, benign     Uncontrolled     GERD (gastroesophageal reflux disease)     Hypercholesterolemia     Hypertension     Neuropathy     Nonspecific abnormal electrocardiogram (ECG) (EKG)     Obesity, unspecified     Weight loss has been strongly encouraged by following dietary restrictions and an exercise routine.     Polio     as a young child    Pre-operative cardiovascular examination     Sciatica     Sleep apnea     Unspecified cerebral artery occlusion with cerebral infarction     TIA? no residual         Current Outpatient Medications:     diclofenac EC (VOLTAREN) 50 mg EC tablet, take 1 tablet by mouth twice a day with meals, Disp: 120 Tab, Rfl: 2    metoprolol tartrate (LOPRESSOR) 25 mg tablet, take 1 tablet by mouth twice a day, Disp: 60 Tab, Rfl: 6    ibuprofen (MOTRIN) 800 mg tablet, take 1 tablet by mouth every 6 hours if needed for pain, Disp: 90 Tab, Rfl: 1    NEXIUM 40 mg capsule, Take 1 Cap by mouth daily. , Disp: 90 Cap, Rfl: 3    losartan (COZAAR) 100 mg tablet, take 1 tablet by mouth once daily, Disp: 90 Tab, Rfl: 3    Ketoconazole 2 % topical foam, Apply  to affected area two (2) times a day., Disp: 100 g, Rfl: 0    cyclobenzaprine (FLEXERIL) 5 mg tablet, Take 1 Tab by mouth three (3) times daily as needed for Muscle Spasm(s). 1-2 tabs po TID prn, Disp: 12 Tab, Rfl: 0    diclofenac (VOLTAREN) 1 % gel, Apply 4 g to affected area every six (6) hours. Apply 4 grams to affected joint up to 4 times per day, maximum 16 grams per joint per day Dispense 5 100 gram tubes, Disp: 100 g, Rfl: 4    gabapentin (NEURONTIN) 100 mg capsule, take 2 capsules by mouth three times a day, Disp: 90 Cap, Rfl: 1    pravastatin (PRAVACHOL) 40 mg tablet, Take 1 Tab by mouth nightly., Disp: , Rfl: 0    clopidogrel (PLAVIX) 75 mg tab, Take 1 Tab by mouth daily. , Disp: , Rfl:     lidocaine (LIDODERM) 5 %, apply 1 patch to the affected area daily. Leave on for 12 hours and then off for 12 hours. , Disp: 5 Patch, Rfl: 2    furosemide (LASIX) 40 mg tablet, Take 1 Tab by mouth daily. , Disp: 90 Tab, Rfl: 3    oxyCODONE-acetaminophen (PERCOCET) 5-325 mg per tablet, Take 1 Tab by mouth every four (4) hours as needed for Pain. Max Daily Amount: 6 Tabs., Disp: 6 Tab, Rfl: 0    dicyclomine (BENTYL) 20 mg tablet, Take 20 mg by mouth every six (6) hours. , Disp: , Rfl: 0    desonide (TRIDESILON) 0.05 % cream, , Disp: , Rfl: 0    ALBUTEROL SULFATE (PROAIR HFA IN), Take 2 Puffs by inhalation every four (4) hours as needed. , Disp: , Rfl:     FLUTICASONE/SALMETEROL (ADVAIR HFA IN), Take 2 Puffs by inhalation every twelve (12) hours. , Disp: , Rfl:     montelukast (SINGULAIR) 10 mg tablet, Take 10 mg by mouth daily. , Disp: , Rfl:     tiotropium (SPIRIVA WITH HANDIHALER) 18 mcg inhalation capsule, Take 1 Cap by inhalation daily. , Disp: , Rfl:     amLODIPine (NORVASC) 10 mg tablet, Take  by mouth daily. , Disp: , Rfl:     bethanechol (URECHOLINE) 10 mg tablet, Take 10 mg by mouth Before breakfast, lunch, and dinner., Disp: , Rfl:     aspirin 81 mg tablet, Take 81 mg by mouth.  , Disp: , Rfl:     ergocalciferol (VITAMIN D2) 50,000 unit capsule, Take 50,000 Units by mouth Every Thursday. , Disp: , Rfl:     No Known Allergies    Social History     Socioeconomic History    Marital status:      Spouse name: Not on file    Number of children: Not on file    Years of education: Not on file    Highest education level: Not on file   Occupational History    Not on file   Social Needs    Financial resource strain: Not on file    Food insecurity     Worry: Not on file     Inability: Not on file    Transportation needs     Medical: Not on file     Non-medical: Not on file   Tobacco Use    Smoking status: Never Smoker    Smokeless tobacco: Never Used   Substance and Sexual Activity    Alcohol use: No     Alcohol/week: 0.0 standard drinks    Drug use: No    Sexual activity: Not on file   Lifestyle    Physical activity     Days per week: Not on file     Minutes per session: Not on file    Stress: Not on file   Relationships    Social connections     Talks on phone: Not on file     Gets together: Not on file     Attends Druze service: Not on file     Active member of club or organization: Not on file     Attends meetings of clubs or organizations: Not on file     Relationship status: Not on file    Intimate partner violence     Fear of current or ex partner: Not on file     Emotionally abused: Not on file     Physically abused: Not on file     Forced sexual activity: Not on file   Other Topics Concern    Not on file   Social History Narrative    ** Merged History Encounter **            Past Surgical History:   Procedure Laterality Date    BREAST SURGERY PROCEDURE UNLISTED      initial surgery 2006, then left modified radical mastectomy 6/2012    HX BREAST BIOPSY  4/11/12    Left    HX BREAST BIOPSY Left     left mastectomy       Patient seen evaluate today for bilateral knee pain. She does have known advancing arthritis of her knees particularly patellofemoral.  She had injections in the past which gave her good relief. Presents today with some increasing discomfort of the knees without injury. She reports discomfort from a chair. Going up and down stairs. Some night discomfort. She still have some swelling in her legs. She had a vascular procedure done by Dr. Enedina Martinez in the past and states it really has not helped a whole lot. She wears compression stockings at times. She is on a fluid pill. Patient denies recent fevers, chills, chest pain, SOB, or injuries. No recent systemic changes noted. A 12-point review of systems is performed today. Pertinent positives are noted. All other systems reviewed and otherwise are negative. Physical exam: General: Alert and oriented x3, nad.  well-developed, well nourished. normal affect, AF. NC/AT, EOMI, neck supple, trachea midline, no JVD present. Breathing is non-labored. Examination of lower extremities reveals pain-free range of the hips. There is no pain to palpation of the trochanteric bursa. Neck straight leg raise. Negative calf tenderness. Negative Homans. No signs of DVT present. Does have mild edema present to the bilateral lower extremities. Each of the knees reveal skin intact. There is no erythema, ecchymosis, warmth. No signs of infection or swelling present. She does have discomfort to palpation tricompartmentally about the knees and crepitus arising the anterior compartment. Findings are consistent with advanced arthritis of the knees. Assessment: #1 bilateral knee advanced osteoarthritis    Plan: At this point we discussed treatment options.   We will move forth a repeat cortisone injection for each of the knees today. After informed consent, under aseptic conditions, with US guided assitance, each knee was prepped with betadine and 6mg of celestone was injected without complications. The patient tolerated the injection well. The patient is instructed on post-injection care. She has a follow-up appointment with another vascular doctor for further evaluation. She sees back in 3 months time for evaluation x-ray of each of the knees and repeat injection. Chart reviewed for the following:  Jorge BURNS PA-C, have reviewed the History, Physical and updated the Allergic reactions for Rebecca Breath?     TIME OUT performed immediately prior to start of procedure:  Jorge BURNS PA-C, have performed the following reviews on Rebecca Breath prior to the start of the procedure:  ????????  * Patient was identified by name and date of birth   * Agreement on procedure being performed was verified  * Risks and Benefits explained to the patient  * Procedure site verified and marked as necessary  * Patient was positioned for comfort  * Consent was signed and verified    Time:2:03 PM    Body part: bilateral knees    Medication & Dose: 6mg celestone each    Date of procedure: 06/12/20    Procedure performed by: Jorge Claire PA-C    Provider assisted by: none    Patient assisted by: self    How tolerated by patient: tolerated the procedure well with no complications    Post Procedural Pain Scale: 9    Comments: none       JR Rojelio REN PA-C, ATC

## 2020-06-12 NOTE — PROGRESS NOTES
1. Have you been to the ER, urgent care clinic since your last visit? Hospitalized since your last visit? No    2. Have you seen or consulted any other health care providers outside of the [address] [place] [address] since your last visit? Include any pap smears or colon screening.  No

## 2020-06-26 ENCOUNTER — TELEPHONE (OUTPATIENT)
Dept: CARDIOLOGY CLINIC | Age: 70
End: 2020-06-26

## 2020-06-26 NOTE — TELEPHONE ENCOUNTER
PA for patient Nexium has been approved PA # [id number] until 3/17/2020-12-. Patient is aware. She voices understanding and acceptance of this advice and will call back if any further questions or concerns.

## 2020-06-30 ENCOUNTER — HOSPITAL ENCOUNTER (EMERGENCY)
Age: 70
Discharge: HOME | End: 2020-06-30
Attending: EMERGENCY MEDICINE
Payer: MEDICARE

## 2020-06-30 ENCOUNTER — APPOINTMENT (OUTPATIENT)
Dept: GENERAL RADIOLOGY | Age: 70
End: 2020-06-30
Attending: EMERGENCY MEDICINE
Payer: MEDICARE

## 2020-06-30 VITALS
WEIGHT: 202 LBS | DIASTOLIC BLOOD PRESSURE: 70 MMHG | SYSTOLIC BLOOD PRESSURE: 154 MMHG | HEART RATE: 88 BPM | OXYGEN SATURATION: 94 % | HEIGHT: 65 IN | BODY MASS INDEX: 33.66 KG/M2 | RESPIRATION RATE: 18 BRPM | TEMPERATURE: 98.2 F

## 2020-06-30 DIAGNOSIS — M25.559 HIP PAIN: Primary | ICD-10-CM

## 2020-06-30 PROCEDURE — 73502 X-RAY EXAM HIP UNI 2-3 VIEWS: CPT

## 2020-06-30 PROCEDURE — 99282 EMERGENCY DEPT VISIT SF MDM: CPT

## 2020-06-30 RX ORDER — KETOROLAC TROMETHAMINE 15 MG/ML
15 INJECTION, SOLUTION INTRAMUSCULAR; INTRAVENOUS
Status: DISCONTINUED | OUTPATIENT
Start: 2020-06-30 | End: 2020-06-30 | Stop reason: HOSPADM

## 2020-06-30 RX ORDER — METAXALONE 800 MG/1
800 TABLET ORAL 4 TIMES DAILY
Qty: 20 TAB | Refills: 0 | Status: SHIPPED | OUTPATIENT
Start: 2020-06-30 | End: 2020-07-05

## 2020-06-30 NOTE — DISCHARGE INSTRUCTIONS
1.  Moderate degenerative changes noted about the hip. 2.  Continue current medications. 3.  Add Skelaxin for local muscle spasm. 4.  Follow-up with pain management for ongoing treatment recommendations. 5.  Follow-up with your primary care physician for recheck. You may benefit from outpatient physical therapy. 6.  Recommend MRI scan of the hip in 6 to 8 weeks if not improved. Patient Education        Hip Pain: Care Instructions  Your Care Instructions     Hip pain may be caused by many things, including overuse, a fall, or a twisting movement. Another cause of hip pain is arthritis. Your pain may increase when you stand up, walk, or squat. The pain may come and go or may be constant. Home treatment can help relieve hip pain, swelling, and stiffness. If your pain is ongoing, you may need more tests and treatment. Follow-up care is a key part of your treatment and safety. Be sure to make and go to all appointments, and call your doctor if you are having problems. It's also a good idea to know your test results and keep a list of the medicines you take. How can you care for yourself at home? · Take pain medicines exactly as directed. ? If the doctor gave you a prescription medicine for pain, take it as prescribed. ? If you are not taking a prescription pain medicine, ask your doctor if you can take an over-the-counter medicine. · Rest and protect your hip. Take a break from any activity, including standing or walking, that may cause pain. · Put ice or a cold pack against your hip for 10 to 20 minutes at a time. Try to do this every 1 to 2 hours for the next 3 days (when you are awake) or until the swelling goes down. Put a thin cloth between the ice and your skin. · Sleep on your healthy side with a pillow between your knees, or sleep on your back with pillows under your knees. · If there is no swelling, you can put moist heat, a heating pad, or a warm cloth on your hip.  Do gentle stretching exercises to help keep your hip flexible. · Learn how to prevent falls. Have your vision and hearing checked regularly. Wear slippers or shoes with a nonskid sole. · Stay at a healthy weight. · Wear comfortable shoes. When should you call for help? [id number] anytime you think you may need emergency care. For example, call if:  · You have sudden chest pain and shortness of breath, or you cough up blood. · You are not able to stand or walk or bear weight. · Your buttocks, legs, or feet feel numb or tingly. · Your leg or foot is cool or pale or changes color. · You have severe pain. Call your doctor now or seek immediate medical care if:  · You have signs of infection, such as:  ? Increased pain, swelling, warmth, or redness in the hip area. ? Red streaks leading from the hip area. ? Pus draining from the hip area. ? A fever. · You have signs of a blood clot, such as:  ? Pain in your calf, back of the knee, thigh, or groin. ? Redness and swelling in your leg or groin. · You are not able to bend, straighten, or move your leg normally. · You have trouble urinating or having bowel movements. Watch closely for changes in your health, and be sure to contact your doctor if:  · You do not get better as expected. Where can you learn more? Go to http://laura-jessie.info/  Enter Z720 in the search box to learn more about \"Hip Pain: Care Instructions. \"  Current as of: June 26, 2019               Content Version: 12.5  © 7157-9303 [employer]. Care instructions adapted under license by [employer] (which disclaims liability or warranty for this information). If you have questions about a medical condition or this instruction, always ask your healthcare professional. [place] [address] any warranty or liability for your use of this information.

## 2020-06-30 NOTE — ED NOTES
Cristin Alcantara is a 77 y.o. female that was discharged in stable condition. The patients diagnosis, condition and treatment were explained to  patient and aftercare instructions were given. The patient verbalized understanding. Patient armband removed and shredded.

## 2020-06-30 NOTE — ED PROVIDER NOTES
60-year-old female history of hypertension, hyperlipidemia, anemia, chronic lower extremity pain followed in pain management, and neuropathy presents for evaluation of left hip pain over the last week. No trauma. Pain is worse with ambulation and weightbearing. No lumbar pain. No bowel or bladder symptoms. Patient uses a cane for assisted ambulation due to chronic disability related to knee and lower extremity pain. This is unchanged from baseline. Past Medical History:   Diagnosis Date    Anemia     Asthma     Blood disorder     Cancer ([address]     breast, diagnosed 2006 left    Cancer [facility])     Chest pain, unspecified     The working diagnosis is chest pain. The differential diagnosis includes chest wall pain, stable angina.  Chronic obstructive pulmonary disease (HCC)     Essential hypertension     Essential hypertension, benign     Uncontrolled     GERD (gastroesophageal reflux disease)     Hypercholesterolemia     Hypertension     Neuropathy     Nonspecific abnormal electrocardiogram (ECG) (EKG)     Obesity, unspecified     Weight loss has been strongly encouraged by following dietary restrictions and an exercise routine.    [facility] Polio     as a young child    Pre-operative cardiovascular examination     Sciatica     Sleep apnea     Unspecified cerebral artery occlusion with cerebral infarction     TIA? no residual       Past Surgical History:   Procedure Laterality Date    BREAST SURGERY PROCEDURE UNLISTED      initial surgery 2006, then left modified radical mastectomy 6/2012    HX BREAST BIOPSY  4/11/12    Left    HX BREAST BIOPSY Left     left mastectomy         Family History:   Problem Relation Age of Onset    Cancer Mother     Cancer Father     Arthritis-osteo Other     Hypertension Other     Heart Disease Neg Hx         Negative family history of premature CAD or CVA       Social History     Socioeconomic History    Marital status:      Spouse name: Not on file    Number of children: Not on file    Years of education: Not on file    Highest education level: Not on file   Occupational History    Not on file   Social Needs    Financial resource strain: Not on file    Food insecurity     Worry: Not on file     Inability: Not on file    Transportation needs     Medical: Not on file     Non-medical: Not on file   Tobacco Use    Smoking status: Never Smoker    Smokeless tobacco: Never Used   Substance and Sexual Activity    Alcohol use: No     Alcohol/week: 0.0 standard drinks    Drug use: No    Sexual activity: Not on file   Lifestyle    Physical activity     Days per week: Not on file     Minutes per session: Not on file    Stress: Not on file   Relationships    Social connections     Talks on phone: Not on file     Gets together: Not on file     Attends Restorationism service: Not on file     Active member of club or organization: Not on file     Attends meetings of clubs or organizations: Not on file     Relationship status: Not on file    Intimate partner violence     Fear of current or ex partner: Not on file     Emotionally abused: Not on file     Physically abused: Not on file     Forced sexual activity: Not on file   Other Topics Concern    Not on file   Social History Narrative    ** Merged History Encounter **              ALLERGIES: Patient has no known allergies. Review of Systems   Constitutional: Negative for fever. Musculoskeletal: Positive for arthralgias and gait problem. Negative for back pain. All other systems reviewed and are negative. Vitals:    06/30/20 1007   BP: 154/70   Pulse: 88   Resp: 18   Temp: 98.2 °F (36.8 °C)   SpO2: 94%   Weight: 91.6 kg (202 lb)   Height: 5' 5\" (1.651 m)            Physical Exam  Vitals signs and nursing note reviewed. Constitutional:       General: She is not in acute distress. Appearance: She is well-developed. She is obese. HENT:      Head: Normocephalic and atraumatic.    Eyes: General: No scleral icterus. Cardiovascular:      Rate and Rhythm: Normal rate. Pulmonary:      Effort: Pulmonary effort is normal.   Musculoskeletal:      Comments: Left hip is not shortened or externally rotated at rest.  Patient does complain of pain on internal and external rotation. No visible deformity. Mild bilateral pitting edema in the distal lower extremities. Distal perfusion intact. No lumbar pain to palpation. Skin:     General: Skin is warm and dry. Neurological:      Mental Status: She is alert and oriented to person, place, and time. Hip x-ray interpreted per myself as negative for acute pathology. Mild degenerative changes noted. MDM  Number of Diagnoses or Management Options  Hip pain:   Diagnosis management comments: Impression: Acute hip pain and combination with chronic lower extremity pain. Patient is maintained on Percocet and NSAIDs for treatment of same. X-ray with mild degenerative changes noted. Toradol ordered, patient declined. .  Patient  to pain management for ongoing pain control. We will add Skelaxin, patient has component of muscle spasm related to her pain. Procedures    Diagnosis:   1. Hip pain          Disposition: home    Follow-up Information     Follow up With Specialties Details Why Contact Info    Erna Plata MD [facility]  for recheck of ongoing symptoms [address] [facility]  [phone]      pain managment    for treatment recommendations           Patient's Medications   Start Taking    METAXALONE (SKELAXIN) 800 MG TABLET    Take 1 Tab by mouth four (4) times daily for 5 days. Continue Taking    ALBUTEROL SULFATE (PROAIR HFA IN)    Take 2 Puffs by inhalation every four (4) hours as needed. AMLODIPINE (NORVASC) 10 MG TABLET    Take  by mouth daily. ASPIRIN 81 MG TABLET    Take 81 mg by mouth. BETHANECHOL (URECHOLINE) 10 MG TABLET    Take 10 mg by mouth Before breakfast, lunch, and dinner.     CLOPIDOGREL (PLAVIX) 75 MG TAB    Take 1 Tab by mouth daily. CYCLOBENZAPRINE (FLEXERIL) 5 MG TABLET    Take 1 Tab by mouth three (3) times daily as needed for Muscle Spasm(s). 1-2 tabs po TID prn    DESONIDE (TRIDESILON) 0.05 % CREAM        DICLOFENAC (VOLTAREN) 1 % GEL    Apply 4 g to affected area every six (6) hours. Apply 4 grams to affected joint up to 4 times per day, maximum 16 grams per joint per day Dispense 5 100 gram tubes    DICLOFENAC EC (VOLTAREN) 50 MG EC TABLET    take 1 tablet by mouth twice a day with meals    DICYCLOMINE (BENTYL) 20 MG TABLET    Take 20 mg by mouth every six (6) hours. ERGOCALCIFEROL (VITAMIN D2) 50,000 UNIT CAPSULE    Take 50,000 Units by mouth Every Thursday. FLUTICASONE/SALMETEROL (ADVAIR HFA IN)    Take 2 Puffs by inhalation every twelve (12) hours. FUROSEMIDE (LASIX) 40 MG TABLET    Take 1 Tab by mouth daily. GABAPENTIN (NEURONTIN) 100 MG CAPSULE    take 2 capsules by mouth three times a day    IBUPROFEN (MOTRIN) 800 MG TABLET    take 1 tablet by mouth every 6 hours if needed for pain    KETOCONAZOLE 2 % TOPICAL FOAM    Apply  to affected area two (2) times a day. LIDOCAINE (LIDODERM) 5 %    apply 1 patch to the affected area daily. Leave on for 12 hours and then off for 12 hours. LOSARTAN (COZAAR) 100 MG TABLET    take 1 tablet by mouth once daily    METOPROLOL TARTRATE (LOPRESSOR) 25 MG TABLET    take 1 tablet by mouth twice a day    MONTELUKAST (SINGULAIR) 10 MG TABLET    Take 10 mg by mouth daily. NEXIUM 40 MG CAPSULE    Take 1 Cap by mouth daily. OXYCODONE-ACETAMINOPHEN (PERCOCET) 5-325 MG PER TABLET    Take 1 Tab by mouth every four (4) hours as needed for Pain. Max Daily Amount: 6 Tabs. PRAVASTATIN (PRAVACHOL) 40 MG TABLET    Take 1 Tab by mouth nightly. TIOTROPIUM (SPIRIVA WITH HANDIHALER) 18 MCG INHALATION CAPSULE    Take 1 Cap by inhalation daily.    These Medications have changed    No medications on file   Stop Taking    No medications on file

## 2020-07-01 ENCOUNTER — PATIENT OUTREACH (OUTPATIENT)
Dept: CASE MANAGEMENT | Age: 70
End: 2020-07-01

## 2020-07-01 NOTE — PROGRESS NOTES
Patient contacted regarding recent discharge and COVID-19 risk. Discussed COVID-19 related testing which was not done at this time. Test results were not done. Patient informed of results, if available? n/a    Care Transition Nurse/ Ambulatory Care Manager contacted the patient by telephone to perform post discharge assessment. Verified name and  with patient as identifiers. Patient has following risk factors of: COPD. CTN/ACM reviewed discharge instructions, medical action plan and red flags related to discharge diagnosis. Reviewed and educated them on any new and changed medications related to discharge diagnosis. Advised obtaining a 90-day supply of all daily and as-needed medications. Education provided regarding infection prevention, and signs and symptoms of COVID-19 and when to seek medical attention with patient who verbalized understanding. Discussed exposure protocols and quarantine from [address] you at higher risk for severe illness  and given an opportunity for questions and concerns. The patient agrees to contact the COVID-19 hotline [phone] or PCP office for questions related to their healthcare. CTN/ACM provided contact information for future reference. From CDC: Are you at higher risk for severe illness?  Wash your hands often.  Avoid close contact (6 feet, which is about two arm lengths) with people who are sick.  Put distance between yourself and other people if COVID-19 is spreading in your community.  Clean and disinfect frequently touched surfaces.  Avoid all cruise travel and non-essential air travel.  Call your healthcare professional if you have concerns about COVID-19 and your underlying condition or if you are sick.     For more information on steps you can take to protect yourself, see CDC's How to Protect Yourself      Patient/family/caregiver given information for Elan Garcia and agrees to enroll no      Plan for follow-up call in - days based on severity of symptoms and risk factors.

## 2020-07-09 ENCOUNTER — HOSPITAL ENCOUNTER (EMERGENCY)
Age: 70
Discharge: HOME | End: 2020-07-09
Attending: EMERGENCY MEDICINE

## 2020-07-09 VITALS
SYSTOLIC BLOOD PRESSURE: 148 MMHG | RESPIRATION RATE: 17 BRPM | TEMPERATURE: 99 F | OXYGEN SATURATION: 99 % | WEIGHT: 201 LBS | HEART RATE: 84 BPM | BODY MASS INDEX: 33.49 KG/M2 | DIASTOLIC BLOOD PRESSURE: 88 MMHG | HEIGHT: 65 IN

## 2020-07-09 DIAGNOSIS — M54.2 CERVICAL PAIN: Primary | ICD-10-CM

## 2020-07-09 RX ORDER — CYCLOBENZAPRINE HCL 5 MG
5-10 TABLET ORAL
Qty: 22 TAB | Refills: 0 | Status: SHIPPED | OUTPATIENT
Start: 2020-07-09 | End: 2020-07-16

## 2020-07-10 NOTE — DISCHARGE INSTRUCTIONS
Return for pain, fever not resolving with motrin or tylenol, shortness of breath, vomiting, decreased fluid intake, weakness, numbness, dizziness, or any change or concerns. Patient Education        Neck Pain: Care Instructions  Your Care Instructions     You can have neck pain anywhere from the bottom of your head to the top of your shoulders. It can spread to the upper back or arms. Injuries, painting a ceiling, sleeping with your neck twisted, staying in one position for too long, and many other activities can cause neck pain. Most neck pain gets better with home care. Your doctor may recommend medicine to relieve pain or relax your muscles. He or she may suggest exercise and physical therapy to increase flexibility and relieve stress. You may need to wear a special (cervical) collar to support your neck for a day or two. Follow-up care is a key part of your treatment and safety. Be sure to make and go to all appointments, and call your doctor if you are having problems. It's also a good idea to know your test results and keep a list of the medicines you take. How can you care for yourself at home? · Try using a heating pad on a low or medium setting for 15 to 20 minutes every 2 or 3 hours. Try a warm shower in place of one session with the heating pad. · You can also try an ice pack for 10 to 15 minutes every 2 to 3 hours. Put a thin cloth between the ice and your skin. · Take pain medicines exactly as directed. ? If the doctor gave you a prescription medicine for pain, take it as prescribed. ? If you are not taking a prescription pain medicine, ask your doctor if you can take an over-the-counter medicine. · If your doctor recommends a cervical collar, wear it exactly as directed. When should you call for help? Call your doctor now or seek immediate medical care if:  · You have new or worsening numbness in your arms, buttocks or legs.   · You have new or worsening weakness in your arms or legs. (This could make it hard to stand up.)  · You lose control of your bladder or bowels. Watch closely for changes in your health, and be sure to contact your doctor if:  · Your neck pain is getting worse. · You are not getting better after 1 week. · You do not get better as expected. Where can you learn more? Go to http://www.espana.com/  Enter V723 in the search box to learn more about \"Neck Pain: Care Instructions. \"  Current as of: March 2, 2020               Content Version: 12.5  © 6454-8828 [employer]. Care instructions adapted under license by [employer] (which disclaims liability or warranty for this information). If you have questions about a medical condition or this instruction, always ask your healthcare professional. [address] any warranty or liability for your use of this information.
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
## 2020-07-10 NOTE — ED NOTES
I have reviewed discharge instructions with the patient. The patient verbalized understanding. Patient armband removed and given to patient to take home. Patient was informed of the privacy risks if armband lost or stolen  Current Discharge Medication List      CONTINUE these medications which have CHANGED    Details   cyclobenzaprine (FLEXERIL) 5 mg tablet Take 1-2 Tabs by mouth three (3) times daily as needed for Muscle Spasm(s) for up to 7 days.   Qty: 22 Tab, Refills: 0

## 2020-07-27 ENCOUNTER — PATIENT OUTREACH (OUTPATIENT)
Dept: CASE MANAGEMENT | Age: 70
End: 2020-07-27

## 2020-07-27 NOTE — PROGRESS NOTES
Contacted patient for Transitions of Care Coordination  follow up. No answer. Left message introducing self, role and reason for call. Requested return call. Contact information provided. Patient resolved from Transition of Care episode on 7/27/20  Discussed COVID-19 related testing which was not done at this time. Test results were not done. Patient informed of results, if available? n/a     Patient/family has been provided the following resources and education related to COVID-19:                         Signs, symptoms and red flags related to COVID-19            CDC exposure and quarantine guidelines            [facility] exposure contact - [phone]            Contact for their local Department of Health               No further outreach scheduled with this CTN/ACM/LPN/HC/ MA. Episode of Care resolved. Patient has this CTN/ACM/LPN/HC/MA contact information if future needs arise.

## 2020-09-16 ENCOUNTER — OFFICE VISIT (OUTPATIENT)
Dept: NEUROLOGY | Age: 70
End: 2020-09-16

## 2020-09-16 VITALS
BODY MASS INDEX: 32.82 KG/M2 | OXYGEN SATURATION: 95 % | HEART RATE: 105 BPM | TEMPERATURE: 95 F | RESPIRATION RATE: 22 BRPM | WEIGHT: 197 LBS | DIASTOLIC BLOOD PRESSURE: 84 MMHG | HEIGHT: 65 IN | SYSTOLIC BLOOD PRESSURE: 150 MMHG

## 2020-09-16 DIAGNOSIS — G62.9 PERIPHERAL POLYNEUROPATHY: Primary | ICD-10-CM

## 2020-09-16 RX ORDER — GABAPENTIN 300 MG/1
300 CAPSULE ORAL 3 TIMES DAILY
Qty: 90 CAP | Refills: 3 | Status: SHIPPED | OUTPATIENT
Start: 2020-09-16 | End: 2020-10-16

## 2020-09-16 NOTE — PROGRESS NOTES
Anni Montgomery is a 77 y.o. female . presents for New Patient Ravinder Angelo MD); Pain (Chronic) (neuopathic pain); and Follow-up (h/o TIA)   . A 77years old female patient with medical history of prediabetes, hypertension, GERD here for evaluation of pain over her lower extremities of about 8 months duration. Initially she had swelling of her lower extremities and was told that she had some problems with her veins. She has undergone laser treatment over her legs on January 5, 2020. Claims since then her feet started to get sore. Is progressively going up involving the legs. Denied any burning or tingling sensation. Has numbness over her lower extremities. Occasionally the right lower extremity gives way. She has occasional lower back pain but nonradiating. Has previous diabetes and her last hemoglobin A1c was 6.0. Not on any medications. Had history of breast cancer which was treated with radiation; no chemotherapy. No alcohol abuse. Denies any history of trauma to the legs. Review of Systems   Constitutional: Negative for chills, fever and weight loss. HENT: Negative for hearing loss and tinnitus. Eyes: Negative for blurred vision and double vision. Respiratory: Positive for cough (sometimes) and shortness of breath. Negative for hemoptysis and sputum production. Cardiovascular: Positive for leg swelling. Negative for chest pain. Gastrointestinal: Negative for nausea and vomiting. Genitourinary: Negative for dysuria and urgency. Musculoskeletal: Positive for back pain. Negative for neck pain. Skin: Negative for itching and rash. Neurological: Positive for sensory change (feet). Negative for dizziness, tingling, tremors, focal weakness and headaches. Endo/Heme/Allergies: Bruises/bleeds easily.        Past Medical History:   Diagnosis Date    Anemia     Asthma     Blood disorder     Cancer ([facility] [address]     breast, diagnosed 2006 left    Cancer ([facility] [address]     Chest pain, unspecified     The working diagnosis is chest pain. The differential diagnosis includes chest wall pain, stable angina.  Chronic obstructive pulmonary disease (HCC)     Essential hypertension     Essential hypertension, benign     Uncontrolled     GERD (gastroesophageal reflux disease)     Hypercholesterolemia     Hypertension     Neuropathy     Nonspecific abnormal electrocardiogram (ECG) (EKG)     Obesity, unspecified     Weight loss has been strongly encouraged by following dietary restrictions and an exercise routine.    [facility] Polio     as a young child    Pre-operative cardiovascular examination     Sciatica     Sleep apnea     Unspecified cerebral artery occlusion with cerebral infarction     TIA? no residual       Past Surgical History:   Procedure Laterality Date    BREAST SURGERY PROCEDURE UNLISTED      initial surgery 2006, then left modified radical mastectomy 6/2012    HX BREAST BIOPSY  4/11/12    Left    HX BREAST BIOPSY Left     left mastectomy        Family History   Problem Relation Age of Onset    Cancer Mother     Cancer Father     Arthritis-osteo Other     Hypertension Other     Heart Disease Neg Hx         Negative family history of premature CAD or CVA        Social History     Socioeconomic History    Marital status:      Spouse name: Not on file    Number of children: Not on file    Years of education: Not on file    Highest education level: Not on file   Occupational History    Not on file   Social Needs    Financial resource strain: Not on file    Food insecurity     Worry: Not on file     Inability: Not on file    Transportation needs     Medical: Not on file     Non-medical: Not on file   Tobacco Use    Smoking status: Never Smoker    Smokeless tobacco: Never Used   Substance and Sexual Activity    Alcohol use: No     Alcohol/week: 0.0 standard drinks    Drug use: No    Sexual activity: Not on file   Lifestyle    Physical activity     Days per week: Not on file     Minutes per session: Not on file    Stress: Not on file   Relationships    Social connections     Talks on phone: Not on file     Gets together: Not on file     Attends Ca[employer] service: Not on file     Active member of club or organization: Not on file     Attends meetings of clubs or organizations: Not on file     Relationship status: Not on file    Intimate partner violence     Fear of current or ex partner: Not on file     Emotionally abused: Not on file     Physically abused: Not on file     Forced sexual activity: Not on file   Other Topics Concern    Not on file   Social History Narrative    ** Merged History Encounter **             No Known Allergies      Current Outpatient Medications   Medication Sig Dispense Refill    diclofenac EC (VOLTAREN) 50 mg EC tablet take 1 tablet by mouth twice a day with meals 120 Tab 2    metoprolol tartrate (LOPRESSOR) 25 mg tablet take 1 tablet by mouth twice a day 60 Tab 6    ibuprofen (MOTRIN) 800 mg tablet take 1 tablet by mouth every 6 hours if needed for pain 90 Tab 1    NEXIUM 40 mg capsule Take 1 Cap by mouth daily. 90 Cap 3    losartan (COZAAR) 100 mg tablet take 1 tablet by mouth once daily 90 Tab 3    Ketoconazole 2 % topical foam Apply  to affected area two (2) times a day. 100 g 0    diclofenac (VOLTAREN) 1 % gel Apply 4 g to affected area every six (6) hours. Apply 4 grams to affected joint up to 4 times per day, maximum 16 grams per joint per day Dispense 5 100 gram tubes 100 g 4    gabapentin (NEURONTIN) 100 mg capsule take 2 capsules by mouth three times a day 90 Cap 1    pravastatin (PRAVACHOL) 40 mg tablet Take 1 Tab by mouth nightly. 0    clopidogrel (PLAVIX) 75 mg tab Take 1 Tab by mouth daily.  lidocaine (LIDODERM) 5 % apply 1 patch to the affected area daily. Leave on for 12 hours and then off for 12 hours. 5 Patch 2    furosemide (LASIX) 40 mg tablet Take 1 Tab by mouth daily.  90 Tab 3    oxyCODONE-acetaminophen (PERCOCET) 5-325 mg per tablet Take 1 Tab by mouth every four (4) hours as needed for Pain. Max Daily Amount: 6 Tabs. 6 Tab 0    dicyclomine (BENTYL) 20 mg tablet Take 20 mg by mouth every six (6) hours. 0    desonide (TRIDESILON) 0.05 % cream   0    ALBUTEROL SULFATE (PROAIR HFA IN) Take 2 Puffs by inhalation every four (4) hours as needed.  FLUTICASONE/SALMETEROL (ADVAIR HFA IN) Take 2 Puffs by inhalation every twelve (12) hours.  montelukast (SINGULAIR) 10 mg tablet Take 10 mg by mouth daily.  tiotropium (SPIRIVA WITH HANDIHALER) 18 mcg inhalation capsule Take 1 Cap by inhalation daily.  amLODIPine (NORVASC) 10 mg tablet Take  by mouth daily.  bethanechol (URECHOLINE) 10 mg tablet Take 10 mg by mouth Before breakfast, lunch, and dinner.  aspirin 81 mg tablet Take 81 mg by mouth.  ergocalciferol (VITAMIN D2) 50,000 unit capsule Take 50,000 Units by mouth Every Thursday. Physical Exam  Constitutional:       Appearance: Normal appearance. HENT:      Mouth/Throat:      Mouth: Mucous membranes are moist.      Pharynx: Oropharynx is clear. Eyes:      Extraocular Movements: Extraocular movements intact. Pupils: Pupils are equal, round, and reactive to light. Neck:      Musculoskeletal: Normal range of motion and neck supple. Pulmonary:      Effort: Pulmonary effort is normal. No respiratory distress. Musculoskeletal: Normal range of motion. Right lower leg: Edema (slight pitting) present. Left lower leg: Edema present. Neurological:      Mental Status: She is alert. Comments: Mental status: Awake, alert, oriented x3, follows simple and complex commands, no neglect.   Speech and languge: fluent, coherent,  and comprehension intact  CN: VFF, EOMI, PERRLA, face sensation intact , no facial asymmetry noted, palate elevation symmetric bilat, SS+SCM 5/5 bilat, tongue midline  Motor: no pronator drift, tone normal throughout, strength 5/5 throughout  Sensory: Decreased light touch and pinprick below mid leg. Intact weakness. DTR: 2+ throughout except at the ankles 0/4, toes downgoing BL  Gait: Antalgic. No visits with results within 3 Month(s) from this visit. Latest known visit with results is:   Admission on 09/05/2019, Discharged on 09/06/2019   Component Date Value Ref Range Status    Color 09/05/2019 YELLOW    Final    Appearance 09/05/2019 CLEAR    Final    Specific gravity 09/05/2019 1.013  1.005 - 1.030   Final    pH (UA) 09/05/2019 7.0  5.0 - 8.0   Final    Protein 09/05/2019 NEGATIVE   NEG mg/dL Final    Glucose 09/05/2019 NEGATIVE   NEG mg/dL Final    Ketone 09/05/2019 NEGATIVE   NEG mg/dL Final    Bilirubin 09/05/2019 NEGATIVE   NEG   Final    Blood 09/05/2019 NEGATIVE   NEG   Final    Urobilinogen 09/05/2019 1.0  0.2 - 1.0 EU/dL Final    Nitrites 09/05/2019 NEGATIVE   NEG   Final    Leukocyte Esterase 09/05/2019 TRACE* NEG   Final    WBC 09/05/2019 4 to 10  0 - 4 /hpf Final    RBC 09/05/2019 0 to 3  0 - 5 /hpf Final    Epithelial cells 09/05/2019 2+  0 - 5 /lpf Final    Bacteria 09/05/2019 1+* NEG /hpf Final    Mucus 09/05/2019 FEW* NEG /lpf Final             ICD-10-CM ICD-9-CM    1. Peripheral polyneuropathy  G62.9 356.9 PROTEIN ELECTROPHORESIS      HEAVY METALS PROFILE I, BLOOD      EMG LIMITED      gabapentin (NEURONTIN) 300 mg capsule     77years old female patient with soreness over her lower extremities involving the feet and legs over 8 months duration which is progressively going up. Has numbness. No weakness but occasionally the right lower extremity gives way. Patient is a prediabetic. Symptoms are possibly from neuropathy; risk includes prediabetes. Will check for other risk factors including blood electrophoresis and heavy metal screening. I have increased the dose of gabapentin to 300 mg p.o. 3 times daily. Will do electromyography of the lower extremities.   We will see her in 3 months time.

## 2020-09-16 NOTE — LETTER
9/16/20 Patient: Ilya Hayes YOB: 1950 Date of Visit: 9/16/2020 Lucina Saavedra MD 
[address] [facility] [address] [place] [address] VIA Facsimile: [phone] Dear Lucina Saavedra MD, Thank you for referring Ms. Javon Larios to [facility] for evaluation. My notes for this consultation are attached. If you have questions, please do not hesitate to call me. I look forward to following your patient along with you. Sincerely, Beatrice Brand MD

## 2020-09-28 ENCOUNTER — APPOINTMENT (OUTPATIENT)
Dept: GENERAL RADIOLOGY | Age: 70
End: 2020-09-28
Attending: NURSE PRACTITIONER
Payer: MEDICARE

## 2020-09-28 ENCOUNTER — HOSPITAL ENCOUNTER (EMERGENCY)
Age: 70
Discharge: HOME | End: 2020-09-28
Attending: EMERGENCY MEDICINE
Payer: MEDICARE

## 2020-09-28 VITALS
RESPIRATION RATE: 18 BRPM | HEART RATE: 88 BPM | DIASTOLIC BLOOD PRESSURE: 90 MMHG | SYSTOLIC BLOOD PRESSURE: 155 MMHG | WEIGHT: 197 LBS | HEIGHT: 60 IN | BODY MASS INDEX: 38.68 KG/M2 | TEMPERATURE: 98.3 F | OXYGEN SATURATION: 97 %

## 2020-09-28 DIAGNOSIS — M19.049 HAND ARTHRITIS: Primary | ICD-10-CM

## 2020-09-28 DIAGNOSIS — M19.039 WRIST ARTHRITIS: ICD-10-CM

## 2020-09-28 DIAGNOSIS — M79.89 SWELLING OF LEFT HAND: ICD-10-CM

## 2020-09-28 PROCEDURE — 74011250637 HC RX REV CODE- 250/637: Performed by: NURSE PRACTITIONER

## 2020-09-28 PROCEDURE — 73130 X-RAY EXAM OF HAND: CPT

## 2020-09-28 PROCEDURE — 99283 EMERGENCY DEPT VISIT LOW MDM: CPT

## 2020-09-28 RX ORDER — NAPROXEN 250 MG/1
500 TABLET ORAL
Status: COMPLETED | OUTPATIENT
Start: 2020-09-28 | End: 2020-09-28

## 2020-09-28 RX ORDER — NAPROXEN 500 MG/1
500 TABLET ORAL
Qty: 20 TAB | Refills: 0 | Status: SHIPPED | OUTPATIENT
Start: 2020-09-28 | End: 2020-10-06

## 2020-09-28 RX ADMIN — NAPROXEN 500 MG: 250 TABLET ORAL at 13:25

## 2020-09-28 NOTE — ED PROVIDER NOTES
EMERGENCY DEPARTMENT HISTORY AND PHYSICAL EXAM    1:05 PM      Date: 9/28/2020  Patient Name: Aniya Parmar    History of Presenting Illness     Chief Complaint   Patient presents with    Hand Swelling         History Provided By: Patient    Additional History (Context): Aniya Parmar is a 77 y.o. female with past medical history significant for breast cancer (in remission), anemia, asthma, hypertension, neuropathy, chronic pain, obesity, sleep apnea, and TIA who presents with pain and swelling to the left hand with no history of trauma, injury, or fall. Patient reports chronic swelling in the left upper extremity from the shoulder to the wrist secondary to lymphadenopathy after breast cancer with lymph node removal about 12 years ago. She states she uses a compression sleeve as well as compression device machine to help with the swelling. She states about 4 days ago she started having increased pain and swelling to the dorsal aspect of the left hand and wrist which is unusual for her. She denies any redness, warmth, or paresthesias. She denies any past history of anything similar. Pain is worse with range of motion. Patient is right-hand dominant    PCP: José Rajput MD    Current Outpatient Medications   Medication Sig Dispense Refill    naproxen (Naprosyn) 500 mg tablet Take 1 Tab by mouth two (2) times daily as needed for Pain for up to 8 days. 20 Tab 0    gabapentin (NEURONTIN) 300 mg capsule Take 1 Cap by mouth three (3) times daily for 30 days. Max Daily Amount: 900 mg. 90 Cap 3    diclofenac EC (VOLTAREN) 50 mg EC tablet take 1 tablet by mouth twice a day with meals 120 Tab 2    metoprolol tartrate (LOPRESSOR) 25 mg tablet take 1 tablet by mouth twice a day 60 Tab 6    ibuprofen (MOTRIN) 800 mg tablet take 1 tablet by mouth every 6 hours if needed for pain 90 Tab 1    NEXIUM 40 mg capsule Take 1 Cap by mouth daily.  90 Cap 3    losartan (COZAAR) 100 mg tablet take 1 tablet by mouth once daily 90 Tab 3    Ketoconazole 2 % topical foam Apply  to affected area two (2) times a day. 100 g 0    diclofenac (VOLTAREN) 1 % gel Apply 4 g to affected area every six (6) hours. Apply 4 grams to affected joint up to 4 times per day, maximum 16 grams per joint per day Dispense 5 100 gram tubes 100 g 4    pravastatin (PRAVACHOL) 40 mg tablet Take 1 Tab by mouth nightly. 0    clopidogrel (PLAVIX) 75 mg tab Take 1 Tab by mouth daily.  lidocaine (LIDODERM) 5 % apply 1 patch to the affected area daily. Leave on for 12 hours and then off for 12 hours. 5 Patch 2    furosemide (LASIX) 40 mg tablet Take 1 Tab by mouth daily. 90 Tab 3    oxyCODONE-acetaminophen (PERCOCET) 5-325 mg per tablet Take 1 Tab by mouth every four (4) hours as needed for Pain. Max Daily Amount: 6 Tabs. 6 Tab 0    dicyclomine (BENTYL) 20 mg tablet Take 20 mg by mouth every six (6) hours. 0    desonide (TRIDESILON) 0.05 % cream   0    ALBUTEROL SULFATE (PROAIR HFA IN) Take 2 Puffs by inhalation every four (4) hours as needed.  FLUTICASONE/SALMETEROL (ADVAIR HFA IN) Take 2 Puffs by inhalation every twelve (12) hours.  montelukast (SINGULAIR) 10 mg tablet Take 10 mg by mouth daily.  tiotropium (SPIRIVA WITH HANDIHALER) 18 mcg inhalation capsule Take 1 Cap by inhalation daily.  amLODIPine (NORVASC) 10 mg tablet Take  by mouth daily.  bethanechol (URECHOLINE) 10 mg tablet Take 10 mg by mouth Before breakfast, lunch, and dinner.  aspirin 81 mg tablet Take 81 mg by mouth.  ergocalciferol (VITAMIN D2) 50,000 unit capsule Take 50,000 Units by mouth Every Thursday. Past History     Past Medical History:  Past Medical History:   Diagnosis Date    Anemia     Asthma     Blood disorder     Cancer ([facility] [address]     breast, diagnosed 2006 left    Cancer [facility])     Chest pain, unspecified     The working diagnosis is chest pain. The differential diagnosis includes chest wall pain, stable angina.  Chronic obstructive pulmonary disease (HCC)     Essential hypertension     Essential hypertension, benign     Uncontrolled     GERD (gastroesophageal reflux disease)     Hypercholesterolemia     Hypertension     Neuropathy     Nonspecific abnormal electrocardiogram (ECG) (EKG)     Obesity, unspecified     Weight loss has been strongly encouraged by following dietary restrictions and an exercise routine. [facility] [place]     as a young child    Pre-operative cardiovascular examination     Sciatica     Sleep apnea     Unspecified cerebral artery occlusion with cerebral infarction     TIA? no residual       Past Surgical History:  Past Surgical History:   Procedure Laterality Date    BREAST SURGERY PROCEDURE UNLISTED      initial surgery 2006, then left modified radical mastectomy 6/2012    HX BREAST BIOPSY  4/11/12    Left    HX BREAST BIOPSY Left     left mastectomy       Family History:  Family History   Problem Relation Age of Onset    Cancer Mother     Cancer Father     Arthritis-osteo Other     Hypertension Other     Heart Disease Neg Hx         Negative family history of premature CAD or CVA       Social History:  Social History     Tobacco Use    Smoking status: Never Smoker    Smokeless tobacco: Never Used   Substance Use Topics    Alcohol use: No     Alcohol/week: 0.0 standard drinks    Drug use: No       Allergies:  No Known Allergies      Review of Systems       Review of Systems   Constitutional: Negative. Negative for chills and fever. HENT: Negative. Negative for congestion, ear pain and rhinorrhea. Eyes: Negative. Negative for pain and redness. Respiratory: Negative. Negative for cough and shortness of breath. Cardiovascular: Negative. Negative for chest pain, palpitations and leg swelling. Gastrointestinal: Negative. Negative for abdominal pain, constipation, diarrhea, nausea and vomiting. Genitourinary: Negative.   Negative for dysuria, frequency, hematuria and urgency. Musculoskeletal: Positive for arthralgias and joint swelling. Negative for back pain, gait problem and neck pain. Skin: Negative. Negative for rash and wound. Neurological: Negative. Negative for dizziness, seizures, speech difficulty, weakness, light-headedness and headaches. Hematological: Negative for adenopathy. Does not bruise/bleed easily. All other systems reviewed and are negative. Physical Exam     Visit Vitals  BP (!) 155/90 (BP 1 Location: Right arm, BP Patient Position: At rest)   Pulse 88   Temp 98.3 °F (36.8 °C)   Resp 18   Ht 5' (1.524 m)   Wt 89.4 kg (197 lb)   LMP  (LMP Unknown)   SpO2 97%   BMI 38.47 kg/m²         Physical Exam  Vitals signs and nursing note reviewed. Constitutional:       General: She is in acute distress. Appearance: Normal appearance. She is obese. She is not ill-appearing, toxic-appearing or diaphoretic. HENT:      Head: Normocephalic and atraumatic. Nose: Nose normal.   Eyes:      General: Lids are normal. Vision grossly intact. Conjunctiva/sclera: Conjunctivae normal.      Pupils: Pupils are equal, round, and reactive to light. Neck:      Musculoskeletal: Full passive range of motion without pain and normal range of motion. Cardiovascular:      Rate and Rhythm: Normal rate and regular rhythm. Pulmonary:      Effort: Pulmonary effort is normal.      Breath sounds: Normal breath sounds. Musculoskeletal:      Left wrist: She exhibits tenderness and swelling. She exhibits normal range of motion, no bony tenderness, no effusion, no crepitus and no deformity. Left hand: She exhibits decreased range of motion, tenderness and swelling. She exhibits no bony tenderness, normal two-point discrimination, normal capillary refill, no deformity and no laceration. Normal sensation noted.  Decreased sensation is not present in the ulnar distribution, is not present in the medial redistribution and is not present in the radial distribution. Normal strength noted. She exhibits no finger abduction, no thumb/finger opposition and no wrist extension trouble. Comments: Diffuse moderate nonpitting edema in the left upper extremity from the shoulder through the hand and fingers. Tenderness to the dorsal aspect of the left wrist and hand without any superficial skin changes, warmth, or erythema. Positive Finkelstein's test.  No paresthesias. Capillary refill is brisk distally in the fingers. Pain with range of motion especially in the left thumb. Skin:     General: Skin is warm and dry. Capillary Refill: Capillary refill takes less than 2 seconds. Neurological:      General: No focal deficit present. Mental Status: She is alert and oriented to person, place, and time. Psychiatric:         Mood and Affect: Mood normal.         Behavior: Behavior normal. Behavior is cooperative. Diagnostic Study Results     Labs -  No results found for this or any previous visit (from the past 12 hour(s)). Radiologic Studies -   XR HAND LT MIN 3 V   Final Result   IMPRESSION:      No acute osseous findings. Degenerative/chronic changes as above and soft tissue   swelling noted. Medical Decision Making   I am the first provider for this patient. I reviewed available nursing notes, past medical history, past surgical history, family history and social history. Vital Signs-Reviewed the patient's vital signs. Records Reviewed: Nursing Notes and Old Medical Records (Time of Review: 1:05 PM)    ED Course: Progress Notes, Reevaluation, and Consults:  1:05 PM  Initial assessment performed. The patients presenting problems have been discussed, and they/their family are in agreement with the care plan formulated and outlined with them. I have encouraged them to ask questions as they arise throughout their visit.     Provider Notes (Medical Decision Making):       Patient is a 78-year-old female who presents to the ER with complaints of left hand pain and swelling. There is significant nonpitting edema to the hand without any evidence of trauma, erythema, or warmth. She has chronic swelling in the left upper extremity due to lymphedema secondary to breast cancer but there is no concern for DVT as the swelling is chronic. She does have pain throughout the dorsal aspect of the left hand without any deformity. No erythema or warmth to suggest infective tenosynovitis or septic joint. Patient is afebrile and aside from high blood pressure vital signs are otherwise within normal limits. Will obtain appropriate studies to evaluate patient's complaints and treat symptomatically. Will disposition after reassessment assuming no clinical change or worsening and appropriate response to symptomatic treatment. X-ray of the left hand shows degenerative and chronic changes as well as soft tissue swelling. There are no acute findings. Will place patient in a thumb spica splint to provide support and immobilization due to positive Finkelstein's test.  Also start a short course of NSAIDs and follow-up with orthopedics. ER precautions discussed at length. Diagnosis     Clinical Impression:   1. Hand arthritis    2. Wrist arthritis    3. Swelling of left hand        Disposition: Discharged home in stable condition    DISCHARGE NOTE:     Patient has been reexamined. Patient has no new complaints, changes, or physical findings. Care plan outlined and precautions discussed. Results of x-ray and physical exam findings were reviewed with the patient. All medications were reviewed with the patient; will discharge home with naproxen. All of patient's questions and concerns were addressed. Patient was instructed and agrees to follow up with orthopedics, as well as to return to the ED upon further deterioration. Patient is ready to go home.     Follow-up Information     Follow up With Specialties Details Why [facility]  Schedule an appointment as soon as possible for a visit Follow-up from the Emergency [address] [facility]    [address] [facility] EMERGENCY DEPT Emergency Medicine  As needed, If symptoms worsen [address] [facility]  [phone]           Current Discharge Medication List      START taking these medications    Details   naproxen (Naprosyn) 500 mg tablet Take 1 Tab by mouth two (2) times daily as needed for Pain for up to 8 days. Qty: 20 Tab, Refills: 0         CONTINUE these medications which have NOT CHANGED    Details   gabapentin (NEURONTIN) 300 mg capsule Take 1 Cap by mouth three (3) times daily for 30 days. Max Daily Amount: 900 mg. Qty: 90 Cap, Refills: 3    Associated Diagnoses: Peripheral polyneuropathy      diclofenac EC (VOLTAREN) 50 mg EC tablet take 1 tablet by mouth twice a day with meals  Qty: 120 Tab, Refills: 2    Associated Diagnoses: Rotator cuff syndrome, right      metoprolol tartrate (LOPRESSOR) 25 mg tablet take 1 tablet by mouth twice a day  Qty: 60 Tab, Refills: 6      ibuprofen (MOTRIN) 800 mg tablet take 1 tablet by mouth every 6 hours if needed for pain  Qty: 90 Tab, Refills: 1    Associated Diagnoses: Pain in both knees, unspecified chronicity      NEXIUM 40 mg capsule Take 1 Cap by mouth daily. Qty: 90 Cap, Refills: 3      losartan (COZAAR) 100 mg tablet take 1 tablet by mouth once daily  Qty: 90 Tab, Refills: 3      Ketoconazole 2 % topical foam Apply  to affected area two (2) times a day. Qty: 100 g, Refills: 0      diclofenac (VOLTAREN) 1 % gel Apply 4 g to affected area every six (6) hours. Apply 4 grams to affected joint up to 4 times per day, maximum 16 grams per joint per day Dispense 5 100 gram tubes  Qty: 100 g, Refills: 4      pravastatin (PRAVACHOL) 40 mg tablet Take 1 Tab by mouth nightly.   Refills: 0      clopidogrel (PLAVIX) 75 mg tab Take 1 Tab by mouth daily.      lidocaine (LIDODERM) 5 % apply 1 patch to the affected area daily. Leave on for 12 hours and then off for 12 hours. Qty: 5 Patch, Refills: 2    Associated Diagnoses: Chronic pain of right knee      furosemide (LASIX) 40 mg tablet Take 1 Tab by mouth daily. Qty: 90 Tab, Refills: 3      oxyCODONE-acetaminophen (PERCOCET) 5-325 mg per tablet Take 1 Tab by mouth every four (4) hours as needed for Pain. Max Daily Amount: 6 Tabs. Qty: 6 Tab, Refills: 0    Associated Diagnoses: Cellulitis and abscess of foot      dicyclomine (BENTYL) 20 mg tablet Take 20 mg by mouth every six (6) hours. Refills: 0      desonide (TRIDESILON) 0.05 % cream Refills: 0      ALBUTEROL SULFATE (PROAIR HFA IN) Take 2 Puffs by inhalation every four (4) hours as needed. FLUTICASONE/SALMETEROL (ADVAIR HFA IN) Take 2 Puffs by inhalation every twelve (12) hours. montelukast (SINGULAIR) 10 mg tablet Take 10 mg by mouth daily. tiotropium (SPIRIVA WITH HANDIHALER) 18 mcg inhalation capsule Take 1 Cap by inhalation daily. amLODIPine (NORVASC) 10 mg tablet Take  by mouth daily. bethanechol (URECHOLINE) 10 mg tablet Take 10 mg by mouth Before breakfast, lunch, and dinner. aspirin 81 mg tablet Take 81 mg by mouth.        ergocalciferol (VITAMIN D2) 50,000 unit capsule Take 50,000 Units by mouth Every Thursday. Dictation disclaimer:  Please note that this dictation was completed with [employer] the [employer] voice recognition software. Quite often unanticipated grammatical, syntax, homophones, and other interpretive errors are inadvertently transcribed by the computer software. Please disregard these errors. Please excuse any errors that have escaped final proofreading.

## 2020-09-28 NOTE — ED TRIAGE NOTES
Patient c/o swelling to left hand. She states hx of lymphedema to left arm s/p breast surgery. She states using sleeve and machine for lymphedema to left arm without relief of swelling to left hand. Denies injury.

## 2020-09-28 NOTE — ROUTINE PROCESS
Zoltan Cuadra is a 77 y.o. female that was discharged in stable. Pt was accompanied by self. Pt is driving. The patients diagnosis, condition and treatment were explained to  patient and aftercare instructions were given. The patient verbalized understanding. Patient armband removed and shredded.

## 2020-10-12 ENCOUNTER — TELEPHONE (OUTPATIENT)
Dept: NEUROLOGY | Age: 70
End: 2020-10-12

## 2020-10-24 DIAGNOSIS — M25.561 PAIN IN BOTH KNEES, UNSPECIFIED CHRONICITY: ICD-10-CM

## 2020-10-24 DIAGNOSIS — M25.562 PAIN IN BOTH KNEES, UNSPECIFIED CHRONICITY: ICD-10-CM

## 2020-10-26 RX ORDER — IBUPROFEN 800 MG/1
TABLET ORAL
Qty: 90 TAB | Refills: 1 | Status: SHIPPED | OUTPATIENT
Start: 2020-10-26 | End: 2021-01-08

## 2020-11-11 ENCOUNTER — OFFICE VISIT (OUTPATIENT)
Dept: NEUROLOGY | Age: 70
End: 2020-11-11

## 2020-11-11 VITALS
HEART RATE: 113 BPM | OXYGEN SATURATION: 94 % | WEIGHT: 196 LBS | HEIGHT: 65 IN | DIASTOLIC BLOOD PRESSURE: 70 MMHG | SYSTOLIC BLOOD PRESSURE: 150 MMHG | TEMPERATURE: 97.6 F | BODY MASS INDEX: 32.65 KG/M2 | RESPIRATION RATE: 18 BRPM

## 2020-11-11 DIAGNOSIS — G62.9 PERIPHERAL POLYNEUROPATHY: Primary | ICD-10-CM

## 2020-11-11 NOTE — PROGRESS NOTES
[facility]  [address] [id number]  [facility] [phone]    Neurophysiology Report    Patient: Polly Duran     ID:  Physician: Jyoti Marino MD   Gender: Female Ref Phys: Jyoti Marino MD   Handedness:      Study Date: November 11, 2020         Patient History:  A 77years old female patient with medical history of prediabetes referred to EMG for evaluation of pain over her lower extremities. Has no significant numbness or weakness. Had bilateral lower extremity swelling which is getting better. The pain in legs is also improving. .     On brief exam  Motor: no pronator drift, tone normal throughout, strength 5/5 throughout  Sensory: Decreased light touch and pinprick below mid leg. Intact weakness. DTR: 2+ throughout except at the ankles 0/4, toes downgoing BL    Nerve Conduction Studies  Anti Sensory Summary Table         Stim Site NR Peak (ms) Norm Peak (ms) O-P Amp (µV) Norm O-P Amp Dist (cm) Yang (m/s)   Right Saphenous Anti Sensory (Lower Leg)   Site 2    10.7  9.7      Left Sup Peron Anti Sensory (Lower Leg)   Ankle    4.0 <4.4 4.5 >6.0 14.0 38.9   Site 2    4.0  5.0      Left Sural Anti Sensory (Ankle)   Calf    4.1  5.9 >5 14.0 40.0   Site 2    4.0  4.1      Site 3    3.9  4.1        Motor Summary Table     Stim Site NR Onset (ms) Norm Onset (ms) O-P Amp (mV) Norm O-P Amp Dist (cm) Yang (m/s) Norm Yang (m/s)   Left Peroneal Motor (EDB)   Ankle    3.9 <6.5 3.7 >2.0      Left Tibial Motor (Abd Kelly Brev)   Ankle    5.6 <5.8 2.5 >4.0          NCS FINDINGS:     Evaluation of the Left peroneal motor and the Left sural sensory nerves were unremarkable.  The Left tibial motor nerve showed reduced amplitude (2.5 mV).  The Left superficial peroneal sensory nerve showed reduced amplitude (4.5 µV) and decreased conduction velocity (Ankle-Lower Leg, 38.9 m/s).     Patient Refused Needle exam.       INTERPRETATION:   The limited nerve conduction study suggestive of mainly axonal type neuropathy.     ___________________________  Edison Mendez MD          Waveforms:

## 2020-11-11 NOTE — PROGRESS NOTES
Keren Cerda presents today for   Chief Complaint   Patient presents with    Procedure     EMG       Is someone accompanying this pt? no    Is the patient using any DME equipment during OV? [facility]    Depression Screening:  3 most recent PHQ Screens 6/12/2020   Little interest or pleasure in doing things Not at all   Feeling down, depressed, irritable, or hopeless Not at all   Total Score PHQ 2 0       Learning Assessment:  Learning Assessment 3/14/2018   PRIMARY LEARNER Patient   PRIMARY LANGUAGE ENGLISH   LEARNER PREFERENCE PRIMARY READING   ANSWERED BY patient   RELATIONSHIP SELF       Abuse Screening:  Abuse Screening Questionnaire 5/10/2019   Do you ever feel afraid of your partner? N   Are you in a relationship with someone who physically or mentally threatens you? N   Is it safe for you to go home? Y       Fall Risk  Fall Risk Assessment, last 12 mths 11/11/2020   Able to walk? Yes   Fall in past 12 months? No   Fall with injury? -   Number of falls in past 12 months -   Fall Risk Score -         Coordination of Care:  1. Have you been to the ER, urgent care clinic since your last visit? Hospitalized since your last visit? no    2. Have you seen or consulted any other health care providers outside of the [address] [place] [address] since your last visit? Include any pap smears or colon screening.  no

## 2020-12-07 ENCOUNTER — HOSPITAL ENCOUNTER (OUTPATIENT)
Dept: MAMMOGRAPHY | Age: 70
Discharge: HOME | End: 2020-12-07
Attending: SURGERY
Payer: MEDICARE

## 2020-12-07 DIAGNOSIS — Z85.3 HISTORY OF LEFT BREAST CANCER: ICD-10-CM

## 2020-12-07 PROCEDURE — 77063 BREAST TOMOSYNTHESIS BI: CPT

## 2020-12-10 ENCOUNTER — OFFICE VISIT (OUTPATIENT)
Dept: ORTHOPEDIC SURGERY | Age: 70
End: 2020-12-10
Payer: MEDICARE

## 2020-12-10 VITALS
WEIGHT: 201 LBS | HEART RATE: 84 BPM | DIASTOLIC BLOOD PRESSURE: 71 MMHG | TEMPERATURE: 97.1 F | BODY MASS INDEX: 33.49 KG/M2 | HEIGHT: 65 IN | SYSTOLIC BLOOD PRESSURE: 143 MMHG

## 2020-12-10 DIAGNOSIS — M25.561 RIGHT KNEE PAIN, UNSPECIFIED CHRONICITY: ICD-10-CM

## 2020-12-10 DIAGNOSIS — R60.0 EDEMA OF RIGHT LOWER LEG: ICD-10-CM

## 2020-12-10 DIAGNOSIS — M17.12 PRIMARY OSTEOARTHRITIS OF LEFT KNEE: Primary | ICD-10-CM

## 2020-12-10 DIAGNOSIS — M17.11 ARTHRITIS OF RIGHT KNEE: ICD-10-CM

## 2020-12-10 DIAGNOSIS — M25.562 LEFT KNEE PAIN, UNSPECIFIED CHRONICITY: ICD-10-CM

## 2020-12-10 PROCEDURE — G8432 DEP SCR NOT DOC, RNG: HCPCS | Performed by: PHYSICIAN ASSISTANT

## 2020-12-10 PROCEDURE — 20611 DRAIN/INJ JOINT/BURSA W/US: CPT | Performed by: PHYSICIAN ASSISTANT

## 2020-12-10 PROCEDURE — G8427 DOCREV CUR MEDS BY ELIG CLIN: HCPCS | Performed by: PHYSICIAN ASSISTANT

## 2020-12-10 PROCEDURE — G8754 DIAS BP LESS 90: HCPCS | Performed by: PHYSICIAN ASSISTANT

## 2020-12-10 PROCEDURE — 1090F PRES/ABSN URINE INCON ASSESS: CPT | Performed by: PHYSICIAN ASSISTANT

## 2020-12-10 PROCEDURE — 3017F COLORECTAL CA SCREEN DOC REV: CPT | Performed by: PHYSICIAN ASSISTANT

## 2020-12-10 PROCEDURE — G8753 SYS BP > OR = 140: HCPCS | Performed by: PHYSICIAN ASSISTANT

## 2020-12-10 PROCEDURE — G9899 SCRN MAM PERF RSLTS DOC: HCPCS | Performed by: PHYSICIAN ASSISTANT

## 2020-12-10 PROCEDURE — G8536 NO DOC ELDER MAL SCRN: HCPCS | Performed by: PHYSICIAN ASSISTANT

## 2020-12-10 PROCEDURE — 1101F PT FALLS ASSESS-DOCD LE1/YR: CPT | Performed by: PHYSICIAN ASSISTANT

## 2020-12-10 PROCEDURE — G8400 PT W/DXA NO RESULTS DOC: HCPCS | Performed by: PHYSICIAN ASSISTANT

## 2020-12-10 PROCEDURE — G8417 CALC BMI ABV UP PARAM F/U: HCPCS | Performed by: PHYSICIAN ASSISTANT

## 2020-12-10 RX ORDER — BETAMETHASONE SODIUM PHOSPHATE AND BETAMETHASONE ACETATE 3; 3 MG/ML; MG/ML
12 INJECTION, SUSPENSION INTRA-ARTICULAR; INTRALESIONAL; INTRAMUSCULAR; SOFT TISSUE ONCE
Status: COMPLETED | OUTPATIENT
Start: 2020-12-10 | End: 2020-12-10

## 2020-12-10 RX ADMIN — BETAMETHASONE SODIUM PHOSPHATE AND BETAMETHASONE ACETATE 12 MG: 3; 3 INJECTION, SUSPENSION INTRA-ARTICULAR; INTRALESIONAL; INTRAMUSCULAR; SOFT TISSUE at 16:04

## 2020-12-10 NOTE — PROGRESS NOTES
[facility]  [phone]           Patient: Greg Combs                MRN: [id number]       SSN: [id number]  YOB: 1950        AGE: 72 y.o. SEX: female  Body mass index is 33.45 kg/m². PCP: Elinor Aleman MD  12/10/20            REVIEW OF SYSTEMS:  Constitutional: Negative for fever, chills, weight loss and malaise/fatigue. HENT: Negative. Eyes: Negative. Respiratory: Negative. Cardiovascular: Negative. Gastrointestinal: No bowel incontinence or constipation. Genitourinary: No bladder incontinence or saddle anesthesia. Skin: Negative. Neurological: Negative. Endo/Heme/Allergies: Negative. Psychiatric/Behavioral: Negative. Musculoskeletal: As per HPI above. Past Medical History:   Diagnosis Date    Anemia     Asthma     Blood disorder     Cancer ([facility] [address]     breast, diagnosed 2006 left    Cancer [facility])     Chest pain, unspecified     The working diagnosis is chest pain. The differential diagnosis includes chest wall pain, stable angina.  Chronic obstructive pulmonary disease (HCC)     Essential hypertension     Essential hypertension, benign     Uncontrolled     GERD (gastroesophageal reflux disease)     Hypercholesterolemia     Hypertension     Neuropathy     Nonspecific abnormal electrocardiogram (ECG) (EKG)     Obesity, unspecified     Weight loss has been strongly encouraged by following dietary restrictions and an exercise routine.     Polio     as a young child    Pre-operative cardiovascular examination     Sciatica     Sleep apnea     Unspecified cerebral artery occlusion with cerebral infarction     TIA? no residual         Current Outpatient Medications:     ibuprofen (MOTRIN) 800 mg tablet, take 1 tablet by mouth every 6 hours if needed for pain, Disp: 90 Tab, Rfl: 1    metoprolol tartrate (LOPRESSOR) 25 mg tablet, take 1 tablet by mouth twice a day, Disp: 60 Tab, Rfl: 6    NEXIUM 40 mg capsule, Take 1 Cap by mouth daily. , Disp: 90 Cap, Rfl: 3    losartan (COZAAR) 100 mg tablet, take 1 tablet by mouth once daily, Disp: 90 Tab, Rfl: 3    Ketoconazole 2 % topical foam, Apply  to affected area two (2) times a day., Disp: 100 g, Rfl: 0    diclofenac (VOLTAREN) 1 % gel, Apply 4 g to affected area every six (6) hours. Apply 4 grams to affected joint up to 4 times per day, maximum 16 grams per joint per day Dispense 5 100 gram tubes, Disp: 100 g, Rfl: 4    pravastatin (PRAVACHOL) 40 mg tablet, Take 1 Tab by mouth nightly., Disp: , Rfl: 0    clopidogrel (PLAVIX) 75 mg tab, Take 1 Tab by mouth daily. , Disp: , Rfl:     lidocaine (LIDODERM) 5 %, apply 1 patch to the affected area daily. Leave on for 12 hours and then off for 12 hours. , Disp: 5 Patch, Rfl: 2    furosemide (LASIX) 40 mg tablet, Take 1 Tab by mouth daily. , Disp: 90 Tab, Rfl: 3    oxyCODONE-acetaminophen (PERCOCET) 5-325 mg per tablet, Take 1 Tab by mouth every four (4) hours as needed for Pain. Max Daily Amount: 6 Tabs., Disp: 6 Tab, Rfl: 0    desonide (TRIDESILON) 0.05 % cream, , Disp: , Rfl: 0    ALBUTEROL SULFATE (PROAIR HFA IN), Take 2 Puffs by inhalation every four (4) hours as needed. , Disp: , Rfl:     FLUTICASONE/SALMETEROL (ADVAIR HFA IN), Take 2 Puffs by inhalation every twelve (12) hours. , Disp: , Rfl:     montelukast (SINGULAIR) 10 mg tablet, Take 10 mg by mouth daily. , Disp: , Rfl:     tiotropium (SPIRIVA WITH HANDIHALER) 18 mcg inhalation capsule, Take 1 Cap by inhalation daily. , Disp: , Rfl:     amLODIPine (NORVASC) 10 mg tablet, Take  by mouth daily. , Disp: , Rfl:     bethanechol (URECHOLINE) 10 mg tablet, Take 10 mg by mouth Before breakfast, lunch, and dinner., Disp: , Rfl:     aspirin 81 mg tablet, Take 81 mg by mouth.  , Disp: , Rfl:     ergocalciferol (VITAMIN D2) 50,000 unit capsule, Take 50,000 Units by mouth Every Thursday. , Disp: , Rfl:     diclofenac EC (VOLTAREN) 50 mg EC tablet, take 1 tablet by mouth twice a day with meals, Disp: 120 Tab, Rfl: 2    dicyclomine (BENTYL) 20 mg tablet, Take 20 mg by mouth every six (6) hours. , Disp: , Rfl: 0    Current Facility-Administered Medications:     betamethasone (CELESTONE) injection 12 mg, 12 mg, Intra artICUlar, ONCE, Clarice Serrato PA-C    No Known Allergies    Social History     Socioeconomic History    Marital status:      Spouse name: Not on file    Number of children: Not on file    Years of education: Not on file    Highest education level: Not on file   Occupational History    Not on file   Social Needs    Financial resource strain: Not on file    Food insecurity     Worry: Not on file     Inability: Not on file    Transportation needs     Medical: Not on file     Non-medical: Not on file   Tobacco Use    Smoking status: Never Smoker    Smokeless tobacco: Never Used   Substance and Sexual Activity    Alcohol use: No     Alcohol/week: 0.0 standard drinks    Drug use: No    Sexual activity: Not on file   Lifestyle    Physical activity     Days per week: Not on file     Minutes per session: Not on file    Stress: Not on file   Relationships    Social connections     Talks on phone: Not on file     Gets together: Not on file     Attends Faith service: Not on file     Active member of club or organization: Not on file     Attends meetings of clubs or organizations: Not on file     Relationship status: Not on file    Intimate partner violence     Fear of current or ex partner: Not on file     Emotionally abused: Not on file     Physically abused: Not on file     Forced sexual activity: Not on file   Other Topics Concern    Not on file   Social History Narrative    ** Merged History Encounter **            Past Surgical History:   Procedure Laterality Date    BREAST SURGERY PROCEDURE UNLISTED      initial surgery 2006, then left modified radical mastectomy 6/2012    HX BREAST BIOPSY 4/11/12    Left    HX BREAST BIOPSY Left     left mastectomy       Patient seen and evaluated today for bilateral knee pain. She does have known advanced arthritis, particularly patellofemoral.  She has trouble getting from the chair. She has trouble with stairs. Does have night pain. There is no mechanical symptomatology. She is requesting an injection for her knees. She has chronic venous insufficiency and would like referral to a vascular doctor as well. She apparently had some procedures done with Dr. Candy Persaud in the past.    Patient denies recent fevers, chills, chest pain, SOB, or injuries. No recent systemic changes noted. A 12-point review of systems is performed today. Pertinent positives are noted. All other systems reviewed and otherwise are negative. Physical exam: General: Alert and oriented x3, nad.  well-developed, well nourished. normal affect, AF. NC/AT, EOMI, neck supple, trachea midline, no JVD present. Breathing is non-labored. Examination of lower extremities reveals pain-free range of the hips. There is no pain to palpation the trochanteric bursa. Negative straight leg raise. Negative calf tenderness. Negative Homans. No signs of DVT present. There is mild edema present distally. Each of the knees reveal skin intact. There is no erythema, ecchymosis, warmth. There are no signs of infection or cellulitis present. She does have discomfort patellofemoral grinding crepitus anteriorly summation activity as well as pain to palpation to the medial joint line. Assessment: #1 bilateral knee osteoarthritis, #2 bilateral lower extremity venous insufficiency    Plan: At this point, we are going to move forward with a cortisone injection for each of the knees today. After informed consent, under aseptic conditions, with US guided assitance, each knee was prepped with betadine and 6mg of celestone was injected without complications. The patient tolerated the injection well. The patient is instructed on post-injection care. We will place a referral for Dr. Joli Nyhan for further evaluation and treatment of her venous insufficiency. Chart reviewed for the following:  Christos BURNS PA-C, have reviewed the History, Physical and updated the Allergic reactions for Sharon Nieto?     TIME OUT performed immediately prior to start of procedure:  Christos BURNS PA-C, have performed the following reviews on Sharon Nieto prior to the start of the procedure:  ????????  * Patient was identified by name and date of birth   * Agreement on procedure being performed was verified  * Risks and Benefits explained to the patient  * Procedure site verified and marked as necessary  * Patient was positioned for comfort  * Consent was signed and verified    Time:4:02 PM    Body part: bilateral knees    Medication & Dose: 6 mg of celestone for each knee    Date of procedure: 12/10/20    Procedure performed by: Christos Engel PA-C    Provider assisted by: none    Patient assisted by: self    How tolerated by patient: tolerated the procedure well with no complications    Post Procedural Pain Scale: 9    Comments: none       JR Rojelio REN PA-C, ATC

## 2021-01-07 DIAGNOSIS — M25.562 PAIN IN BOTH KNEES, UNSPECIFIED CHRONICITY: ICD-10-CM

## 2021-01-07 DIAGNOSIS — M25.561 PAIN IN BOTH KNEES, UNSPECIFIED CHRONICITY: ICD-10-CM

## 2021-01-08 RX ORDER — IBUPROFEN 800 MG/1
TABLET ORAL
Qty: 90 TAB | Refills: 1 | Status: SHIPPED | OUTPATIENT
Start: 2021-01-08 | End: 2021-04-12

## 2021-01-20 ENCOUNTER — APPOINTMENT (OUTPATIENT)
Dept: PHYSICAL THERAPY | Age: 71
End: 2021-01-20

## 2021-01-21 ENCOUNTER — OFFICE VISIT (OUTPATIENT)
Dept: NEUROLOGY | Age: 71
End: 2021-01-21
Payer: MEDICARE

## 2021-01-21 VITALS
SYSTOLIC BLOOD PRESSURE: 140 MMHG | TEMPERATURE: 97.6 F | BODY MASS INDEX: 33.32 KG/M2 | WEIGHT: 200 LBS | DIASTOLIC BLOOD PRESSURE: 80 MMHG | HEART RATE: 108 BPM | RESPIRATION RATE: 18 BRPM | OXYGEN SATURATION: 94 % | HEIGHT: 65 IN

## 2021-01-21 DIAGNOSIS — G62.9 PERIPHERAL POLYNEUROPATHY: Primary | ICD-10-CM

## 2021-01-21 PROCEDURE — G8753 SYS BP > OR = 140: HCPCS | Performed by: STUDENT IN AN ORGANIZED HEALTH CARE EDUCATION/TRAINING PROGRAM

## 2021-01-21 PROCEDURE — G8417 CALC BMI ABV UP PARAM F/U: HCPCS | Performed by: STUDENT IN AN ORGANIZED HEALTH CARE EDUCATION/TRAINING PROGRAM

## 2021-01-21 PROCEDURE — 3017F COLORECTAL CA SCREEN DOC REV: CPT | Performed by: STUDENT IN AN ORGANIZED HEALTH CARE EDUCATION/TRAINING PROGRAM

## 2021-01-21 PROCEDURE — G8754 DIAS BP LESS 90: HCPCS | Performed by: STUDENT IN AN ORGANIZED HEALTH CARE EDUCATION/TRAINING PROGRAM

## 2021-01-21 PROCEDURE — G8400 PT W/DXA NO RESULTS DOC: HCPCS | Performed by: STUDENT IN AN ORGANIZED HEALTH CARE EDUCATION/TRAINING PROGRAM

## 2021-01-21 PROCEDURE — G8510 SCR DEP NEG, NO PLAN REQD: HCPCS | Performed by: STUDENT IN AN ORGANIZED HEALTH CARE EDUCATION/TRAINING PROGRAM

## 2021-01-21 PROCEDURE — 1101F PT FALLS ASSESS-DOCD LE1/YR: CPT | Performed by: STUDENT IN AN ORGANIZED HEALTH CARE EDUCATION/TRAINING PROGRAM

## 2021-01-21 PROCEDURE — 1090F PRES/ABSN URINE INCON ASSESS: CPT | Performed by: STUDENT IN AN ORGANIZED HEALTH CARE EDUCATION/TRAINING PROGRAM

## 2021-01-21 PROCEDURE — 99214 OFFICE O/P EST MOD 30 MIN: CPT | Performed by: STUDENT IN AN ORGANIZED HEALTH CARE EDUCATION/TRAINING PROGRAM

## 2021-01-21 PROCEDURE — G8536 NO DOC ELDER MAL SCRN: HCPCS | Performed by: STUDENT IN AN ORGANIZED HEALTH CARE EDUCATION/TRAINING PROGRAM

## 2021-01-21 PROCEDURE — G9899 SCRN MAM PERF RSLTS DOC: HCPCS | Performed by: STUDENT IN AN ORGANIZED HEALTH CARE EDUCATION/TRAINING PROGRAM

## 2021-01-21 PROCEDURE — G8427 DOCREV CUR MEDS BY ELIG CLIN: HCPCS | Performed by: STUDENT IN AN ORGANIZED HEALTH CARE EDUCATION/TRAINING PROGRAM

## 2021-01-21 RX ORDER — GABAPENTIN 300 MG/1
300 CAPSULE ORAL 2 TIMES DAILY
Qty: 60 CAP | Refills: 5 | Status: SHIPPED | OUTPATIENT
Start: 2021-01-21 | End: 2021-02-20

## 2021-01-21 NOTE — PROGRESS NOTES
Heather Jack is a 72 y.o. female . presents for Peripheral Neuropathy (follow up)   . A 72years old male patient here for follow-up evaluation of peripheral neuropathy. She was last in the clinic in September 2020. She was continued on gabapentin 300 mg p.o. 3 times daily. She is not taking it as prescribed. She continues to have the soreness in her lower extremities mainly between the ankle and the mid leg bilaterally. Feels her lower extremities are swollen. No skin changes. She also has numbness. Soreness is mostly when she is sitting. Feels better when she is lying down and put a pillow under her legs. Does not wake her up from sleep. She also has some tingling sensation over her left fingers. No problem with . Since her last visit, she had an EMG (mainly nerve conduction study of the lower extremities. The limited study was suggestive of axonal type of peripheral neuropathy. During her last visit, serum protein electrophoresis and heavy metal screening were ordered: Both unremarkable. Hemoglobin A1c was also ordered but no results available. From previous encounter:  A 77years old female patient with medical history of prediabetes, hypertension, GERD here for evaluation of pain over her lower extremities of about 8 months duration. Initially she had swelling of her lower extremities and was told that she had some problems with her veins. She has undergone laser treatment over her legs on January 5, 2020. Claims since then her feet started to get sore. Is progressively going up involving the legs. Denied any burning or tingling sensation. Has numbness over her lower extremities. Occasionally the right lower extremity gives way. She has occasional lower back pain but nonradiating. Has previous diabetes and her last hemoglobin A1c was 6.0. Not on any medications. Had history of breast cancer which was treated with radiation; no chemotherapy. No alcohol abuse.   Denies any history of trauma to the legs. Review of Systems   Constitutional: Negative for chills, fever and weight loss. HENT: Negative for hearing loss and tinnitus. Eyes: Negative for blurred vision and double vision. Respiratory: Positive for cough (sometimes) and shortness of breath. Negative for hemoptysis and sputum production. Cardiovascular: Positive for chest pain (sometimes) and leg swelling. Gastrointestinal: Negative for nausea and vomiting. Genitourinary: Negative for dysuria and urgency. Musculoskeletal: Positive for back pain. Negative for neck pain. Skin: Positive for itching (sometimes). Negative for rash. Neurological: Positive for tingling (hands bilaterally), sensory change (feet) and focal weakness (problem wiht ). Negative for dizziness, tremors and headaches. Endo/Heme/Allergies: Bruises/bleeds easily. Past Medical History:   Diagnosis Date    Anemia     Asthma     Blood disorder     Cancer ([address]     breast, diagnosed 2006 left    Cancer [facility])     Chest pain, unspecified     The working diagnosis is chest pain. The differential diagnosis includes chest wall pain, stable angina.  Chronic obstructive pulmonary disease (HCC)     Essential hypertension     Essential hypertension, benign     Uncontrolled     GERD (gastroesophageal reflux disease)     Hypercholesterolemia     Hypertension     Neuropathy     Nonspecific abnormal electrocardiogram (ECG) (EKG)     Obesity, unspecified     Weight loss has been strongly encouraged by following dietary restrictions and an exercise routine.     Polio     as a young child    Pre-operative cardiovascular examination     Sciatica     Sleep apnea     Unspecified cerebral artery occlusion with cerebral infarction     TIA? no residual       Past Surgical History:   Procedure Laterality Date    HX BREAST BIOPSY  4/11/12    Left    HX BREAST BIOPSY Left     left mastectomy    HX VEIN STRIPPING Bilateral     OR BREAST SURGERY PROCEDURE UNLISTED      initial surgery 2006, then left modified radical mastectomy 6/2012        Family History   Problem Relation Age of Onset    Cancer Mother     Cancer Father     Arthritis-osteo Other     Hypertension Other     Heart Disease Neg Hx         Negative family history of premature CAD or CVA        Social History     Socioeconomic History    Marital status:      Spouse name: Not on file    Number of children: Not on file    Years of education: Not on file    Highest education level: Not on file   Occupational History    Not on file   Social Needs    Financial resource strain: Not on file    Food insecurity     Worry: Not on file     Inability: Not on file    Transportation needs     Medical: Not on file     Non-medical: Not on file   Tobacco Use    Smoking status: Never Smoker    Smokeless tobacco: Never Used   Substance and Sexual Activity    Alcohol use: No     Alcohol/week: 0.0 standard drinks    Drug use: No    Sexual activity: Not on file   Lifestyle    Physical activity     Days per week: Not on file     Minutes per session: Not on file    Stress: Not on file   Relationships    Social connections     Talks on phone: Not on file     Gets together: Not on file     Attends Hoahaoism service: Not on file     Active member of club or organization: Not on file     Attends meetings of clubs or organizations: Not on file     Relationship status: Not on file    Intimate partner violence     Fear of current or ex partner: Not on file     Emotionally abused: Not on file     Physically abused: Not on file     Forced sexual activity: Not on file   Other Topics Concern    Not on file   Social History Narrative    ** Merged History Encounter **             No Known Allergies      Current Outpatient Medications   Medication Sig Dispense Refill    gabapentin (NEURONTIN) 300 mg capsule Take 1 Cap by mouth two (2) times a day for 30 days. Max Daily Amount: 600 mg. 60 Cap 5    ibuprofen (MOTRIN) 800 mg tablet take 1 tablet by mouth every 6 hours AS NEEDED FOR PAIN 90 Tab 1    metoprolol tartrate (LOPRESSOR) 25 mg tablet take 1 tablet by mouth twice a day 60 Tab 6    NEXIUM 40 mg capsule Take 1 Cap by mouth daily. 90 Cap 3    losartan (COZAAR) 100 mg tablet take 1 tablet by mouth once daily 90 Tab 3    Ketoconazole 2 % topical foam Apply  to affected area two (2) times a day. 100 g 0    diclofenac (VOLTAREN) 1 % gel Apply 4 g to affected area every six (6) hours. Apply 4 grams to affected joint up to 4 times per day, maximum 16 grams per joint per day Dispense 5 100 gram tubes 100 g 4    pravastatin (PRAVACHOL) 40 mg tablet Take 1 Tab by mouth nightly. 0    lidocaine (LIDODERM) 5 % apply 1 patch to the affected area daily. Leave on for 12 hours and then off for 12 hours. 5 Patch 2    furosemide (LASIX) 40 mg tablet Take 1 Tab by mouth daily. 90 Tab 3    oxyCODONE-acetaminophen (PERCOCET) 5-325 mg per tablet Take 1 Tab by mouth every four (4) hours as needed for Pain. Max Daily Amount: 6 Tabs. 6 Tab 0    desonide (TRIDESILON) 0.05 % cream   0    ALBUTEROL SULFATE (PROAIR HFA IN) Take 2 Puffs by inhalation every four (4) hours as needed.  FLUTICASONE/SALMETEROL (ADVAIR HFA IN) Take 2 Puffs by inhalation every twelve (12) hours.  montelukast (SINGULAIR) 10 mg tablet Take 10 mg by mouth daily.  tiotropium (SPIRIVA WITH HANDIHALER) 18 mcg inhalation capsule Take 1 Cap by inhalation daily.  amLODIPine (NORVASC) 10 mg tablet Take  by mouth daily.  bethanechol (URECHOLINE) 10 mg tablet Take 10 mg by mouth Before breakfast, lunch, and dinner.  aspirin 81 mg tablet Take 81 mg by mouth.  diclofenac EC (VOLTAREN) 50 mg EC tablet take 1 tablet by mouth twice a day with meals 120 Tab 2    clopidogrel (PLAVIX) 75 mg tab Take 1 Tab by mouth daily.  dicyclomine (BENTYL) 20 mg tablet Take 20 mg by mouth every six (6) hours.   0    ergocalciferol (VITAMIN D2) 50,000 unit capsule Take 50,000 Units by mouth Every Thursday. Physical Exam  Constitutional:       Appearance: Normal appearance. HENT:      Mouth/Throat:      Mouth: Mucous membranes are moist.      Pharynx: Oropharynx is clear. Eyes:      Extraocular Movements: Extraocular movements intact. Pupils: Pupils are equal, round, and reactive to light. Neck:      Musculoskeletal: Normal range of motion and neck supple. Pulmonary:      Effort: Pulmonary effort is normal. No respiratory distress. Musculoskeletal: Normal range of motion. Right lower leg: Edema (slight pitting) present. Left lower leg: Edema present. Neurological:      Mental Status: She is alert. Comments: Mental status: Awake, alert, oriented x3, follows simple and complex commands, no neglect. Speech and languge: fluent, coherent,  and comprehension intact  CN: VFF, EOMI, PERRLA, face sensation intact , no facial asymmetry noted, palate elevation symmetric bilat, SS+SCM 5/5 bilat, tongue midline  Motor: no pronator drift, tone normal throughout, strength 5/5 throughout  Sensory: Decreased light touch and pinprick below mid leg. DTR: 2+ throughout except at the ankles 0/4, toes downgoing BL  Gait: Antalgic. No visits with results within 3 Month(s) from this visit.    Latest known visit with results is:   Admission on 09/05/2019, Discharged on 09/06/2019   Component Date Value Ref Range Status    Color 09/05/2019 YELLOW    Final    Appearance 09/05/2019 CLEAR    Final    Specific gravity 09/05/2019 1.013  1.005 - 1.030   Final    pH (UA) 09/05/2019 7.0  5.0 - 8.0   Final    Protein 09/05/2019 NEGATIVE   NEG mg/dL Final    Glucose 09/05/2019 NEGATIVE   NEG mg/dL Final    Ketone 09/05/2019 NEGATIVE   NEG mg/dL Final    Bilirubin 09/05/2019 NEGATIVE   NEG   Final    Blood 09/05/2019 NEGATIVE   NEG   Final    Urobilinogen 09/05/2019 1.0  0.2 - 1.0 EU/dL Final    Nitrites 09/05/2019 NEGATIVE   NEG   Final    Leukocyte Esterase 09/05/2019 TRACE* NEG   Final    WBC 09/05/2019 4 to 10  0 - 4 /hpf Final    RBC 09/05/2019 0 to 3  0 - 5 /hpf Final    Epithelial cells 09/05/2019 2+  0 - 5 /lpf Final    Bacteria 09/05/2019 1+* NEG /hpf Final    Mucus 09/05/2019 FEW* NEG /lpf Final             ICD-10-CM ICD-9-CM    1. Peripheral polyneuropathy  G62.9 356.9 gabapentin (NEURONTIN) 300 mg capsule      HEMOGLOBIN A1C WITH EAG     A 72years old male patient here for follow-up of neuropathy. She was last seen in the clinic in September 2020. On gabapentin, currently taking 300 mg p.o. per day [order was for 300 mg p.o. 3 times daily]. No significant side effects with the lower dose. Since her last visit, had her conduction study which was suggestive of axonal type neuropathy. Heavy metal screening and serum protein electrophoresis were unremarkable. We will continue with gabapentin: With adjusted dose to 300 mg p.o. twice daily; will increase gradually. Reordered her hemoglobin A1c. We will see her again in 6 months time.

## 2021-01-22 ENCOUNTER — OFFICE VISIT (OUTPATIENT)
Dept: SURGERY | Age: 71
End: 2021-01-22
Payer: MEDICARE

## 2021-01-22 VITALS
DIASTOLIC BLOOD PRESSURE: 80 MMHG | RESPIRATION RATE: 16 BRPM | WEIGHT: 202 LBS | OXYGEN SATURATION: 95 % | TEMPERATURE: 97.5 F | SYSTOLIC BLOOD PRESSURE: 151 MMHG | HEART RATE: 93 BPM | HEIGHT: 65 IN | BODY MASS INDEX: 33.66 KG/M2

## 2021-01-22 DIAGNOSIS — Z85.3 HISTORY OF LEFT BREAST CANCER: ICD-10-CM

## 2021-01-22 DIAGNOSIS — I97.2 POSTMASTECTOMY LYMPHEDEMA SYNDROME: Primary | ICD-10-CM

## 2021-01-22 DIAGNOSIS — I89.0 LYMPHEDEMA OF LEFT ARM: ICD-10-CM

## 2021-01-22 PROCEDURE — G8754 DIAS BP LESS 90: HCPCS | Performed by: SURGERY

## 2021-01-22 PROCEDURE — G9899 SCRN MAM PERF RSLTS DOC: HCPCS | Performed by: SURGERY

## 2021-01-22 PROCEDURE — G8417 CALC BMI ABV UP PARAM F/U: HCPCS | Performed by: SURGERY

## 2021-01-22 PROCEDURE — G8432 DEP SCR NOT DOC, RNG: HCPCS | Performed by: SURGERY

## 2021-01-22 PROCEDURE — 1101F PT FALLS ASSESS-DOCD LE1/YR: CPT | Performed by: SURGERY

## 2021-01-22 PROCEDURE — G8753 SYS BP > OR = 140: HCPCS | Performed by: SURGERY

## 2021-01-22 PROCEDURE — 3017F COLORECTAL CA SCREEN DOC REV: CPT | Performed by: SURGERY

## 2021-01-22 PROCEDURE — 1090F PRES/ABSN URINE INCON ASSESS: CPT | Performed by: SURGERY

## 2021-01-22 PROCEDURE — G8400 PT W/DXA NO RESULTS DOC: HCPCS | Performed by: SURGERY

## 2021-01-22 PROCEDURE — G8536 NO DOC ELDER MAL SCRN: HCPCS | Performed by: SURGERY

## 2021-01-22 PROCEDURE — 99213 OFFICE O/P EST LOW 20 MIN: CPT | Performed by: SURGERY

## 2021-01-22 PROCEDURE — G8427 DOCREV CUR MEDS BY ELIG CLIN: HCPCS | Performed by: SURGERY

## 2021-01-26 ENCOUNTER — HOSPITAL ENCOUNTER (OUTPATIENT)
Dept: PHYSICAL THERAPY | Age: 71
Discharge: HOME | End: 2021-01-26
Payer: MEDICARE

## 2021-01-26 PROCEDURE — 97162 PT EVAL MOD COMPLEX 30 MIN: CPT

## 2021-01-26 NOTE — PROGRESS NOTES
[address] [place] [address] [facility]  General Surgery    Subjective:  Patient in our breast cancer surveillance program 8 years out from left mastectomy for focal DCIS [place] positive. Mammogram performed 12/7/2020 reveals B density breast with BI-RADS 2 changes. Patient denies any unintentional weight loss or breast pain no nipple drainage or discharge. Objective:  Vitals:    01/22/21 1507   BP: (!) 151/80   Pulse: 93   Resp: 16   Temp: 97.5 °F (36.4 °C)   TempSrc: Temporal   SpO2: 95%   Weight: 91.6 kg (202 lb)   Height: 5' 5\" (1.651 m)       Physical Exam:    General: Awake and alert, oriented x4, no apparent distress   Breasts:    Left: No dimpling, discoloration    No axillary or supraclavicular lymphadenopathy. No mass   Right: No dimpling, discoloration, nipple inversion or retractions. No axillary or supraclavicular lymphadenopathy. No mass    Current Medications:  Current Outpatient Medications   Medication Sig Dispense Refill    gabapentin (NEURONTIN) 300 mg capsule Take 1 Cap by mouth two (2) times a day for 30 days. Max Daily Amount: 600 mg. 60 Cap 5    ibuprofen (MOTRIN) 800 mg tablet take 1 tablet by mouth every 6 hours AS NEEDED FOR PAIN 90 Tab 1    diclofenac EC (VOLTAREN) 50 mg EC tablet take 1 tablet by mouth twice a day with meals 120 Tab 2    metoprolol tartrate (LOPRESSOR) 25 mg tablet take 1 tablet by mouth twice a day 60 Tab 6    NEXIUM 40 mg capsule Take 1 Cap by mouth daily. 90 Cap 3    losartan (COZAAR) 100 mg tablet take 1 tablet by mouth once daily 90 Tab 3    Ketoconazole 2 % topical foam Apply  to affected area two (2) times a day. 100 g 0    diclofenac (VOLTAREN) 1 % gel Apply 4 g to affected area every six (6) hours. Apply 4 grams to affected joint up to 4 times per day, maximum 16 grams per joint per day Dispense 5 100 gram tubes 100 g 4    pravastatin (PRAVACHOL) 40 mg tablet Take 1 Tab by mouth nightly. 0    clopidogrel (PLAVIX) 75 mg tab Take 1 Tab by mouth daily.  lidocaine (LIDODERM) 5 % apply 1 patch to the affected area daily. Leave on for 12 hours and then off for 12 hours. 5 Patch 2    furosemide (LASIX) 40 mg tablet Take 1 Tab by mouth daily. 90 Tab 3    oxyCODONE-acetaminophen (PERCOCET) 5-325 mg per tablet Take 1 Tab by mouth every four (4) hours as needed for Pain. Max Daily Amount: 6 Tabs. 6 Tab 0    dicyclomine (BENTYL) 20 mg tablet Take 20 mg by mouth every six (6) hours. 0    desonide (TRIDESILON) 0.05 % cream   0    ALBUTEROL SULFATE (PROAIR HFA IN) Take 2 Puffs by inhalation every four (4) hours as needed.  FLUTICASONE/SALMETEROL (ADVAIR HFA IN) Take 2 Puffs by inhalation every twelve (12) hours.  montelukast (SINGULAIR) 10 mg tablet Take 10 mg by mouth daily.  tiotropium (SPIRIVA WITH HANDIHALER) 18 mcg inhalation capsule Take 1 Cap by inhalation daily.  amLODIPine (NORVASC) 10 mg tablet Take  by mouth daily.  bethanechol (URECHOLINE) 10 mg tablet Take 10 mg by mouth Before breakfast, lunch, and dinner.  aspirin 81 mg tablet Take 81 mg by mouth.  ergocalciferol (VITAMIN D2) 50,000 unit capsule Take 50,000 Units by mouth Every Thursday. Chart and notes reviewed. Data reviewed. I have evaluated and examined the patient. Impression and plan:  Patient with history of stage 0 left breast cancer in our breast cancer surveillance program.  Prescription for prosthesis and bras  Continue monthly self breast exam  Follow-up in 1 year for clinical breast exam  Bilateral 3D screening mammography in 1 year  Please call or visit with any questions or concerns.        Jane Jordan MD

## 2021-01-26 NOTE — PROGRESS NOTES
[facility]  [address]  [place], [address]  [phone] fax    Plan of Care/ Statement of Necessity for Physical Therapy Services    Patient name: Hi Galan Start of Care: 2021   Referral source: J Luis, Not On File, MD : 1950    Medical Diagnosis: Lymphedema, not elsewhere classified [I89.0]  Payor: BLUE CROSS MEDICARE / Plan: [facility] [address] HMO / Product Type: Managed Care Medicare /  Onset Date:2020    Treatment Diagnosis: Localized edema BLE   Prior Hospitalization: see medical history Provider#: [id number]   Medications: Verified on Patient summary List    Comorbidities: left breast cancer, mastectomy left breast with lymph node removal, asthma, arthritis, HTN, hx of stroke   Prior Level of Function: modified independent with mobility with SPC and min assist for ADLs from family/      The Plan of Care and following information is based on the information from the initial evaluation. Assessment/ key information: Patient is a 52-year-old female who presents to physical therapy with chief complaint of BLE pain and intermittent swelling. Patient states that she started having the leg pain and swelling after a vascular procedure on 2020. Per patient, she had two laser ablations to left lower leg and one laser ablation to right lower leg. She states that she has been to 4 vascular doctors in the past year due to persistent BLE pain and soreness with intermittent swelling. She denies wearing compression stockings for her legs. Pain is worse with palpation and prolonged walking. Of note, patient has history of left breast cancer, first diagnosed in , with lymph node removal. She has LUE lymphedema and has been treated for lymphedema in the past. She uses her pump for her LUE but does not wear her sleeves and glove regularly.  Recommended to patient to follow up with oncologist for LUE lymphedema and to seek referral for lymphedema therapy for LUE. Patient stated that she would consider it. Patient presents with signs and symptoms consistent with intermittent BLE swelling of possible venous origin vs lymphedema stage 1. Based on presentation today, this PT has low suspion for lymphedema. Swelling observed today was very mild (see girth measurements below) but patient was very tender to palpation to bilateral lower legs. Patient reported altered sensations to plantar surface of toes, \"like there is something in my shoe when I walk, but nothing is there. \" She also reported new numbness to right medial malleolus region. Skin color and integrity unremarkable. Demonstrated good capillary refill to bilateral great toes with good warmth to feet. Patient would benefit from skilled physical therapy for fitting for compression stockings for BLE, for education and training on decongestive exercises and elevation, and self manual lymph drainage in order to reduce pain and swelling.     Girth (cm):  Right LE  1/26/21 Left LE  1/26/21   MTP: 21.5 21.4   Midfoot/navicular: 22.1 21.9   Ankle: (10 cm from floor) 21.3 21.2   Calf     (20 cm from floor) 24.1 22.7   Calf:      (38 cm from floor) 37.4 36.8   Knee:    (patella) 40.1 38.0   Thigh     (61 cm from floor) 48.0 47.5   Groin: 57.0 56.5       Patient will benefit from skilled PT services to address ROM deficits, address strength deficits, analyze and address soft tissue restrictions and BLE swelling to address rehab goals per plan of care  Treatment Protocol:              o Manual Lymphatic Drainage              o Soft Tissue Mobilization/MFR              o Compression Bandaging              o Skin Care/Wound care              o Decongestive Exercises/Self MFR/Strengthening              o Education              o Compression Garment    Evaluation Complexity History HIGH Complexity :3+ comorbidities / personal factors will impact the outcome/ POC ; Examination MEDIUM Complexity : 3 Standardized tests and measures addressing body structure, function, activity limitation and / or participation in recreation  ;Presentation MEDIUM Complexity : Evolving with changing characteristics  ; Clinical Decision Making MEDIUM Complexity : FOTO score of 26-74  Overall Complexity Rating: MEDIUM  Problem List: pain affecting function, decrease strength, edema affecting function, impaired gait/ balance, decrease ADL/ functional abilitiies, decrease activity tolerance and decrease transfer abilities   Treatment Plan may include any combination of the following: Therapeutic exercise, Therapeutic activities, Neuromuscular re-education, Physical agent/modality, Gait/balance training, Manual therapy, Patient education, Self Care training, Functional mobility training, Home safety training, Stair training and Other: garment/orthotic training and management, complete decongestive therapy  Patient / Family readiness to learn indicated by: asking questions, trying to perform skills and interest  Persons(s) to be included in education: patient (P)  Barriers to Learning/Limitations: None  Patient Goal (s): I want someone to tell me why my legs are still hurting  Patient Self Reported Health Status: fair  Rehabilitation Potential: good    Short Term Goals: To be accomplished in 2 weeks:  Goal: Patient will be measured and fitted for BLE ready to wear compression garment in order to promote home maintenance of lymphedema. Status: To be initiated next session  Goal:  Patient will be independent with HEP including LUE decongestive exercises to promote independence with lymphedema management  Status: initiated  Goals:  Patient will complete self-manual lymph drainage technique with less than 25% verbal cues in order to promote independence with lymphedema management and re-route lymph fluid. Status: To be initiated next session    Long Term Goals:  To be accomplished in 4 weeks:  Goal:  Patient/family will be Independent with compression management routine, including donning/doffing garments, garment care, and garment wearing schedule in order to maintain BLE limb volume. Status: n/a  Goal: Patient/family will be independent with HEP, including skin care, exercises, and self-manual lymph drainage to maintain skin integrity and limb volume at home. Status: n/a    Frequency / Duration: Patient to be seen 1-2 times per week for 4 weeks. Patient/ Caregiver education and instruction: Diagnosis, prognosis, self care and exercises   [x]  Plan of care has been reviewed with PTA    Certification Period: 01/26/21 - 2/24/21  Alexandria Neas, PT, DPT, CLT  1/26/2021 12:33 PM    ________________________________________________________________________    I certify that the above Therapy Services are being furnished while the patient is under my care. I agree with the treatment plan and certify that this therapy is necessary.     [facility] Signature:____________________  Date:____________Time: _________    Please sign and return to [facility] [address] [place] [address] & [facility]  [address]  [place], [facility] [address]  [phone] fax

## 2021-01-26 NOTE — PROGRESS NOTES
Lymphedema EVAL/DAILY NOTE    Patient Name: Juana Haider  Date:2021  : 1950  [x]  Patient  Verified  Payor: BLUE CROSS MEDICARE / Plan: [facility] HMO / Product Type: Managed Care Medicare /    In time:11:04  Out time:12:00  Total Treatment Time (min): 56  Total Timed Codes (min): 41  1:1 Treatment Time (MC/BCBS only): 64   Visit #: 1 of 8    Referring Provider: J Luis, Not On File, MD  Next Appointment: unsure  Treatment Area: Lymphedema, not elsewhere classified [I89.0]    Pain Level (0-10 scale): 10/10 BLE, left worse than right, lower anterior legs. Personal Goal:  \"I want some doctor to tell me why my legs are still hurting. \"    History of Present Illness: \"Patient is a 69-year-old female who presents to physical therapy with chief complaint of BLE pain and intermittent swelling. Patient states that she started having the leg pain and swelling after a vascular procedure on 2020. Per chart, she had two laser ablations to left lower leg and one laser ablation to right lower leg. She states that she has been to 4 vascular doctors in the past year due to persistent BLE pain and soreness with intermittent swelling. She denies wearing compression stockings for her legs. Pain is worse with palpation and prolonged walking. Of note, patient has history of left breast cancer, first diagnosed in 2006, with lymph node removal. She has LUE lymphedema and has been treated for lymphedema in the past. She uses her pump for her LUE but does not wear her sleeves and glove regularly.     Social History (DME-pump, family support):Lives with family  Work situation:   []Currently working    []Plan to return to work   []Disabled   [x]Retired      Lives with:   []Alone   []Spouse/significant other   [x]Family/relative   []Other     Are you physically able to complete lymphedema home program? [x]Yes  []No  If no, who will assist patient?     Dominant Hand: right handed    Current or Previous Compression Garment(s):   []Stocking []Sleeve  [] Pantyhose                          [] Glove/gauntlet/toe                           []Brand:     N/A                     []mmHg:   [] Size:     Compression Pump:  [x]Yes  []No  If yes:  Brand: unknown                        []Date Prescribed: for left UE    Has your activity changed since diagnosis? Yes, difficulty walking long distances    Subjective functional status:    Lymphedema extremity:  LUE lymphedema noted, not new onset, but present during evaluation. [x]Left arm    []Right Arm    []Left Leg LLE swelling but questionable lymphedema vs venous cause  []Right Leg    RLE swelling but questionable lymphedema vs venous cause    Present Symptoms/History: onset 1/5/2020 s/p ablations to left lower leg x 2 and right lower leg x 1   History of infections? no   History of leaking fluid? no   Currently on antibiotics? []Yes [x]No      Do you take diuretics for lymphedema? []Yes [x]No      Do you take any other drugs for lymphedema? no   Previous lymphedema treatment: yes, for LUE     Oncology history, if applicable   Medical Oncologist:    Radiation Oncologist:    Primary Care Physician: Veronica Balderas MD    Date of first cancer diagnosis/type/stage: 2006, stage 0    Surgery:mastectomy left   Radiation:   Type: yes   Field  Date of Completion   Number of Treatments                   Side Effects Encountered:     Chemotherapy:    Dates    Side Effects Encountered:      Other Treatment:(clinical trial or genetic counseling)    Recurrence n/a     Precautions/Indications: Asthma, venous surgery, neuropathy, hx stroke, CoPD, GERD, sciatica  (acute infection, kidney problems, acute DVT, CHF, acute bronchitis, bronchial asthma, hypertension, carotid artery surgery, carotid sinus syndrome, hyperthyroidism, history of stroke, cardiac arrhythmia, currently pregnant, gastrointestinal problems, vascular problems/aneurysm, diverticulosis/diverticulitis, recent abdominal surgery, radiation fibrosis/cystitis, unexplained pain?)    Diagnostic tests (imaging/bone scan/labs): neg for DVT       OBJECTIVE:   Edema:  [x]min []moderate  [] severe [] Pitting    Circumferences:                                             Girth (cm):  Right LE  1/26/21 Left LE  1/26/21   MTP: 21.5 21.4   Midfoot/navicular: 22.1 21.9   Ankle: (10 cm from floor) 21.3 21.2   Calf     (20 cm from floor) 24.1 22.7   Calf:      (38 cm from floor) 37.4 36.8   Knee:    (patella) 40.1 38.0   Thigh     (61 cm from floor) 48.0 47.5   Groin: 57.0 56.5       Skin Integrity      Sensation:  []normal   [] sensitive    [x] decreased  Light touch/Sharp/Dull                Color:  [x]normal []red  [] Pink    Texture:  [x]Soft []Brawny []Fibrotic []Boggy    Skin integrity:  [] Hyperpigmentation []Hyperkeratosis []Papillomas  []Lymphorrhea  Scars/Burns/incisions:                Wounds: location:                      size:               depth:    Posture: WFL    ROM: BLE ROM WFL    Strength: grossly 4/5 BLE strength      15 min [x]Eval                  []Re-Eval       41 min Self Care/Home Management: Patient education on potential causes of LE swelling, complete decongestive therapy including compression, skin care, manual lymph drainage, and decongestive exercises. Patient education on plan of care, potential costs of treatment, garment procurement process and purpose of garment. Rationale: To promote patient's knowledge of condition and understanding of lymphedema treatment protocol to maximize compliance with therapy and promote independence with home management.            With   [] TE   [] TA   [] neuro   [x] other: Patient Education: [x] Review HEP    [] Progressed/Changed HEP based on:   [] positioning   [] body mechanics   [] transfers   [] heat/ice application    [x] other: BLE decongestive exercises and self care above     Other Objective/Functional Measures: FOTO 36%     Recommended bandaging:  N/a    Planned bandages to order:  6-cm x 0, 8-cm x 0, 10-cm x 0, 12-cm x 0, mollelast wraps, cellona 10-cm x 0  Recommend compression level: [x]20-30 mmHg Vs 10-15 mmHg []30-40 mmHg []40-50 mmHg []50-60 mmHg    15-20 mmHg Jobst Ambition black size 2 knee high stockings provided.     Pain Level (0-10 scale) post treatment: 8/10    ASSESSMENT/Changes in Function: See POC        Justification for Eval Code Complexity: Moderate  Patient History (low 0, mod 1-2, high 3-4): high: breast cancer, hx of vascular ablation, hx of stroke, hx of LUE lymphedema   Examination (low 1-2, mod 3+, high 4+): moderate: strength, girth, skin assessment, sensation, ROM  Clinical Presentation (low: stable/uncomplicated; mod: evolving; high: unstable/unpredictable): moderate-evolving  Clinical Decision Making (low , mod 26-74, high 1-25): moderate FOTO 36%           PLAN  [x]  Upgrade activities as tolerated     []  Continue plan of care  []  Update interventions per flow sheet       []  Discharge due to:_  [x]  Other:_ initiate 22 Rolanda Alamo, PT, DPT, CLT 1/26/2021  12:03 PM

## 2021-01-26 NOTE — PATIENT INSTRUCTIONS
Breast Self-Exam: Care Instructions 
Your Care Instructions 
  
A breast self-exam is when you check your breasts for lumps or changes. This regular exam helps you learn how your breasts normally look and feel. Most breast problems or changes are not because of cancer. 
Breast self-exam is not a substitute for a mammogram. Having regular breast exams by your doctor and regular mammograms improve your chances of finding any problems with your breasts. 
Some women set a time each month to do a step-by-step breast self-exam. Other women like a less formal system. They might look at their breasts as they brush their teeth, or feel their breasts once in a while in the shower. 
If you notice a change in your breast, tell your doctor. 
Follow-up care is a key part of your treatment and safety. Be sure to make and go to all appointments, and call your doctor if you are having problems. It's also a good idea to know your test results and keep a list of the medicines you take. 
How do you do a breast self-exam? 
· The best time to examine your breasts is usually one week after your menstrual period begins. Your breasts should not be tender then. If you do not have periods, you might do your exam on a day of the month that is easy to remember. 
· To examine your breasts: 
? Remove all your clothes above the waist and lie down. When you are lying down, your breast tissue spreads evenly over your chest wall, which makes it easier to feel all your breast tissue. 
? Use the pads—not the fingertips—of the 3 middle fingers of your left hand to check your right breast. Move your fingers slowly in small coin-sized circles that overlap. 
 ? Use three levels of pressure to feel of all your breast tissue. Use light pressure to feel the tissue close to the skin surface. Use medium pressure to feel a little deeper. Use firm pressure to feel your tissue close to your breastbone and ribs. Use each pressure level to feel your breast tissue before moving on to the next spot. 
? Check your entire breast, moving up and down as if following a strip from the collarbone to the bra line, and from the armpit to the ribs. Repeat until you have covered the entire breast. 
? Repeat this procedure for your left breast, using the pads of the 3 middle fingers of your right hand. 
· To examine your breasts while in the shower: 
? Place one arm over your head and lightly soap your breast on that side. 
? Using the pads of your fingers, gently move your hand over your breast (in the strip pattern described above), feeling carefully for any lumps or changes. 
? Repeat for the other breast. 
· Have your doctor inspect anything you notice to see if you need further testing. 
Where can you learn more? 
Go to https://www.[employer]ctions 
Enter P148 in the search box to learn more about \"Breast Self-Exam: Care Instructions.\" 
Current as of: April 29, 2020               Content Version: 12.6 
© 0836-3922 [employer].  
Care instructions adapted under license by [employer] (which disclaims liability or warranty for this information). If you have questions about a medical condition or this instruction, always ask your healthcare professional. [employer] disclaims any warranty or liability for your use of this information.

## 2021-02-02 ENCOUNTER — APPOINTMENT (OUTPATIENT)
Dept: PHYSICAL THERAPY | Age: 71
End: 2021-02-02
Payer: MEDICARE

## 2021-02-04 ENCOUNTER — APPOINTMENT (OUTPATIENT)
Dept: PHYSICAL THERAPY | Age: 71
End: 2021-02-04
Payer: MEDICARE

## 2021-02-24 ENCOUNTER — OFFICE VISIT (OUTPATIENT)
Dept: CARDIOLOGY CLINIC | Age: 71
End: 2021-02-24
Payer: MEDICARE

## 2021-02-24 VITALS
HEART RATE: 94 BPM | SYSTOLIC BLOOD PRESSURE: 128 MMHG | BODY MASS INDEX: 33.66 KG/M2 | TEMPERATURE: 98.1 F | HEIGHT: 65 IN | DIASTOLIC BLOOD PRESSURE: 67 MMHG | WEIGHT: 202 LBS

## 2021-02-24 DIAGNOSIS — J45.30 MILD PERSISTENT ASTHMA WITHOUT COMPLICATION: ICD-10-CM

## 2021-02-24 DIAGNOSIS — I50.32 CHRONIC DIASTOLIC CONGESTIVE HEART FAILURE (HCC): Primary | ICD-10-CM

## 2021-02-24 DIAGNOSIS — E78.5 DYSLIPIDEMIA: ICD-10-CM

## 2021-02-24 DIAGNOSIS — I10 ESSENTIAL HYPERTENSION, BENIGN: ICD-10-CM

## 2021-02-24 PROCEDURE — G8752 SYS BP LESS 140: HCPCS | Performed by: INTERNAL MEDICINE

## 2021-02-24 PROCEDURE — G8754 DIAS BP LESS 90: HCPCS | Performed by: INTERNAL MEDICINE

## 2021-02-24 PROCEDURE — G8400 PT W/DXA NO RESULTS DOC: HCPCS | Performed by: INTERNAL MEDICINE

## 2021-02-24 PROCEDURE — 99214 OFFICE O/P EST MOD 30 MIN: CPT | Performed by: INTERNAL MEDICINE

## 2021-02-24 PROCEDURE — G8417 CALC BMI ABV UP PARAM F/U: HCPCS | Performed by: INTERNAL MEDICINE

## 2021-02-24 PROCEDURE — G8432 DEP SCR NOT DOC, RNG: HCPCS | Performed by: INTERNAL MEDICINE

## 2021-02-24 PROCEDURE — G8536 NO DOC ELDER MAL SCRN: HCPCS | Performed by: INTERNAL MEDICINE

## 2021-02-24 PROCEDURE — 3017F COLORECTAL CA SCREEN DOC REV: CPT | Performed by: INTERNAL MEDICINE

## 2021-02-24 PROCEDURE — 1101F PT FALLS ASSESS-DOCD LE1/YR: CPT | Performed by: INTERNAL MEDICINE

## 2021-02-24 PROCEDURE — 93000 ELECTROCARDIOGRAM COMPLETE: CPT | Performed by: INTERNAL MEDICINE

## 2021-02-24 PROCEDURE — G8428 CUR MEDS NOT DOCUMENT: HCPCS | Performed by: INTERNAL MEDICINE

## 2021-02-24 PROCEDURE — G9899 SCRN MAM PERF RSLTS DOC: HCPCS | Performed by: INTERNAL MEDICINE

## 2021-02-24 PROCEDURE — 1090F PRES/ABSN URINE INCON ASSESS: CPT | Performed by: INTERNAL MEDICINE

## 2021-02-24 RX ORDER — AMLODIPINE BESYLATE 2.5 MG/1
2.5 TABLET ORAL DAILY
Qty: 90 TAB | Refills: 1 | Status: SHIPPED | OUTPATIENT
Start: 2021-02-24 | End: 2021-09-13

## 2021-02-24 RX ORDER — METOPROLOL TARTRATE 50 MG/1
50 TABLET ORAL 2 TIMES DAILY
Qty: 180 TAB | Refills: 1 | Status: SHIPPED | OUTPATIENT
Start: 2021-02-24 | End: 2022-04-06 | Stop reason: SDUPTHER

## 2021-02-24 NOTE — PROGRESS NOTES
1. Have you been to the ER, urgent care clinic since your last visit? Hospitalized since your last visit?     no    2. Have you seen or consulted any other health care providers outside of the [address] [place] [address] since your last visit? Include any pap smears or colon screening. Yes Where: pcp     3. Since your last visit, have you had any of the following symptoms?       chest pains and shortness of breath,swelling

## 2021-02-25 ENCOUNTER — HOSPITAL ENCOUNTER (OUTPATIENT)
Dept: PHYSICAL THERAPY | Age: 71
Discharge: HOME | End: 2021-02-25
Payer: MEDICARE

## 2021-02-25 PROCEDURE — 97535 SELF CARE MNGMENT TRAINING: CPT

## 2021-02-25 PROCEDURE — 97530 THERAPEUTIC ACTIVITIES: CPT

## 2021-02-25 NOTE — PROGRESS NOTES
[facility]  [address]  [place], [address]  [phone] fax    Continued Plan of Care/ Re-certification for Physical Therapy Services    Patient name: Juvenal Serra Start of Care: 2021   Referral source: Merrick Kaufman MD : 1950   Medical/Treatment Diagnosis: Lymphedema, not elsewhere classified [I89.0]  Payor: rBigitte Settler / Plan: [address] [facility] HMO / Product Type: Managed Care Medicare /  Onset Date:2020     Prior Hospitalization: see medical history Provider#: [id number]   Medications: Verified on Patient Summary List    Comorbidities: left breast cancer, mastectomy left breast with lymph node removal, asthma, arthritis, HTN, hx of stroke   Prior Level of Function: modified independent with mobility with SPC and min assist for ADLs from family/  Visits from Start of Care: 2    Missed Visits: 0    The Plan of Care and following information is based on the patient's current status:  Goal: Patient will be measured and fitted for BLE ready to wear compression garment in order to promote home maintenance of lymphedema. Status at last note/certification: initiated at evaluation  Current Status: met    Goal: Patient will be independent with HEP including LUE decongestive exercises to promote independence with lymphedema management  Status at last note/certification:50% compliance  Current Status: not met    Goal: Patient will complete self-manual lymph drainage technique with less than 25% verbal cues in order to promote independence with lymphedema management and re-route lymph fluid. Status at last note/certification: n/a  Current Status: not met/not indicated at this time    Key functional changes: patient has been fitted for compression garments for complaints of LE swelling. She has received her compression garments on 21. BLE with near-absent swelling noted this date. Continues with tenderness to touch to bilateral lower legs though with near absent swelling noted this date. Girth (cm):  Right LE  1/26/21 Left LE  1/26/21 RLE  2/25/21 LLE  2/25/21   MTP: 21.5 21.4 21.6 21.3   Midfoot/navicular: 22.1 21.9 22.0 21.7   Ankle: (10 cm from floor) 21.3 21.2 21.3 20.6   Calf     (20 cm from floor) 24.1 22.7 22.8 22.7   Calf:      (38 cm from floor) 37.4 36.8 37.6 36.1   Knee:    (patella) 40.1 38.0 38.1 37.2   Thigh     (61 cm from floor) 48.0 47.5 48.0 47.0   Groin: 57.0 56.5 57.0 56.0          Problems/ barriers to goal attainment: n/a     Problem List: pain affecting function, edema affecting function, impaired gait/ balance, decrease ADL/ functional abilitiies, decrease activity tolerance, decrease flexibility/ joint mobility and decrease transfer abilities    Treatment Plan: Therapeutic exercise, Therapeutic activities, Neuromuscular re-education, Physical agent/modality, Gait/balance training, Manual therapy, Patient education, Self Care training, Functional mobility training, Home safety training and Other: orthotic/garment management and training     Patient Goal (s) has been updated and includes: \"I just want my socks and I'm good. \"     Goals for this certification period to be accomplished in 1 treatments:  1. Patient will possess all required compression garments and verbalize independence with garment management and care. Frequency / Duration: Patient to be seen 1 times per week for 1 treatments:    Assessment / Recommendations: Patient has received all necessary compression garments for complaints of LE swelling. Patient reports that her cardiologist that she saw this week has changed her medication because he/she believed that the HTN medication was causing LE swelling. Patient educated to hold on compression garments for 2 weeks to adjust for new medication and then proceed to use compression garments as needed for LE swelling.  Patient educated never to wear compression garments at night. Patient will be discharged from physical therapy after treatment 2/25/21. Certification Period: 2/25/21-2/25/21    Dimitri Jones, PT, DPT, CLT  2/25/2021 12:49 PM    ________________________________________________________________________  I certify that the above Therapy Services are being furnished while the patient is under my care. I agree with the treatment plan and certify that this therapy is necessary. [] I have read the above and request that my patient continue as recommended.   [] I have read the above report and request that my patient continue therapy with the following changes/special instructions: _____________________________________________  [] I have read the above report and request that my patient be discharged from therapy    Physician's Signature:____________Date:_________TIME:________     Allan Snyder MD  ** Signature, Date and Time must be completed for valid certification **    Please sign and return to [facility] [address] [address] Jose Cruz Caputo [address] [place] [address] [address]  [phone] fax

## 2021-02-25 NOTE — PROGRESS NOTES
[facility]  [address]  [place], [address]  [phone] fax    Physical Therapy Discharge Summary  Patient name: Ilya Drew Start of Care: 21   Referral source: Colin Moreno MD : 1950   Medical/Treatment Diagnosis: Lymphedema, not elsewhere classified [I89.0]  Payor: BLUE CROSS MEDICARE / Plan: [facility] [address] HMO / Product Type: Managed Care Medicare /  Onset Date:2020     Prior Hospitalization: see medical history Provider#: [id number]   Medications: Verified on Patient Summary List    Comorbidities: left breast cancer, mastectomy left breast with lymph node removal, asthma, arthritis, HTN, hx of stroke   Prior Level of Function: modified independent with mobility with SPC and min assist for ADLs from family/  Visits from Start of Care: 2    Missed Visits: 0  Reporting Period : 21 - 21      Summary of Care:  Goal: Patient will possess all required compression garments and verbalize independence with garment management and care. Status at last note/certification: compression garments arrived  Status at discharge: met      ASSESSMENT/RECOMMENDATIONS:  Patient see for initial evaluation for swelling assessment and garment fitting and then for garment follow up only. Patient has received all necessary compression garments for complaints of LE swelling. Patient reports that her cardiologist that she saw this week has changed her medication because he/she believed that the HTN medication was causing LE swelling. Patient educated to hold on compression garments for 2 weeks to adjust for new medication and then proceed to use compression garments as needed for LE swelling. Patient educated never to wear compression garments at night.  Patient verablized understanding for all education and is discharged from physical therapy at this time.     [x]Discontinue therapy: [x]Patient has reached or is progressing toward set goals      []Patient is non-compliant or has abdicated      []Due to lack of appreciable progress towards set [address] , PT, DPT, CLT  2/25/2021 12:47 PM

## 2021-02-25 NOTE — PROGRESS NOTES
PHYSICAL THERAPY - DAILY TREATMENT NOTE    Patient Name: Paulino Bowling        Date: 2021  : 1950   YES Patient  Verified  Visit #:   2   of   5  Insurance: Payor: BLUE CROSS MEDICARE / Plan: [facility]  HMO / Product Type: Managed Care Medicare /      In time: 11:52 Out time: 12:22   Total Treatment Time: 30     Medicare/[facility] Time Tracking (below)   Total Timed Codes (min):  30 1:1 Treatment Time:  30     TREATMENT AREA =  Lymphedema, not elsewhere classified [I89.0]    SUBJECTIVE    Pain Level (on 0 to 10 scale):  3  / 10   Medication Changes/New allergies or changes in medical history, any new surgeries or procedures? NO     If yes, update Summary List   Subjective Functional Status/Changes:  [x]  No changes reported     \"I got to hurry up and go because my brother is in the ER right now and I'm needed there. \"          OBJECTIVE    15 min Therapeutic Activity: Fitted patient for compression garments that were ordered; assessed orders for correctness; trained patient on donning/doffing technique. Rationale: To promote independence with donning/doffing compression garments. 15 min Self-Care: Patient education on compression garment wearing schedule and wash instructions. Educated patient on decongestive exercises. Provided discharge instructions to patient and reviewed with patient. Rationale: To promote carryover of lymphedema treatment protocol and increase independence with lymphedema management.      min Patient Education:  YES  Reviewed HEP   []  Progressed/Changed HEP based on:   Patient reports compliance     Other Objective/Functional Measures:    Girth (cm):  Right LE  21 Left LE  21 RLE  21 LLE  21   MTP: 21.5 21.4 21.6 21.3   Midfoot/navicular: 22.1 21.9 22.0 21.7   Ankle: (10 cm from floor) 21.3 21.2 21.3 20.6   Calf     (20 cm from floor) 24.1 22.7 22.8 22.7   Calf:      (38 cm from floor) 37.4 36.8 37.6 36.1   Knee: (patella) 40.1 38.0 38.1 37.2   Thigh     (61 cm from floor) 48.0 47.5 48.0 47.0   Groin: 57.0 56.5 57.0 56.0     Correct garments arrived: Juzo dynamic knee high stockings 20-30 mmHg, size II, length regular, silicone border. Sigvaris compression wraps standard calf and foot, size S. Post Treatment Pain Level (on 0 to 10) scale:   3  / 10     ASSESSMENT    Assessment/Changes in Function:     Patient has received all necessary compression garments for complaints of LE swelling. Patient reports that her cardiologist that she saw this week has changed her medication because he/she believed that the HTN medication was causing LE swelling. Patient educated to hold on compression garments for 2 weeks to adjust for new medication and then proceed to use compression garments as needed for LE swelling. Patient educated never to wear compression garments at night. Patient verablized understanding for all education and is discharged from physical therapy at this time. []  See Progress Note/Recertification   Patient is discharged from skilled physical therapy; all goals met. Progress toward goals / Updated goals:    Short Term Goals: To be accomplished in 2 weeks:  Goal: Patient will be measured and fitted for BLE ready to wear compression garment in order to promote home maintenance of lymphedema. Status:  completed 1/26/2021  Goal:  Patient will be independent with HEP including LUE decongestive exercises to promote independence with lymphedema management  Status: Patient reports 50% compliance  Goals:  Patient will complete self-manual lymph drainage technique with less than 25% verbal cues in order to promote independence with lymphedema management and re-route lymph fluid. Status:  Not indicated at this time.     Long Term Goals:  To be accomplished in 4 weeks:  Goal:  Patient/family will be Independent with compression management routine, including donning/doffing garments, garment care, and garment wearing schedule in order to maintain BLE limb volume. Status: Patient verbalized udnerstanding 2/25/21  Goal: Patient/family will be independent with HEP, including skin care, exercises, and self-manual lymph drainage to maintain skin integrity and limb volume at home. Status: manual lymph drainage not indicated at this time.       PLAN    [x]  Upgrade activities as tolerated YES Continue plan of care   []  Discharge due to :    []  Other:      Therapist: Osman Cardona, PT, DPT, CLT    Date: 2/25/2021 Time: 12:13 PM     Future Appointments   Date Time Provider Jovany Monroe   5/5/2021 11:30 AM Min Mccurdy MD [facility] AMB   7/21/2021 11:00 AM Kyle Overton MD [facility] BS AMB

## 2021-02-25 NOTE — PROGRESS NOTES
Physical Therapy Discharge Instructions      [facility] [address]  [place], [address]  [phone] fax    Patient: Will Bazzi  : 1950      Continue Home Exercise Program daily for lower extremity decongestion exercises. Wear your compression garments as needed. . Follow care instructions per  for washing (gentle cycle preferred, air dry preferred). Follow up with MD:     [] Upon completion of therapy     [x] As needed      Recommendations:     [x]   Return to activity with home program    []   Return to activity with the following modifications:       []Post Rehab Program    []Join Independent aquatic program     []Return to/join local gym        Additional Comments:   Your garments: Juzo dynamic knee high stockings, 20-30 mmHg, size II, length long, silicone border. Sigvaris compreflex wraps, size S for foot and calf.             Juvenal Richardson, PT, DPT, CLT  2021 12:13 PM

## 2021-03-01 NOTE — PROGRESS NOTES
HISTORY OF PRESENT ILLNESS  Jeniffer Fenton is a 72 y.o. female. Hypertension  The history is provided by the patient. This is a chronic problem. The current episode started more than 1 week ago. The problem occurs every several days. The problem has not changed since onset. Associated symptoms include shortness of breath. Pertinent negatives include no chest pain. Shortness of Breath  The history is provided by the patient. This is a chronic problem. The problem occurs intermittently. The current episode started more than 1 week ago. The problem has been gradually improving. Pertinent negatives include no ear pain, no neck pain, no wheezing, no chest pain, no rash and no leg swelling. Associated medical issues include heart failure. Associated medical issues do not include chronic lung disease or CAD. CHF  Associated symptoms include shortness of breath. Pertinent negatives include no chest pain. Review of Systems   Constitutional: Negative for chills and weight loss. HENT: Negative for congestion, ear discharge, ear pain, hearing loss, nosebleeds and tinnitus. Eyes: Negative for blurred vision. Respiratory: Positive for shortness of breath. Negative for wheezing and stridor. Cardiovascular: Negative for chest pain and leg swelling. Gastrointestinal: Negative for heartburn. Musculoskeletal: Negative for myalgias and neck pain. Skin: Negative for itching and rash. Neurological: Negative for tingling, tremors, focal weakness and loss of consciousness. Psychiatric/Behavioral: Negative for depression and suicidal ideas.      Family History   Problem Relation Age of Onset   Patricia Vigilbs Cancer Mother     Cancer Father     Arthritis-osteo Other     Hypertension Other     Heart Disease Neg Hx         Negative family history of premature CAD or CVA       Past Medical History:   Diagnosis Date    Anemia     Asthma     Blood disorder     Cancer ([facility] [address]     breast, diagnosed 2006 left    Cancer [facility])     Chest pain, unspecified     The working diagnosis is chest pain. The differential diagnosis includes chest wall pain, stable angina.  Chronic obstructive pulmonary disease (HCC)     Essential hypertension     Essential hypertension, benign     Uncontrolled     GERD (gastroesophageal reflux disease)     Hypercholesterolemia     Hypertension     Neuropathy     Nonspecific abnormal electrocardiogram (ECG) (EKG)     Obesity, unspecified     Weight loss has been strongly encouraged by following dietary restrictions and an exercise routine.  Polio     as a young child    Pre-operative cardiovascular examination     Sciatica     Sleep apnea     Unspecified cerebral artery occlusion with cerebral infarction     TIA? no residual       Past Surgical History:   Procedure Laterality Date    HX BREAST BIOPSY  4/11/12    Left    HX BREAST BIOPSY Left     left mastectomy    HX VEIN STRIPPING Bilateral     IA BREAST SURGERY PROCEDURE UNLISTED      initial surgery 2006, then left modified radical mastectomy 6/2012       Social History     Tobacco Use    Smoking status: Never Smoker    Smokeless tobacco: Never Used   Substance Use Topics    Alcohol use: No     Alcohol/week: 0.0 standard drinks       No Known Allergies    Outpatient Medications Marked as Taking for the 2/24/21 encounter (Office Visit) with Erica Clancy MD   Medication Sig Dispense Refill    amLODIPine (NORVASC) 2.5 mg tablet Take 1 Tab by mouth daily. 90 Tab 1    metoprolol tartrate (LOPRESSOR) 50 mg tablet Take 1 Tab by mouth two (2) times a day. 180 Tab 1    ibuprofen (MOTRIN) 800 mg tablet take 1 tablet by mouth every 6 hours AS NEEDED FOR PAIN 90 Tab 1    NEXIUM 40 mg capsule Take 1 Cap by mouth daily. 90 Cap 3    losartan (COZAAR) 100 mg tablet take 1 tablet by mouth once daily 90 Tab 3    Ketoconazole 2 % topical foam Apply  to affected area two (2) times a day.  100 g 0    diclofenac (VOLTAREN) 1 % gel Apply 4 g to affected area every six (6) hours. Apply 4 grams to affected joint up to 4 times per day, maximum 16 grams per joint per day Dispense 5 100 gram tubes 100 g 4    pravastatin (PRAVACHOL) 40 mg tablet Take 1 Tab by mouth nightly. 0    clopidogrel (PLAVIX) 75 mg tab Take 1 Tab by mouth daily.  lidocaine (LIDODERM) 5 % apply 1 patch to the affected area daily. Leave on for 12 hours and then off for 12 hours. 5 Patch 2    furosemide (LASIX) 40 mg tablet Take 1 Tab by mouth daily. 90 Tab 3    oxyCODONE-acetaminophen (PERCOCET) 5-325 mg per tablet Take 1 Tab by mouth every four (4) hours as needed for Pain. Max Daily Amount: 6 Tabs. 6 Tab 0    desonide (TRIDESILON) 0.05 % cream   0    ALBUTEROL SULFATE (PROAIR HFA IN) Take 2 Puffs by inhalation every four (4) hours as needed.  FLUTICASONE/SALMETEROL (ADVAIR HFA IN) Take 2 Puffs by inhalation every twelve (12) hours.  montelukast (SINGULAIR) 10 mg tablet Take 10 mg by mouth daily.  tiotropium (SPIRIVA WITH HANDIHALER) 18 mcg inhalation capsule Take 1 Cap by inhalation daily.  bethanechol (URECHOLINE) 10 mg tablet Take 10 mg by mouth Before breakfast, lunch, and dinner.  aspirin 81 mg tablet Take 81 mg by mouth.  ergocalciferol (VITAMIN D2) 50,000 unit capsule Take 50,000 Units by mouth Every Thursday. Visit Vitals  /67   Pulse 94   Temp 98.1 °F (36.7 °C) (Temporal)   Ht 5' 5\" (1.651 m)   Wt 91.6 kg (202 lb)   LMP  (LMP Unknown)   BMI 33.61 kg/m²     Physical Exam   Constitutional: She is oriented to person, place, and time. She appears well-developed and well-nourished. No distress. HENT:   Head: Atraumatic. Mouth/Throat: No oropharyngeal exudate. Eyes: Conjunctivae are normal. Right eye exhibits no discharge. Left eye exhibits no discharge. No scleral icterus. Neck: Normal range of motion. Neck supple. No JVD present. No tracheal deviation present. No thyromegaly present.    Cardiovascular: Normal rate and regular rhythm. Exam reveals no gallop. No murmur heard. Pulmonary/Chest: Effort normal. No stridor. She has no wheezes. She has no rales. Abdominal: Soft. There is no abdominal tenderness. There is no rebound and no guarding. Musculoskeletal: Normal range of motion. General: No tenderness or edema. Lymphadenopathy:     She has no cervical adenopathy. Neurological: She is alert and oriented to person, place, and time. She exhibits normal muscle tone. Skin: Skin is warm. She is not diaphoretic. Psychiatric: She has a normal mood and affect. Her behavior is normal.     ekg sinus rhythm with no acute st-t changes  Echo 10/2017:  SUMMARY:  Left ventricle: Systolic function was normal. Ejection fraction was  estimated in the range of 55 % to 60 %. There were no regional wall motion  abnormalities. There was mild concentric hypertrophy. Doppler parameters  were consistent with abnormal left ventricular relaxation (grade 1  diastolic dysfunction). Mitral valve: There was mild regurgitation. Tricuspid valve: There was mild regurgitation. ASSESSMENT and PLAN    ICD-10-CM ICD-9-CM    1. Chronic diastolic congestive heart failure (HCC)  I50.32 428.32 AMB POC EKG ROUTINE W/ 12 LEADS, INTER & REP     428.0 ECHO ADULT COMPLETE   2. Essential hypertension, benign  I10 401.1    3. Dyslipidemia  E78.5 272.4    4. Mild persistent asthma without complication  M51.17 623.14      Orders Placed This Encounter    AMB POC EKG ROUTINE W/ 12 LEADS, INTER & REP     Order Specific Question:   Reason for Exam:     Answer:   chf    ECHO ADULT COMPLETE     Standing Status:   Future     Standing Expiration Date:   8/27/2021     Order Specific Question:   Contrast Enhancement (Bubble Study, Definity, Optison) may be used if criteria listed in established evidence-based protocol has been identified. Answer: Yes    amLODIPine (NORVASC) 2.5 mg tablet     Sig: Take 1 Tab by mouth daily.      Dispense:  90 Tab     Refill:  1    metoprolol tartrate (LOPRESSOR) 50 mg tablet     Sig: Take 1 Tab by mouth two (2) times a day. Dispense:  180 Tab     Refill:  1     Follow-up and Dispositions    · Return in about 3 months (around 5/24/2021). current treatment plan is effective, no change in therapy  reviewed diet, exercise and weight control. Patient is a 73-year-old female with longstanding history of hypertension, chronic diastolic CHF, asthma seen for follow-up. C/o stable dyspnea at moderate exertion. Intermittent LE edema. Will obtain echo to assess Lv function/ HHD. F/u in 3 months.  Meanwhile recommend salt restriction and to continue current meds

## 2021-04-07 DIAGNOSIS — M25.562 PAIN IN BOTH KNEES, UNSPECIFIED CHRONICITY: ICD-10-CM

## 2021-04-07 DIAGNOSIS — M25.561 PAIN IN BOTH KNEES, UNSPECIFIED CHRONICITY: ICD-10-CM

## 2021-04-12 RX ORDER — IBUPROFEN 800 MG/1
TABLET ORAL
Qty: 90 TAB | Refills: 1 | Status: SHIPPED | OUTPATIENT
Start: 2021-04-12 | End: 2021-12-14

## 2021-04-20 RX ORDER — METOPROLOL TARTRATE 25 MG/1
TABLET, FILM COATED ORAL
Qty: 60 TAB | Refills: 6 | Status: SHIPPED | OUTPATIENT
Start: 2021-04-20 | End: 2022-09-19 | Stop reason: SDUPTHER

## 2021-07-22 ENCOUNTER — OFFICE VISIT (OUTPATIENT)
Dept: ORTHOPEDIC SURGERY | Age: 71
End: 2021-07-22
Payer: MEDICARE

## 2021-07-22 VITALS
TEMPERATURE: 97.5 F | WEIGHT: 199 LBS | HEART RATE: 97 BPM | BODY MASS INDEX: 33.15 KG/M2 | HEIGHT: 65 IN | OXYGEN SATURATION: 96 %

## 2021-07-22 DIAGNOSIS — M17.12 PRIMARY OSTEOARTHRITIS OF LEFT KNEE: ICD-10-CM

## 2021-07-22 DIAGNOSIS — M17.11 ARTHRITIS OF RIGHT KNEE: ICD-10-CM

## 2021-07-22 DIAGNOSIS — M25.561 RIGHT KNEE PAIN, UNSPECIFIED CHRONICITY: Primary | ICD-10-CM

## 2021-07-22 DIAGNOSIS — M25.562 LEFT KNEE PAIN, UNSPECIFIED CHRONICITY: ICD-10-CM

## 2021-07-22 PROCEDURE — 20611 DRAIN/INJ JOINT/BURSA W/US: CPT | Performed by: PHYSICIAN ASSISTANT

## 2021-07-22 PROCEDURE — 1090F PRES/ABSN URINE INCON ASSESS: CPT | Performed by: PHYSICIAN ASSISTANT

## 2021-07-22 PROCEDURE — 73564 X-RAY EXAM KNEE 4 OR MORE: CPT | Performed by: PHYSICIAN ASSISTANT

## 2021-07-22 PROCEDURE — 1101F PT FALLS ASSESS-DOCD LE1/YR: CPT | Performed by: PHYSICIAN ASSISTANT

## 2021-07-22 PROCEDURE — G8417 CALC BMI ABV UP PARAM F/U: HCPCS | Performed by: PHYSICIAN ASSISTANT

## 2021-07-22 PROCEDURE — G9899 SCRN MAM PERF RSLTS DOC: HCPCS | Performed by: PHYSICIAN ASSISTANT

## 2021-07-22 PROCEDURE — 3017F COLORECTAL CA SCREEN DOC REV: CPT | Performed by: PHYSICIAN ASSISTANT

## 2021-07-22 PROCEDURE — G8432 DEP SCR NOT DOC, RNG: HCPCS | Performed by: PHYSICIAN ASSISTANT

## 2021-07-22 PROCEDURE — G8427 DOCREV CUR MEDS BY ELIG CLIN: HCPCS | Performed by: PHYSICIAN ASSISTANT

## 2021-07-22 PROCEDURE — 99214 OFFICE O/P EST MOD 30 MIN: CPT | Performed by: PHYSICIAN ASSISTANT

## 2021-07-22 PROCEDURE — G8400 PT W/DXA NO RESULTS DOC: HCPCS | Performed by: PHYSICIAN ASSISTANT

## 2021-07-22 PROCEDURE — G8756 NO BP MEASURE DOC: HCPCS | Performed by: PHYSICIAN ASSISTANT

## 2021-07-22 PROCEDURE — G8536 NO DOC ELDER MAL SCRN: HCPCS | Performed by: PHYSICIAN ASSISTANT

## 2021-07-22 RX ORDER — BETAMETHASONE SODIUM PHOSPHATE AND BETAMETHASONE ACETATE 3; 3 MG/ML; MG/ML
12 INJECTION, SUSPENSION INTRA-ARTICULAR; INTRALESIONAL; INTRAMUSCULAR; SOFT TISSUE ONCE
Status: COMPLETED | OUTPATIENT
Start: 2021-07-22 | End: 2021-07-22

## 2021-07-22 RX ADMIN — BETAMETHASONE SODIUM PHOSPHATE AND BETAMETHASONE ACETATE 12 MG: 3; 3 INJECTION, SUSPENSION INTRA-ARTICULAR; INTRALESIONAL; INTRAMUSCULAR; SOFT TISSUE at 16:44

## 2021-07-22 NOTE — PROGRESS NOTES
[address] [place] [address]  [phone]           Patient: Meliton Robert                MRN: [id number]       SSN: [id number]  YOB: 1950        AGE: 72 y.o. SEX: female  Body mass index is 33.12 kg/m². PCP: Wilmar Méndez MD  07/22/21            REVIEW OF SYSTEMS:  Constitutional: Negative for fever, chills, weight loss and malaise/fatigue. HENT: Negative. Eyes: Negative. Respiratory: Negative. Cardiovascular: Negative. Gastrointestinal: No bowel incontinence or constipation. Genitourinary: No bladder incontinence or saddle anesthesia. Skin: Negative. Neurological: Negative. Endo/Heme/Allergies: Negative. Psychiatric/Behavioral: Negative. Musculoskeletal: As per HPI above. Past Medical History:   Diagnosis Date    Anemia     Asthma     Blood disorder     Cancer ([facility] [address]     breast, diagnosed 2006 left    Cancer [facility])     Chest pain, unspecified     The working diagnosis is chest pain. The differential diagnosis includes chest wall pain, stable angina.  Chronic obstructive pulmonary disease (HCC)     Essential hypertension     Essential hypertension, benign     Uncontrolled     GERD (gastroesophageal reflux disease)     Hypercholesterolemia     Hypertension     Neuropathy     Nonspecific abnormal electrocardiogram (ECG) (EKG)     Obesity, unspecified     Weight loss has been strongly encouraged by following dietary restrictions and an exercise routine.     Polio     as a young child    Pre-operative cardiovascular examination     Sciatica     Sleep apnea     Unspecified cerebral artery occlusion with cerebral infarction     TIA? no residual         Current Outpatient Medications:     metoprolol tartrate (LOPRESSOR) 25 mg tablet, take 1 tablet by mouth twice a day, Disp: 60 Tab, Rfl: 6    ibuprofen (MOTRIN) 800 mg tablet, take 1 tablet by mouth every 6 hours AS NEEDED FOR PAIN, Disp: 90 Tab, Rfl: 1    amLODIPine (NORVASC) 2.5 mg tablet, Take 1 Tab by mouth daily. , Disp: 90 Tab, Rfl: 1    metoprolol tartrate (LOPRESSOR) 50 mg tablet, Take 1 Tab by mouth two (2) times a day., Disp: 180 Tab, Rfl: 1    NEXIUM 40 mg capsule, Take 1 Cap by mouth daily. , Disp: 90 Cap, Rfl: 3    losartan (COZAAR) 100 mg tablet, take 1 tablet by mouth once daily, Disp: 90 Tab, Rfl: 3    Ketoconazole 2 % topical foam, Apply  to affected area two (2) times a day., Disp: 100 g, Rfl: 0    diclofenac (VOLTAREN) 1 % gel, Apply 4 g to affected area every six (6) hours. Apply 4 grams to affected joint up to 4 times per day, maximum 16 grams per joint per day Dispense 5 100 gram tubes, Disp: 100 g, Rfl: 4    pravastatin (PRAVACHOL) 40 mg tablet, Take 1 Tab by mouth nightly., Disp: , Rfl: 0    clopidogrel (PLAVIX) 75 mg tab, Take 1 Tab by mouth daily. , Disp: , Rfl:     lidocaine (LIDODERM) 5 %, apply 1 patch to the affected area daily. Leave on for 12 hours and then off for 12 hours. , Disp: 5 Patch, Rfl: 2    furosemide (LASIX) 40 mg tablet, Take 1 Tab by mouth daily. , Disp: 90 Tab, Rfl: 3    oxyCODONE-acetaminophen (PERCOCET) 5-325 mg per tablet, Take 1 Tab by mouth every four (4) hours as needed for Pain. Max Daily Amount: 6 Tabs., Disp: 6 Tab, Rfl: 0    desonide (TRIDESILON) 0.05 % cream, , Disp: , Rfl: 0    ALBUTEROL SULFATE (PROAIR HFA IN), Take 2 Puffs by inhalation every four (4) hours as needed. , Disp: , Rfl:     FLUTICASONE/SALMETEROL (ADVAIR HFA IN), Take 2 Puffs by inhalation every twelve (12) hours. , Disp: , Rfl:     montelukast (SINGULAIR) 10 mg tablet, Take 10 mg by mouth daily. , Disp: , Rfl:     tiotropium (SPIRIVA WITH HANDIHALER) 18 mcg inhalation capsule, Take 1 Cap by inhalation daily. , Disp: , Rfl:     bethanechol (URECHOLINE) 10 mg tablet, Take 10 mg by mouth Before breakfast, lunch, and dinner., Disp: , Rfl:     aspirin 81 mg tablet, Take 81 mg by mouth.  , Disp: , Rfl:     ergocalciferol (VITAMIN D2) 50,000 unit capsule, Take 50,000 Units by mouth Every Thursday. , Disp: , Rfl:     diclofenac EC (VOLTAREN) 50 mg EC tablet, take 1 tablet by mouth twice a day with meals (Patient not taking: Reported on 7/22/2021), Disp: 120 Tab, Rfl: 2    dicyclomine (BENTYL) 20 mg tablet, Take 20 mg by mouth every six (6) hours. (Patient not taking: Reported on 7/22/2021), Disp: , Rfl: 0    No Known Allergies    Social History     Socioeconomic History    Marital status:      Spouse name: Not on file    Number of children: Not on file    Years of education: Not on file    Highest education level: Not on file   Occupational History    Not on file   Tobacco Use    Smoking status: Never Smoker    Smokeless tobacco: Never Used   Substance and Sexual Activity    Alcohol use: No     Alcohol/week: 0.0 standard drinks    Drug use: No    Sexual activity: Not on file   Other Topics Concern    Not on file   Social History Narrative    ** Merged History Encounter **          Social Determinants of Health     Financial Resource Strain:     Difficulty of Paying Living Expenses:    Food Insecurity:     Worried About Running Out of Food in the Last Year:     [address] in the Last Year:    Transportation Needs:     Lack of Transportation (Medical):      Lack of Transportation (Non-Medical):    Physical Activity:     Days of Exercise per Week:     Minutes of Exercise per Session:    Stress:     Feeling of Stress :    Social Connections:     Frequency of Communication with Friends and Family:     Frequency of Social Gatherings with Friends and Family:     Attends Yarsanism Services:     Active Member of Clubs or Organizations:     Attends Club or Organization Meetings:     Marital Status:    Intimate Partner Violence:     Fear of Current or Ex-Partner:     Emotionally Abused:     Physically Abused:     Sexually Abused:        Past Surgical History:   Procedure Laterality Date    HX BREAST BIOPSY  4/11/12    Left    HX BREAST BIOPSY Left     left mastectomy    HX VEIN STRIPPING Bilateral     CO BREAST SURGERY PROCEDURE UNLISTED      initial surgery 2006, then left modified radical mastectomy 6/2012       Patient seen evaluate today for bilateral knees. She has known advancing arthritis of her knees which is worsening. She has discomfort in from a chair. She has discomfort stairs. She has trouble kneeling on her knees. She does have pain at night. She had injections in the past which gave her moderate relief. Patient denies recent fevers, chills, chest pain, SOB, or injuries. No recent systemic changes noted. A 12-point review of systems is performed today. Pertinent positives are noted. All other systems reviewed and otherwise are negative. Physical exam: General: Alert and oriented x3, nad.  well-developed, well nourished. normal affect, AF. NC/AT, EOMI, neck supple, trachea midline, no JVD present. Breathing is non-labored. Examination of the lower extremities reveals pain-free range of motion of the hips. There is no pain to palpation trochanter bursa. Neck straight leg raise. Negative calf tenderness. Negative Homans. No signs of DVT present. Each of the knees reveal skin intact. There is no erythema, ecchymosis, warmth. There are no signs of infection or cellulitis present. There is discomfort to palpation tricompartmentally about the knees but especially to the medial patella from articulation. Radiographs obtained in office today 7/22/2021 at the [facility] location include AP, tunnel, lateral, skyline of each of the knees shows fairly advanced arthritis with thinning of the medial compartment as well as patellofemoral articulation. No acute abnormalities noted. Assessment: Bilateral knee advanced arthritis right greater than left    Plan: At this point, we discussed treatment options.   We will going to move forward with a cortisone injection for each of her knees. After informed consent, under aseptic conditions, with US guided assitance, each knee was prepped with betadine and a mxiture of 3ml 1% lidocaine and 6mg of celestone was injected without complications. The patient tolerated the injection well. The patient is instructed on post-injection care. Surgical intervention was discussed including the alternatives, risks, complications as well as expected outcome. We have  elected against given the efficacy of conservative treatment. Chart reviewed for the following:  Tony BURNS PA-C, have reviewed the History, Physical and updated the Allergic reactions for [employer]? TIME OUT performed immediately prior to start of procedure:  Tony BURNS PA-C, have performed the following reviews on [employer] prior to the start of the procedure:  ????????  * Patient was identified by name and date of birth   * Agreement on procedure being performed was verified  * Risks and Benefits explained to the patient  * Procedure site verified and marked as necessary  * Patient was positioned for comfort  * Consent was signed and verified    Time:4:38 PM    Body part: bilateral knees, intra-articular    Medication & Dose: 3ml 1% lidocaine and 6mg celestone each    Date of procedure: 07/22/21    Procedure performed by: Tony Onofre PA-C    Provider assisted by: none    Patient assisted by: self    How tolerated by patient: tolerated the procedure well with no complications    Post Procedural Pain Scale: 10    Comments:   [address] using a frequency of 10MHz with a 12L-RS transducer head was used to confirm needle placement.   Ultrasound images captured using [address] Ultrasound machine and scanned into patient's chart       Jessica Baer PA-C, ATC

## 2021-09-13 RX ORDER — AMLODIPINE BESYLATE 2.5 MG/1
TABLET ORAL
Qty: 90 TABLET | Refills: 1 | Status: SHIPPED | OUTPATIENT
Start: 2021-09-13 | End: 2022-03-07

## 2021-10-29 ENCOUNTER — VIRTUAL VISIT (OUTPATIENT)
Dept: NEUROLOGY | Age: 71
End: 2021-10-29
Payer: MEDICARE

## 2021-10-29 DIAGNOSIS — G62.9 PERIPHERAL POLYNEUROPATHY: ICD-10-CM

## 2021-10-29 DIAGNOSIS — G62.9 PERIPHERAL POLYNEUROPATHY: Primary | ICD-10-CM

## 2021-10-29 PROCEDURE — G8756 NO BP MEASURE DOC: HCPCS | Performed by: STUDENT IN AN ORGANIZED HEALTH CARE EDUCATION/TRAINING PROGRAM

## 2021-10-29 PROCEDURE — 99214 OFFICE O/P EST MOD 30 MIN: CPT | Performed by: STUDENT IN AN ORGANIZED HEALTH CARE EDUCATION/TRAINING PROGRAM

## 2021-10-29 PROCEDURE — 1090F PRES/ABSN URINE INCON ASSESS: CPT | Performed by: STUDENT IN AN ORGANIZED HEALTH CARE EDUCATION/TRAINING PROGRAM

## 2021-10-29 PROCEDURE — 1101F PT FALLS ASSESS-DOCD LE1/YR: CPT | Performed by: STUDENT IN AN ORGANIZED HEALTH CARE EDUCATION/TRAINING PROGRAM

## 2021-10-29 PROCEDURE — 3017F COLORECTAL CA SCREEN DOC REV: CPT | Performed by: STUDENT IN AN ORGANIZED HEALTH CARE EDUCATION/TRAINING PROGRAM

## 2021-10-29 PROCEDURE — G8536 NO DOC ELDER MAL SCRN: HCPCS | Performed by: STUDENT IN AN ORGANIZED HEALTH CARE EDUCATION/TRAINING PROGRAM

## 2021-10-29 PROCEDURE — G8427 DOCREV CUR MEDS BY ELIG CLIN: HCPCS | Performed by: STUDENT IN AN ORGANIZED HEALTH CARE EDUCATION/TRAINING PROGRAM

## 2021-10-29 PROCEDURE — G8417 CALC BMI ABV UP PARAM F/U: HCPCS | Performed by: STUDENT IN AN ORGANIZED HEALTH CARE EDUCATION/TRAINING PROGRAM

## 2021-10-29 PROCEDURE — G8400 PT W/DXA NO RESULTS DOC: HCPCS | Performed by: STUDENT IN AN ORGANIZED HEALTH CARE EDUCATION/TRAINING PROGRAM

## 2021-10-29 PROCEDURE — G9899 SCRN MAM PERF RSLTS DOC: HCPCS | Performed by: STUDENT IN AN ORGANIZED HEALTH CARE EDUCATION/TRAINING PROGRAM

## 2021-10-29 PROCEDURE — G8432 DEP SCR NOT DOC, RNG: HCPCS | Performed by: STUDENT IN AN ORGANIZED HEALTH CARE EDUCATION/TRAINING PROGRAM

## 2021-10-29 RX ORDER — GABAPENTIN 300 MG/1
300 CAPSULE ORAL 3 TIMES DAILY
Qty: 90 CAPSULE | Refills: 5 | Status: SHIPPED | OUTPATIENT
Start: 2021-10-29 | End: 2021-11-28

## 2021-10-29 NOTE — PROGRESS NOTES
Aditya Martin is a 72 y.o. female who was seen by synchronous (real-time) audio-video technology on 10/29/2021 for Follow-up        Assessment & Plan:   Diagnoses and all orders for this visit:    1. Peripheral polyneuropathy  -     gabapentin (NEURONTIN) 300 mg capsule; Take 1 Capsule by mouth three (3) times daily for 30 days. Max Daily Amount: 900 mg.  -     HEMOGLOBIN A1C WITH EAG; Future    Will increase the dose of gabapentin to 300 mg p.o. 3 times daily. Have reordered her hemoglobin A1c. Will follow with podiatry. We will see her again in 6 months time. Subjective:   A 72years old female patient here for follow-up of neuropathy. Last seen in the clinic in January 2021. She is on gabapentin 300 mg p.o. twice daily. Continues to have the soreness in her legs. No tingling or burning sensation. She has some numbness occasionally. Some difficulty walking from knee pain. No recent falls. Occasionally, her lower extremities feel weak. She has intermittent swelling of her legs. An appointment to see podiatrist.  During the last visit, an order for hemoglobin A1c was put but was not done. From previous encounter:   A 72years old male patient here for follow-up evaluation of peripheral neuropathy. She was last in the clinic in September 2020. She was continued on gabapentin 300 mg p.o. 3 times daily. She is not taking it as prescribed. She continues to have the soreness in her lower extremities mainly between the ankle and the mid leg bilaterally. Feels her lower extremities are swollen. No skin changes. She also has numbness. Soreness is mostly when she is sitting. Feels better when she is lying down and put a pillow under her legs. Does not wake her up from sleep. She also has some tingling sensation over her left fingers. No problem with . Since her last visit, she had an EMG (mainly nerve conduction study of the lower extremities.   The limited study was suggestive of axonal type of peripheral neuropathy. During her last visit, serum protein electrophoresis and heavy metal screening were ordered: Both unremarkable. Hemoglobin A1c was also ordered but no results available.     Prior to Admission medications    Medication Sig Start Date End Date Taking? Authorizing Provider   amLODIPine (NORVASC) 2.5 mg tablet take 1 tablet by mouth once daily 9/13/21  Yes Tnaia Torres MD   metoprolol tartrate (LOPRESSOR) 25 mg tablet take 1 tablet by mouth twice a day 4/20/21  Yes Julia Howard NP   ibuprofen (MOTRIN) 800 mg tablet take 1 tablet by mouth every 6 hours AS NEEDED FOR PAIN 4/12/21  Yes Bekah Wilson PA-C   metoprolol tartrate (LOPRESSOR) 50 mg tablet Take 1 Tab by mouth two (2) times a day. 2/24/21  Yes Tania Torres MD   diclofenac EC (VOLTAREN) 50 mg EC tablet take 1 tablet by mouth twice a day with meals 5/13/20  Yes Elizabeth Simons PA   NEXIUM 40 mg capsule Take 1 Cap by mouth daily. 2/26/20  Yes Sang Aguiar NP   losartan (COZAAR) 100 mg tablet take 1 tablet by mouth once daily 2/3/20  Yes Tania Torres MD   Ketoconazole 2 % topical foam Apply  to affected area two (2) times a day. 12/10/19  Yes Shadi Mathias MD   diclofenac (VOLTAREN) 1 % gel Apply 4 g to affected area every six (6) hours. Apply 4 grams to affected joint up to 4 times per day, maximum 16 grams per joint per day Dispense 5 100 gram tubes 9/6/19  Yes Ania Vaughn MD   pravastatin (PRAVACHOL) 40 mg tablet Take 1 Tab by mouth nightly. 4/25/19  Yes Provider, Historical   clopidogrel (PLAVIX) 75 mg tab Take 1 Tab by mouth daily. Yes Provider, Historical   lidocaine (LIDODERM) 5 % apply 1 patch to the affected area daily. Leave on for 12 hours and then off for 12 hours. 9/21/18  Yes Ryan ESCOBAR PA-C   furosemide (LASIX) 40 mg tablet Take 1 Tab by mouth daily.  3/14/18  Yes Tania Torres MD   oxyCODONE-acetaminophen (PERCOCET) 5-325 mg per tablet Take 1 Tab by mouth every four (4) hours as needed for Pain. Max Daily Amount: 6 Tabs. 2/24/18  Yes Hortensia Jarrett PA   dicyclomine (BENTYL) 20 mg tablet Take 20 mg by mouth every six (6) hours. 5/28/17  Yes Provider, Historical   desonide (TRIDESILON) 0.05 % cream  9/19/15  Yes Provider, Historical   ALBUTEROL SULFATE (PROAIR HFA IN) Take 2 Puffs by inhalation every four (4) hours as needed. Yes Provider, Historical   FLUTICASONE/SALMETEROL (ADVAIR HFA IN) Take 2 Puffs by inhalation every twelve (12) hours. Yes Provider, Historical   montelukast (SINGULAIR) 10 mg tablet Take 10 mg by mouth daily. Yes Other, MD Severino   tiotropium (SPIRIVA WITH HANDIHALER) 18 mcg inhalation capsule Take 1 Cap by inhalation daily. Yes Other, MD Severino   bethanechol (URECHOLINE) 10 mg tablet Take 10 mg by mouth Before breakfast, lunch, and dinner. Yes Provider, Historical   aspirin 81 mg tablet Take 81 mg by mouth. Yes Provider, Historical   ergocalciferol (VITAMIN D2) 50,000 unit capsule Take 50,000 Units by mouth Every Thursday.    Yes Provider, Historical     Patient Active Problem List   Diagnosis Code    TIA (transient ischemic attack) G45.9    Dyslipidemia E78.5    Left arm pain M79.602    Middle cerebral artery stenosis I66.09    Recurrent breast carcinoma-Left C50.919    Essential hypertension, benign I10    Obesity, unspecified E66.9    Asthma J45.909    Chest pain, unspecified R07.9    Nonspecific abnormal electrocardiogram (ECG) (EKG) R94.31    Low back pain without sciatica M54.50    Displacement of lumbar intervertebral disc without myelopathy M51.26    Thoracic or lumbosacral neuritis or radiculitis, unspecified [id number]    Displacement of cervical intervertebral disc without myelopathy M50.20    Spinal stenosis, cervicothoracic region M48.03    SOB (shortness of breath) R06.02    Chronic diastolic congestive heart failure (HCC) I50.32     Patient Active Problem List    Diagnosis Date Noted    Chronic diastolic congestive heart failure (HCC) 11/15/2017    SOB (shortness of breath) 10/10/2017    Low back pain without sciatica 04/24/2017    Displacement of lumbar intervertebral disc without myelopathy 04/24/2017    Thoracic or lumbosacral neuritis or radiculitis, unspecified 04/24/2017    Displacement of cervical intervertebral disc without myelopathy 04/24/2017    Spinal stenosis, cervicothoracic region 04/24/2017    Essential hypertension, benign     Obesity, unspecified     Asthma     Chest pain, unspecified     Nonspecific abnormal electrocardiogram (ECG) (EKG)     Recurrent breast carcinoma-Left     Middle cerebral artery stenosis 09/26/2012    TIA (transient ischemic attack) 08/06/2012    Dyslipidemia 08/06/2012    Left arm pain 08/06/2012     Current Outpatient Medications   Medication Sig Dispense Refill    gabapentin (NEURONTIN) 300 mg capsule Take 1 Capsule by mouth three (3) times daily for 30 days. Max Daily Amount: 900 mg. 90 Capsule 5    amLODIPine (NORVASC) 2.5 mg tablet take 1 tablet by mouth once daily 90 Tablet 1    metoprolol tartrate (LOPRESSOR) 25 mg tablet take 1 tablet by mouth twice a day 60 Tab 6    ibuprofen (MOTRIN) 800 mg tablet take 1 tablet by mouth every 6 hours AS NEEDED FOR PAIN 90 Tab 1    metoprolol tartrate (LOPRESSOR) 50 mg tablet Take 1 Tab by mouth two (2) times a day. 180 Tab 1    diclofenac EC (VOLTAREN) 50 mg EC tablet take 1 tablet by mouth twice a day with meals 120 Tab 2    NEXIUM 40 mg capsule Take 1 Cap by mouth daily. 90 Cap 3    losartan (COZAAR) 100 mg tablet take 1 tablet by mouth once daily 90 Tab 3    Ketoconazole 2 % topical foam Apply  to affected area two (2) times a day. 100 g 0    diclofenac (VOLTAREN) 1 % gel Apply 4 g to affected area every six (6) hours.  Apply 4 grams to affected joint up to 4 times per day, maximum 16 grams per joint per day Dispense 5 100 gram tubes 100 g 4    pravastatin (PRAVACHOL) 40 mg tablet Take 1 Tab by mouth nightly. 0    clopidogrel (PLAVIX) 75 mg tab Take 1 Tab by mouth daily.  lidocaine (LIDODERM) 5 % apply 1 patch to the affected area daily. Leave on for 12 hours and then off for 12 hours. 5 Patch 2    furosemide (LASIX) 40 mg tablet Take 1 Tab by mouth daily. 90 Tab 3    oxyCODONE-acetaminophen (PERCOCET) 5-325 mg per tablet Take 1 Tab by mouth every four (4) hours as needed for Pain. Max Daily Amount: 6 Tabs. 6 Tab 0    dicyclomine (BENTYL) 20 mg tablet Take 20 mg by mouth every six (6) hours. 0    desonide (TRIDESILON) 0.05 % cream   0    ALBUTEROL SULFATE (PROAIR HFA IN) Take 2 Puffs by inhalation every four (4) hours as needed.  FLUTICASONE/SALMETEROL (ADVAIR HFA IN) Take 2 Puffs by inhalation every twelve (12) hours.  montelukast (SINGULAIR) 10 mg tablet Take 10 mg by mouth daily.  tiotropium (SPIRIVA WITH HANDIHALER) 18 mcg inhalation capsule Take 1 Cap by inhalation daily.  bethanechol (URECHOLINE) 10 mg tablet Take 10 mg by mouth Before breakfast, lunch, and dinner.  aspirin 81 mg tablet Take 81 mg by mouth.  ergocalciferol (VITAMIN D2) 50,000 unit capsule Take 50,000 Units by mouth Every Thursday. No Known Allergies  Past Medical History:   Diagnosis Date    Anemia     Asthma     Blood disorder     Cancer ([facility] [address]     breast, diagnosed 2006 left    Cancer [facility])     Chest pain, unspecified     The working diagnosis is chest pain. The differential diagnosis includes chest wall pain, stable angina.  Chronic obstructive pulmonary disease (HCC)     Essential hypertension     Essential hypertension, benign     Uncontrolled     GERD (gastroesophageal reflux disease)     Hypercholesterolemia     Hypertension     Neuropathy     Nonspecific abnormal electrocardiogram (ECG) (EKG)     Obesity, unspecified     Weight loss has been strongly encouraged by following dietary restrictions and an exercise routine.     Polio     as a young child  Pre-operative cardiovascular examination     Sciatica     Sleep apnea     Unspecified cerebral artery occlusion with cerebral infarction     TIA? no residual     Past Surgical History:   Procedure Laterality Date    HX BREAST BIOPSY  4/11/12    Left    HX BREAST BIOPSY Left     left mastectomy    HX VEIN STRIPPING Bilateral     AL BREAST SURGERY PROCEDURE UNLISTED      initial surgery 2006, then left modified radical mastectomy 6/2012     Family History   Problem Relation Age of Onset    Cancer Mother     Cancer Father     Arthritis-osteo Other     Hypertension Other     Heart Disease Neg Hx         Negative family history of premature CAD or CVA     Social History     Tobacco Use    Smoking status: Never Smoker    Smokeless tobacco: Never Used   Substance Use Topics    Alcohol use: No     Alcohol/week: 0.0 standard drinks       Review of Systems   Constitutional: Negative for chills and fever. HENT: Negative for hearing loss. Eyes: Negative for blurred vision and double vision. Respiratory: Positive for cough and shortness of breath (soemtimes). Negative for hemoptysis and sputum production. Cardiovascular: Positive for chest pain (sometimes) and leg swelling. Gastrointestinal: Negative for nausea and vomiting. Genitourinary: Negative for dysuria, frequency and urgency. Musculoskeletal: Negative for neck pain. Back pain: sometimes. Skin: Positive for itching (foot). Negative for rash. Neurological: Positive for dizziness (sometimes), tingling (legs/feets) and sensory change. Negative for tremors, focal weakness, seizures and headaches. Psychiatric/Behavioral: Negative for depression. The patient is not nervous/anxious.         Objective:     Patient-Reported Vitals 10/29/2021   Patient-Reported Weight 190lb        [INSTRUCTIONS:  \"[x]\" Indicates a positive item  \"[]\" Indicates a negative item  -- DELETE ALL ITEMS NOT EXAMINED]    Constitutional: [] Appears well-developed and well-nourished [x] No apparent distress      [] Abnormal -     Mental status: [x] Alert and awake  [x] Oriented       [x] Able to follow commands    [] Abnormal -     Eyes:   EOM    [x]  Normal    [] Abnormal -   Sclera  []  Normal    [] Abnormal -          Discharge []  None visible   [] Abnormal -     HENT: [x] Normocephalic, atraumatic  [] Abnormal -   [x] Mouth/Throat: Mucous membranes are moist    External Ears [] Normal  [] Abnormal -    Neck: [] No visualized mass [] Abnormal -     Pulmonary/Chest: [x] Respiratory effort normal   [] No visualized signs of difficulty breathing or respiratory distress        [] Abnormal -      Musculoskeletal:   [x] Normal gait with no signs of ataxia         [x] Normal range of motion of neck        [] Abnormal -     Neurological:        [x] No Facial Asymmetry (Cranial nerve 7 motor function) (limited exam due to video visit)          [x] No gaze palsy         [] Abnormal -       Mental status: Awake, alert, oriented, follows simple and complex commands. Speech and languge: fluent, coherent, and comprehension intact  CN: EOMI,  no facial asymmetry noted, palate elevation symmetric bilat, moves her head from side to side, tongue midline  Motor: no pronator drift, symmetric movement of the upper and lower extremities.    Coordination: Intact rapid alternating movements; able to touch her nose and stretch out her fingers without any significant dysmetria.   Gait: Antalgic. Skin:        [] No significant exanthematous lesions or discoloration noted on facial skin         [] Abnormal -            Psychiatric:       [x] Normal Affect [] Abnormal -        [] No Hallucinations    Other pertinent observable physical exam findings:-        We discussed the expected course, resolution and complications of the diagnosis(es) in detail. Medication risks, benefits, costs, interactions, and alternatives were discussed as indicated.   I advised her to contact the office if her condition worsens, changes or fails to improve as anticipated. She expressed understanding with the diagnosis(es) and plan. Digna Spence, was evaluated through a synchronous (real-time) audio-video encounter. The patient (or guardian if applicable) is aware that this is a billable service. Verbal consent to proceed has been obtained within the past 12 months. The visit was conducted pursuant to the emergency declaration under the [address] [place] [address] and the [employer] and [employer] Act. Patient identification was verified, and a caregiver was present when appropriate. The patient was located in a state where the provider was credentialed to provide care.       Kody Alford MD

## 2021-10-29 NOTE — PROGRESS NOTES
Eladio Isaac is a 72 y.o. female on virtual visit today for follow-up. Demian Mo will assist with today's visit. 1. Have you been to the ER, urgent care clinic since your last visit? Hospitalized since your last visit? No    2. Have you seen or consulted any other health care providers outside of the [address] [place] [address] since your last visit? Include any pap smears or colon screening. No     Mobile number [phone] will be used for today's visit.

## 2021-11-12 ENCOUNTER — APPOINTMENT (OUTPATIENT)
Dept: VASCULAR SURGERY | Age: 71
End: 2021-11-12
Attending: EMERGENCY MEDICINE
Payer: MEDICARE

## 2021-11-12 ENCOUNTER — HOSPITAL ENCOUNTER (EMERGENCY)
Age: 71
Discharge: HOME | End: 2021-11-12
Attending: EMERGENCY MEDICINE
Payer: MEDICARE

## 2021-11-12 VITALS
SYSTOLIC BLOOD PRESSURE: 150 MMHG | DIASTOLIC BLOOD PRESSURE: 88 MMHG | HEART RATE: 86 BPM | TEMPERATURE: 98.8 F | RESPIRATION RATE: 16 BRPM | OXYGEN SATURATION: 99 %

## 2021-11-12 DIAGNOSIS — M79.89 LEFT LEG SWELLING: Primary | ICD-10-CM

## 2021-11-12 LAB — D DIMER PPP FEU-MCNC: 1.28 UG/ML(FEU)

## 2021-11-12 PROCEDURE — 99282 EMERGENCY DEPT VISIT SF MDM: CPT

## 2021-11-12 PROCEDURE — 85379 FIBRIN DEGRADATION QUANT: CPT

## 2021-11-12 PROCEDURE — 93971 EXTREMITY STUDY: CPT

## 2021-11-12 NOTE — ED TRIAGE NOTES
Left leg swelling. Patient was told by home health nurse that he left leg appeared to be more swollen than her right leg yesterday. Today both legs appear to be the same. The left leg has some swelling around the knee however, no swelling noted in the calf, no redness, no pain.

## 2021-11-12 NOTE — ED PROVIDER NOTES
EMERGENCY DEPARTMENT HISTORY AND PHYSICAL EXAM    10:23 AM patient seen at this time in room QB      Date: 11/12/2021  Patient Name: Rebecca Adams    History of Presenting Illness     Chief Complaint   Patient presents with    Leg Swelling         History Provided By: patient    Additional History (Context): Rebecca Adams is a 72 y.o. female presents with had swelling in her left leg upper calf yesterday, primary doctor directed her to the ER concern for blood clot. Swelling is gone down she did not have significant pain no immobilization or recent surgery. No history of blood clots in her lungs or her legs and no chest pain or shortness of breath. PCP: Emily Singleton MD    Chief Complaint:   Duration:    Timing:    Location:   Quality:   Severity:   Modifying Factors:   Associated Symptoms:       Current Outpatient Medications   Medication Sig Dispense Refill    gabapentin (NEURONTIN) 300 mg capsule Take 1 Capsule by mouth three (3) times daily for 30 days. Max Daily Amount: 900 mg. 90 Capsule 5    amLODIPine (NORVASC) 2.5 mg tablet take 1 tablet by mouth once daily 90 Tablet 1    metoprolol tartrate (LOPRESSOR) 25 mg tablet take 1 tablet by mouth twice a day 60 Tab 6    ibuprofen (MOTRIN) 800 mg tablet take 1 tablet by mouth every 6 hours AS NEEDED FOR PAIN 90 Tab 1    metoprolol tartrate (LOPRESSOR) 50 mg tablet Take 1 Tab by mouth two (2) times a day. 180 Tab 1    diclofenac EC (VOLTAREN) 50 mg EC tablet take 1 tablet by mouth twice a day with meals 120 Tab 2    NEXIUM 40 mg capsule Take 1 Cap by mouth daily. 90 Cap 3    losartan (COZAAR) 100 mg tablet take 1 tablet by mouth once daily 90 Tab 3    Ketoconazole 2 % topical foam Apply  to affected area two (2) times a day. 100 g 0    diclofenac (VOLTAREN) 1 % gel Apply 4 g to affected area every six (6) hours.  Apply 4 grams to affected joint up to 4 times per day, maximum 16 grams per joint per day Dispense 5 100 gram tubes 100 g 4  pravastatin (PRAVACHOL) 40 mg tablet Take 1 Tab by mouth nightly. 0    clopidogrel (PLAVIX) 75 mg tab Take 1 Tab by mouth daily.  lidocaine (LIDODERM) 5 % apply 1 patch to the affected area daily. Leave on for 12 hours and then off for 12 hours. 5 Patch 2    furosemide (LASIX) 40 mg tablet Take 1 Tab by mouth daily. 90 Tab 3    oxyCODONE-acetaminophen (PERCOCET) 5-325 mg per tablet Take 1 Tab by mouth every four (4) hours as needed for Pain. Max Daily Amount: 6 Tabs. 6 Tab 0    dicyclomine (BENTYL) 20 mg tablet Take 20 mg by mouth every six (6) hours. 0    desonide (TRIDESILON) 0.05 % cream   0    ALBUTEROL SULFATE (PROAIR HFA IN) Take 2 Puffs by inhalation every four (4) hours as needed.  FLUTICASONE/SALMETEROL (ADVAIR HFA IN) Take 2 Puffs by inhalation every twelve (12) hours.  montelukast (SINGULAIR) 10 mg tablet Take 10 mg by mouth daily.  tiotropium (SPIRIVA WITH HANDIHALER) 18 mcg inhalation capsule Take 1 Cap by inhalation daily.  bethanechol (URECHOLINE) 10 mg tablet Take 10 mg by mouth Before breakfast, lunch, and dinner.  aspirin 81 mg tablet Take 81 mg by mouth.  ergocalciferol (VITAMIN D2) 50,000 unit capsule Take 50,000 Units by mouth Every Thursday. Past History     Past Medical History:  Past Medical History:   Diagnosis Date    Anemia     Asthma     Blood disorder     Cancer ([facility] [address]     breast, diagnosed 2006 left    [facility])     Chest pain, unspecified     The working diagnosis is chest pain. The differential diagnosis includes chest wall pain, stable angina.     Chronic obstructive pulmonary disease (HCC)     Essential hypertension     Essential hypertension, benign     Uncontrolled     GERD (gastroesophageal reflux disease)     Hypercholesterolemia     Hypertension     Neuropathy     Nonspecific abnormal electrocardiogram (ECG) (EKG)     Obesity, unspecified     Weight loss has been strongly encouraged by following dietary restrictions and an exercise routine. [facility] Polio     as a young child    Pre-operative cardiovascular examination     Sciatica     Sleep apnea     Unspecified cerebral artery occlusion with cerebral infarction     TIA? no residual       Past Surgical History:  Past Surgical History:   Procedure Laterality Date    HX BREAST BIOPSY  4/11/12    Left    HX BREAST BIOPSY Left     left mastectomy    HX VEIN STRIPPING Bilateral     SD BREAST SURGERY PROCEDURE UNLISTED      initial surgery 2006, then left modified radical mastectomy 6/2012       Family History:  Family History   Problem Relation Age of Onset    Cancer Mother     Cancer Father     Arthritis-osteo Other     Hypertension Other     Heart Disease Neg Hx         Negative family history of premature CAD or CVA       Social History:  Social History     Tobacco Use    Smoking status: Never Smoker    Smokeless tobacco: Never Used   Substance Use Topics    Alcohol use: No     Alcohol/week: 0.0 standard drinks    Drug use: No       Allergies:  No Known Allergies      Review of Systems     Review of Systems   Constitutional: Negative for diaphoresis and fever. HENT: Negative for congestion and sore throat. Eyes: Negative for pain and itching. Respiratory: Negative for cough and shortness of breath. Cardiovascular: Negative for chest pain and palpitations. Gastrointestinal: Negative for abdominal pain and diarrhea. Endocrine: Negative for polydipsia and polyuria. Genitourinary: Negative for dysuria and hematuria. Musculoskeletal: Negative for arthralgias and myalgias. Left leg swelling reported   Skin: Negative for rash and wound. Neurological: Negative for seizures and syncope. Hematological: Does not bruise/bleed easily. Psychiatric/Behavioral: Negative for agitation and hallucinations.          Physical Exam       Patient Vitals for the past 12 hrs:   Temp Pulse Resp BP SpO2   11/12/21 1015 98.8 °F (37.1 °C) 86 16 (!) 150/88 99 %       IPVITALS  Patient Vitals for the past 24 hrs:   BP Temp Pulse Resp SpO2   11/12/21 1015 (!) 150/88 98.8 °F (37.1 °C) 86 16 99 %       Physical Exam  Vitals and nursing note reviewed. Constitutional:       General: She is not in acute distress. Appearance: Normal appearance. She is well-developed. She is not ill-appearing. HENT:      Head: Normocephalic and atraumatic. Eyes:      General: No scleral icterus. Conjunctiva/sclera: Conjunctivae normal.   Neck:      Vascular: No JVD. Cardiovascular:      Rate and Rhythm: Normal rate and regular rhythm. Heart sounds: Normal heart sounds. Comments: 4 intact extremity pulses  Pulmonary:      Effort: Pulmonary effort is normal.      Breath sounds: Normal breath sounds. Abdominal:      Palpations: Abdomen is soft. There is no mass. Tenderness: There is no abdominal tenderness. Musculoskeletal:         General: No swelling, tenderness, deformity or signs of injury. Normal range of motion. Cervical back: Normal range of motion and neck supple. Comments: Neurovascular intact bilateral lower extremities   Lymphadenopathy:      Cervical: No cervical adenopathy. Skin:     General: Skin is warm and dry. Neurological:      Mental Status: She is alert. Diagnostic Study Results   Labs -  Recent Results (from the past 12 hour(s))   D DIMER    Collection Time: 11/12/21 11:42 AM   Result Value Ref Range    D DIMER 1.28 (H) <0.46 ug/ml(FEU)       Radiologic Studies -   DUPLEX LOWER EXT VENOUS LEFT    (Results Pending)     No results found. Medications ordered:   Medications - No data to display      Medical Decision Making   Initial Medical Decision Making and DDx:  Had swelling in the leg yesterday, still perceives some swelling although I do not. No pain or tenderness in the popliteal fossa or the calf neurovascularly intact no apparent injury no chest pain or shortness of breath. We will get D-dimer. Discussed diagnostic plan with the patient she is agreeable    ED Course: Progress Notes, Reevaluation, and Consults:  ED Course as of 11/12/21 1254   Fri Nov 12, 2021   1253 Report from vascular tech, PVL negative for DVT. Discussed with patient and her  questions answered and will be discharged she is happy with this plan. [CB]      ED Course User Index  [CB] Sobia Campos MD         I am the first provider for this patient. I reviewed the vital signs, available nursing notes, past medical history, past surgical history, family history and social history. Patient Vitals for the past 12 hrs:   Temp Pulse Resp BP SpO2   11/12/21 1015 98.8 °F (37.1 °C) 86 16 (!) 150/88 99 %       Vital Signs-Reviewed the patient's vital signs. Pulse Oximetry Analysis, Cardiac Monitor, 12 lead ekg:      Interpreted by the EP. Records Reviewed: Nursing notes reviewed (Time of Review: 10:23 AM)    Procedures:   Critical Care Time:   Aspirin: (was aspirin given for stroke?)    Diagnosis     Clinical Impression:   1. Left leg swelling        Disposition:       Follow-up Information     Follow up With Specialties Details Why Contact Info    Maria Isabel Flores MD Family Medicine In 2 days  [address]  [phone]             Patient's Medications   Start Taking    No medications on file   Continue Taking    ALBUTEROL SULFATE (PROAIR HFA IN)    Take 2 Puffs by inhalation every four (4) hours as needed. AMLODIPINE (NORVASC) 2.5 MG TABLET    take 1 tablet by mouth once daily    ASPIRIN 81 MG TABLET    Take 81 mg by mouth. BETHANECHOL (URECHOLINE) 10 MG TABLET    Take 10 mg by mouth Before breakfast, lunch, and dinner. CLOPIDOGREL (PLAVIX) 75 MG TAB    Take 1 Tab by mouth daily. DESONIDE (TRIDESILON) 0.05 % CREAM        DICLOFENAC (VOLTAREN) 1 % GEL    Apply 4 g to affected area every six (6) hours.  Apply 4 grams to affected joint up to 4 times per day, maximum 16 grams per joint per day Dispense 5 100 gram tubes    DICLOFENAC EC (VOLTAREN) 50 MG EC TABLET    take 1 tablet by mouth twice a day with meals    DICYCLOMINE (BENTYL) 20 MG TABLET    Take 20 mg by mouth every six (6) hours. ERGOCALCIFEROL (VITAMIN D2) 50,000 UNIT CAPSULE    Take 50,000 Units by mouth Every Thursday. FLUTICASONE/SALMETEROL (ADVAIR HFA IN)    Take 2 Puffs by inhalation every twelve (12) hours. FUROSEMIDE (LASIX) 40 MG TABLET    Take 1 Tab by mouth daily. GABAPENTIN (NEURONTIN) 300 MG CAPSULE    Take 1 Capsule by mouth three (3) times daily for 30 days. Max Daily Amount: 900 mg. IBUPROFEN (MOTRIN) 800 MG TABLET    take 1 tablet by mouth every 6 hours AS NEEDED FOR PAIN    KETOCONAZOLE 2 % TOPICAL FOAM    Apply  to affected area two (2) times a day. LIDOCAINE (LIDODERM) 5 %    apply 1 patch to the affected area daily. Leave on for 12 hours and then off for 12 hours. LOSARTAN (COZAAR) 100 MG TABLET    take 1 tablet by mouth once daily    METOPROLOL TARTRATE (LOPRESSOR) 25 MG TABLET    take 1 tablet by mouth twice a day    METOPROLOL TARTRATE (LOPRESSOR) 50 MG TABLET    Take 1 Tab by mouth two (2) times a day. MONTELUKAST (SINGULAIR) 10 MG TABLET    Take 10 mg by mouth daily. NEXIUM 40 MG CAPSULE    Take 1 Cap by mouth daily. OXYCODONE-ACETAMINOPHEN (PERCOCET) 5-325 MG PER TABLET    Take 1 Tab by mouth every four (4) hours as needed for Pain. Max Daily Amount: 6 Tabs. PRAVASTATIN (PRAVACHOL) 40 MG TABLET    Take 1 Tab by mouth nightly. TIOTROPIUM (SPIRIVA WITH HANDIHALER) 18 MCG INHALATION CAPSULE    Take 1 Cap by inhalation daily.    These Medications have changed    No medications on file   Stop Taking    No medications on file     _______________________________    Notes:    Conor Haro MD using Dragon dictation      _______________________________

## 2021-11-12 NOTE — ED NOTES
Patient present to ed A&Ox4 resp even and unlabored, ambulates with cane, gait steady. Pt sent by home health nurse for PVL of LLE. Pt denies pain states \" nurse said come because my leg look swollen and it was a little hard. I don't have any pain\". Pt appears well.

## 2021-12-01 ENCOUNTER — TRANSCRIBE ORDER (OUTPATIENT)
Dept: SCHEDULING | Age: 71
End: 2021-12-01

## 2021-12-01 DIAGNOSIS — Z12.31 SCREENING MAMMOGRAM, ENCOUNTER FOR: Primary | ICD-10-CM

## 2021-12-02 ENCOUNTER — OFFICE VISIT (OUTPATIENT)
Dept: ORTHOPEDIC SURGERY | Age: 71
End: 2021-12-02
Payer: MEDICARE

## 2021-12-02 VITALS — OXYGEN SATURATION: 99 % | HEIGHT: 65 IN | HEART RATE: 78 BPM | WEIGHT: 199 LBS | BODY MASS INDEX: 33.15 KG/M2

## 2021-12-02 DIAGNOSIS — M25.562 PAIN IN BOTH KNEES, UNSPECIFIED CHRONICITY: ICD-10-CM

## 2021-12-02 DIAGNOSIS — R60.0 EDEMA OF RIGHT LOWER LEG: ICD-10-CM

## 2021-12-02 DIAGNOSIS — M17.11 PRIMARY OSTEOARTHRITIS OF RIGHT KNEE: Primary | ICD-10-CM

## 2021-12-02 DIAGNOSIS — M25.561 PAIN IN BOTH KNEES, UNSPECIFIED CHRONICITY: ICD-10-CM

## 2021-12-02 PROCEDURE — 20611 DRAIN/INJ JOINT/BURSA W/US: CPT | Performed by: PHYSICIAN ASSISTANT

## 2021-12-02 PROCEDURE — 3017F COLORECTAL CA SCREEN DOC REV: CPT | Performed by: PHYSICIAN ASSISTANT

## 2021-12-02 PROCEDURE — 99214 OFFICE O/P EST MOD 30 MIN: CPT | Performed by: PHYSICIAN ASSISTANT

## 2021-12-02 PROCEDURE — G8432 DEP SCR NOT DOC, RNG: HCPCS | Performed by: PHYSICIAN ASSISTANT

## 2021-12-02 PROCEDURE — G8756 NO BP MEASURE DOC: HCPCS | Performed by: PHYSICIAN ASSISTANT

## 2021-12-02 PROCEDURE — G8536 NO DOC ELDER MAL SCRN: HCPCS | Performed by: PHYSICIAN ASSISTANT

## 2021-12-02 PROCEDURE — G8417 CALC BMI ABV UP PARAM F/U: HCPCS | Performed by: PHYSICIAN ASSISTANT

## 2021-12-02 PROCEDURE — 1090F PRES/ABSN URINE INCON ASSESS: CPT | Performed by: PHYSICIAN ASSISTANT

## 2021-12-02 PROCEDURE — G8400 PT W/DXA NO RESULTS DOC: HCPCS | Performed by: PHYSICIAN ASSISTANT

## 2021-12-02 PROCEDURE — G8427 DOCREV CUR MEDS BY ELIG CLIN: HCPCS | Performed by: PHYSICIAN ASSISTANT

## 2021-12-02 PROCEDURE — G9899 SCRN MAM PERF RSLTS DOC: HCPCS | Performed by: PHYSICIAN ASSISTANT

## 2021-12-02 PROCEDURE — 1101F PT FALLS ASSESS-DOCD LE1/YR: CPT | Performed by: PHYSICIAN ASSISTANT

## 2021-12-02 RX ORDER — DICLOFENAC SODIUM 10 MG/G
4 GEL TOPICAL 4 TIMES DAILY
Qty: 100 G | Refills: 4 | Status: SHIPPED | OUTPATIENT
Start: 2021-12-02 | End: 2021-12-08 | Stop reason: SDUPTHER

## 2021-12-02 RX ORDER — IBUPROFEN 800 MG/1
800 TABLET ORAL
Qty: 90 TABLET | Refills: 1 | OUTPATIENT
Start: 2021-12-02

## 2021-12-02 RX ORDER — BETAMETHASONE SODIUM PHOSPHATE AND BETAMETHASONE ACETATE 3; 3 MG/ML; MG/ML
6 INJECTION, SUSPENSION INTRA-ARTICULAR; INTRALESIONAL; INTRAMUSCULAR; SOFT TISSUE ONCE
Status: COMPLETED | OUTPATIENT
Start: 2021-12-02 | End: 2021-12-02

## 2021-12-02 RX ADMIN — BETAMETHASONE SODIUM PHOSPHATE AND BETAMETHASONE ACETATE 6 MG: 3; 3 INJECTION, SUSPENSION INTRA-ARTICULAR; INTRALESIONAL; INTRAMUSCULAR; SOFT TISSUE at 15:44

## 2021-12-02 NOTE — PROGRESS NOTES
[address] [place] [address]  [phone]           Patient: Dustin Joshi                MRN: [id number]       SSN: [id number]  YOB: 1950        AGE: 80 y.o. SEX: female  Body mass index is 33.12 kg/m². PCP: Jaguar Triplett MD  12/02/21            REVIEW OF SYSTEMS:  Constitutional: Negative for fever, chills, weight loss and malaise/fatigue. HENT: Negative. Eyes: Negative. Respiratory: Negative. Cardiovascular: Negative. Gastrointestinal: No bowel incontinence or constipation. Genitourinary: No bladder incontinence or saddle anesthesia. Skin: Negative. Neurological: Negative. Endo/Heme/Allergies: Negative. Psychiatric/Behavioral: Negative. Musculoskeletal: As per HPI above. Past Medical History:   Diagnosis Date    Anemia     Asthma     Blood disorder     Cancer ([facility] [address]     breast, diagnosed 2006 left    Cancer [facility])     Chest pain, unspecified     The working diagnosis is chest pain. The differential diagnosis includes chest wall pain, stable angina.  Chronic obstructive pulmonary disease (HCC)     Essential hypertension     Essential hypertension, benign     Uncontrolled     GERD (gastroesophageal reflux disease)     Hypercholesterolemia     Hypertension     Neuropathy     Nonspecific abnormal electrocardiogram (ECG) (EKG)     Obesity, unspecified     Weight loss has been strongly encouraged by following dietary restrictions and an exercise routine.  Polio     as a young child    Pre-operative cardiovascular examination     Sciatica     Sleep apnea     Unspecified cerebral artery occlusion with cerebral infarction     TIA? no residual         Current Outpatient Medications:     diclofenac (VOLTAREN) 1 % gel, Apply 4 g to affected area four (4) times daily. , Disp: 100 g, Rfl: 4    amLODIPine (NORVASC) 2.5 mg tablet, take 1 tablet by mouth once daily, Disp: 90 Tablet, Rfl: 1    metoprolol tartrate (LOPRESSOR) 25 mg tablet, take 1 tablet by mouth twice a day, Disp: 60 Tab, Rfl: 6    ibuprofen (MOTRIN) 800 mg tablet, take 1 tablet by mouth every 6 hours AS NEEDED FOR PAIN, Disp: 90 Tab, Rfl: 1    metoprolol tartrate (LOPRESSOR) 50 mg tablet, Take 1 Tab by mouth two (2) times a day., Disp: 180 Tab, Rfl: 1    NEXIUM 40 mg capsule, Take 1 Cap by mouth daily. , Disp: 90 Cap, Rfl: 3    losartan (COZAAR) 100 mg tablet, take 1 tablet by mouth once daily, Disp: 90 Tab, Rfl: 3    Ketoconazole 2 % topical foam, Apply  to affected area two (2) times a day., Disp: 100 g, Rfl: 0    diclofenac (VOLTAREN) 1 % gel, Apply 4 g to affected area every six (6) hours. Apply 4 grams to affected joint up to 4 times per day, maximum 16 grams per joint per day Dispense 5 100 gram tubes, Disp: 100 g, Rfl: 4    pravastatin (PRAVACHOL) 40 mg tablet, Take 1 Tab by mouth nightly., Disp: , Rfl: 0    clopidogrel (PLAVIX) 75 mg tab, Take 1 Tab by mouth daily. , Disp: , Rfl:     lidocaine (LIDODERM) 5 %, apply 1 patch to the affected area daily. Leave on for 12 hours and then off for 12 hours. , Disp: 5 Patch, Rfl: 2    furosemide (LASIX) 40 mg tablet, Take 1 Tab by mouth daily. , Disp: 90 Tab, Rfl: 3    oxyCODONE-acetaminophen (PERCOCET) 5-325 mg per tablet, Take 1 Tab by mouth every four (4) hours as needed for Pain. Max Daily Amount: 6 Tabs., Disp: 6 Tab, Rfl: 0    dicyclomine (BENTYL) 20 mg tablet, Take 20 mg by mouth every six (6) hours. , Disp: , Rfl: 0    desonide (TRIDESILON) 0.05 % cream, , Disp: , Rfl: 0    ALBUTEROL SULFATE (PROAIR HFA IN), Take 2 Puffs by inhalation every four (4) hours as needed. , Disp: , Rfl:     FLUTICASONE/SALMETEROL (ADVAIR HFA IN), Take 2 Puffs by inhalation every twelve (12) hours. , Disp: , Rfl:     montelukast (SINGULAIR) 10 mg tablet, Take 10 mg by mouth daily. , Disp: , Rfl:     tiotropium (SPIRIVA WITH HANDIHALER) 18 mcg inhalation capsule, Take 1 Cap by inhalation daily. , Disp: , Rfl:     bethanechol (URECHOLINE) 10 mg tablet, Take 10 mg by mouth Before breakfast, lunch, and dinner., Disp: , Rfl:     aspirin 81 mg tablet, Take 81 mg by mouth.  , Disp: , Rfl:     ergocalciferol (VITAMIN D2) 50,000 unit capsule, Take 50,000 Units by mouth Every Thursday. , Disp: , Rfl:     diclofenac EC (VOLTAREN) 50 mg EC tablet, take 1 tablet by mouth twice a day with meals (Patient not taking: Reported on 12/2/2021), Disp: 120 Tab, Rfl: 2    Current Facility-Administered Medications:     betamethasone (CELESTONE) injection 6 mg, 6 mg, Intra artICUlar, ONCE, Sadia Serrato PA-C    No Known Allergies    Social History     Socioeconomic History    Marital status:      Spouse name: Not on file    Number of children: Not on file    Years of education: Not on file    Highest education level: Not on file   Occupational History    Not on file   Tobacco Use    Smoking status: Never Smoker    Smokeless tobacco: Never Used   Substance and Sexual Activity    Alcohol use: No     Alcohol/week: 0.0 standard drinks    Drug use: No    Sexual activity: Not on file   Other Topics Concern    Not on file   Social History Narrative    ** Merged History Encounter **          Social Determinants of Health     Financial Resource Strain:     Difficulty of Paying Living Expenses: Not on file   Food Insecurity:     Worried About Running Out of Food in the Last Year: Not on file    Santana of Food in the Last Year: Not on file   Transportation Needs:     Lack of Transportation (Medical): Not on file    Lack of Transportation (Non-Medical):  Not on file   Physical Activity:     Days of Exercise per Week: Not on file    Minutes of Exercise per Session: Not on file   Stress:     Feeling of Stress : Not on file   Social Connections:     Frequency of Communication with Friends and Family: Not on file    Frequency of Social Gatherings with Friends and Family: Not on file    Attends Orthodoxy Services: Not on file    Active Member of Clubs or Organizations: Not on file    Attends Club or Organization Meetings: Not on file    Marital Status: Not on file   Intimate Partner Violence:     Fear of Current or Ex-Partner: Not on file    Emotionally Abused: Not on file    Physically Abused: Not on file    Sexually Abused: Not on file   Housing Stability:     Unable to Pay for Housing in the Last Year: Not on file    Number of [place] in the Last Year: Not on file    Unstable Housing in the Last Year: Not on file       Past Surgical History:   Procedure Laterality Date    HX BREAST BIOPSY  4/11/12    Left    HX BREAST BIOPSY Left     left mastectomy    HX VEIN STRIPPING Bilateral     [facility]      initial surgery 2006, then left modified radical mastectomy 6/2012       Patient seen evaluate today for her right knee. Does have known advancing arthritis of the right knee. She had injections in the past which give her good relief. She presents today with increasing discomfort. She has decreased walking tolerance. Has trouble getting from a chair. She has trouble stairs. She has pain at night. Patient denies recent fevers, chills, chest pain, SOB, or injuries. No recent systemic changes noted. A 12-point review of systems is performed today. Pertinent positives are noted. All other systems reviewed and otherwise are negative. Physical exam: General: Alert and oriented x3, nad.  well-developed, well nourished. normal affect, AF. NC/AT, EOMI, neck supple, trachea midline, no JVD present. Breathing is non-labored. Examination of the lower extremities reveals pain-free range of motion the hips. There is no pain to palpation the trochanter bursa. Negative straight leg raise. Negative calf tenderness. Negative Homans. No signs of DVT present. There is mild edema noted to the lower extremities with some mild discomfort to palpation.   The knee reveals skin intact. There is no erythema or ecchymosis noted. There are no signs of infection or cellulitis present. She does have discomfort to palpation the medial joint line as well as patellofemoral grind. Assessment: Right knee osteoarthritis, venous insufficiency with mild edema    Plan: At this point, we discussed treatment options. We will move forward with a cortisone injection for the right knee. After informed consent, under aseptic conditions, with US guided assitance, the right knee was prepped with betadine and a mxiture of 3ml 1% lidocaine and 6mg of celestone was injected without complications. The patient tolerated the injection well. The patient is instructed on post-injection care. We will refer her to vascular. She is instructed on VICTOR M stockings as well as leg elevation. She will continue with her diuretics. Surgical mesh was discussed and elected against given efficacy of conservative treatment. Chart reviewed for the following:  Ger BURNS PA-C, have reviewed the History, Physical and updated the Allergic reactions for Alvia Kasal?     TIME OUT performed immediately prior to start of procedure:  Ger BURNS PA-C, have performed the following reviews on Alvia Kasal prior to the start of the procedure:  ????????  * Patient was identified by name and date of birth   * Agreement on procedure being performed was verified  * Risks and Benefits explained to the patient  * Procedure site verified and marked as necessary  * Patient was positioned for comfort  * Consent was signed and verified    Time:3:42 PM    Body part: right knee, intra-articular    Medication & Dose: 3ml 1% lidocaine and 6mg celestone    Date of procedure: 12/02/21    Procedure performed by: Ger Quintana PA-C    Provider assisted by: none    Patient assisted by: self    How tolerated by patient: tolerated the procedure well with no complications    Post Procedural Pain Scale: 10    Comments:   [address] using a frequency of 10MHz with a 12L-RS transducer head was used to confirm needle placement.   Ultrasound images captured using [address] Ultrasound machine and scanned into patient's chart       Kavita Palmer PA-C, ATC

## 2021-12-08 ENCOUNTER — OFFICE VISIT (OUTPATIENT)
Dept: CARDIOLOGY CLINIC | Age: 71
End: 2021-12-08
Payer: MEDICARE

## 2021-12-08 VITALS
HEIGHT: 65 IN | BODY MASS INDEX: 33.49 KG/M2 | WEIGHT: 201 LBS | HEART RATE: 77 BPM | SYSTOLIC BLOOD PRESSURE: 149 MMHG | DIASTOLIC BLOOD PRESSURE: 81 MMHG

## 2021-12-08 DIAGNOSIS — I50.32 CHRONIC DIASTOLIC CONGESTIVE HEART FAILURE (HCC): Primary | ICD-10-CM

## 2021-12-08 DIAGNOSIS — I10 ESSENTIAL HYPERTENSION, BENIGN: ICD-10-CM

## 2021-12-08 DIAGNOSIS — J45.30 MILD PERSISTENT ASTHMA WITHOUT COMPLICATION: ICD-10-CM

## 2021-12-08 DIAGNOSIS — E78.5 DYSLIPIDEMIA: ICD-10-CM

## 2021-12-08 PROCEDURE — G8417 CALC BMI ABV UP PARAM F/U: HCPCS | Performed by: INTERNAL MEDICINE

## 2021-12-08 PROCEDURE — 3017F COLORECTAL CA SCREEN DOC REV: CPT | Performed by: INTERNAL MEDICINE

## 2021-12-08 PROCEDURE — G8428 CUR MEDS NOT DOCUMENT: HCPCS | Performed by: INTERNAL MEDICINE

## 2021-12-08 PROCEDURE — G9899 SCRN MAM PERF RSLTS DOC: HCPCS | Performed by: INTERNAL MEDICINE

## 2021-12-08 PROCEDURE — 1101F PT FALLS ASSESS-DOCD LE1/YR: CPT | Performed by: INTERNAL MEDICINE

## 2021-12-08 PROCEDURE — 1090F PRES/ABSN URINE INCON ASSESS: CPT | Performed by: INTERNAL MEDICINE

## 2021-12-08 PROCEDURE — G8753 SYS BP > OR = 140: HCPCS | Performed by: INTERNAL MEDICINE

## 2021-12-08 PROCEDURE — G8400 PT W/DXA NO RESULTS DOC: HCPCS | Performed by: INTERNAL MEDICINE

## 2021-12-08 PROCEDURE — 99214 OFFICE O/P EST MOD 30 MIN: CPT | Performed by: INTERNAL MEDICINE

## 2021-12-08 PROCEDURE — G8510 SCR DEP NEG, NO PLAN REQD: HCPCS | Performed by: INTERNAL MEDICINE

## 2021-12-08 PROCEDURE — G8754 DIAS BP LESS 90: HCPCS | Performed by: INTERNAL MEDICINE

## 2021-12-08 PROCEDURE — G8536 NO DOC ELDER MAL SCRN: HCPCS | Performed by: INTERNAL MEDICINE

## 2021-12-08 RX ORDER — ESOMEPRAZOLE MAGNESIUM 40 MG/1
40 CAPSULE, DELAYED RELEASE ORAL DAILY
Qty: 30 CAPSULE | Refills: 3 | Status: SHIPPED | OUTPATIENT
Start: 2021-12-08 | End: 2022-10-12 | Stop reason: SDUPTHER

## 2021-12-08 NOTE — PROGRESS NOTES
HISTORY OF PRESENT ILLNESS  Juvenal Serra is a 80 y.o. female. Shortness of Breath  The history is provided by the patient. This is a chronic problem. The problem occurs intermittently. The current episode started more than 1 week ago. The problem has not changed since onset. Pertinent negatives include no ear pain, no neck pain, no wheezing, no rash and no leg swelling. Associated medical issues include heart failure. Associated medical issues do not include chronic lung disease or CAD. Review of Systems   Constitutional: Negative for chills and weight loss. HENT: Negative for congestion, ear discharge, ear pain, hearing loss, nosebleeds and tinnitus. Eyes: Negative for blurred vision. Respiratory: Positive for shortness of breath. Negative for wheezing and stridor. Cardiovascular: Negative for leg swelling. Gastrointestinal: Negative for heartburn. Musculoskeletal: Negative for myalgias and neck pain. Skin: Negative for itching and rash. Neurological: Negative for tingling, tremors, focal weakness and loss of consciousness. Psychiatric/Behavioral: Negative for depression and suicidal ideas. Family History   Problem Relation Age of Onset    Cancer Mother     Cancer Father     OSTEOARTHRITIS Other     Hypertension Other     Heart Disease Neg Hx         Negative family history of premature CAD or CVA       Past Medical History:   Diagnosis Date    Anemia     Asthma     Blood disorder     Cancer ([facility] [address]     breast, diagnosed 2006 left    Cancer ([facility] [address]     Chest pain, unspecified     The working diagnosis is chest pain. The differential diagnosis includes chest wall pain, stable angina.     Chronic obstructive pulmonary disease (HCC)     Essential hypertension     Essential hypertension, benign     Uncontrolled     GERD (gastroesophageal reflux disease)     Hypercholesterolemia     Hypertension     Neuropathy     Nonspecific abnormal electrocardiogram (ECG) (EKG)     Obesity, unspecified     Weight loss has been strongly encouraged by following dietary restrictions and an exercise routine.  Polio     as a young child    Pre-operative cardiovascular examination     Sciatica     Sleep apnea     Unspecified cerebral artery occlusion with cerebral infarction     TIA? no residual       Past Surgical History:   Procedure Laterality Date    HX BREAST BIOPSY  4/11/12    Left    HX BREAST BIOPSY Left     left mastectomy    HX VEIN STRIPPING Bilateral     DC BREAST SURGERY PROCEDURE UNLISTED      initial surgery 2006, then left modified radical mastectomy 6/2012       Social History     Tobacco Use    Smoking status: Never Smoker    Smokeless tobacco: Never Used   Substance Use Topics    Alcohol use: No     Alcohol/week: 0.0 standard drinks       No Known Allergies    Outpatient Medications Marked as Taking for the 12/8/21 encounter (Office Visit) with Jayleen Perez MD   Medication Sig Dispense Refill    esomeprazole (NexIUM) 40 mg capsule Take 1 Capsule by mouth daily. 30 Capsule 3    amLODIPine (NORVASC) 2.5 mg tablet take 1 tablet by mouth once daily 90 Tablet 1    metoprolol tartrate (LOPRESSOR) 25 mg tablet take 1 tablet by mouth twice a day 60 Tab 6    ibuprofen (MOTRIN) 800 mg tablet take 1 tablet by mouth every 6 hours AS NEEDED FOR PAIN 90 Tab 1    losartan (COZAAR) 100 mg tablet take 1 tablet by mouth once daily 90 Tab 3    Ketoconazole 2 % topical foam Apply  to affected area two (2) times a day. 100 g 0    diclofenac (VOLTAREN) 1 % gel Apply 4 g to affected area every six (6) hours. Apply 4 grams to affected joint up to 4 times per day, maximum 16 grams per joint per day Dispense 5 100 gram tubes 100 g 4    pravastatin (PRAVACHOL) 40 mg tablet Take 1 Tab by mouth nightly. 0    clopidogrel (PLAVIX) 75 mg tab Take 1 Tab by mouth daily.  lidocaine (LIDODERM) 5 % apply 1 patch to the affected area daily.  Leave on for 12 hours and then off for 12 hours. 5 Patch 2    furosemide (LASIX) 40 mg tablet Take 1 Tab by mouth daily. 90 Tab 3    oxyCODONE-acetaminophen (PERCOCET) 5-325 mg per tablet Take 1 Tab by mouth every four (4) hours as needed for Pain. Max Daily Amount: 6 Tabs. 6 Tab 0    dicyclomine (BENTYL) 20 mg tablet Take 20 mg by mouth every six (6) hours. 0    ALBUTEROL SULFATE (PROAIR HFA IN) Take 2 Puffs by inhalation every four (4) hours as needed.  FLUTICASONE/SALMETEROL (ADVAIR HFA IN) Take 2 Puffs by inhalation every twelve (12) hours.  montelukast (SINGULAIR) 10 mg tablet Take 10 mg by mouth daily.  tiotropium (SPIRIVA WITH HANDIHALER) 18 mcg inhalation capsule Take 1 Cap by inhalation daily.  bethanechol (URECHOLINE) 10 mg tablet Take 10 mg by mouth Before breakfast, lunch, and dinner.  aspirin 81 mg tablet Take 81 mg by mouth.  ergocalciferol (VITAMIN D2) 50,000 unit capsule Take 50,000 Units by mouth Every Thursday. Visit Vitals  BP (!) 149/81   Pulse 77   Ht 5' 5\" (1.651 m)   Wt 91.2 kg (201 lb)   LMP  (LMP Unknown)   BMI 33.45 kg/m²     Physical Exam  Constitutional:       General: She is not in acute distress. Appearance: She is well-developed. She is not diaphoretic. HENT:      Head: Atraumatic. Mouth/Throat:      Pharynx: No oropharyngeal exudate. Eyes:      General: No scleral icterus. Right eye: No discharge. Left eye: No discharge. Conjunctiva/sclera: Conjunctivae normal.   Neck:      Thyroid: No thyromegaly. Vascular: No JVD. Trachea: No tracheal deviation. Cardiovascular:      Rate and Rhythm: Normal rate and regular rhythm. Heart sounds: No murmur heard. No gallop. Pulmonary:      Effort: Pulmonary effort is normal.      Breath sounds: No stridor. No wheezing or rales. Abdominal:      Palpations: Abdomen is soft. Tenderness: There is no abdominal tenderness. There is no guarding or rebound.    Musculoskeletal:         General: No tenderness. Normal range of motion. Cervical back: Normal range of motion and neck supple. Lymphadenopathy:      Cervical: No cervical adenopathy. Skin:     General: Skin is warm. Neurological:      Mental Status: She is alert and oriented to person, place, and time. Motor: No abnormal muscle tone. Psychiatric:         Behavior: Behavior normal.       ekg sinus rhythm with no acute st-t changes  Echo 10/2017:  SUMMARY:  Left ventricle: Systolic function was normal. Ejection fraction was  estimated in the range of 55 % to 60 %. There were no regional wall motion  abnormalities. There was mild concentric hypertrophy. Doppler parameters  were consistent with abnormal left ventricular relaxation (grade 1  diastolic dysfunction). Mitral valve: There was mild regurgitation. Tricuspid valve: There was mild regurgitation. ASSESSMENT and PLAN    ICD-10-CM ICD-9-CM    1. Chronic diastolic congestive heart failure (HCC)  I50.32 428.32 ECHO ADULT COMPLETE     428.0    2. Essential hypertension, benign  I10 401.1    3. Dyslipidemia  E78.5 272.4    4. Mild persistent asthma without complication  U50.00 337.71      Orders Placed This Encounter    ECHO ADULT COMPLETE     Standing Status:   Future     Standing Expiration Date:   6/10/2022     Order Specific Question:   Contrast Enhancement (Bubble Study, Definity, Optison) may be used if criteria listed in established evidence-based protocol has been identified. Answer: Yes    esomeprazole (NexIUM) 40 mg capsule     Sig: Take 1 Capsule by mouth daily. Dispense:  30 Capsule     Refill:  3     Follow-up and Dispositions    · Return in about 3 months (around 3/8/2022). current treatment plan is effective, no change in therapy  reviewed diet, exercise and weight control. Patient is a 52-year-old female with longstanding history of hypertension, chronic diastolic CHF, asthma seen for follow-up.   C/o stable dyspnea at moderate exertion. Intermittent LE edema. Will obtain echo to assess Lv function/ HHD. F/u in 3 months.  Meanwhile recommend salt restriction and to continue current meds

## 2021-12-08 NOTE — PROGRESS NOTES
1. Have you been to the ER, urgent care clinic since your last visit? Hospitalized since your last visit? No    2. Have you seen or consulted any other health care providers outside of the [address] [place] [address] since your last visit? Include any pap smears or colon screening.       No

## 2021-12-13 ENCOUNTER — HOSPITAL ENCOUNTER (OUTPATIENT)
Dept: MAMMOGRAPHY | Age: 71
Discharge: HOME | End: 2021-12-13
Attending: SURGERY
Payer: MEDICARE

## 2021-12-13 DIAGNOSIS — Z12.31 SCREENING MAMMOGRAM, ENCOUNTER FOR: ICD-10-CM

## 2021-12-13 PROCEDURE — 77063 BREAST TOMOSYNTHESIS BI: CPT

## 2022-03-07 RX ORDER — AMLODIPINE BESYLATE 2.5 MG/1
TABLET ORAL
Qty: 90 TABLET | Refills: 1 | Status: SHIPPED | OUTPATIENT
Start: 2022-03-07 | End: 2022-10-12 | Stop reason: ALTCHOICE

## 2022-03-18 PROBLEM — I50.32 CHRONIC DIASTOLIC CONGESTIVE HEART FAILURE (HCC): Status: ACTIVE | Noted: 2017-11-15

## 2022-03-18 PROBLEM — R06.02 SOB (SHORTNESS OF BREATH): Status: ACTIVE | Noted: 2017-10-10

## 2022-03-18 PROBLEM — M50.20 DISPLACEMENT OF CERVICAL INTERVERTEBRAL DISC WITHOUT MYELOPATHY: Status: ACTIVE | Noted: 2017-04-24

## 2022-03-18 PROBLEM — M48.03 SPINAL STENOSIS, CERVICOTHORACIC REGION: Status: ACTIVE | Noted: 2017-04-24

## 2022-03-19 PROBLEM — M51.26 DISPLACEMENT OF LUMBAR INTERVERTEBRAL DISC WITHOUT MYELOPATHY: Status: ACTIVE | Noted: 2017-04-24

## 2022-03-19 PROBLEM — M54.50 LOW BACK PAIN WITHOUT SCIATICA: Status: ACTIVE | Noted: 2017-04-24

## 2022-03-23 ENCOUNTER — HOSPITAL ENCOUNTER (OUTPATIENT)
Dept: GENERAL RADIOLOGY | Age: 72
Discharge: HOME | End: 2022-03-23
Payer: MEDICARE

## 2022-03-23 DIAGNOSIS — M54.50 LOW BACK PAIN: ICD-10-CM

## 2022-03-23 PROCEDURE — 72110 X-RAY EXAM L-2 SPINE 4/>VWS: CPT

## 2022-04-06 ENCOUNTER — OFFICE VISIT (OUTPATIENT)
Dept: CARDIOLOGY CLINIC | Age: 72
End: 2022-04-06
Payer: MEDICARE

## 2022-04-06 VITALS
SYSTOLIC BLOOD PRESSURE: 145 MMHG | WEIGHT: 208 LBS | OXYGEN SATURATION: 93 % | BODY MASS INDEX: 34.66 KG/M2 | HEIGHT: 65 IN | HEART RATE: 103 BPM | DIASTOLIC BLOOD PRESSURE: 72 MMHG

## 2022-04-06 DIAGNOSIS — E78.5 DYSLIPIDEMIA: ICD-10-CM

## 2022-04-06 DIAGNOSIS — I50.32 CHRONIC DIASTOLIC CONGESTIVE HEART FAILURE (HCC): Primary | ICD-10-CM

## 2022-04-06 DIAGNOSIS — I10 ESSENTIAL HYPERTENSION, BENIGN: ICD-10-CM

## 2022-04-06 DIAGNOSIS — J45.30 MILD PERSISTENT ASTHMA WITHOUT COMPLICATION: ICD-10-CM

## 2022-04-06 PROCEDURE — 93000 ELECTROCARDIOGRAM COMPLETE: CPT | Performed by: INTERNAL MEDICINE

## 2022-04-06 PROCEDURE — G8417 CALC BMI ABV UP PARAM F/U: HCPCS | Performed by: INTERNAL MEDICINE

## 2022-04-06 PROCEDURE — G8753 SYS BP > OR = 140: HCPCS | Performed by: INTERNAL MEDICINE

## 2022-04-06 PROCEDURE — G8432 DEP SCR NOT DOC, RNG: HCPCS | Performed by: INTERNAL MEDICINE

## 2022-04-06 PROCEDURE — 99214 OFFICE O/P EST MOD 30 MIN: CPT | Performed by: INTERNAL MEDICINE

## 2022-04-06 PROCEDURE — G8536 NO DOC ELDER MAL SCRN: HCPCS | Performed by: INTERNAL MEDICINE

## 2022-04-06 PROCEDURE — G8428 CUR MEDS NOT DOCUMENT: HCPCS | Performed by: INTERNAL MEDICINE

## 2022-04-06 PROCEDURE — 1101F PT FALLS ASSESS-DOCD LE1/YR: CPT | Performed by: INTERNAL MEDICINE

## 2022-04-06 PROCEDURE — 1090F PRES/ABSN URINE INCON ASSESS: CPT | Performed by: INTERNAL MEDICINE

## 2022-04-06 PROCEDURE — G8754 DIAS BP LESS 90: HCPCS | Performed by: INTERNAL MEDICINE

## 2022-04-06 PROCEDURE — 3017F COLORECTAL CA SCREEN DOC REV: CPT | Performed by: INTERNAL MEDICINE

## 2022-04-06 PROCEDURE — G9899 SCRN MAM PERF RSLTS DOC: HCPCS | Performed by: INTERNAL MEDICINE

## 2022-04-06 PROCEDURE — G8400 PT W/DXA NO RESULTS DOC: HCPCS | Performed by: INTERNAL MEDICINE

## 2022-04-06 NOTE — PROGRESS NOTES
HISTORY OF PRESENT ILLNESS  Waqar Yap is a 80 y.o. female. Shortness of Breath  The history is provided by the patient. This is a chronic problem. The problem occurs intermittently. The current episode started more than 1 week ago. The problem has not changed since onset. Pertinent negatives include no ear pain, no neck pain, no wheezing, no rash and no leg swelling. Associated medical issues include heart failure. Associated medical issues do not include chronic lung disease or CAD. Review of Systems   Constitutional: Negative for chills and weight loss. HENT: Negative for congestion, ear discharge, ear pain, hearing loss, nosebleeds and tinnitus. Eyes: Negative for blurred vision. Respiratory: Negative for wheezing and stridor. Cardiovascular: Negative for leg swelling. Gastrointestinal: Negative for heartburn. Musculoskeletal: Negative for myalgias and neck pain. Skin: Negative for itching and rash. Neurological: Negative for tingling, tremors, focal weakness and loss of consciousness. Psychiatric/Behavioral: Negative for depression and suicidal ideas. Family History   Problem Relation Age of Onset    Cancer Mother     Cancer Father     OSTEOARTHRITIS Other     Hypertension Other     Heart Disease Neg Hx         Negative family history of premature CAD or CVA       Past Medical History:   Diagnosis Date    Anemia     Asthma     Blood disorder     Cancer ([facility] [address]     breast, diagnosed 2006 left    Cancer ([facility] [address]     Chest pain, unspecified     The working diagnosis is chest pain. The differential diagnosis includes chest wall pain, stable angina.     Chronic obstructive pulmonary disease (HCC)     Essential hypertension     Essential hypertension, benign     Uncontrolled     GERD (gastroesophageal reflux disease)     Hypercholesterolemia     Hypertension     Neuropathy     Nonspecific abnormal electrocardiogram (ECG) (EKG)     Obesity, unspecified     Weight loss has been strongly encouraged by following dietary restrictions and an exercise routine.  Polio     as a young child    Pre-operative cardiovascular examination     Sciatica     Sleep apnea     Unspecified cerebral artery occlusion with cerebral infarction     TIA? no residual       Past Surgical History:   Procedure Laterality Date    HX BREAST BIOPSY  4/11/12    Left    HX BREAST BIOPSY Left     left mastectomy    HX VEIN STRIPPING Bilateral     PA BREAST SURGERY PROCEDURE UNLISTED      initial surgery 2006, then left modified radical mastectomy 6/2012       Social History     Tobacco Use    Smoking status: Never Smoker    Smokeless tobacco: Never Used   Substance Use Topics    Alcohol use: No     Alcohol/week: 0.0 standard drinks       No Known Allergies    Outpatient Medications Marked as Taking for the 4/6/22 encounter (Office Visit) with Hong Murphy MD   Medication Sig Dispense Refill    amLODIPine (NORVASC) 2.5 mg tablet take 1 tablet by mouth once daily 90 Tablet 1    ibuprofen (MOTRIN) 800 mg tablet take 1 tablet by mouth every 6 hours AS NEEDED FOR PAIN 90 Tablet 1    esomeprazole (NexIUM) 40 mg capsule Take 1 Capsule by mouth daily. 30 Capsule 3    metoprolol tartrate (LOPRESSOR) 25 mg tablet take 1 tablet by mouth twice a day 60 Tab 6    losartan (COZAAR) 100 mg tablet take 1 tablet by mouth once daily 90 Tab 3    Ketoconazole 2 % topical foam Apply  to affected area two (2) times a day. 100 g 0    diclofenac (VOLTAREN) 1 % gel Apply 4 g to affected area every six (6) hours. Apply 4 grams to affected joint up to 4 times per day, maximum 16 grams per joint per day Dispense 5 100 gram tubes 100 g 4    pravastatin (PRAVACHOL) 40 mg tablet Take 1 Tab by mouth nightly. 0    clopidogrel (PLAVIX) 75 mg tab Take 1 Tab by mouth daily.  lidocaine (LIDODERM) 5 % apply 1 patch to the affected area daily. Leave on for 12 hours and then off for 12 hours.  5 Patch 2    furosemide (LASIX) 40 mg tablet Take 1 Tab by mouth daily. 90 Tab 3    oxyCODONE-acetaminophen (PERCOCET) 5-325 mg per tablet Take 1 Tab by mouth every four (4) hours as needed for Pain. Max Daily Amount: 6 Tabs. 6 Tab 0    desonide (TRIDESILON) 0.05 % cream   0    ALBUTEROL SULFATE (PROAIR HFA IN) Take 2 Puffs by inhalation every four (4) hours as needed.  FLUTICASONE/SALMETEROL (ADVAIR HFA IN) Take 2 Puffs by inhalation every twelve (12) hours.  montelukast (SINGULAIR) 10 mg tablet Take 10 mg by mouth daily.  tiotropium (SPIRIVA WITH HANDIHALER) 18 mcg inhalation capsule Take 1 Cap by inhalation daily.  bethanechol (URECHOLINE) 10 mg tablet Take 10 mg by mouth Before breakfast, lunch, and dinner.  aspirin 81 mg tablet Take 81 mg by mouth.  ergocalciferol (VITAMIN D2) 50,000 unit capsule Take 50,000 Units by mouth Every Thursday. Visit Vitals  BP (!) 145/72 (BP 1 Location: Left upper arm, BP Patient Position: Sitting, BP Cuff Size: Large adult)   Pulse (!) 103   Ht 5' 5\" (1.651 m)   Wt 94.3 kg (208 lb)   LMP  (LMP Unknown)   SpO2 93%   BMI 34.61 kg/m²     Physical Exam  Constitutional:       General: She is not in acute distress. Appearance: She is well-developed. She is not diaphoretic. HENT:      Head: Atraumatic. Mouth/Throat:      Pharynx: No oropharyngeal exudate. Eyes:      General: No scleral icterus. Right eye: No discharge. Left eye: No discharge. Conjunctiva/sclera: Conjunctivae normal.   Neck:      Thyroid: No thyromegaly. Vascular: No JVD. Trachea: No tracheal deviation. Cardiovascular:      Rate and Rhythm: Normal rate and regular rhythm. Heart sounds: No murmur heard. No gallop. Pulmonary:      Effort: Pulmonary effort is normal.      Breath sounds: No stridor. No wheezing or rales. Abdominal:      Palpations: Abdomen is soft. Tenderness: There is no abdominal tenderness.  There is no guarding or rebound. Musculoskeletal:         General: No tenderness. Normal range of motion. Cervical back: Normal range of motion and neck supple. Lymphadenopathy:      Cervical: No cervical adenopathy. Skin:     General: Skin is warm. Neurological:      Mental Status: She is alert and oriented to person, place, and time. Motor: No abnormal muscle tone. Psychiatric:         Behavior: Behavior normal.       ekg sinus rhythm with no acute st-t changes  Echo 10/2017:  SUMMARY:  Left ventricle: Systolic function was normal. Ejection fraction was  estimated in the range of 55 % to 60 %. There were no regional wall motion  abnormalities. There was mild concentric hypertrophy. Doppler parameters  were consistent with abnormal left ventricular relaxation (grade 1  diastolic dysfunction). Mitral valve: There was mild regurgitation. Tricuspid valve: There was mild regurgitation. 02/24/22    ECHO ADULT COMPLETE 02/25/2022 2/25/2022    Interpretation Summary    Left Ventricle: Left ventricle size is normal. Mildly increased wall thickness. Normal wall motion. Normal left ventricular systolic function with a visually estimated EF of 60 - 65%. Diastolic dysfunction present with normal LV EF.   Mitral Valve: Mild transvalvular regurgitation. Signed by: Adelita Randolph MD on 2/25/2022  3:12 PM    ASSESSMENT and PLAN    ICD-10-CM ICD-9-CM    1. Chronic diastolic congestive heart failure (HCC)  I50.32 428.32      428.0    2. Essential hypertension, benign  I10 401.1 AMB POC EKG ROUTINE W/ 12 LEADS, INTER & REP   3. Dyslipidemia  E78.5 272.4    4. Mild persistent asthma without complication  E24.72 512.42      Orders Placed This Encounter    AMB POC EKG ROUTINE W/ 12 LEADS, INTER & REP     Order Specific Question:   Reason for Exam:     Answer:   htn     Follow-up and Dispositions    · Return in about 6 months (around 10/6/2022).        current treatment plan is effective, no change in therapy  reviewed diet, exercise and weight control. Patient is a 62-year-old female with longstanding history of hypertension, chronic diastolic CHF NYHA class II, asthma seen for follow-up. C/o stable dyspnea at moderate exertion. Intermittent LE edema. Echo report reviewed with patient-normal LV function with no wall motion abnormality/valvular heart disease. Continue current dose of Lasix 40 mg daily. Continue salt restriction weight reduction. Follow-up in 6 months.

## 2022-04-06 NOTE — PROGRESS NOTES
1. Have you been to the ER, urgent care clinic since your last visit? Hospitalized since your last visit? No    2. Have you seen or consulted any other health care providers outside of the [address] [place] [address] since your last visit? Include any pap smears or colon screening. No     3. Do you need any refills today?  Yes nexium

## 2022-04-16 NOTE — DISCHARGE INSTRUCTIONS
PT: Received new PT orders for evaluation and treatment.  PT originally ordered on 4/9/22, evaluated 4/11/22 and has been followed by PT for treatment since evaluation.  Will continue to provide inpatient PT treatment, no re-evaluation indicated.  Will complete order from 4/15/22.   Patient Education        Arthritis: Care Instructions  Overview  Arthritis, also called osteoarthritis, is a breakdown of the cartilage that cushions your joints. When the cartilage wears down, your bones rub against each other. This causes pain and stiffness. Many people have some arthritis as they age. Arthritis most often affects the joints of the spine, hands, hips, knees, or feet. Arthritis never goes away completely. But medicine and home treatment can help reduce pain and help you stay active. Follow-up care is a key part of your treatment and safety. Be sure to make and go to all appointments, and call your doctor if you are having problems. It's also a good idea to know your test results and keep a list of the medicines you take. How can you care for yourself at home? · Stay at a healthy weight. Being overweight puts extra strain on your joints. · Talk to your doctor or physical therapist about exercises that will help ease joint pain. ? Stretch. You may enjoy gentle forms of yoga to help keep your joints and muscles flexible. ? Walk instead of jog. Other types of exercise that are less stressful on the joints include riding a bike, swimming, nereida chi, or water exercise. ? Lift weights. Strong muscles help reduce stress on your joints. Stronger thigh muscles, for example, take some of the stress off of the knees and hips. Learn the right way to lift weights so you don't make joint pain worse. · Take your medicines exactly as prescribed. Call your doctor if you think you are having a problem with your medicine. · Take pain medicines exactly as directed. ? If the doctor gave you a prescription medicine for pain, take it as prescribed. ? If you are not taking a prescription pain medicine, ask your doctor if you can take an over-the-counter medicine. · Use a cane, crutch, walker, or another device if you need help to get around. These can help rest your joints.  You also can use other things to make life easier, such as a higher toilet seat and padded handles on kitchen utensils. · Do not sit in low chairs. They can make it hard to get up. · Put heat or cold on your sore joints as needed. Use whichever helps you most. You can also switch between hot and cold packs. ? Apply heat 2 or 3 times a day for 20 to 30 minutes--using a heating pad, hot shower, or hot pack--to relieve pain and stiffness. But don't use heat on a swollen joint. ? Put ice or a cold pack on your sore joint for 10 to 20 minutes at a time. Put a thin cloth between the ice and your skin. When should you call for help? Call your doctor now or seek immediate medical care if:    · You have sudden swelling, warmth, or pain in any joint.     · You have joint pain and a fever or rash.     · You have such bad pain that you cannot use a joint. Watch closely for changes in your health, and be sure to contact your doctor if:    · You have mild joint symptoms that continue even with more than 6 weeks of care at home.     · You have stomach pain or other problems with your medicine. Where can you learn more? Go to http://laura-jessie.info/  Enter X977 in the search box to learn more about \"Arthritis: Care Instructions. \"  Current as of: December 9, 2019               Content Version: 12.6  © 0953-6603 [employer]. Care instructions adapted under license by [employer] (which disclaims liability or warranty for this information). If you have questions about a medical condition or this instruction, always ask your healthcare professional. [address] any warranty or liability for your use of this information.

## 2022-06-28 ENCOUNTER — OFFICE VISIT (OUTPATIENT)
Dept: ORTHOPEDIC SURGERY | Age: 72
End: 2022-06-28
Payer: MEDICARE

## 2022-06-28 VITALS
WEIGHT: 208.2 LBS | HEART RATE: 115 BPM | OXYGEN SATURATION: 90 % | HEIGHT: 65 IN | TEMPERATURE: 97.5 F | BODY MASS INDEX: 34.69 KG/M2

## 2022-06-28 DIAGNOSIS — M25.551 RIGHT HIP PAIN: Primary | ICD-10-CM

## 2022-06-28 DIAGNOSIS — M70.61 GREATER TROCHANTERIC BURSITIS OF RIGHT HIP: ICD-10-CM

## 2022-06-28 PROCEDURE — G8432 DEP SCR NOT DOC, RNG: HCPCS | Performed by: ORTHOPAEDIC SURGERY

## 2022-06-28 PROCEDURE — 1090F PRES/ABSN URINE INCON ASSESS: CPT | Performed by: ORTHOPAEDIC SURGERY

## 2022-06-28 PROCEDURE — G8427 DOCREV CUR MEDS BY ELIG CLIN: HCPCS | Performed by: ORTHOPAEDIC SURGERY

## 2022-06-28 PROCEDURE — 1101F PT FALLS ASSESS-DOCD LE1/YR: CPT | Performed by: ORTHOPAEDIC SURGERY

## 2022-06-28 PROCEDURE — 1123F ACP DISCUSS/DSCN MKR DOCD: CPT | Performed by: ORTHOPAEDIC SURGERY

## 2022-06-28 PROCEDURE — G8417 CALC BMI ABV UP PARAM F/U: HCPCS | Performed by: ORTHOPAEDIC SURGERY

## 2022-06-28 PROCEDURE — G9899 SCRN MAM PERF RSLTS DOC: HCPCS | Performed by: ORTHOPAEDIC SURGERY

## 2022-06-28 PROCEDURE — G8400 PT W/DXA NO RESULTS DOC: HCPCS | Performed by: ORTHOPAEDIC SURGERY

## 2022-06-28 PROCEDURE — G8756 NO BP MEASURE DOC: HCPCS | Performed by: ORTHOPAEDIC SURGERY

## 2022-06-28 PROCEDURE — 3017F COLORECTAL CA SCREEN DOC REV: CPT | Performed by: ORTHOPAEDIC SURGERY

## 2022-06-28 PROCEDURE — 20610 DRAIN/INJ JOINT/BURSA W/O US: CPT | Performed by: ORTHOPAEDIC SURGERY

## 2022-06-28 PROCEDURE — G8536 NO DOC ELDER MAL SCRN: HCPCS | Performed by: ORTHOPAEDIC SURGERY

## 2022-06-28 PROCEDURE — 99213 OFFICE O/P EST LOW 20 MIN: CPT | Performed by: ORTHOPAEDIC SURGERY

## 2022-06-28 PROCEDURE — 73502 X-RAY EXAM HIP UNI 2-3 VIEWS: CPT | Performed by: ORTHOPAEDIC SURGERY

## 2022-06-28 RX ORDER — BETAMETHASONE SODIUM PHOSPHATE AND BETAMETHASONE ACETATE 3; 3 MG/ML; MG/ML
6 INJECTION, SUSPENSION INTRA-ARTICULAR; INTRALESIONAL; INTRAMUSCULAR; SOFT TISSUE ONCE
Status: COMPLETED | OUTPATIENT
Start: 2022-06-28 | End: 2022-06-28

## 2022-06-28 RX ADMIN — BETAMETHASONE SODIUM PHOSPHATE AND BETAMETHASONE ACETATE 6 MG: 3; 3 INJECTION, SUSPENSION INTRA-ARTICULAR; INTRALESIONAL; INTRAMUSCULAR; SOFT TISSUE at 10:54

## 2022-06-28 NOTE — PROGRESS NOTES
Patient: Marina Hunt                MRN: [id number]       SSN: [id number]  YOB: 1950        AGE: 80 y.o. SEX: female  Body mass index is 34.65 kg/m². PCP: Monica Griffin MD  06/28/22    Maia Gordillo returns for evaluation of right hip pain little bit in the low back as well we have been treating her for advanced arthritis involving the right knee and injections are doing well hurts to roll over on the right hip at night occasionally some groin discomfort as well she does go to pain management the pain can be radicular but typically is laterally based denies fevers or chills and otherwise has been feeling well examination today she does use her cane of the knee is noncontributory today both hips actually rotate fairly well including internal rotation and flexion some tenderness over the greater trochanter on the right side and calf nontender Homans' sign is negative straight leg raise is negative as well.     X-rays today AP pelvis AP and lateral of the right hip on 6/28/2022 confirms moderate arthritis of the hip but not severely so I do not think she is all that symptomatic from the arthritis currently I think it is more bursitis and some low back pain with radiculopathy as her back was mildly tender today malalignments of neuropathy she does have about half plus pitting edema distally which is mildly tender for her as well actually the legs look better than previous visits    There was a discussion regarding surgery was decided that is not currently recommended we should pursue nonoperative measures therefore the right hip injected and well-tolerated and return to see us in about 2 to 3 months time for follow-up assessment I would expect in the next year or 2 if she may require an intra-articular injection but not at this point thank you    REVIEW OF SYSTEMS:      CON: negative  EYE: negative   ENT: negative  RESP: negative  GI:    negative   :  negative  MSK: Positive  A twelve point review of systems was completed, positives noted and all other systems were reviewed and are negative          Past Medical History:   Diagnosis Date    Anemia     Asthma     Blood disorder     Cancer ([address]     breast, diagnosed 2006 left    Cancer [facility])     Chest pain, unspecified     The working diagnosis is chest pain. The differential diagnosis includes chest wall pain, stable angina.  Chronic obstructive pulmonary disease (HCC)     Essential hypertension     Essential hypertension, benign     Uncontrolled     GERD (gastroesophageal reflux disease)     Hypercholesterolemia     Hypertension     Neuropathy     Nonspecific abnormal electrocardiogram (ECG) (EKG)     Obesity, unspecified     Weight loss has been strongly encouraged by following dietary restrictions and an exercise routine.  Polio     as a young child    Pre-operative cardiovascular examination     Sciatica     Sleep apnea     Unspecified cerebral artery occlusion with cerebral infarction     TIA? no residual       Family History   Problem Relation Age of Onset    Cancer Mother     Cancer Father     OSTEOARTHRITIS Other     Hypertension Other     Heart Disease Neg Hx         Negative family history of premature CAD or CVA       Current Outpatient Medications   Medication Sig Dispense Refill    amLODIPine (NORVASC) 2.5 mg tablet take 1 tablet by mouth once daily 90 Tablet 1    ibuprofen (MOTRIN) 800 mg tablet take 1 tablet by mouth every 6 hours AS NEEDED FOR PAIN 90 Tablet 1    esomeprazole (NexIUM) 40 mg capsule Take 1 Capsule by mouth daily.  30 Capsule 3    metoprolol tartrate (LOPRESSOR) 25 mg tablet take 1 tablet by mouth twice a day 60 Tab 6    diclofenac EC (VOLTAREN) 50 mg EC tablet take 1 tablet by mouth twice a day with meals (Patient not taking: Reported on 12/2/2021) 120 Tab 2    losartan (COZAAR) 100 mg tablet take 1 tablet by mouth once daily 90 Tab 3    Ketoconazole 2 % topical foam Apply to affected area two (2) times a day. 100 g 0    diclofenac (VOLTAREN) 1 % gel Apply 4 g to affected area every six (6) hours. Apply 4 grams to affected joint up to 4 times per day, maximum 16 grams per joint per day Dispense 5 100 gram tubes 100 g 4    pravastatin (PRAVACHOL) 40 mg tablet Take 1 Tab by mouth nightly. 0    clopidogrel (PLAVIX) 75 mg tab Take 1 Tab by mouth daily.  lidocaine (LIDODERM) 5 % apply 1 patch to the affected area daily. Leave on for 12 hours and then off for 12 hours. 5 Patch 2    furosemide (LASIX) 40 mg tablet Take 1 Tab by mouth daily. 90 Tab 3    oxyCODONE-acetaminophen (PERCOCET) 5-325 mg per tablet Take 1 Tab by mouth every four (4) hours as needed for Pain. Max Daily Amount: 6 Tabs. 6 Tab 0    dicyclomine (BENTYL) 20 mg tablet Take 20 mg by mouth every six (6) hours. (Patient not taking: Reported on 4/6/2022)  0    desonide (TRIDESILON) 0.05 % cream   0    ALBUTEROL SULFATE (PROAIR HFA IN) Take 2 Puffs by inhalation every four (4) hours as needed.  FLUTICASONE/SALMETEROL (ADVAIR HFA IN) Take 2 Puffs by inhalation every twelve (12) hours.  montelukast (SINGULAIR) 10 mg tablet Take 10 mg by mouth daily.  tiotropium (SPIRIVA WITH HANDIHALER) 18 mcg inhalation capsule Take 1 Cap by inhalation daily.  bethanechol (URECHOLINE) 10 mg tablet Take 10 mg by mouth Before breakfast, lunch, and dinner.  aspirin 81 mg tablet Take 81 mg by mouth.  ergocalciferol (VITAMIN D2) 50,000 unit capsule Take 50,000 Units by mouth Every Thursday.          No Known Allergies    Past Surgical History:   Procedure Laterality Date    HX BREAST BIOPSY  4/11/12    Left    HX BREAST BIOPSY Left     left mastectomy    HX VEIN STRIPPING Bilateral     [place] BREAST SURGERY PROCEDURE UNLISTED      initial surgery 2006, then left modified radical mastectomy 6/2012       Social History     Socioeconomic History    Marital status:      Spouse name: Not on file    Number of children: Not on file    Years of education: Not on file    Highest education level: Not on file   Occupational History    Not on file   Tobacco Use    Smoking status: Never Smoker    Smokeless tobacco: Never Used   Substance and Sexual Activity    Alcohol use: No     Alcohol/week: 0.0 standard drinks    Drug use: No    Sexual activity: Not on file   Other Topics Concern    Not on file   Social History Narrative    ** Merged History Encounter **          Social Determinants of Health     Financial Resource Strain:     Difficulty of Paying Living Expenses: Not on file   Food Insecurity:     Worried About Running Out of Food in the Last Year: Not on file    Santana of Food in the Last Year: Not on file   Transportation Needs:     Lack of Transportation (Medical): Not on file    Lack of Transportation (Non-Medical):  Not on file   Physical Activity:     Days of Exercise per Week: Not on file    Minutes of Exercise per Session: Not on file   Stress:     Feeling of Stress : Not on file   Social Connections:     Frequency of Communication with Friends and Family: Not on file    Frequency of Social Gatherings with Friends and Family: Not on file    Attends Sabianist Services: Not on file    Active Member of [address] [place] [address] or Organizations: Not on file    Attends Club or Organization Meetings: Not on file    Marital Status: Not on file   Intimate Partner Violence:     Fear of Current or Ex-Partner: Not on file    Emotionally Abused: Not on file    Physically Abused: Not on file    Sexually Abused: Not on file   Housing Stability:     Unable to Pay for Housing in the Last Year: Not on file    Number of [place] in the Last Year: Not on file    Unstable Housing in the Last Year: Not on file       Visit Vitals  Pulse (!) 115   Temp 97.5 °F (36.4 °C) (Temporal)   Ht 5' 5\" (1.651 m)   Wt 94.4 kg (208 lb 3.2 oz)   LMP  (LMP Unknown)   SpO2 90%   BMI 34.65 kg/m²         PHYSICAL EXAMINATION:  GENERAL: Alert and oriented x3, in no acute distress, well-developed, well-nourished, afebrile. HEART: No JVD. EYES: No scleral icterus   NECK: No significant lymphadenopathy   LUNGS: No respiratory compromise or indrawing  ABDOMEN: Soft, non-tender, non-distended. Note: This note was completed using voice recognition software. Any typographical/name errors or mistakes are unintentional.    Electronically signed by: MD GRANT Bucio, Bettye Garcia M.D., have reviewed the history, physical, and have updated the allergic reactions for Chemo Connell. TIME OUT performed immediately prior to the start of procedure:  Klaudia Lutz M.D., have performed the following reviews on Chemo Connell prior to the start of the procedure:    - Patient was identified by name and date of birth  - Agreement on procedure being performed was verified  - Risks and benefits explained to the patient  - Patient was positioned for comfort  - Consent was signed and verified  - Patient was advised regarding risks of bruising, bleeding, infection and pain    Time: 10:48 AM     Body Part: intra-bursal injection of right hip    Medication and Dose: 1mL Celestone preparation, i.e. 6 mg, and 3 mL 1% lidocaine    Date of Procedure: 06/28/22    PROCEDURE PERFORMED BY : Robin Garcia M.D., [facility])    Provider Assisted by: Seymour Grady    Patient assisted by: self    Patient tolerated procedure well with no complications

## 2022-08-11 ENCOUNTER — HOSPITAL ENCOUNTER (EMERGENCY)
Age: 72
Discharge: HOME | End: 2022-08-11
Attending: EMERGENCY MEDICINE
Payer: MEDICARE

## 2022-08-11 ENCOUNTER — APPOINTMENT (OUTPATIENT)
Dept: GENERAL RADIOLOGY | Age: 72
End: 2022-08-11
Attending: EMERGENCY MEDICINE
Payer: MEDICARE

## 2022-08-11 VITALS
RESPIRATION RATE: 16 BRPM | DIASTOLIC BLOOD PRESSURE: 69 MMHG | BODY MASS INDEX: 34.15 KG/M2 | OXYGEN SATURATION: 97 % | SYSTOLIC BLOOD PRESSURE: 141 MMHG | TEMPERATURE: 98.2 F | HEIGHT: 64 IN | HEART RATE: 77 BPM | WEIGHT: 200 LBS

## 2022-08-11 DIAGNOSIS — M54.12 CERVICAL RADICULOPATHY: Primary | ICD-10-CM

## 2022-08-11 DIAGNOSIS — N30.00 ACUTE CYSTITIS WITHOUT HEMATURIA: ICD-10-CM

## 2022-08-11 LAB
APPEARANCE UR: ABNORMAL
BACTERIA URNS QL MICRO: ABNORMAL /HPF
BILIRUB UR QL: NEGATIVE
COLOR UR: YELLOW
EPITH CASTS URNS QL MICRO: ABNORMAL /LPF (ref 0–5)
GLUCOSE UR STRIP.AUTO-MCNC: NEGATIVE MG/DL
HGB UR QL STRIP: ABNORMAL
KETONES UR QL STRIP.AUTO: NEGATIVE MG/DL
LEUKOCYTE ESTERASE UR QL STRIP.AUTO: ABNORMAL
NITRITE UR QL STRIP.AUTO: NEGATIVE
PH UR STRIP: 7 [PH] (ref 5–8)
PROT UR STRIP-MCNC: ABNORMAL MG/DL
RBC #/AREA URNS HPF: ABNORMAL /HPF (ref 0–5)
SP GR UR REFRACTOMETRY: 1.01 (ref 1–1.03)
UROBILINOGEN UR QL STRIP.AUTO: 1 EU/DL (ref 0.2–1)
WBC URNS QL MICRO: ABNORMAL /HPF (ref 0–4)

## 2022-08-11 PROCEDURE — 81001 URINALYSIS AUTO W/SCOPE: CPT

## 2022-08-11 PROCEDURE — 74011250637 HC RX REV CODE- 250/637: Performed by: EMERGENCY MEDICINE

## 2022-08-11 PROCEDURE — 74011000250 HC RX REV CODE- 250: Performed by: EMERGENCY MEDICINE

## 2022-08-11 PROCEDURE — 74011250636 HC RX REV CODE- 250/636: Performed by: EMERGENCY MEDICINE

## 2022-08-11 PROCEDURE — 72050 X-RAY EXAM NECK SPINE 4/5VWS: CPT

## 2022-08-11 PROCEDURE — 99284 EMERGENCY DEPT VISIT MOD MDM: CPT

## 2022-08-11 PROCEDURE — 96372 THER/PROPH/DIAG INJ SC/IM: CPT

## 2022-08-11 RX ORDER — LIDOCAINE 4 G/100G
2 PATCH TOPICAL
Status: DISCONTINUED | OUTPATIENT
Start: 2022-08-11 | End: 2022-08-11 | Stop reason: HOSPADM

## 2022-08-11 RX ORDER — LIDOCAINE 4 G/100G
2 PATCH TOPICAL EVERY 24 HOURS
Status: DISCONTINUED | OUTPATIENT
Start: 2022-08-11 | End: 2022-08-11

## 2022-08-11 RX ORDER — CEPHALEXIN 250 MG/1
500 CAPSULE ORAL
Status: COMPLETED | OUTPATIENT
Start: 2022-08-11 | End: 2022-08-11

## 2022-08-11 RX ORDER — DIAZEPAM 5 MG/1
5 TABLET ORAL
Status: COMPLETED | OUTPATIENT
Start: 2022-08-11 | End: 2022-08-11

## 2022-08-11 RX ORDER — LIDOCAINE 4 G/100G
PATCH TOPICAL
Qty: 30 PATCH | Refills: 0 | OUTPATIENT
Start: 2022-08-11 | End: 2022-10-20

## 2022-08-11 RX ORDER — METHOCARBAMOL 750 MG/1
1500 TABLET, FILM COATED ORAL
Qty: 40 TABLET | Refills: 0 | Status: SHIPPED | OUTPATIENT
Start: 2022-08-11 | End: 2022-08-16

## 2022-08-11 RX ORDER — MORPHINE SULFATE 4 MG/ML
8 INJECTION INTRAVENOUS
Status: COMPLETED | OUTPATIENT
Start: 2022-08-11 | End: 2022-08-11

## 2022-08-11 RX ORDER — CEPHALEXIN 500 MG/1
500 CAPSULE ORAL 2 TIMES DAILY
Qty: 14 CAPSULE | Refills: 0 | Status: SHIPPED | OUTPATIENT
Start: 2022-08-11 | End: 2022-08-18

## 2022-08-11 RX ADMIN — CEPHALEXIN 500 MG: 250 CAPSULE ORAL at 13:21

## 2022-08-11 RX ADMIN — DIAZEPAM 5 MG: 5 TABLET ORAL at 11:40

## 2022-08-11 RX ADMIN — MORPHINE SULFATE 8 MG: 4 INJECTION, SOLUTION INTRAMUSCULAR; INTRAVENOUS at 11:38

## 2022-08-11 NOTE — ED PROVIDER NOTES
EMERGENCY DEPARTMENT HISTORY AND PHYSICAL EXAM    11:08 AM      Date: 8/11/2022  Patient Name: Luh Randall    History of Presenting Illness     Chief Complaint   Patient presents with    Neck Pain    Arm Pain         History Provided By: Patient and Patient's   Location/Duration/Severity/Modifying factors   78-year-old female presents with complaints of pain in the right posterior lateral neck, radiation down the right upper extremity and to the right shoulder blade. Symptom onset approximately 2 weeks ago. She states he is in pain management, takes Percocet and ibuprofen at home. She denies any falls or trauma, symptoms exacerbated with cervical rotation to the right, extension, and sometimes lying on the right side. She denies any weakness or numbness of the right upper extremity, no symptoms in the left upper extremity or bilateral lower extremities. She denies any chest pain, states she has chronic shortness of breath due to COPD, not acutely changed. She denies abdominal pain, but does report some dysuria. No recent fever, has mild chronic coughing due to COPD, not acutely changed. Neck Pain   Pertinent negatives include no chest pain and no weakness. Arm Pain   Associated symptoms include neck pain. PCP: Pedro Apple MD    Current Outpatient Medications   Medication Sig Dispense Refill    fluticasone/umeclidin/vilanter (TRELEGY ELLIPTA IN) Take  by inhalation. lidocaine 4 % patch APPLY 1-2 PATCHES TO PAINFUL AREA; REMOVE AFTER 12 HOURS. MAY APPLY NEW PATCHES 12 HOURS LATER. 30 Patch 0    methocarbamoL (ROBAXIN) 750 mg tablet Take 2 Tablets by mouth four (4) times daily as needed for Muscle Spasm(s) for up to 5 days. Indications: muscle spasm, MAY CAUSE DROWSINESS; DO NOT DRINK, DRIVE, OR USE MACHINERY WHILE TAKING 40 Tablet 0    cephALEXin (Keflex) 500 mg capsule Take 1 Capsule by mouth two (2) times a day for 7 days.  14 Capsule 0    amLODIPine (NORVASC) 2.5 mg tablet take 1 tablet by mouth once daily 90 Tablet 1    ibuprofen (MOTRIN) 800 mg tablet take 1 tablet by mouth every 6 hours AS NEEDED FOR PAIN 90 Tablet 1    esomeprazole (NexIUM) 40 mg capsule Take 1 Capsule by mouth daily. 30 Capsule 3    metoprolol tartrate (LOPRESSOR) 25 mg tablet take 1 tablet by mouth twice a day 60 Tab 6    losartan (COZAAR) 100 mg tablet take 1 tablet by mouth once daily 90 Tab 3    Ketoconazole 2 % topical foam Apply  to affected area two (2) times a day. 100 g 0    diclofenac (VOLTAREN) 1 % gel Apply 4 g to affected area every six (6) hours. Apply 4 grams to affected joint up to 4 times per day, maximum 16 grams per joint per day Dispense 5 100 gram tubes 100 g 4    pravastatin (PRAVACHOL) 40 mg tablet Take 1 Tab by mouth nightly. 0    furosemide (LASIX) 40 mg tablet Take 1 Tab by mouth daily. 90 Tab 3    oxyCODONE-acetaminophen (PERCOCET) 5-325 mg per tablet Take 1 Tab by mouth every four (4) hours as needed for Pain. Max Daily Amount: 6 Tabs. 6 Tab 0    desonide (TRIDESILON) 0.05 % cream   0    ALBUTEROL SULFATE (PROAIR HFA IN) Take 2 Puffs by inhalation every four (4) hours as needed. montelukast (SINGULAIR) 10 mg tablet Take 10 mg by mouth daily. bethanechol (URECHOLINE) 10 mg tablet Take 10 mg by mouth Before breakfast, lunch, and dinner. aspirin 81 mg tablet Take 81 mg by mouth.        ergocalciferol (ERGOCALCIFEROL) 1,250 mcg (50,000 unit) capsule Take 50,000 Units by mouth Every Thursday. Past History     Past Medical History:  Past Medical History:   Diagnosis Date    Anemia     Asthma     Blood disorder     Bursitis of right hip     Cancer ([facility]     breast, diagnosed 2006 left    Cancer [facility])     Chest pain, unspecified     The working diagnosis is chest pain. The differential diagnosis includes chest wall pain, stable angina.     Chronic obstructive pulmonary disease (HCC)     Essential hypertension     Essential hypertension, benign     Uncontrolled     GERD (gastroesophageal reflux disease)     Hypercholesterolemia     Hypertension     Low back pain     Neuropathy     Nonspecific abnormal electrocardiogram (ECG) (EKG)     Obesity, unspecified     Weight loss has been strongly encouraged by following dietary restrictions and an exercise routine. Osteoarthritis of right knee     Pain in both knees     Polio     as a young child    Pre-operative cardiovascular examination     Sciatica     Sleep apnea     Unspecified cerebral artery occlusion with cerebral infarction     TIA? no residual    Vitamin D deficiency        Past Surgical History:  Past Surgical History:   Procedure Laterality Date    HX BREAST BIOPSY  4/11/12    Left    HX BREAST BIOPSY Left     left mastectomy    HX VEIN STRIPPING Bilateral     [place] BREAST SURGERY PROCEDURE UNLISTED      initial surgery 2006, then left modified radical mastectomy 6/2012       Family History:  Family History   Problem Relation Age of Onset    Cancer Mother     Cancer Father     OSTEOARTHRITIS Other     Hypertension Other     Heart Disease Neg Hx         Negative family history of premature CAD or CVA       Social History:  Social History     Tobacco Use    Smoking status: Never    Smokeless tobacco: Never   Substance Use Topics    Alcohol use: No     Alcohol/week: 0.0 standard drinks    Drug use: No       Allergies:  No Known Allergies      Review of Systems       Review of Systems   Constitutional:  Negative for fever. HENT:  Negative for congestion and sore throat. Eyes:  Negative for visual disturbance. Respiratory:  Negative for shortness of breath. Cardiovascular:  Negative for chest pain and leg swelling. Gastrointestinal:  Negative for abdominal pain, diarrhea and vomiting. Genitourinary:  Positive for dysuria. Negative for hematuria. Musculoskeletal:  Positive for arthralgias and neck pain. Skin:  Negative for color change and rash. Neurological:  Negative for syncope and weakness. Psychiatric/Behavioral:  Negative for confusion. Physical Exam   Visit Vitals  BP (!) 141/69   Pulse 77   Temp 98.2 °F (36.8 °C)   Resp 16   Ht 5' 4\" (1.626 m)   Wt 90.7 kg (200 lb)   LMP  (LMP Unknown)   SpO2 97%   BMI 34.33 kg/m²         Physical Exam  Nursing note and vitals reviewed.   General appearance: non-toxic, no distress unless moving C spine  Eyes: EOMI,  anicteric sclera  HEENT: mucous membranes slightly tacky, oropharynx clear  Pulmonary: Scant scattered expiratory wheeze on exam  Cardiac: normal rate and regular rhythm, 2+DP pulses, 2+ radial pulses  Abdomen: soft, nontender, elevated BMI, bowel sounds present  MSK: Tender to palpation right cervical paraspinal musculature and right upper trapezius, radicular symptoms reproduced with Spurling's maneuver on the right, normal tone and bulk of the extremities  Neuro: Alert, answers questions appropriately, light touch intact bilateral upper extremities, strength intact bilateral upper extremities  Skin: capillary refill brisk      Diagnostic Study Results     Labs -  Recent Results (from the past 12 hour(s))   URINALYSIS W/ RFLX MICROSCOPIC    Collection Time: 08/11/22 12:44 PM   Result Value Ref Range    Color YELLOW      Appearance TURBID      Specific gravity 1.012 1.005 - 1.030      pH (UA) 7.0 5.0 - 8.0      Protein TRACE (A) NEG mg/dL    Glucose Negative NEG mg/dL    Ketone Negative NEG mg/dL    Bilirubin Negative NEG      Blood TRACE (A) NEG      Urobilinogen 1.0 0.2 - 1.0 EU/dL    Nitrites Negative NEG      Leukocyte Esterase LARGE (A) NEG     URINE MICROSCOPIC ONLY    Collection Time: 08/11/22 12:44 PM   Result Value Ref Range    WBC TOO NUMEROUS TO COUNT 0 - 4 /hpf    RBC 0 to 3 0 - 5 /hpf    Epithelial cells FEW 0 - 5 /lpf    Bacteria FEW (A) NEG /hpf       Radiologic Studies -   XR SPINE CERV 4 OR 5 V   Final Result   Degenerative disease with moderate C4-5 and C5-6 right foraminal   narrowing             Medical Decision Making   I am the first provider for this patient. I reviewed the vital signs, available nursing notes, past medical history, past surgical history, family history and social history. Vital Signs-Reviewed the patient's vital signs. EKG:    Records Reviewed:  (Time of Review: 11:08 AM)    ED Course: Progress Notes, Reevaluation, and Consults:         Provider Notes (Medical Decision Making):   MDM  Number of Diagnoses or Management Options  Acute cystitis without hematuria  Cervical radiculopathy  Diagnosis management comments: Patient with symptoms highly suggestive of right cervical radiculopathy with palpable muscular spasm on examination, radicular symptoms reproduced with Spurling's maneuver and cervical range of motion. Patient also complains of urinary frequency, urinalysis suggestive of UTI. Will treat symptomatically, recommend outpatient follow-up and should return if any new or worsening symptoms develop. Currently in pain management, should continue any controlled substances for symptom control through her pain management program.        Procedures    Critical Care Time: 0      Diagnosis     Clinical Impression:   1. Cervical radiculopathy    2. Acute cystitis without hematuria        Disposition: discharge    Follow-up Information    None          Discharge Medication List as of 8/11/2022  2:13 PM        START taking these medications    Details   lidocaine 4 % patch APPLY 1-2 PATCHES TO PAINFUL AREA; REMOVE AFTER 12 HOURS. MAY APPLY NEW PATCHES 12 HOURS LATER., Normal, Disp-30 Patch, R-0      methocarbamoL (ROBAXIN) 750 mg tablet Take 2 Tablets by mouth four (4) times daily as needed for Muscle Spasm(s) for up to 5 days. Indications: muscle spasm, MAY CAUSE DROWSINESS; DO NOT DRINK, DRIVE, OR USE MACHINERY WHILE TAKING, Normal, Disp-40 Tablet, R-0      cephALEXin (Keflex) 500 mg capsule Take 1 Capsule by mouth two (2) times a day for 7 days. , Normal, Disp-14 Capsule, R-0           CONTINUE these medications which have NOT CHANGED    Details   fluticasone/umeclidin/vilanter (TRELEGY ELLIPTA IN) Take  by inhalation. , Historical Med      amLODIPine (NORVASC) 2.5 mg tablet take 1 tablet by mouth once daily, Normal, Disp-90 Tablet, R-1      ibuprofen (MOTRIN) 800 mg tablet take 1 tablet by mouth every 6 hours AS NEEDED FOR PAIN, Normal, Disp-90 Tablet, R-1      esomeprazole (NexIUM) 40 mg capsule Take 1 Capsule by mouth daily. , Normal, Disp-30 Capsule, R-3      metoprolol tartrate (LOPRESSOR) 25 mg tablet take 1 tablet by mouth twice a day, Normal, Disp-60 Tab, R-6      losartan (COZAAR) 100 mg tablet take 1 tablet by mouth once daily, Normal, Disp-90 Tab, R-3      Ketoconazole 2 % topical foam Apply  to affected area two (2) times a day., Normal, Disp-100 g, R-0      diclofenac (VOLTAREN) 1 % gel Apply 4 g to affected area every six (6) hours. Apply 4 grams to affected joint up to 4 times per day, maximum 16 grams per joint per day Dispense 5 100 gram tubes, Print, Disp-100 g, R-4      pravastatin (PRAVACHOL) 40 mg tablet Take 1 Tab by mouth nightly., Historical Med, R-0      furosemide (LASIX) 40 mg tablet Take 1 Tab by mouth daily. , Normal, Disp-90 Tab, R-3      oxyCODONE-acetaminophen (PERCOCET) 5-325 mg per tablet Take 1 Tab by mouth every four (4) hours as needed for Pain. Max Daily Amount: 6 Tabs., Print, Disp-6 Tab, R-0      desonide (TRIDESILON) 0.05 % cream Historical Med, R-0      ALBUTEROL SULFATE (PROAIR HFA IN) Take 2 Puffs by inhalation every four (4) hours as needed., Historical Med      montelukast (SINGULAIR) 10 mg tablet Take 10 mg by mouth daily. , Historical Med      bethanechol (URECHOLINE) 10 mg tablet Take 10 mg by mouth Before breakfast, lunch, and dinner., Historical Med      aspirin 81 mg tablet Take 81 mg by mouth.  , Historical Med      ergocalciferol (ERGOCALCIFEROL) 1,250 mcg (50,000 unit) capsule Take 50,000 Units by mouth Every Thursday., Historical Med           STOP taking these medications       lidocaine (LIDODERM) 5 % Comments:   Reason for Stopping:             Disclaimer: Sections of this note are dictated using utilizing voice recognition software. Minor typographical errors may be present. If questions arise, please do not hesitate to contact me or call our department.

## 2022-08-11 NOTE — ED TRIAGE NOTES
C/o neck pain and right arm pain that began two weeks ago. She states using rubbing alcohol, percocet, and ibuprofen for pain. States last taking medication for pain yesterday. Denies falls or known injury.

## 2022-09-19 RX ORDER — METOPROLOL TARTRATE 25 MG/1
25 TABLET, FILM COATED ORAL 2 TIMES DAILY
Qty: 60 TABLET | Refills: 6 | Status: SHIPPED | OUTPATIENT
Start: 2022-09-19 | End: 2022-10-12 | Stop reason: SDUPTHER

## 2022-10-12 ENCOUNTER — OFFICE VISIT (OUTPATIENT)
Dept: CARDIOLOGY CLINIC | Age: 72
End: 2022-10-12
Payer: MEDICARE

## 2022-10-12 VITALS
WEIGHT: 206 LBS | HEIGHT: 64 IN | HEART RATE: 96 BPM | DIASTOLIC BLOOD PRESSURE: 63 MMHG | OXYGEN SATURATION: 98 % | BODY MASS INDEX: 35.17 KG/M2 | SYSTOLIC BLOOD PRESSURE: 135 MMHG

## 2022-10-12 DIAGNOSIS — I50.32 CHRONIC DIASTOLIC CONGESTIVE HEART FAILURE (HCC): Primary | ICD-10-CM

## 2022-10-12 DIAGNOSIS — I10 ESSENTIAL HYPERTENSION, BENIGN: ICD-10-CM

## 2022-10-12 DIAGNOSIS — E78.5 DYSLIPIDEMIA: ICD-10-CM

## 2022-10-12 DIAGNOSIS — R07.89 OTHER CHEST PAIN: ICD-10-CM

## 2022-10-12 PROCEDURE — G8417 CALC BMI ABV UP PARAM F/U: HCPCS | Performed by: INTERNAL MEDICINE

## 2022-10-12 PROCEDURE — G8754 DIAS BP LESS 90: HCPCS | Performed by: INTERNAL MEDICINE

## 2022-10-12 PROCEDURE — 93000 ELECTROCARDIOGRAM COMPLETE: CPT | Performed by: INTERNAL MEDICINE

## 2022-10-12 PROCEDURE — G8432 DEP SCR NOT DOC, RNG: HCPCS | Performed by: INTERNAL MEDICINE

## 2022-10-12 PROCEDURE — G8427 DOCREV CUR MEDS BY ELIG CLIN: HCPCS | Performed by: INTERNAL MEDICINE

## 2022-10-12 PROCEDURE — G9899 SCRN MAM PERF RSLTS DOC: HCPCS | Performed by: INTERNAL MEDICINE

## 2022-10-12 PROCEDURE — 1090F PRES/ABSN URINE INCON ASSESS: CPT | Performed by: INTERNAL MEDICINE

## 2022-10-12 PROCEDURE — 1101F PT FALLS ASSESS-DOCD LE1/YR: CPT | Performed by: INTERNAL MEDICINE

## 2022-10-12 PROCEDURE — 1123F ACP DISCUSS/DSCN MKR DOCD: CPT | Performed by: INTERNAL MEDICINE

## 2022-10-12 PROCEDURE — 99214 OFFICE O/P EST MOD 30 MIN: CPT | Performed by: INTERNAL MEDICINE

## 2022-10-12 PROCEDURE — G8536 NO DOC ELDER MAL SCRN: HCPCS | Performed by: INTERNAL MEDICINE

## 2022-10-12 PROCEDURE — G8752 SYS BP LESS 140: HCPCS | Performed by: INTERNAL MEDICINE

## 2022-10-12 PROCEDURE — G8400 PT W/DXA NO RESULTS DOC: HCPCS | Performed by: INTERNAL MEDICINE

## 2022-10-12 PROCEDURE — 3017F COLORECTAL CA SCREEN DOC REV: CPT | Performed by: INTERNAL MEDICINE

## 2022-10-12 RX ORDER — AMLODIPINE BESYLATE 2.5 MG/1
2.5 TABLET ORAL DAILY
Qty: 90 TABLET | Refills: 1 | Status: CANCELLED | OUTPATIENT
Start: 2022-10-12

## 2022-10-12 RX ORDER — CYCLOBENZAPRINE HCL 5 MG
TABLET ORAL
COMMUNITY
Start: 2022-09-22

## 2022-10-12 RX ORDER — FUROSEMIDE 40 MG/1
40 TABLET ORAL DAILY
Qty: 90 TABLET | Refills: 1 | Status: SHIPPED | OUTPATIENT
Start: 2022-10-12

## 2022-10-12 RX ORDER — ESOMEPRAZOLE MAGNESIUM 40 MG/1
40 CAPSULE, DELAYED RELEASE ORAL DAILY
Qty: 90 CAPSULE | Refills: 1 | Status: SHIPPED | OUTPATIENT
Start: 2022-10-12

## 2022-10-12 RX ORDER — DILTIAZEM HYDROCHLORIDE 180 MG/1
180 CAPSULE, COATED, EXTENDED RELEASE ORAL DAILY
Qty: 90 CAPSULE | Refills: 1 | Status: SHIPPED | OUTPATIENT
Start: 2022-10-12

## 2022-10-12 RX ORDER — CELECOXIB 100 MG/1
CAPSULE ORAL
COMMUNITY
Start: 2022-08-24 | End: 2022-10-20 | Stop reason: ALTCHOICE

## 2022-10-12 RX ORDER — LOSARTAN POTASSIUM 100 MG/1
100 TABLET ORAL DAILY
Qty: 90 TABLET | Refills: 1 | Status: SHIPPED | OUTPATIENT
Start: 2022-10-12

## 2022-10-12 RX ORDER — METOPROLOL TARTRATE 25 MG/1
25 TABLET, FILM COATED ORAL 2 TIMES DAILY
Qty: 180 TABLET | Refills: 1 | Status: SHIPPED | OUTPATIENT
Start: 2022-10-12

## 2022-10-12 NOTE — PROGRESS NOTES
HISTORY OF PRESENT ILLNESS  Ever Saenz is a 80 y.o. female. Follow-up of CHF, hypertension, hyperlipidemia, obesity    Shortness of Breath  The history is provided by the Patient. This is a chronic problem. The average episode lasts 1 hour. The problem occurs intermittently. The current episode started more than 1 week ago. The problem has not changed since onset. Associated symptoms include chest pain and leg swelling. Pertinent negatives include no fever, no headaches, no ear pain, no neck pain, no cough, no wheezing, no PND, no orthopnea, no vomiting, no rash and no claudication. The problem's precipitants include exercise (1 block). Associated medical issues include heart failure. Associated medical issues do not include chronic lung disease or CAD. Follow-up  Associated symptoms include chest pain and shortness of breath. Pertinent negatives include no headaches. Leg Swelling  The history is provided by the Patient. This is a chronic problem. The current episode started more than 1 week ago (yrs). The problem occurs daily. Associated symptoms include chest pain and shortness of breath. Pertinent negatives include no headaches. Nothing aggravates the symptoms. Chest Pain (Angina)   The history is provided by the Patient. This is a new problem. The current episode started more than 1 week ago. Duration of episode(s) is 2 minutes. The problem occurs every several days (1-2/wk). The pain is associated with rest and normal activity. The pain is present in the substernal region. The quality of the pain is described as sharp. The pain does not radiate. Associated symptoms include lower extremity edema and shortness of breath. Pertinent negatives include no claudication, no cough, no dizziness, no fever, no headaches, no malaise/fatigue, no nausea, no orthopnea, no palpitations, no PND and no vomiting. Review of Systems   Constitutional:  Negative for chills, fever, malaise/fatigue and weight loss. HENT:  Negative for congestion, ear discharge, ear pain, hearing loss, nosebleeds and tinnitus. Eyes:  Negative for blurred vision and discharge. Respiratory:  Positive for shortness of breath. Negative for cough, wheezing and stridor. Cardiovascular:  Positive for chest pain and leg swelling. Negative for palpitations, orthopnea, claudication and PND. Gastrointestinal:  Negative for diarrhea, heartburn, nausea and vomiting. Genitourinary:  Negative for dysuria and hematuria. Musculoskeletal:  Negative for joint pain, myalgias and neck pain. Skin:  Negative for itching and rash. Neurological:  Negative for dizziness, tingling, tremors, focal weakness, seizures, loss of consciousness and headaches. Endo/Heme/Allergies:  Negative for polydipsia. Does not bruise/bleed easily. Psychiatric/Behavioral:  Negative for depression, substance abuse and suicidal ideas. The patient does not have insomnia. Family History   Problem Relation Age of Onset    Cancer Mother     Cancer Father     OSTEOARTHRITIS Other     Hypertension Other     Heart Disease Neg Hx         Negative family history of premature CAD or CVA       Past Medical History:   Diagnosis Date    Anemia     Asthma     Blood disorder     Bursitis of right hip     Cancer ([facility] [address]     breast, diagnosed 2006 left    Cancer [facility])     Chest pain, unspecified     The working diagnosis is chest pain. The differential diagnosis includes chest wall pain, stable angina. Chronic obstructive pulmonary disease (HCC)     Essential hypertension     Essential hypertension, benign     Uncontrolled     GERD (gastroesophageal reflux disease)     Hypercholesterolemia     Hypertension     Low back pain     Neuropathy     Nonspecific abnormal electrocardiogram (ECG) (EKG)     Obesity, unspecified     Weight loss has been strongly encouraged by following dietary restrictions and an exercise routine.     Osteoarthritis of right knee     Pain in both knees Polio     as a young child    Pre-operative cardiovascular examination     Sciatica     Sleep apnea     Unspecified cerebral artery occlusion with cerebral infarction     TIA? no residual    Vitamin D deficiency        Past Surgical History:   Procedure Laterality Date    HX BREAST BIOPSY  4/11/12    Left    HX BREAST BIOPSY Left     left mastectomy    HX VEIN STRIPPING Bilateral     MN BREAST SURGERY PROCEDURE UNLISTED      initial surgery 2006, then left modified radical mastectomy 6/2012       Social History     Tobacco Use    Smoking status: Never    Smokeless tobacco: Never   Substance Use Topics    Alcohol use: No     Alcohol/week: 0.0 standard drinks       No Known Allergies    Outpatient Medications Marked as Taking for the 10/12/22 encounter (Office Visit) with Deanne Link MD   Medication Sig Dispense Refill    celecoxib (CELEBREX) 100 mg capsule take 1 capsule by mouth twice a day with food (DO NOT TAKE WITH ORANGE OR GRAPEFUIT JUICE)      cyclobenzaprine (FLEXERIL) 5 mg tablet take 1 tablet by mouth three times a day if needed for muscle spa. ..  (REFER TO PRESCRIPTION NOTES). metoprolol tartrate (LOPRESSOR) 25 mg tablet Take 1 Tablet by mouth two (2) times a day. 60 Tablet 6    ibuprofen (MOTRIN) 800 mg tablet take 1 tablet by mouth every 6 hours AS NEEDED FOR PAIN 90 Tablet 1    fluticasone/umeclidin/vilanter (TRELEGY ELLIPTA IN) Take  by inhalation. lidocaine 4 % patch APPLY 1-2 PATCHES TO PAINFUL AREA; REMOVE AFTER 12 HOURS. MAY APPLY NEW PATCHES 12 HOURS LATER. 30 Patch 0    amLODIPine (NORVASC) 2.5 mg tablet take 1 tablet by mouth once daily 90 Tablet 1    esomeprazole (NexIUM) 40 mg capsule Take 1 Capsule by mouth daily. 30 Capsule 3    losartan (COZAAR) 100 mg tablet take 1 tablet by mouth once daily 90 Tab 3    Ketoconazole 2 % topical foam Apply  to affected area two (2) times a day. 100 g 0    diclofenac (VOLTAREN) 1 % gel Apply 4 g to affected area every six (6) hours.  Apply 4 grams to affected joint up to 4 times per day, maximum 16 grams per joint per day Dispense 5 100 gram tubes 100 g 4    pravastatin (PRAVACHOL) 40 mg tablet Take 1 Tab by mouth nightly. 0    furosemide (LASIX) 40 mg tablet Take 1 Tab by mouth daily. 90 Tab 3    oxyCODONE-acetaminophen (PERCOCET) 5-325 mg per tablet Take 1 Tab by mouth every four (4) hours as needed for Pain. Max Daily Amount: 6 Tabs. 6 Tab 0    desonide (TRIDESILON) 0.05 % cream   0    ALBUTEROL SULFATE (PROAIR HFA IN) Take 2 Puffs by inhalation every four (4) hours as needed. montelukast (SINGULAIR) 10 mg tablet Take 10 mg by mouth daily. bethanechol (URECHOLINE) 10 mg tablet Take 10 mg by mouth Before breakfast, lunch, and dinner. aspirin 81 mg tablet Take 81 mg by mouth.        ergocalciferol (ERGOCALCIFEROL) 1,250 mcg (50,000 unit) capsule Take 50,000 Units by mouth Every Thursday. Visit Vitals  /63 (BP 1 Location: Left upper arm, BP Patient Position: Sitting, BP Cuff Size: Adult)   Pulse 96   Ht 5' 4\" (1.626 m)   Wt 93.4 kg (206 lb)   LMP  (LMP Unknown)   SpO2 98%   BMI 35.36 kg/m²     Physical Exam  Constitutional:       General: She is not in acute distress. Appearance: She is well-developed. She is obese. She is not diaphoretic. HENT:      Head: Atraumatic. Mouth/Throat:      Pharynx: No oropharyngeal exudate. Eyes:      General: No scleral icterus. Right eye: No discharge. Left eye: No discharge. Conjunctiva/sclera: Conjunctivae normal.   Neck:      Thyroid: No thyromegaly. Vascular: No JVD. Trachea: No tracheal deviation. Cardiovascular:      Rate and Rhythm: Normal rate and regular rhythm. Heart sounds: No murmur heard. No gallop. Pulmonary:      Effort: Pulmonary effort is normal.      Breath sounds: No stridor. No wheezing or rales. Abdominal:      Palpations: Abdomen is soft. Tenderness: There is no abdominal tenderness.  There is no guarding or rebound. Musculoskeletal:         General: No tenderness. Normal range of motion. Cervical back: Normal range of motion and neck supple. Right lower leg: Edema (Trace) present. Left lower leg: Edema (Trace) present. Lymphadenopathy:      Cervical: No cervical adenopathy. Skin:     General: Skin is warm. Neurological:      Mental Status: She is alert and oriented to person, place, and time. Motor: No abnormal muscle tone. Psychiatric:         Behavior: Behavior normal.     ekg sinus rhythm with no acute st-t changes  Echo 10/2017:  SUMMARY:  Left ventricle: Systolic function was normal. Ejection fraction was  estimated in the range of 55 % to 60 %. There were no regional wall motion  abnormalities. There was mild concentric hypertrophy. Doppler parameters  were consistent with abnormal left ventricular relaxation (grade 1  diastolic dysfunction). Mitral valve: There was mild regurgitation. Tricuspid valve: There was mild regurgitation. 02/24/22    ECHO ADULT COMPLETE 02/25/2022 2/25/2022    Interpretation Summary    Left Ventricle: Left ventricle size is normal. Mildly increased wall thickness. Normal wall motion. Normal left ventricular systolic function with a visually estimated EF of 60 - 65%. Diastolic dysfunction present with normal LV EF. Mitral Valve: Mild transvalvular regurgitation. Signed by: Sarah Rodney MD on 2/25/2022  3:12 PM    ASSESSMENT and PLAN    current treatment plan is effective, no change in therapy  reviewed diet, exercise and weight control. Patient is a 16-year-old female with longstanding history of hypertension, chronic diastolic CHF NYHA class II, asthma seen for follow-up. C/o stable dyspnea at moderate exertion. Intermittent LE edema. Echo report reviewed with patient-normal LV function with no wall motion abnormality/valvular heart disease. Continue current dose of Lasix 40 mg daily.   Continue salt restriction weight reduction. Follow-up in 6 months. Diagnoses and all orders for this visit:    1. Chronic diastolic congestive heart failure ([facility] [address]    2. Essential hypertension, benign  -     metoprolol tartrate (LOPRESSOR) 25 mg tablet; Take 1 Tablet by mouth two (2) times a day. -     losartan (COZAAR) 100 mg tablet; Take 1 Tablet by mouth daily. -     esomeprazole (NexIUM) 40 mg capsule; Take 1 Capsule by mouth daily. -     furosemide (LASIX) 40 mg tablet; Take 1 Tablet by mouth daily.  -     AMB POC EKG ROUTINE W/ 12 LEADS, INTER & REP  -     NUCLEAR CARDIAC STRESS TEST; Future  -     dilTIAZem ER (CARDIZEM CD) 180 mg capsule; Take 1 Capsule by mouth daily.  -     LIPID PANEL; Future  -     HEPATIC FUNCTION PANEL; Future  -     METABOLIC PANEL, BASIC; Future    3. Dyslipidemia  -     LIPID PANEL; Future    4. Other chest pain  -     AMB POC EKG ROUTINE W/ 12 LEADS, INTER & REP  -     NUCLEAR CARDIAC STRESS TEST; Future      Pertinent laboratory and test data reviewed and discussed with patient. See patient instructions also for other medical advice given    Medications Discontinued During This Encounter   Medication Reason    amLODIPine (NORVASC) 2.5 mg tablet Alternate Therapy    furosemide (LASIX) 40 mg tablet REORDER    losartan (COZAAR) 100 mg tablet REORDER    esomeprazole (NexIUM) 40 mg capsule REORDER    metoprolol tartrate (LOPRESSOR) 25 mg tablet REORDER       Follow-up and Dispositions    Return in about 3 months (around 1/12/2023), or if symptoms worsen or fail to improve, for post test.       10/12/2022 CHF is objectively compensated without any significant fluid overload but NYHA class III. She does not walk much and was encouraged to walk regularly. She using multiple pain medications and told me that she goes to pain clinic. I will leave the management to them but I cautioned her against multiple chronic NSAID use which are associated with increased risk of cardiovascular events including strokes.   Chest pain is atypical and EKG has no acute ST-T changes but will get a stress test to evaluate any ischemia. She cannot walk on the treadmill as she is walks with a stick. Her lipid need to be followed up and were ordered along with BMP and LFTs.

## 2022-10-12 NOTE — PROGRESS NOTES
1. Have you been to the ER, urgent care clinic since your last visit? Hospitalized since your last visit? No    2. Have you seen or consulted any other health care providers outside of the [address] [place] [address] since your last visit? Include any pap smears or colon screening. No     3. Since your last visit, have you had any of the following symptoms? shortness of breath, dizziness, and swelling in legs/arms. 4.  Have you had any blood work, X-rays or cardiac testing? No     Requested: NO     In [facility]: NO    5. Where do you normally have your labs drawn? [facility]    6. Do you need any refills today?    Yes

## 2022-10-12 NOTE — PATIENT INSTRUCTIONS
Medications Discontinued During This Encounter   Medication Reason    amLODIPine (NORVASC) 2.5 mg tablet Alternate Therapy    furosemide (LASIX) 40 mg tablet REORDER    losartan (COZAAR) 100 mg tablet REORDER    esomeprazole (NexIUM) 40 mg capsule REORDER    metoprolol tartrate (LOPRESSOR) 25 mg tablet REORDER         Learning About the Mediterranean Diet  What is the Mediterranean diet? The Mediterranean diet is a style of eating rather than a diet plan. It features foods eaten in [place], [place], [place], and other countries along the [facility]. It emphasizes eating foods like fish, fruits, vegetables, beans, high-fiber breads and whole grains, nuts, and olive oil. This style of eating includes limited red meat, cheese, and sweets. Why choose the Mediterranean diet? A Mediterranean-style diet may improve heart health. It contains more fat than other heart-healthy diets. But the fats are mainly from nuts, unsaturated oils (such as fish oils and olive oil), and certain nut or seed oils (such as canola, soybean, or flaxseed oil). These fats may help protect the heart and blood vessels. How can you get started on the Mediterranean diet? Here are some things you can do to switch to a more Mediterranean way of eating. What to eat  Eat a variety of fruits and vegetables each day, such as grapes, blueberries, tomatoes, broccoli, peppers, figs, olives, spinach, eggplant, beans, lentils, and chickpeas. Eat a variety of whole-grain foods each day, such as oats, brown rice, and whole wheat bread, pasta, and couscous. Eat fish at least 2 times a week. Try tuna, salmon, mackerel, lake trout, herring, or sardines. Eat moderate amounts of low-fat dairy products, such as milk, cheese, or yogurt. Eat moderate amounts of poultry and eggs. Choose healthy (unsaturated) fats, such as nuts, olive oil, and certain nut or seed oils like canola, soybean, and flaxseed.   Limit unhealthy (saturated) fats, such as butter, palm oil, and coconut oil. And limit fats found in animal products, such as meat and dairy products made with whole milk. Try to eat red meat only a few times a month in very small amounts. Limit sweets and desserts to only a few times a week. This includes sugar-sweetened drinks like soda. The Mediterranean diet may also include red wine with your meal--1 glass each day for women and up to 2 glasses a day for men. Tips for eating at home  Use herbs, spices, garlic, lemon zest, and citrus juice instead of salt to add flavor to foods. Add avocado slices to your sandwich instead of iraheta. Have fish for lunch or dinner instead of red meat. Brush the fish with olive oil, and broil or grill it. Sprinkle your salad with seeds or nuts instead of cheese. Cook with olive or canola oil instead of butter or oils that are high in saturated fat. Switch from 2% milk or whole milk to 1% or fat-free milk. Dip raw vegetables in a vinaigrette dressing or hummus instead of dips made from mayonnaise or sour cream.  Have a piece of fruit for dessert instead of a piece of cake. Try baked apples, or have some dried fruit. Tips for eating out  Try broiled, grilled, baked, or poached fish instead of having it fried or breaded. Ask your  to have your meals prepared with olive oil instead of butter. Order dishes made with marinara sauce or sauces made from olive oil. Avoid sauces made from cream or mayonnaise. Choose whole-grain breads, whole wheat pasta and pizza crust, brown rice, beans, and lentils. Cut back on butter or margarine on bread. Instead, you can dip your bread in a small amount of olive oil. Ask for a side salad or grilled vegetables instead of french fries or chips. Where can you learn more? Go to http://www.[employer].com/  Enter O407 in the search box to learn more about \"Learning About the Mediterranean Diet. \"  Current as of: September 8, 2021               Content Version: 13.2  © 6600-5426 [employer]. Care instructions adapted under license by [employer] (which disclaims liability or warranty for this information). If you have questions about a medical condition or this instruction, always ask your healthcare professional. [address] any warranty or liability for your use of this information.

## 2022-10-20 ENCOUNTER — APPOINTMENT (OUTPATIENT)
Dept: CT IMAGING | Age: 72
End: 2022-10-20
Attending: PHYSICIAN ASSISTANT
Payer: MEDICARE

## 2022-10-20 ENCOUNTER — APPOINTMENT (OUTPATIENT)
Dept: GENERAL RADIOLOGY | Age: 72
End: 2022-10-20
Attending: EMERGENCY MEDICINE
Payer: MEDICARE

## 2022-10-20 ENCOUNTER — HOSPITAL ENCOUNTER (EMERGENCY)
Age: 72
Discharge: HOME | End: 2022-10-20
Attending: EMERGENCY MEDICINE
Payer: MEDICARE

## 2022-10-20 VITALS
HEART RATE: 86 BPM | TEMPERATURE: 98.4 F | DIASTOLIC BLOOD PRESSURE: 82 MMHG | RESPIRATION RATE: 18 BRPM | SYSTOLIC BLOOD PRESSURE: 155 MMHG | OXYGEN SATURATION: 100 % | HEIGHT: 65 IN | BODY MASS INDEX: 34.66 KG/M2 | WEIGHT: 208 LBS

## 2022-10-20 DIAGNOSIS — M54.41 ACUTE RIGHT-SIDED LOW BACK PAIN WITH RIGHT-SIDED SCIATICA: ICD-10-CM

## 2022-10-20 DIAGNOSIS — M25.531 RIGHT WRIST PAIN: Primary | ICD-10-CM

## 2022-10-20 PROCEDURE — 99284 EMERGENCY DEPT VISIT MOD MDM: CPT

## 2022-10-20 PROCEDURE — 73130 X-RAY EXAM OF HAND: CPT

## 2022-10-20 PROCEDURE — 74011000250 HC RX REV CODE- 250: Performed by: PHYSICIAN ASSISTANT

## 2022-10-20 PROCEDURE — 72131 CT LUMBAR SPINE W/O DYE: CPT

## 2022-10-20 PROCEDURE — 74011250637 HC RX REV CODE- 250/637: Performed by: PHYSICIAN ASSISTANT

## 2022-10-20 RX ORDER — OXYCODONE AND ACETAMINOPHEN 5; 325 MG/1; MG/1
1 TABLET ORAL
Status: COMPLETED | OUTPATIENT
Start: 2022-10-20 | End: 2022-10-20

## 2022-10-20 RX ORDER — LIDOCAINE 50 MG/G
PATCH TOPICAL
Qty: 30 EACH | Refills: 0 | Status: SHIPPED | OUTPATIENT
Start: 2022-10-20

## 2022-10-20 RX ORDER — LIDOCAINE 4 G/100G
1 PATCH TOPICAL
Status: DISCONTINUED | OUTPATIENT
Start: 2022-10-20 | End: 2022-10-20 | Stop reason: HOSPADM

## 2022-10-20 RX ORDER — NAPROXEN 375 MG/1
375 TABLET ORAL 2 TIMES DAILY WITH MEALS
Qty: 10 TABLET | Refills: 0 | Status: SHIPPED | OUTPATIENT
Start: 2022-10-20

## 2022-10-20 RX ADMIN — OXYCODONE HYDROCHLORIDE AND ACETAMINOPHEN 1 TABLET: 5; 325 TABLET ORAL at 15:54

## 2022-10-20 NOTE — ED TRIAGE NOTES
Pt presents with right arm & leg pain for approx 3 days. She denies injury/trauma; leg pain is just under the buttock area & she believes the pain is due to sciatica. Stated she was unable to ambulate to her vehicle to come to be seen. Took Percocet last night but has not taken anything today.

## 2022-10-20 NOTE — DISCHARGE INSTRUCTIONS
Take medication as prescribed. Follow-up with your primary care physician within 2 days for reassessment. Bring the results from this visit with you for their review. Return to the ED immediately for any new, worsening, or persistent symptoms, including abdominal pain, weakness, or any other medical concerns.

## 2022-10-20 NOTE — ED PROVIDER NOTES
EMERGENCY DEPARTMENT HISTORY AND PHYSICAL EXAM    4:16 PM      Date: 10/20/2022  Patient Name: Tabatha Persaud    History of Presenting Illness     Chief Complaint   Patient presents with    Leg Pain    Hand Pain         History Provided By: Patient    Additional History (Context): Tabatha Persaud is a 80 y.o. female with  hx of CHF, HLD, HTN, remote hx of breast cancer, and other noted PMH  who presents with complaint of right wrist pain x3 days. Patient's pain is worse with movement. Denies alleviating factors. Denies fever or chills, skin discoloration, injury or trauma, numbness or tingling, extremity weakness. Patient also notes right low back pain with radiation to right lower extremity x3 days. Notes concern for sciatica. Notes numbness and tingling radiating to the leg as well. Denies fall or trauma, urinary or bowel incontinence retention, abdominal pain, nausea or vomiting. Denies history of IVDA, active cancer. Note she took her chronic Percocet for symptoms with mild relief. PCP: Aaliyah Carter MD    Current Facility-Administered Medications   Medication Dose Route Frequency Provider Last Rate Last Admin    lidocaine 4 % patch 1 Patch  1 Patch TransDERmal NOW Meg Reddy, [address]   1 Patch at 10/20/22 7781     Current Outpatient Medications   Medication Sig Dispense Refill    lidocaine (Lidoderm) 5 % Apply patch to the affected area for 12 hours a day and remove for 12 hours a day. 30 Each 0    naproxen (NAPROSYN) 375 mg tablet Take 1 Tablet by mouth two (2) times daily (with meals). 10 Tablet 0    cyclobenzaprine (FLEXERIL) 5 mg tablet take 1 tablet by mouth three times a day if needed for muscle spa. ..  (REFER TO PRESCRIPTION NOTES). metoprolol tartrate (LOPRESSOR) 25 mg tablet Take 1 Tablet by mouth two (2) times a day. 180 Tablet 1    losartan (COZAAR) 100 mg tablet Take 1 Tablet by mouth daily.  90 Tablet 1    esomeprazole (NexIUM) 40 mg capsule Take 1 Capsule by mouth daily. 90 Capsule 1    furosemide (LASIX) 40 mg tablet Take 1 Tablet by mouth daily. 90 Tablet 1    dilTIAZem ER (CARDIZEM CD) 180 mg capsule Take 1 Capsule by mouth daily. 90 Capsule 1    fluticasone/umeclidin/vilanter (TRELEGY ELLIPTA IN) Take  by inhalation. Ketoconazole 2 % topical foam Apply  to affected area two (2) times a day. 100 g 0    pravastatin (PRAVACHOL) 40 mg tablet Take 1 Tab by mouth nightly.  0    desonide (TRIDESILON) 0.05 % cream   0    ALBUTEROL SULFATE (PROAIR HFA IN) Take 2 Puffs by inhalation every four (4) hours as needed. montelukast (SINGULAIR) 10 mg tablet Take 10 mg by mouth daily. bethanechol (URECHOLINE) 10 mg tablet Take 10 mg by mouth Before breakfast, lunch, and dinner. aspirin 81 mg tablet Take 81 mg by mouth.        ergocalciferol (ERGOCALCIFEROL) 1,250 mcg (50,000 unit) capsule Take 50,000 Units by mouth Every Thursday. Past History     Past Medical History:  Past Medical History:   Diagnosis Date    Anemia     Asthma     Blood disorder     Bursitis of right hip     Cancer ([facility] [address]     breast, diagnosed 2006 left    Cancer [facility])     Chest pain, unspecified     The working diagnosis is chest pain. The differential diagnosis includes chest wall pain, stable angina. Chronic obstructive pulmonary disease (HCC)     Essential hypertension     Essential hypertension, benign     Uncontrolled     GERD (gastroesophageal reflux disease)     Hypercholesterolemia     Hypertension     Low back pain     Neuropathy     Nonspecific abnormal electrocardiogram (ECG) (EKG)     Obesity, unspecified     Weight loss has been strongly encouraged by following dietary restrictions and an exercise routine.     Osteoarthritis of right knee     Pain in both knees     Polio     as a young child    Pre-operative cardiovascular examination     Sciatica     Sleep apnea     Unspecified cerebral artery occlusion with cerebral infarction     TIA? no residual    Vitamin D deficiency Past Surgical History:  Past Surgical History:   Procedure Laterality Date    HX BREAST BIOPSY  4/11/12    Left    HX BREAST BIOPSY Left     left mastectomy    HX VEIN STRIPPING Bilateral     MD BREAST SURGERY PROCEDURE UNLISTED      initial surgery 2006, then left modified radical mastectomy 6/2012       Family History:  Family History   Problem Relation Age of Onset    Cancer Mother     Cancer Father     OSTEOARTHRITIS Other     Hypertension Other     Heart Disease Neg Hx         Negative family history of premature CAD or CVA       Social History:  Social History     Tobacco Use    Smoking status: Never    Smokeless tobacco: Never   Substance Use Topics    Alcohol use: No     Alcohol/week: 0.0 standard drinks    Drug use: No       Allergies:  No Known Allergies      Review of Systems       Review of Systems   Constitutional:  Negative for chills and fever. Respiratory:  Negative for shortness of breath. Cardiovascular:  Negative for chest pain. Gastrointestinal:  Negative for abdominal pain, nausea and vomiting. Musculoskeletal:  Positive for arthralgias, back pain and myalgias. Skin:  Negative for rash. Neurological:  Negative for weakness. All other systems reviewed and are negative. Physical Exam   Visit Vitals  BP (!) 155/82 (BP 1 Location: Right upper arm, BP Patient Position: At rest;Sitting)   Pulse 86   Temp 98.4 °F (36.9 °C)   Resp 18   Ht 5' 5\" (1.651 m)   Wt 94.3 kg (208 lb)   LMP  (LMP Unknown)   SpO2 100%   BMI 34.61 kg/m²         Physical Exam  Vitals and nursing note reviewed. Constitutional:       General: She is not in acute distress. Appearance: Normal appearance. She is well-developed. She is obese. She is not ill-appearing, toxic-appearing or diaphoretic. HENT:      Head: Normocephalic and atraumatic. Cardiovascular:      Rate and Rhythm: Normal rate and regular rhythm. Heart sounds: Normal heart sounds. No murmur heard. No friction rub. No gallop. Pulmonary:      Effort: Pulmonary effort is normal. No respiratory distress. Breath sounds: Normal breath sounds. No wheezing or rales. Abdominal:      General: Abdomen is flat. Bowel sounds are normal. There is no distension. Palpations: Abdomen is soft. Tenderness: There is no abdominal tenderness. There is no guarding or rebound. Musculoskeletal:         General: Normal range of motion. Right wrist: Tenderness (volar aspect of wrist TTP without erythema/edema/ecchymosis) present. No crepitus. Normal pulse. Cervical back: Normal range of motion and neck supple. Lumbar back: Bony tenderness (R SI joint) present. No swelling, edema, deformity, signs of trauma or spasms. Normal range of motion. Comments: No saddle anesthesia, negative SLR b/l, full ROM of hip and knee, normal gait    Skin:     General: Skin is warm and dry. Findings: No rash. Neurological:      General: No focal deficit present. Mental Status: She is alert and oriented to person, place, and time. Cranial Nerves: No cranial nerve deficit. Sensory: No sensory deficit. Motor: No weakness. Gait: Gait normal.         Diagnostic Study Results     Labs -  No results found for this or any previous visit (from the past 12 hour(s)). Radiologic Studies -   CT SPINE LUMB WO CONT   Final Result   Degenerative disc disease with Significant canal stenosis at L4-5. Facet hypertrophy with possible impingement left L1, left L4 and L5 exiting   nerve roots. Otherwise regions of encroachment. XR HAND RT MIN 3 V   Final Result      Polyarticular osteoarthrosis. No erosive changes. Medical Decision Making   I am the first provider for this patient. I reviewed the vital signs, available nursing notes, past medical history, past surgical history, family history and social history. Vital Signs-Reviewed the patient's vital signs.     Records Reviewed: Nursing Notes and Old Medical Records (Time of Review: 4:16 PM)    ED Course: Progress Notes, Reevaluation, and Consults:  4:40 PM: Reviewed results and plan with patient and son. Discussed need for close outpatient follow-up this week for reassessment. Discussed strict return precautions, including weakness, numbness/tingling, or any other medical concerns. Velcro wrist splint applied. Provider Notes (Medical Decision Making): 50-year-old female who presents to the ED due to right wrist pain x3 days. No evidence of cellulitis, septic joint, gout, fracture or trauma. Patient also notes right-sided low back pain with radiation the right lower extremity x3 days. Afebrile, nontoxic-appearing, looks well. No red flag symptoms, fall or trauma. CT demonstrates degenerative disc disease with canal stenosis. Patient stable for discharge with symptomatic management, close outpatient follow-up for further assessment. Strict return precautions provided. Diagnosis     Clinical Impression:   1. Right wrist pain    2. Acute right-sided low back pain with right-sided sciatica        Disposition: home     Follow-up Information       Follow up With Specialties Details Why [place] [address] EMERGENCY DEPT Emergency Medicine  If symptoms worsen [address] Francisca Mueller MD Family Medicine Schedule an appointment as soon as possible for a visit   [address] [place] [address].  [phone]               Discharge Medication List as of 10/20/2022  4:41 PM        START taking these medications    Details   lidocaine (Lidoderm) 5 % Apply patch to the affected area for 12 hours a day and remove for 12 hours a day., Normal, Disp-30 Each, R-0      naproxen (NAPROSYN) 375 mg tablet Take 1 Tablet by mouth two (2) times daily (with meals). , Normal, Disp-10 Tablet, R-0           CONTINUE these medications which have NOT CHANGED    Details   cyclobenzaprine (FLEXERIL) 5 mg tablet take 1 tablet by mouth three times a day if needed for muscle spa. ..  (REFER TO PRESCRIPTION NOTES). , Historical Med      metoprolol tartrate (LOPRESSOR) 25 mg tablet Take 1 Tablet by mouth two (2) times a day., Normal, Disp-180 Tablet, R-1      losartan (COZAAR) 100 mg tablet Take 1 Tablet by mouth daily. , Normal, Disp-90 Tablet, R-1      esomeprazole (NexIUM) 40 mg capsule Take 1 Capsule by mouth daily. , Normal, Disp-90 Capsule, R-1      furosemide (LASIX) 40 mg tablet Take 1 Tablet by mouth daily. , Normal, Disp-90 Tablet, R-1      dilTIAZem ER (CARDIZEM CD) 180 mg capsule Take 1 Capsule by mouth daily. , Normal, Disp-90 Capsule, R-1      fluticasone/umeclidin/vilanter (TRELEGY ELLIPTA IN) Take  by inhalation. , Historical Med      Ketoconazole 2 % topical foam Apply  to affected area two (2) times a day., Normal, Disp-100 g, R-0      pravastatin (PRAVACHOL) 40 mg tablet Take 1 Tab by mouth nightly., Historical Med, R-0      desonide (TRIDESILON) 0.05 % cream Historical Med, R-0      ALBUTEROL SULFATE (PROAIR HFA IN) Take 2 Puffs by inhalation every four (4) hours as needed., Historical Med      montelukast (SINGULAIR) 10 mg tablet Take 10 mg by mouth daily. , Historical Med      bethanechol (URECHOLINE) 10 mg tablet Take 10 mg by mouth Before breakfast, lunch, and dinner., Historical Med      aspirin 81 mg tablet Take 81 mg by mouth.  , Historical Med      ergocalciferol (ERGOCALCIFEROL) 1,250 mcg (50,000 unit) capsule Take 50,000 Units by mouth Every Thursday., Historical Med           STOP taking these medications       celecoxib (CELEBREX) 100 mg capsule Comments:   Reason for Stopping:         ibuprofen (MOTRIN) 800 mg tablet Comments:   Reason for Stopping:         lidocaine 4 % patch Comments:   Reason for Stopping:         diclofenac (VOLTAREN) 1 % gel Comments:   Reason for Stopping:         oxyCODONE-acetaminophen (PERCOCET) 5-325 mg per tablet Comments:   Reason for Stopping:               Dictation disclaimer:  Please note that this dictation was completed with [employer], the computer voice recognition software. Quite often unanticipated grammatical, syntax, homophones, and other interpretive errors are inadvertently transcribed by the computer software. Please disregard these errors. Please excuse any errors that have escaped final proofreading.

## 2022-12-01 ENCOUNTER — TRANSCRIBE ORDER (OUTPATIENT)
Dept: SCHEDULING | Age: 72
End: 2022-12-01

## 2022-12-01 DIAGNOSIS — Z12.31 VISIT FOR SCREENING MAMMOGRAM: Primary | ICD-10-CM

## 2023-01-27 ENCOUNTER — OFFICE VISIT (OUTPATIENT)
Dept: ORTHOPEDIC SURGERY | Age: 73
End: 2023-01-27
Payer: MEDICARE

## 2023-01-27 DIAGNOSIS — M70.71 BURSITIS OF RIGHT HIP, UNSPECIFIED BURSA: ICD-10-CM

## 2023-01-27 DIAGNOSIS — M17.11 PRIMARY OSTEOARTHRITIS OF RIGHT KNEE: Primary | ICD-10-CM

## 2023-01-27 DIAGNOSIS — M54.50 LUMBAR PAIN: ICD-10-CM

## 2023-01-27 RX ORDER — BETAMETHASONE SODIUM PHOSPHATE AND BETAMETHASONE ACETATE 3; 3 MG/ML; MG/ML
6 INJECTION, SUSPENSION INTRA-ARTICULAR; INTRALESIONAL; INTRAMUSCULAR; SOFT TISSUE ONCE
Status: CANCELLED | OUTPATIENT
Start: 2023-01-27 | End: 2023-01-28

## 2023-01-27 RX ORDER — BETAMETHASONE SODIUM PHOSPHATE AND BETAMETHASONE ACETATE 3; 3 MG/ML; MG/ML
6 INJECTION, SUSPENSION INTRA-ARTICULAR; INTRALESIONAL; INTRAMUSCULAR; SOFT TISSUE ONCE
Status: COMPLETED | OUTPATIENT
Start: 2023-01-27 | End: 2023-01-27

## 2023-01-27 RX ORDER — DICLOFENAC SODIUM 10 MG/G
2 GEL TOPICAL 4 TIMES DAILY
Qty: 500 G | Refills: 2 | Status: SHIPPED | OUTPATIENT
Start: 2023-01-27

## 2023-01-27 RX ORDER — CYCLOBENZAPRINE HCL 10 MG
10 TABLET ORAL
Qty: 30 TABLET | Refills: 2 | Status: SHIPPED | OUTPATIENT
Start: 2023-01-27

## 2023-01-27 RX ADMIN — BETAMETHASONE SODIUM PHOSPHATE AND BETAMETHASONE ACETATE 6 MG: 3; 3 INJECTION, SUSPENSION INTRA-ARTICULAR; INTRALESIONAL; INTRAMUSCULAR; SOFT TISSUE at 16:11

## 2023-01-27 NOTE — PROGRESS NOTES
[address] [place] [address]  [phone]           Patient: Digna Spence                MRN: [id number]       SSN: [id number]  YOB: 1950        AGE: 71 y.o. SEX: female  There is no height or weight on file to calculate BMI. PCP: Adrianna Robison MD  01/27/23            REVIEW OF SYSTEMS:  Constitutional: Negative for fever, chills, weight loss and malaise/fatigue. HENT: Negative. Eyes: Negative. Respiratory: Negative. Cardiovascular: Negative. Gastrointestinal: No bowel incontinence or constipation. Genitourinary: No bladder incontinence or saddle anesthesia. Skin: Negative. Neurological: Negative. Endo/Heme/Allergies: Negative. Psychiatric/Behavioral: Negative. Musculoskeletal: As per HPI above. Past Medical History:   Diagnosis Date    Anemia     Asthma     Blood disorder     Bursitis of right hip     Cancer ([facility] [address]     breast, diagnosed 2006 left    Cancer [facility])     Chest pain, unspecified     The working diagnosis is chest pain. The differential diagnosis includes chest wall pain, stable angina. Chronic obstructive pulmonary disease (HCC)     Essential hypertension     Essential hypertension, benign     Uncontrolled     GERD (gastroesophageal reflux disease)     Hypercholesterolemia     Hypertension     Low back pain     Neuropathy     Nonspecific abnormal electrocardiogram (ECG) (EKG)     Obesity, unspecified     Weight loss has been strongly encouraged by following dietary restrictions and an exercise routine.     Osteoarthritis of right knee     Pain in both knees     Polio     as a young child    Pre-operative cardiovascular examination     Sciatica     Sleep apnea     Unspecified cerebral artery occlusion with cerebral infarction     TIA? no residual    Vitamin D deficiency          Current Outpatient Medications:     lidocaine (Lidoderm) 5 %, Apply patch to the affected area for 12 hours a day and remove for 12 hours a day., Disp: 30 Each, Rfl: 0    naproxen (NAPROSYN) 375 mg tablet, Take 1 Tablet by mouth two (2) times daily (with meals). , Disp: 10 Tablet, Rfl: 0    cyclobenzaprine (FLEXERIL) 5 mg tablet, take 1 tablet by mouth three times a day if needed for muscle spa. ..  (REFER TO PRESCRIPTION NOTES). , Disp: , Rfl:     metoprolol tartrate (LOPRESSOR) 25 mg tablet, Take 1 Tablet by mouth two (2) times a day., Disp: 180 Tablet, Rfl: 1    losartan (COZAAR) 100 mg tablet, Take 1 Tablet by mouth daily. , Disp: 90 Tablet, Rfl: 1    esomeprazole (NexIUM) 40 mg capsule, Take 1 Capsule by mouth daily. , Disp: 90 Capsule, Rfl: 1    furosemide (LASIX) 40 mg tablet, Take 1 Tablet by mouth daily. , Disp: 90 Tablet, Rfl: 1    dilTIAZem ER (CARDIZEM CD) 180 mg capsule, Take 1 Capsule by mouth daily. , Disp: 90 Capsule, Rfl: 1    fluticasone/umeclidin/vilanter (TRELEGY ELLIPTA IN), Take  by inhalation. , Disp: , Rfl:     Ketoconazole 2 % topical foam, Apply  to affected area two (2) times a day., Disp: 100 g, Rfl: 0    pravastatin (PRAVACHOL) 40 mg tablet, Take 1 Tab by mouth nightly., Disp: , Rfl: 0    desonide (TRIDESILON) 0.05 % cream, , Disp: , Rfl: 0    ALBUTEROL SULFATE (PROAIR HFA IN), Take 2 Puffs by inhalation every four (4) hours as needed. , Disp: , Rfl:     montelukast (SINGULAIR) 10 mg tablet, Take 10 mg by mouth daily. , Disp: , Rfl:     bethanechol (URECHOLINE) 10 mg tablet, Take 10 mg by mouth Before breakfast, lunch, and dinner., Disp: , Rfl:     aspirin 81 mg tablet, Take 81 mg by mouth.  , Disp: , Rfl:     ergocalciferol (ERGOCALCIFEROL) 1,250 mcg (50,000 unit) capsule, Take 50,000 Units by mouth Every Thursday. , Disp: , Rfl:     No Known Allergies    Social History     Socioeconomic History    Marital status:      Spouse name: Not on file    Number of children: Not on file    Years of education: Not on file    Highest education level: Not on file   Occupational History Not on file   Tobacco Use    Smoking status: Never    Smokeless tobacco: Never   Substance and Sexual Activity    Alcohol use: No     Alcohol/week: 0.0 standard drinks    Drug use: No    Sexual activity: Not on file   Other Topics Concern    Not on file   Social History Narrative    ** Merged History Encounter **          Social Determinants of Health     Financial Resource Strain: Not on file   Food Insecurity: Not on file   Transportation Needs: Not on file   Physical Activity: Not on file   Stress: Not on file   Social Connections: Not on file   Intimate Partner Violence: Not on file   Housing Stability: Not on file       Past Surgical History:   Procedure Laterality Date    HX BREAST BIOPSY  4/11/12    Left    HX BREAST BIOPSY Left     left mastectomy    HX VEIN STRIPPING Bilateral     KS BREAST SURGERY PROCEDURE UNLISTED      initial surgery 2006, then left modified radical mastectomy 6/2012       Patient seen evaluated today with complaints of right lower extremity pain. She report some low back pain which does radiate down her right lower extremity when she is up and ambulating as well as lying in bed at night. She also is reporting having lateral based right hip pain when she does lying in bed especially laying on the right side. She has had no change in bowel bladder habits. No injuries to report. She does have known advancing arthritis of the right knee. She is requesting repeat injection for the knee. She does have decreased walking tolerance. She has trouble with get up from a chair. She has trouble stairs. Patient denies recent fevers, chills, chest pain, SOB, or injuries. No recent systemic changes noted. A 12-point review of systems is performed today. Pertinent positives are noted. All other systems reviewed and otherwise are negative. Physical exam: General: Alert and oriented x3, nad.  well-developed, well nourished. normal affect, AF.   NC/AT, EOMI, neck supple, trachea midline, no JVD present. Breathing is non-labored. Examination of the low back reveals skin intact. There is no erythema or ecchymosis noted. There are no signs for infection or cellulitis present. She does have some paraspinal muscular spasms present. There is no midline pain. The pelvis is stable. She does have pain to palpation the trochanter bursa on the right side. There is no pain with rotation of the hip. Straight leg raise causes some discomfort in the right lower extremity to L4. The right knee reveals skin intact. There is no erythema or ecchymosis noted. There are no signs for infection or cellulitis present. Findings are consistent with advancing arthritis to the right knee particularly patellofemoral articulation. There is crepitus anteriorly noted. Radiographs obtained in the office today 1/27/2023 at the [facility] location include AP and lateral lumbar spine shows multilevel degenerative changes worst at L5-S1 with some beaking noted at multiple levels    Assessment: #1 lumbar degenerative disc disease, radiculopathy, #2 right hip trochanter bursitis, #3 right knee osteoarthritis    Plan: At this point, we discussed treatment options. We will move forth a cortisone injection for the right hip, trochanteric bursa as well as right knee today in the office. After informed consent, under aseptic conditions, with US guided assitance, the right hip and right knee was prepped with betadine and a mxiture of 3ml 1% lidocaine and 6mg of celestone was injected without complications. The patient tolerated the injection well. The patient is instructed on post-injection care. We will move forth an MRI of the lumbar spine for further evaluation of her radiculopathy. She will be referred to the spine center. The meantime we will start on a Medrol Dosepak as well as Flexeril. She is instructed on use as well as usual precautions.         Chart reviewed for the following:  Jeniffer Castanon THERON, have reviewed the History, Physical and updated the Allergic reactions for Hi Petmaxwell? TIME OUT performed immediately prior to start of procedure:  IMya PA-C, have performed the following reviews on Hi Galan prior to the start of the procedure:  ????????  * Patient was identified by name and date of birth   * Agreement on procedure being performed was verified  * Risks and Benefits explained to the patient  * Procedure site verified and marked as necessary  * Patient was positioned for comfort  * Consent was signed and verified    Time:4:07 PM    Body part: right hip, intra-bursal: right knee, intra-articular    Medication & Dose: 3ml 1% lidocaine and 6mg celestone each area    Date of procedure: 01/27/23    Procedure performed by: Mya Hidalgo PA-C    Provider assisted by: none    Patient assisted by: self    How tolerated by patient: tolerated the procedure well with no complications    Post Procedural Pain Scale: 8    Comments:   [address] using a frequency of 10MHz with a 12L-RS transducer head was used to confirm needle placement.   Ultrasound images captured using [address] Ultrasound machine and scanned into patient's chart       Juvenal Gunn PA-C, ATC

## 2023-01-31 DIAGNOSIS — Z12.31 VISIT FOR SCREENING MAMMOGRAM: Primary | ICD-10-CM

## 2023-02-02 NOTE — PROGRESS NOTES
Chief Complaint   Patient presents with    Knee Pain     Right     8/10 pain. I encouraged him to wear his hearing aids regularly

## 2023-02-02 NOTE — TELEPHONE ENCOUNTER
Called patient back and spoke to her. She was just confused about how many doctors we referred her to. I explained that we are sending her to the spine doctor and a Rheumatologist so she will have two different people calling about 2 different appointments.  She understands now Pt informed, added back to med list.

## 2023-02-04 DIAGNOSIS — Z12.31 VISIT FOR SCREENING MAMMOGRAM: Primary | ICD-10-CM

## 2023-02-15 ENCOUNTER — HOSPITAL ENCOUNTER (OUTPATIENT)
Facility: HOSPITAL | Age: 73
Discharge: HOME | End: 2023-02-18
Payer: MEDICARE

## 2023-02-15 DIAGNOSIS — Z12.31 VISIT FOR SCREENING MAMMOGRAM: ICD-10-CM

## 2023-02-15 PROCEDURE — 77063 BREAST TOMOSYNTHESIS BI: CPT

## 2023-03-10 ENCOUNTER — OFFICE VISIT (OUTPATIENT)
Age: 73
End: 2023-03-10

## 2023-03-10 VITALS — TEMPERATURE: 97.7 F | HEIGHT: 65 IN | BODY MASS INDEX: 34.82 KG/M2 | WEIGHT: 209 LBS

## 2023-03-10 DIAGNOSIS — M54.50 LOW BACK PAIN, UNSPECIFIED BACK PAIN LATERALITY, UNSPECIFIED CHRONICITY, UNSPECIFIED WHETHER SCIATICA PRESENT: ICD-10-CM

## 2023-03-10 DIAGNOSIS — M70.61 TROCHANTERIC BURSITIS, RIGHT HIP: Primary | ICD-10-CM

## 2023-03-10 RX ORDER — AMLODIPINE BESYLATE 5 MG/1
TABLET ORAL
COMMUNITY
Start: 2017-05-22

## 2023-03-10 RX ORDER — GABAPENTIN 400 MG/1
CAPSULE ORAL
COMMUNITY
Start: 2023-03-09

## 2023-03-10 RX ORDER — IPRATROPIUM BROMIDE AND ALBUTEROL SULFATE 2.5; .5 MG/3ML; MG/3ML
SOLUTION RESPIRATORY (INHALATION)
COMMUNITY
Start: 2019-12-11

## 2023-03-10 RX ORDER — GABAPENTIN 300 MG/1
CAPSULE ORAL
COMMUNITY

## 2023-03-10 RX ORDER — OXYCODONE AND ACETAMINOPHEN 7.5; 325 MG/1; MG/1
TABLET ORAL
COMMUNITY
Start: 2017-07-17

## 2023-03-10 RX ORDER — CYCLOBENZAPRINE HCL 5 MG
TABLET ORAL
COMMUNITY
Start: 2022-09-22

## 2023-03-10 RX ORDER — TIOTROPIUM BROMIDE 18 UG/1
CAPSULE ORAL; RESPIRATORY (INHALATION)
COMMUNITY
Start: 2016-03-29

## 2023-03-10 RX ORDER — FLUTICASONE PROPIONATE AND SALMETEROL 250; 50 UG/1; UG/1
1 POWDER RESPIRATORY (INHALATION) EVERY 12 HOURS
COMMUNITY
Start: 2016-03-29

## 2023-03-10 RX ORDER — LOSARTAN POTASSIUM 50 MG/1
TABLET ORAL
COMMUNITY

## 2023-03-10 RX ORDER — OXYCODONE AND ACETAMINOPHEN 10; 325 MG/1; MG/1
TABLET ORAL
COMMUNITY
Start: 2023-03-09

## 2023-03-10 RX ORDER — LOSARTAN POTASSIUM 100 MG/1
TABLET ORAL
COMMUNITY

## 2023-03-10 RX ORDER — IBUPROFEN 800 MG/1
800 TABLET ORAL EVERY 8 HOURS PRN
Qty: 90 TABLET | Refills: 2 | Status: SHIPPED | OUTPATIENT
Start: 2023-03-10

## 2023-03-10 RX ORDER — AMLODIPINE BESYLATE 2.5 MG/1
TABLET ORAL
COMMUNITY

## 2023-03-10 RX ORDER — LIDOCAINE 50 MG/G
PATCH TOPICAL
COMMUNITY
Start: 2022-10-20

## 2023-03-10 RX ORDER — MONTELUKAST SODIUM 10 MG/1
TABLET ORAL
COMMUNITY

## 2023-03-10 RX ORDER — IBUPROFEN 800 MG/1
TABLET ORAL
Qty: 120 TABLET | Refills: 0 | Status: CANCELLED | OUTPATIENT
Start: 2023-03-10

## 2023-03-10 RX ORDER — PRAVASTATIN SODIUM 40 MG
TABLET ORAL
COMMUNITY
Start: 2019-04-25

## 2023-03-10 RX ORDER — FLUTICASONE FUROATE, UMECLIDINIUM BROMIDE AND VILANTEROL TRIFENATATE 200; 62.5; 25 UG/1; UG/1; UG/1
POWDER RESPIRATORY (INHALATION)
COMMUNITY
Start: 2022-10-31

## 2023-03-10 RX ORDER — ALBUTEROL SULFATE 90 UG/1
AEROSOL, METERED RESPIRATORY (INHALATION)
COMMUNITY
Start: 2016-03-29

## 2023-03-10 RX ORDER — DICYCLOMINE HCL 20 MG
TABLET ORAL
COMMUNITY
Start: 2017-05-28

## 2023-03-10 RX ORDER — OXYCODONE AND ACETAMINOPHEN 10; 325 MG/1; MG/1
TABLET ORAL
COMMUNITY

## 2023-03-10 RX ORDER — CYCLOBENZAPRINE HCL 10 MG
10 TABLET ORAL 3 TIMES DAILY PRN
Qty: 60 TABLET | Refills: 1 | Status: SHIPPED | OUTPATIENT
Start: 2023-03-10 | End: 2023-04-24

## 2023-03-10 RX ORDER — NAPROXEN 500 MG/1
TABLET ORAL
COMMUNITY
Start: 2020-09-28

## 2023-03-10 RX ORDER — MELOXICAM 15 MG/1
TABLET ORAL
COMMUNITY
Start: 2017-06-27

## 2023-03-10 RX ORDER — NYSTATIN 100000 U/G
CREAM TOPICAL
COMMUNITY
Start: 2020-05-24

## 2023-03-10 RX ORDER — FLUTICASONE FUROATE, UMECLIDINIUM BROMIDE AND VILANTEROL TRIFENATATE 200; 62.5; 25 UG/1; UG/1; UG/1
POWDER RESPIRATORY (INHALATION)
COMMUNITY
Start: 2023-02-17

## 2023-03-10 RX ORDER — BETHANECHOL CHLORIDE 10 MG/1
1 TABLET ORAL
COMMUNITY
Start: 2012-02-21

## 2023-03-10 RX ORDER — GABAPENTIN 300 MG/1
CAPSULE ORAL
COMMUNITY
Start: 2023-01-12

## 2023-03-10 RX ORDER — CELECOXIB 100 MG/1
CAPSULE ORAL
COMMUNITY

## 2023-03-10 RX ORDER — ALBUTEROL SULFATE 90 UG/1
AEROSOL, METERED RESPIRATORY (INHALATION)
COMMUNITY
Start: 2023-02-17

## 2023-03-10 RX ORDER — GABAPENTIN 100 MG/1
CAPSULE ORAL
COMMUNITY
Start: 2017-07-19

## 2023-03-10 RX ORDER — TRAMADOL HYDROCHLORIDE 50 MG/1
TABLET ORAL EVERY 6 HOURS PRN
COMMUNITY
Start: 2015-03-31

## 2023-03-10 RX ORDER — OXYCODONE HYDROCHLORIDE AND ACETAMINOPHEN 5; 325 MG/1; MG/1
1 TABLET ORAL EVERY 4 HOURS PRN
COMMUNITY
Start: 2016-03-29

## 2023-03-10 RX ORDER — NALOXONE HYDROCHLORIDE 4 MG/.1ML
SPRAY NASAL
COMMUNITY

## 2023-03-10 RX ORDER — IBUPROFEN 800 MG/1
TABLET ORAL
COMMUNITY
Start: 2020-08-22

## 2023-03-10 NOTE — PROGRESS NOTES
Patient: Denny Rosa                MRN: [id number]       SSN: [id number]  YOB: 1950        AGE: 71 y.o. SEX: female  Body mass index is 34.78 kg/m². PCP: Saira Burt MD  03/10/23      This office note has been dictated. REVIEW OF SYSTEMS:  Constitutional: Negative for fever, chills, weight loss and malaise/fatigue. HENT: Negative. Eyes: Negative. Respiratory: Negative. Cardiovascular: Negative. Gastrointestinal: No bowel incontinence or constipation. Genitourinary: No bladder incontinence or saddle anesthesia. Skin: Negative. Neurological: Negative. Endo/Heme/Allergies: Negative. Psychiatric/Behavioral: Negative. Musculoskeletal: As per HPI above. Past Medical History:   Diagnosis Date    Anemia     Asthma     Blood disorder     Bursitis of right hip     Cancer ([facility] [address]     breast, diagnosed 2006 left    Cancer [facility])     Chest pain, unspecified     The working diagnosis is chest pain. The differential diagnosis includes chest wall pain, stable angina. Chronic obstructive pulmonary disease (HCC)     Essential hypertension     Essential hypertension, benign     Uncontrolled     GERD (gastroesophageal reflux disease)     Hypercholesterolemia     Hypertension     Low back pain     Neuropathy     Nonspecific abnormal electrocardiogram (ECG) (EKG)     Obesity, unspecified     Weight loss has been strongly encouraged by following dietary restrictions and an exercise routine.     Osteoarthritis of right knee     Pain in both knees     Polio     as a young child    Pre-operative cardiovascular examination     Sciatica     Sleep apnea     Unspecified cerebral artery occlusion with cerebral infarction     TIA? no residual    Vitamin D deficiency          Current Outpatient Medications:     albuterol sulfate HFA (PROVENTIL;VENTOLIN;PROAIR) 108 (90 Base) MCG/ACT inhaler, albuterol sulfate HFA 90 mcg/actuation aerosol inhaler  inhale 1 to 2 puffs by mouth every 4 to 6 hours if needed for cou...  (REFER TO PRESCRIPTION NOTES). , Disp: , Rfl:     amLODIPine (NORVASC) 5 MG tablet, amlodipine 5 mg tablet  take 1 tablet by mouth once daily for blood pressure, Disp: , Rfl:     bethanechol (URECHOLINE) 10 MG tablet, Take 1 tablet by mouth, Disp: , Rfl:     cyclobenzaprine (FLEXERIL) 5 MG tablet, cyclobenzaprine 5 mg tablet  take 1 tablet by mouth three times a day, Disp: , Rfl:     dicyclomine (BENTYL) 20 MG tablet, , Disp: , Rfl:     fluticasone-salmeterol (ADVAIR DISKUS) 250-50 MCG/ACT AEPB diskus inhaler, Inhale 1 puff into the lungs every 12 hours, Disp: , Rfl:     fluticasone-umeclidin-vilant (TRELEGY ELLIPTA) 200-62.5-25 MCG/ACT AEPB inhaler, inhale 1 puff once daily, Disp: , Rfl:     gabapentin (NEURONTIN) 100 MG capsule, take 2 capsules by mouth three times a day, Disp: , Rfl:     ibuprofen (ADVIL;MOTRIN) 800 MG tablet, ibuprofen 800 mg tablet  take 1 tablet by mouth every 6 hours AS NEEDED FOR PAIN, Disp: , Rfl:     ipratropium-albuterol (DUONEB) 0.5-2.5 (3) MG/3ML SOLN nebulizer solution, inhale contents of 1 vial ( 3 milliliters ) in nebulizer by mouth and INTO THE LUNGS every 6 hours, Disp: , Rfl:     lidocaine (LIDODERM) 5 %, lidocaine 5 % topical patch  APPLY 2 TO 3 PATCHES TO AFFECTED AREA FOR 12 HOURS ON THEN 12 HOURS OFF, Disp: , Rfl:     linaclotide (LINZESS) 145 MCG capsule, take 1 tablet by mouth once daily WITH WATER, Disp: , Rfl:     meloxicam (MOBIC) 15 MG tablet, TAKE 1 TAB ONCE A DAY AS NEEDED FOR PAIN, Disp: , Rfl:     naproxen (NAPROSYN) 500 MG tablet, naproxen 500 mg tablet  take 1 tablet by mouth twice a day if needed for pain, Disp: , Rfl:     nystatin (MYCOSTATIN) 890585 UNIT/GM cream, nystatin 100,000 unit/gram topical cream  apply to affected area twice a day, Disp: , Rfl:     oxyCODONE-acetaminophen (PERCOCET) 5-325 MG per tablet, Take 1 tablet by mouth every 4 hours as needed. , Disp: , Rfl:     oxyCODONE-acetaminophen (PERCOCET) 7.5-325 MG per tablet, take 1 tablet by mouth every 8 hours if needed for pain maximum daily dose of 3, Disp: , Rfl:     pravastatin (PRAVACHOL) 40 MG tablet, pravastatin 40 mg tablet  take 1 tablet by mouth at bedtime, Disp: , Rfl:     tiotropium (SPIRIVA HANDIHALER) 18 MCG inhalation capsule, Spiriva with HandiHaler 18 mcg and inhalation capsules  inhale the contents of one capsule in the handihaler once daily, Disp: , Rfl:     traMADol (ULTRAM) 50 MG tablet, Take by mouth every 6 hours as needed. , Disp: , Rfl:     albuterol (PROVENTIL) (5 MG/ML) 0.5% nebulizer solution, albuterol sulfate concentrate 2.5 mg/0.5 mL solution for nebulization  inhale contents of 1 vial ( 0.5 milliliters ) in nebulizer by eloisa...  (REFER TO PRESCRIPTION NOTES). , Disp: , Rfl:     albuterol sulfate HFA (PROVENTIL;VENTOLIN;PROAIR) 108 (90 Base) MCG/ACT inhaler, inhale 2 puffs twice a day by mouth and INTO THE LUNGS every 12 hours if needed, Disp: , Rfl:     amLODIPine (NORVASC) 2.5 MG tablet, amlodipine 2.5 mg tablet  take 1 tablet by mouth once daily, Disp: , Rfl:     celecoxib (CELEBREX) 100 MG capsule, celecoxib 100 mg capsule  take 1 capsule by mouth twice a day, Disp: , Rfl:     diclofenac (VOLTAREN) 50 MG EC tablet, diclofenac sodium 50 mg tablet,delayed release  take 1 tablet by mouth twice a day with meals, Disp: , Rfl:     TRELEGY ELLIPTA 200-62.5-25 MCG/ACT AEPB inhaler, inhale 1 puff by mouth once daily, Disp: , Rfl:     gabapentin (NEURONTIN) 300 MG capsule, take 1 capsule by mouth twice a day, Disp: , Rfl:     gabapentin (NEURONTIN) 300 MG capsule, gabapentin 300 mg capsule  take 1 capsule by mouth twice a day, Disp: , Rfl:     gabapentin (NEURONTIN) 400 MG capsule, , Disp: , Rfl:     ipratropium (ATROVENT) 0.02 % nebulizer solution, ipratropium bromide 0.02 % solution for inhalation  inhale contents of 1 vial in nebulizer every 6 hours, Disp: , Rfl:     losartan (COZAAR) 100 MG tablet, losartan 100 mg tablet  take 1 tablet by mouth once daily, Disp: , Rfl:     losartan (COZAAR) 50 MG tablet, losartan 50 mg tablet  take 1 tablet by mouth once daily, Disp: , Rfl:     montelukast (SINGULAIR) 10 MG tablet, montelukast 10 mg tablet  take 1 tablet by mouth every evening, Disp: , Rfl:     naloxone (NARCAN) 4 MG/0.1ML LIQD nasal spray, Narcan 4 mg/actuation nasal spray  use as directed, Disp: , Rfl:     oxyCODONE-acetaminophen (PERCOCET)  MG per tablet, , Disp: , Rfl:     oxyCODONE-acetaminophen (PERCOCET)  MG per tablet, oxycodone-acetaminophen 10 mg-325 mg tablet  take 1 tablet by mouth three times a day (TO BE BUDGETED OVER 30 DAYS), Disp: , Rfl:     bethanechol (URECHOLINE) 10 MG tablet, Take 10 mg by mouth 3 times daily (before meals), Disp: , Rfl:     cyclobenzaprine (FLEXERIL) 10 MG tablet, Take 10 mg by mouth, Disp: , Rfl:     cyclobenzaprine (FLEXERIL) 5 MG tablet, take 1 tablet by mouth three times a day if needed for muscle spa. ..  (REFER TO PRESCRIPTION NOTES). , Disp: , Rfl:     desonide (DESOWEN) 0.05 % cream, ceived the following from [facility]: Outside name: desonide (TRIDESILON) 0.05 % cream, Disp: , Rfl:     diclofenac sodium (VOLTAREN) 1 % GEL, Apply 2 g topically 4 times daily, Disp: , Rfl:     dilTIAZem (TIAZAC) 180 MG extended release capsule, Take 180 mg by mouth daily, Disp: , Rfl:     ergocalciferol (ERGOCALCIFEROL) 1.25 MG (86452 UT) capsule, Take 50,000 Units by mouth, Disp: , Rfl:     esomeprazole (NEXIUM) 40 MG delayed release capsule, Take 40 mg by mouth daily, Disp: , Rfl:     furosemide (LASIX) 40 MG tablet, Take 40 mg by mouth daily, Disp: , Rfl:     Ketoconazole 2 % FOAM, Apply topically 2 times daily, Disp: , Rfl:     lidocaine (LIDODERM) 5 %, Apply patch to the affected area for 12 hours a day and remove for 12 hours a day., Disp: , Rfl:     losartan (COZAAR) 100 MG tablet, Take 100 mg by mouth daily, Disp: , Rfl:     metoprolol tartrate (LOPRESSOR) 25 MG tablet, Take 25 mg by mouth 2 times daily, Disp: , Rfl:     montelukast (SINGULAIR) 10 MG tablet, Take 10 mg by mouth daily, Disp: , Rfl:     naproxen (NAPROSYN) 375 MG tablet, Take 375 mg by mouth 2 times daily (with meals), Disp: , Rfl:     No Known Allergies    Social History     Socioeconomic History    Marital status:      Spouse name: Not on file    Number of children: Not on file    Years of education: Not on file    Highest education level: Not on file   Occupational History    Not on file   Tobacco Use    Smoking status: Never    Smokeless tobacco: Never   Substance and Sexual Activity    Alcohol use: No     Alcohol/week: 0.0 standard drinks    Drug use: No    Sexual activity: Not on file   Other Topics Concern    Not on file   Social History Narrative         ** Merged History Encounter **     Social Determinants of Health     Financial Resource Strain: Not on file   Food Insecurity: Not on file   Transportation Needs: Not on file   Physical Activity: Not on file   Stress: Not on file   Social Connections: Not on file   Intimate Partner Violence: Not on file   Housing Stability: Not on file       Past Surgical History:   Procedure Laterality Date    BREAST BIOPSY  4/11/12    Left    BREAST BIOPSY Left     left mastectomy    BREAST SURGERY      initial surgery 2006, then left modified radical mastectomy 6/2012    VEIN SURGERY Bilateral          Patient seen evaluated today with complaints of right-sided low back pain.  She has an upcoming appointment with spine center in the near future.  She denies any radiation of symptoms down the lower extremity with exception of a little bit of thigh discomfort.  Her main problem is right-sided low back pain worse with ambulation and certain movements.  She denies any change in bowel or bladder habits.  She had a cortisone injection for trochanteric bursitis as well as right knee arthritis upon the last visit about 6 weeks ago which did help some.    Patient denies recent fevers,  chills, chest pain, SOB, or injuries. No recent systemic changes noted. A 12-point review of systems is performed today. Pertinent positives are noted. All other systems reviewed and otherwise are negative. Physical exam: General: Alert and oriented x3, nad.  well-developed, well nourished. normal affect, AF. NC/AT, EOMI, neck supple, trachea midline, no JVD present. Breathing is non-labored. Examination of the lower extremities reveals pain-free range of motion of the hips. There is no real pain to palpation trochanter bursa. Negative straight leg raise. Negative calf tenderness. Negative Homans. No signs of DVT present. Sensory and motor are intact and symmetric bilaterally to the lower extremities. The pelvis is stable. She does have pain to palpation to the lower lumbar spine paraspinally and to the right SI joint. Assessment: #1 low back pain, SI joint arthropathy    Plan: At this point, we discussed treatment options. We will start her back on her ibuprofen. She is requesting this. She is instructed on use as well as usual precautions. Prescription for Flexeril also be sent to the pharmacy. She is instructed on use as well as usual precautions. She will keep her appointment the spine center and follow-up with us in about 6 weeks time for evaluation.               JR Emmanuel CONTRERAS, RAMAKRISHNA, ATC

## 2023-03-13 ENCOUNTER — TELEPHONE (OUTPATIENT)
Age: 73
End: 2023-03-13

## 2023-04-05 ENCOUNTER — OFFICE VISIT (OUTPATIENT)
Age: 73
End: 2023-04-05
Payer: MEDICARE

## 2023-04-05 VITALS
RESPIRATION RATE: 18 BRPM | WEIGHT: 207 LBS | HEART RATE: 88 BPM | OXYGEN SATURATION: 96 % | BODY MASS INDEX: 34.49 KG/M2 | HEIGHT: 65 IN | TEMPERATURE: 97.3 F

## 2023-04-05 DIAGNOSIS — M51.36 DDD (DEGENERATIVE DISC DISEASE), LUMBAR: ICD-10-CM

## 2023-04-05 DIAGNOSIS — M48.061 SPINAL STENOSIS OF LUMBAR REGION WITHOUT NEUROGENIC CLAUDICATION: Primary | ICD-10-CM

## 2023-04-05 DIAGNOSIS — M54.16 LUMBAR NEURITIS: ICD-10-CM

## 2023-04-05 PROCEDURE — 99204 OFFICE O/P NEW MOD 45 MIN: CPT | Performed by: PHYSICAL MEDICINE & REHABILITATION

## 2023-04-05 PROCEDURE — 1123F ACP DISCUSS/DSCN MKR DOCD: CPT | Performed by: PHYSICAL MEDICINE & REHABILITATION

## 2023-04-05 ASSESSMENT — PATIENT HEALTH QUESTIONNAIRE - PHQ9
1. LITTLE INTEREST OR PLEASURE IN DOING THINGS: 0
SUM OF ALL RESPONSES TO PHQ9 QUESTIONS 1 & 2: 0
2. FEELING DOWN, DEPRESSED OR HOPELESS: 0
SUM OF ALL RESPONSES TO PHQ QUESTIONS 1-9: 0

## 2023-04-05 NOTE — PROGRESS NOTES
Patient had a right knee injection and a right hip bursitis injection. Ordered a L spine MRI. Gave patient flexeril and MDP. Lumbar spine CT  dated 10/20/2022 films independently reviewed by me. Per report, Degenerative disc disease with Significant canal stenosis at L4-5. Facet hypertrophy with possible impingement left L1, left L4 and L5 exiting nerve roots. Otherwise regions of encroachment.  reviewed. Per Dr. Beba Abdul no more narcotics given to patient   Oxycodone QID chronically from from Dr. Yu Base  Body mass index is 34.45 kg/m². PCP: Onur Mendoza MD    Past Medical History:   Diagnosis Date    Anemia     Asthma     Blood disorder     Bursitis of right hip     Cancer ([facility] [address]     breast, diagnosed 2006 left    Cancer [facility])     Chest pain, unspecified     The working diagnosis is chest pain. The differential diagnosis includes chest wall pain, stable angina. Chronic obstructive pulmonary disease (HCC)     Essential hypertension     Essential hypertension, benign     Uncontrolled     GERD (gastroesophageal reflux disease)     Hypercholesterolemia     Hypertension     Low back pain     Neuropathy     Nonspecific abnormal electrocardiogram (ECG) (EKG)     Obesity, unspecified     Weight loss has been strongly encouraged by following dietary restrictions and an exercise routine.     Osteoarthritis of right knee     Pain in both knees     Polio     as a young child    Pre-operative cardiovascular examination     Sciatica     Sleep apnea     Unspecified cerebral artery occlusion with cerebral infarction     TIA? no residual    Vitamin D deficiency           Past Surgical History:   Procedure Laterality Date    BREAST BIOPSY  4/11/12    Left    BREAST BIOPSY Left     left mastectomy    BREAST SURGERY      initial surgery 2006, then left modified radical mastectomy 6/2012    VEIN SURGERY Bilateral          Social History     Tobacco Use    Smoking status: Never    Smokeless tobacco: Never   Substance Use

## 2023-04-20 ENCOUNTER — OFFICE VISIT (OUTPATIENT)
Age: 73
End: 2023-04-20

## 2023-04-20 DIAGNOSIS — M70.61 TROCHANTERIC BURSITIS, RIGHT HIP: Primary | ICD-10-CM

## 2023-04-20 DIAGNOSIS — M17.11 UNILATERAL PRIMARY OSTEOARTHRITIS, RIGHT KNEE: ICD-10-CM

## 2023-04-20 RX ORDER — HYDROCODONE BITARTRATE AND ACETAMINOPHEN 5; 325 MG/1; MG/1
1 TABLET ORAL EVERY 6 HOURS PRN
Qty: 42 TABLET | Refills: 0 | Status: SHIPPED | OUTPATIENT
Start: 2023-04-20 | End: 2023-05-04

## 2023-04-20 RX ORDER — BETAMETHASONE SODIUM PHOSPHATE AND BETAMETHASONE ACETATE 3; 3 MG/ML; MG/ML
6 INJECTION, SUSPENSION INTRA-ARTICULAR; INTRALESIONAL; INTRAMUSCULAR; SOFT TISSUE ONCE
Status: COMPLETED | OUTPATIENT
Start: 2023-04-20 | End: 2023-04-20

## 2023-04-20 RX ORDER — BETAMETHASONE SODIUM PHOSPHATE AND BETAMETHASONE ACETATE 3; 3 MG/ML; MG/ML
6 INJECTION, SUSPENSION INTRA-ARTICULAR; INTRALESIONAL; INTRAMUSCULAR; SOFT TISSUE ONCE
Status: CANCELLED | OUTPATIENT
Start: 2023-04-20 | End: 2023-04-20

## 2023-04-20 RX ADMIN — BETAMETHASONE SODIUM PHOSPHATE AND BETAMETHASONE ACETATE 6 MG: 3; 3 INJECTION, SUSPENSION INTRA-ARTICULAR; INTRALESIONAL; INTRAMUSCULAR; SOFT TISSUE at 15:49

## 2023-04-20 NOTE — PROGRESS NOTES
Physical and updated the Allergic reactions for Jey Montaño? TIME OUT performed immediately prior to start of procedure:  IJennie PA-C, have performed the following reviews on Jey Montaño prior to the start of the procedure:  ????????  * Patient was identified by name and date of birth   * Agreement on procedure being performed was verified  * Risks and Benefits explained to the patient  * Procedure site verified and marked as necessary  * Patient was positioned for comfort  * Consent was signed and verified    Time:3:45 PM    Body part: right hip, intra-bursal    Medication & Dose: 3ml 1% lidocaine and 6mg celestone    Date of procedure: 04/20/23    Procedure performed by: Tona White PA-C    Provider assisted by: none    Patient assisted by: self    How tolerated by patient: tolerated the procedure well with no complications    Post Procedural Pain Scale: 4    Comments:   [address] using a frequency of 10MHz with a 12L-RS transducer head was used to confirm needle placement.   Ultrasound images captured using [address] Ultrasound machine and scanned into patient's chart       Peyman Bautista PA-C, ATC

## 2023-05-11 ENCOUNTER — OFFICE VISIT (OUTPATIENT)
Age: 73
End: 2023-05-11
Payer: MEDICARE

## 2023-05-11 VITALS
RESPIRATION RATE: 16 BRPM | TEMPERATURE: 97.2 F | OXYGEN SATURATION: 99 % | BODY MASS INDEX: 34.66 KG/M2 | HEIGHT: 65 IN | DIASTOLIC BLOOD PRESSURE: 80 MMHG | HEART RATE: 94 BPM | SYSTOLIC BLOOD PRESSURE: 160 MMHG | WEIGHT: 208 LBS

## 2023-05-11 DIAGNOSIS — E66.9 OBESITY (BMI 30.0-34.9): ICD-10-CM

## 2023-05-11 DIAGNOSIS — Z85.3 HISTORY OF LEFT BREAST CANCER: Primary | ICD-10-CM

## 2023-05-11 DIAGNOSIS — I89.0 LYMPHEDEMA OF BREAST: ICD-10-CM

## 2023-05-11 DIAGNOSIS — I89.0 LYMPHEDEMA OF LEFT UPPER EXTREMITY: ICD-10-CM

## 2023-05-11 PROCEDURE — 3078F DIAST BP <80 MM HG: CPT | Performed by: SURGERY

## 2023-05-11 PROCEDURE — 3074F SYST BP LT 130 MM HG: CPT | Performed by: SURGERY

## 2023-05-11 PROCEDURE — 1123F ACP DISCUSS/DSCN MKR DOCD: CPT | Performed by: SURGERY

## 2023-05-11 PROCEDURE — 99203 OFFICE O/P NEW LOW 30 MIN: CPT | Performed by: SURGERY

## 2023-05-11 ASSESSMENT — ENCOUNTER SYMPTOMS
CHEST TIGHTNESS: 0
SHORTNESS OF BREATH: 0

## 2023-05-11 NOTE — PROGRESS NOTES
Halbert Severance is a 71 y.o. female  Chief Complaint   Patient presents with    Follow-up     History of left breast DCIS ER/CA positive     Vitals:    05/11/23 1444 05/11/23 1445   BP: (!) 168/82 (!) 160/80   Site:  Right Upper Arm   Position:  Sitting   Pulse:  94   Resp:  16   Temp:  97.2 °F (36.2 °C)   TempSrc:  Temporal   SpO2:  99%   Weight:  208 lb (94.3 kg)   Height:  5' 5\" (1.651 m)     Halbert Severance has been given the following recommendations today due to her elevated BP reading referred to Alternative/PCP
11/29/2022 149  bpm Final    TID 11/29/2022 1.09   Final    Nuc Stress EF 11/29/2022 54  % Final    Nuc Stress Diastolic Volume Index 36/52/6129 60.0  mL/m2 Final    Nuc Stress Systolic Volume Index 32/99/7074 28.0  mL/m2 Final    Baseline Systolic BP 56/79/8945 169  mmHg Final    Baseline Diastolic BP 26/13/7357 72  mmHg Final    Baseline HR 11/29/2022 82  bpm Final    Stress Systolic BP 85/09/0202 507  mmHg Final    Stress Diastolic BP 62/50/4686 86  mmHg Final    Stress Peak HR 11/29/2022 116  bpm Final    Stress Rate Pressure Product 11/29/2022 16,704  bpm*mmHg Final    Stress Percent HR Achieved 11/29/2022 78  % Final    Stress Stage 1 HR 11/29/2022 116  bpm Final    Stress Stage 1 BP 11/29/2022 144/86  mmHg Final    Recovery Stage 1 HR 11/29/2022 111  bpm Final    Recovery Stage 1 BP 11/29/2022 144/86  mmHg Final    Recovery Stage 2 HR 11/29/2022 111  bpm Final    Recovery Stage 2 BP 11/29/2022 140/82  mmHg Final    Baseline ST Depression 11/29/2022 0  mm Final    Stress ST Depression 11/29/2022 0  mm Final    Left Ventricular Ejection Fraction 11/29/2022 54   Final-Edited    LVEF MODALITY 11/29/2022 Nuclear   Final-Edited       MICKEY [facility]TE DIGITAL SCREEN BILATERAL  Narrative: RIGHT SCREENING DIGITAL MAMMOGRAM WITH CAD WITH DIGITAL BREAST   TOMOSYNTHESIS  IMAGING/COMBINATION 2-D 3-D EXAM    CLINICAL HISTORY/INDICATION:  asymptomatic Encounter for screening   mammogram for  malignant neoplasm of breast , previous left breast carcinoma in her 46s   treated  with mastectomy and radiation    COMPARISON: mammogram 21 - 18    TECHNIQUE: 2-D and tomosynthesis 3-D digital mammograms were performed   with CC  and MLO views obtained of the right breast CAD analysis was performed with   the  computer-aided detection system. Any areas marked correlated with the   mammogram.    Breast composition B: There are scattered areas of fibroglandular density.     FINDINGS:     There are no suspicious masses, regions of distortion, nor

## 2023-05-16 ENCOUNTER — TELEPHONE (OUTPATIENT)
Age: 73
End: 2023-05-16

## 2023-05-22 ENCOUNTER — TELEPHONE (OUTPATIENT)
Age: 73
End: 2023-05-22

## 2023-05-22 NOTE — TELEPHONE ENCOUNTER
Patient called requesting a refill of Oxycodone 10 mg be sent to [address] [place] [address], Jeannette Richardson 60. Please advise pt at [phone].

## 2023-06-26 ENCOUNTER — TELEPHONE (OUTPATIENT)
Age: 73
End: 2023-06-26

## 2023-08-25 RX ORDER — DILTIAZEM HYDROCHLORIDE 180 MG/1
CAPSULE, COATED, EXTENDED RELEASE ORAL
Qty: 90 CAPSULE | OUTPATIENT
Start: 2023-08-25

## 2023-10-20 ENCOUNTER — OFFICE VISIT (OUTPATIENT)
Age: 73
End: 2023-10-20

## 2023-10-20 VITALS — HEIGHT: 65 IN | BODY MASS INDEX: 34.49 KG/M2 | WEIGHT: 207 LBS

## 2023-10-20 DIAGNOSIS — M70.61 TROCHANTERIC BURSITIS, RIGHT HIP: Primary | ICD-10-CM

## 2023-10-20 DIAGNOSIS — I87.2 VENOUS INSUFFICIENCY: ICD-10-CM

## 2023-10-20 DIAGNOSIS — M17.11 UNILATERAL PRIMARY OSTEOARTHRITIS, RIGHT KNEE: ICD-10-CM

## 2023-10-20 RX ORDER — BETAMETHASONE SODIUM PHOSPHATE AND BETAMETHASONE ACETATE 3; 3 MG/ML; MG/ML
6 INJECTION, SUSPENSION INTRA-ARTICULAR; INTRALESIONAL; INTRAMUSCULAR; SOFT TISSUE ONCE
Status: COMPLETED | OUTPATIENT
Start: 2023-10-20 | End: 2023-10-20

## 2023-10-20 RX ADMIN — BETAMETHASONE SODIUM PHOSPHATE AND BETAMETHASONE ACETATE 6 MG: 3; 3 INJECTION, SUSPENSION INTRA-ARTICULAR; INTRALESIONAL; INTRAMUSCULAR; SOFT TISSUE at 10:48

## 2023-11-08 ENCOUNTER — TELEPHONE (OUTPATIENT)
Age: 73
End: 2023-11-08

## 2023-11-08 DIAGNOSIS — M51.36 DDD (DEGENERATIVE DISC DISEASE), LUMBAR: ICD-10-CM

## 2023-11-08 DIAGNOSIS — M70.61 TROCHANTERIC BURSITIS, RIGHT HIP: Primary | ICD-10-CM

## 2023-11-08 DIAGNOSIS — M17.11 UNILATERAL PRIMARY OSTEOARTHRITIS, RIGHT KNEE: ICD-10-CM

## 2023-11-08 DIAGNOSIS — M48.061 SPINAL STENOSIS OF LUMBAR REGION WITHOUT NEUROGENIC CLAUDICATION: ICD-10-CM

## 2023-11-08 DIAGNOSIS — M25.551 PAIN IN RIGHT HIP: ICD-10-CM

## 2023-11-08 NOTE — TELEPHONE ENCOUNTER
Patient called and would like to know if she can get a prescription for gabapentin 800mg sent in to her pharmacy states that she was receiving that prescription before she stop going to pain management             Pharmacy   [address] [place] [address].

## 2023-11-09 NOTE — TELEPHONE ENCOUNTER
Patient advised to contact PCP for RX and she asked to be referred to pain management. Order for pain management placed she did not have a preference to what doctor.

## 2023-12-12 ENCOUNTER — OFFICE VISIT (OUTPATIENT)
Age: 73
End: 2023-12-12
Payer: MEDICARE

## 2023-12-12 VITALS — WEIGHT: 207 LBS | HEIGHT: 65 IN | BODY MASS INDEX: 34.49 KG/M2

## 2023-12-12 DIAGNOSIS — M48.061 SPINAL STENOSIS OF LUMBAR REGION WITHOUT NEUROGENIC CLAUDICATION: ICD-10-CM

## 2023-12-12 DIAGNOSIS — G89.18 POST-OP PAIN: ICD-10-CM

## 2023-12-12 DIAGNOSIS — M17.12 PRIMARY OSTEOARTHRITIS OF LEFT KNEE: ICD-10-CM

## 2023-12-12 DIAGNOSIS — M22.41 CHONDROMALACIA, PATELLA, RIGHT: ICD-10-CM

## 2023-12-12 DIAGNOSIS — M70.61 TROCHANTERIC BURSITIS, RIGHT HIP: Primary | ICD-10-CM

## 2023-12-12 DIAGNOSIS — M17.11 UNILATERAL PRIMARY OSTEOARTHRITIS, RIGHT KNEE: ICD-10-CM

## 2023-12-12 DIAGNOSIS — I87.2 VENOUS INSUFFICIENCY: ICD-10-CM

## 2023-12-12 PROCEDURE — 1123F ACP DISCUSS/DSCN MKR DOCD: CPT | Performed by: PHYSICIAN ASSISTANT

## 2023-12-12 PROCEDURE — 99213 OFFICE O/P EST LOW 20 MIN: CPT | Performed by: PHYSICIAN ASSISTANT

## 2023-12-12 NOTE — PROGRESS NOTES
fevers, chills, chest pain, SOB, or injuries. No recent systemic changes noted. A 12-point review of systems is performed today. Pertinent positives are noted. All other systems reviewed and otherwise are negative. Physical exam: General: Alert and oriented x3, nad.  well-developed, well nourished. normal affect, AF. NC/AT, EOMI, neck supple, trachea midline, no JVD present. Breathing is non-labored. Examination of lower extremities reveals pain-free range of motion the hips. There is no pain to palpation the trochanter bursa. Negative straight leg raise. Negative calf tenderness. Negative Homans. No signs of DVT present. She does have discomfort to palpation the medial joint line of her knees as well as patellofemoral grinding crepitus anteriorly with range of motion activities noted. Assessment: #1 right hip trochanter bursitis improved, #2 bilateral knee osteoarthritis, patellofemoral    Plan: At this point, we discussed treatment options. Will get a series of viscosupplementation preapproved for each of her knees to try to maximize her nonoperative treatment. She will continue activity as tolerated. Will see her back in the office once the injections are approved. Care plan outlined and precautions discussed. Radiographs and Results were reviewed with the patient. Medications were reviewed with the patient. All of pt's questions and concerns were addressed. Increasing symptoms and return precautions associated with chief complaint and evaluation were reviewed with the patient in detail. The patient demonstrated adequate understanding.                 JR Emmanuel CONTRERAS, RAMAKRISHNA, ATC

## 2023-12-29 ENCOUNTER — HOSPITAL ENCOUNTER (EMERGENCY)
Facility: HOSPITAL | Age: 73
End: 2023-12-29
Payer: MEDICARE

## 2023-12-29 ENCOUNTER — HOSPITAL ENCOUNTER (EMERGENCY)
Facility: HOSPITAL | Age: 73
Discharge: HOME | End: 2023-12-29
Payer: MEDICARE

## 2023-12-29 VITALS
BODY MASS INDEX: 33.32 KG/M2 | DIASTOLIC BLOOD PRESSURE: 71 MMHG | HEIGHT: 65 IN | TEMPERATURE: 97.9 F | OXYGEN SATURATION: 99 % | WEIGHT: 200 LBS | RESPIRATION RATE: 19 BRPM | HEART RATE: 99 BPM | SYSTOLIC BLOOD PRESSURE: 155 MMHG

## 2023-12-29 DIAGNOSIS — M25.531 RIGHT WRIST PAIN: ICD-10-CM

## 2023-12-29 DIAGNOSIS — M19.031 OSTEOARTHRITIS OF RIGHT WRIST, UNSPECIFIED OSTEOARTHRITIS TYPE: Primary | ICD-10-CM

## 2023-12-29 PROCEDURE — 73120 X-RAY EXAM OF HAND: CPT

## 2023-12-29 PROCEDURE — 99283 EMERGENCY DEPT VISIT LOW MDM: CPT

## 2023-12-29 RX ORDER — NAPROXEN 500 MG/1
500 TABLET ORAL 2 TIMES DAILY PRN
Qty: 60 TABLET | Refills: 0 | Status: SHIPPED | OUTPATIENT
Start: 2023-12-29

## 2023-12-29 NOTE — ED PROVIDER NOTES
EMERGENCY DEPARTMENT HISTORY AND PHYSICAL EXAM    3:39 PM      Date: 12/29/2023  Patient Name: Chasidy Horne    History of Presenting Illness     Chief Complaint   Patient presents with    Hand Pain         History Provided By: Patient and medical chart review.    Additional History (Context): Chasidy Horne is a 73 y.o. female with   Past Medical History:   Diagnosis Date    Anemia     Asthma     Blood disorder     Bursitis of right hip     Cancer (HCC)     breast, diagnosed 2006 left    Cancer ([facility]CC)     Chest pain, unspecified     The working diagnosis is chest pain.  The differential diagnosis includes chest wall pain, stable angina.    Chronic obstructive pulmonary disease (HCC)     Essential hypertension     Essential hypertension, benign     Uncontrolled     GERD (gastroesophageal reflux disease)     Hypercholesterolemia     Hypertension     Low back pain     Neuropathy     Nonspecific abnormal electrocardiogram (ECG) (EKG)     Obesity, unspecified     Weight loss has been strongly encouraged by following dietary restrictions and an exercise routine.    Osteoarthritis of right knee     Pain in both knees     Polio     as a young child    Pre-operative cardiovascular examination     Sciatica     Sleep apnea     Unspecified cerebral artery occlusion with cerebral infarction     TIA? no residual    Vitamin D deficiency    who presents with complaints of right wrist pain this been going on for 3 days.  Reports pain has increased today.  Denies any injury or trauma.  Denies any numbness tingling to extremities.  Currently rating pain 7 out of 10.  Has not taken any medication to relieve any current symptoms.  Denies any fevers.  Does admit to history of arthritis to her lower extremities.    PCP: Gabby Chu MD    No current facility-administered medications for this encounter.     Current Outpatient Medications   Medication Sig Dispense Refill    naproxen (NAPROSYN) 500 MG tablet Take 1 tablet by  medications prescribed for you. Read the directions carefully, and ask your doctor or other care provider to review them with you.                 ASK your doctor about these medications      * albuterol (5 MG/ML) 0.5% nebulizer solution  Commonly known as: PROVENTIL     * albuterol sulfate  (90 Base) MCG/ACT inhaler  Commonly known as: PROVENTIL;VENTOLIN;PROAIR     * bethanechol 10 MG tablet  Commonly known as: URECHOLINE     * bethanechol 10 MG tablet  Commonly known as: URECHOLINE     celecoxib 100 MG capsule  Commonly known as: CELEBREX     * cyclobenzaprine 5 MG tablet  Commonly known as: FLEXERIL     * cyclobenzaprine 5 MG tablet  Commonly known as: FLEXERIL     * cyclobenzaprine 10 MG tablet  Commonly known as: FLEXERIL     desonide 0.05 % cream  Commonly known as: DESOWEN     diclofenac 50 MG EC tablet  Commonly known as: VOLTAREN     dicyclomine 20 MG tablet  Commonly known as: BENTYL     dilTIAZem 180 MG extended release capsule  Commonly known as: TIAZAC     ergocalciferol 1.25 MG (72322 UT) capsule  Commonly known as: ERGOCALCIFEROL     esomeprazole 40 MG delayed release capsule  Commonly known as: NEXIUM     fluticasone-salmeterol 250-50 MCG/ACT Aepb diskus inhaler  Commonly known as: ADVAIR     furosemide 40 MG tablet  Commonly known as: LASIX  take 1 tablet by mouth once daily     * gabapentin 300 MG capsule  Commonly known as: NEURONTIN     * gabapentin 100 MG capsule  Commonly known as: NEURONTIN     * gabapentin 300 MG capsule  Commonly known as: NEURONTIN     * gabapentin 400 MG capsule  Commonly known as: NEURONTIN     * ibuprofen 800 MG tablet  Commonly known as: ADVIL;MOTRIN     * ibuprofen 800 MG tablet  Commonly known as: ADVIL;MOTRIN  Take 1 tablet by mouth every 8 hours as needed for Pain     ipratropium 0.02 % nebulizer solution  Commonly known as: ATROVENT     ipratropium 0.5 mg-albuterol 2.5 mg 0.5-2.5 (3) MG/3ML Soln nebulizer solution  Commonly known as: DUONEB     Ketoconazole 2

## 2024-01-04 DIAGNOSIS — M17.12 PRIMARY OSTEOARTHRITIS OF LEFT KNEE: ICD-10-CM

## 2024-01-04 DIAGNOSIS — M17.11 UNILATERAL PRIMARY OSTEOARTHRITIS, RIGHT KNEE: Primary | ICD-10-CM

## 2024-02-03 ENCOUNTER — HOSPITAL ENCOUNTER (EMERGENCY)
Facility: HOSPITAL | Age: 74
Discharge: HOME | End: 2024-02-03
Attending: EMERGENCY MEDICINE
Payer: MEDICARE

## 2024-02-03 ENCOUNTER — APPOINTMENT (OUTPATIENT)
Facility: HOSPITAL | Age: 74
End: 2024-02-03
Payer: MEDICARE

## 2024-02-03 VITALS
SYSTOLIC BLOOD PRESSURE: 184 MMHG | DIASTOLIC BLOOD PRESSURE: 92 MMHG | HEART RATE: 100 BPM | OXYGEN SATURATION: 95 % | TEMPERATURE: 98.1 F | HEIGHT: 65 IN | BODY MASS INDEX: 33.32 KG/M2 | RESPIRATION RATE: 18 BRPM | WEIGHT: 200 LBS

## 2024-02-03 DIAGNOSIS — M54.31 SCIATICA, RIGHT SIDE: Primary | ICD-10-CM

## 2024-02-03 DIAGNOSIS — J44.1 COPD WITH ACUTE EXACERBATION (HCC): ICD-10-CM

## 2024-02-03 DIAGNOSIS — M16.11 OSTEOARTHRITIS OF RIGHT HIP, UNSPECIFIED OSTEOARTHRITIS TYPE: ICD-10-CM

## 2024-02-03 PROCEDURE — 6370000000 HC RX 637 (ALT 250 FOR IP): Performed by: EMERGENCY MEDICINE

## 2024-02-03 PROCEDURE — 99283 EMERGENCY DEPT VISIT LOW MDM: CPT

## 2024-02-03 PROCEDURE — 73502 X-RAY EXAM HIP UNI 2-3 VIEWS: CPT

## 2024-02-03 RX ORDER — TRAMADOL HYDROCHLORIDE 50 MG/1
50 TABLET ORAL EVERY 8 HOURS PRN
Qty: 5 TABLET | Refills: 0 | Status: SHIPPED | OUTPATIENT
Start: 2024-02-03 | End: 2024-02-06

## 2024-02-03 RX ORDER — PREDNISONE 20 MG/1
60 TABLET ORAL
Status: COMPLETED | OUTPATIENT
Start: 2024-02-03 | End: 2024-02-03

## 2024-02-03 RX ORDER — PREDNISONE 10 MG/1
TABLET ORAL
Qty: 21 EACH | Refills: 0 | Status: SHIPPED | OUTPATIENT
Start: 2024-02-03

## 2024-02-03 RX ORDER — IPRATROPIUM BROMIDE AND ALBUTEROL SULFATE 2.5; .5 MG/3ML; MG/3ML
1 SOLUTION RESPIRATORY (INHALATION)
Status: COMPLETED | OUTPATIENT
Start: 2024-02-03 | End: 2024-02-03

## 2024-02-03 RX ORDER — TRAMADOL HYDROCHLORIDE 50 MG/1
25 TABLET ORAL
Status: COMPLETED | OUTPATIENT
Start: 2024-02-03 | End: 2024-02-03

## 2024-02-03 RX ADMIN — IPRATROPIUM BROMIDE AND ALBUTEROL SULFATE 1 DOSE: .5; 3 SOLUTION RESPIRATORY (INHALATION) at 17:51

## 2024-02-03 RX ADMIN — PREDNISONE 60 MG: 20 TABLET ORAL at 17:50

## 2024-02-03 RX ADMIN — TRAMADOL HYDROCHLORIDE 25 MG: 50 TABLET ORAL at 19:20

## 2024-02-03 ASSESSMENT — PAIN - FUNCTIONAL ASSESSMENT: PAIN_FUNCTIONAL_ASSESSMENT: 0-10

## 2024-02-03 ASSESSMENT — PAIN SCALES - GENERAL
PAINLEVEL_OUTOF10: 4
PAINLEVEL_OUTOF10: 10

## 2024-02-03 ASSESSMENT — ENCOUNTER SYMPTOMS
BACK PAIN: 1
CHEST TIGHTNESS: 0
EYES NEGATIVE: 1
ABDOMINAL PAIN: 0

## 2024-02-03 NOTE — ED NOTES
I have introduced myself to the patient and discussed anticipated plan of care. Patient resting quietly on stretcher in low and locked position. Call bell in reach. Side rail up x1.      For patient comfort, patient and family given drinks and crackers.

## 2024-02-03 NOTE — ED TRIAGE NOTES
WC to room 4 with c/o atraumatic right hip pain radiating down right lower extremity to right knee. States Hx of sciatica. Denies dysuria

## 2024-02-03 NOTE — ED NOTES
Patient reports right hip pain which radiates down right leg. States she has had sciatic pain and believes this is what is causing her symptoms. Dorsalis pedis pulse +3, plantar flexion and dorsiflexion strong, no numbness reported.

## 2024-02-03 NOTE — ED PROVIDER NOTES
EMERGENCY DEPARTMENT HISTORY AND PHYSICAL EXAM    7:21 PM      Date: 2/3/2024  Patient Name: Chasidy Horne    History of Presenting Illness     Chief Complaint   Patient presents with    Hip Pain       History From: Patient  Patient is a 73-year-old female with a history of asthma, dyslipidemia, congestive heart failure, COPD, stroke, cervical radiculopathy, sciatica, the presents emergency department with complaint of right hip pain has been progressive for the last 4 days.  Patient notes the pain has been starting in her low back and radiating down her right leg but does not go down to her foot.  It difficult for her to get up and move around the house because of the pain.  Patient denies any other aggravating or alleviating factors.  Patient denies any difficulty urinating or having a bowel movement.  Patient denies fevers or chills.  Patient also had some coughing with congestion but says that that something that she has with her COPD and has a nebulizer at home.  The patient has not had any medications for symptoms at home.  Patient is not working at this time.             Nursing Notes were all reviewed and agreed with or any disagreements were addressed in the HPI.    PCP: Gabby Chu MD    No current facility-administered medications for this encounter.     Current Outpatient Medications   Medication Sig Dispense Refill    predniSONE 10 MG (21) TBPK Take 6 tablets on day 1; take 5 tablets on day 2; take 4 tablets on day 3; take 3 tablets on day 4; take 2 tablets on day 5; take 1 tablet on day 6. 21 each 0    traMADol (ULTRAM) 50 MG tablet Take 1 tablet by mouth every 8 hours as needed for Pain for up to 3 days. Intended supply: 3 days. Take lowest dose possible to manage pain Max Daily Amount: 150 mg 5 tablet 0    diclofenac sodium (VOLTAREN) 1 % GEL Apply 2 g topically 4 times daily 350 g 0    metoprolol tartrate (LOPRESSOR) 25 MG tablet Take 1 tablet by mouth 2 times daily 60 tablet 6       Cardiovascular:      Rate and Rhythm: Normal rate.   Pulmonary:      Effort: Pulmonary effort is normal.      Breath sounds: Wheezing and rhonchi present.   Abdominal:      General: There is no distension.      Palpations: Abdomen is soft.   Musculoskeletal:         General: Tenderness present. Normal range of motion.      Comments: Pain with internal and external rotation of the right hip, straight leg raising with pain down the right leg, low back pain is poorly localized   Skin:     General: Skin is warm and dry.      Capillary Refill: Capillary refill takes less than 2 seconds.   Neurological:      General: No focal deficit present.      Mental Status: She is alert and oriented to person, place, and time.      Comments: Chronic dysarthria, mild global weakness, gait not observed   Psychiatric:         Mood and Affect: Mood normal.         Behavior: Behavior normal.      Comments: Supportive and insightful family at bedside           Diagnostic Study Results     Labs -  No results found for this or any previous visit (from the past 12 hour(s)).    Radiologic Studies -   XR HIP 2-3 VW W PELVIS RIGHT   Final Result   1.  No evidence of acute fracture or dislocation.      2.  Moderate to severe right hip osteoarthritis.         DJD, no fracture, interpreted by me    Medical Decision Making   I am the first provider for this patient.    I reviewed the vital signs, available nursing notes, past medical history, past surgical history, family history and social history.    Vital Signs-Reviewed the patient's vital signs.      ED Course: Progress Notes, Reevaluation, and Consults:    Provider Notes (Medical Decision Making):   MDM  Number of Diagnoses or Management Options  COPD with acute exacerbation (HCC)  Osteoarthritis of right hip, unspecified osteoarthritis type  Sciatica, right side  Diagnosis management comments: Patient is a 73-year-old female with a history of stroke, chronic back pain, sciatica, COPD,

## 2024-02-03 NOTE — ED NOTES
Ambulated pt around the room. Pt was able to ambulate with assistance but not by herself. Pt stated she normally ambulates by herself with no issues

## 2024-02-07 ENCOUNTER — OFFICE VISIT (OUTPATIENT)
Age: 74
End: 2024-02-07
Payer: MEDICAID

## 2024-02-07 DIAGNOSIS — M70.61 TROCHANTERIC BURSITIS, RIGHT HIP: ICD-10-CM

## 2024-02-07 DIAGNOSIS — M54.50 LUMBAR PAIN: ICD-10-CM

## 2024-02-07 DIAGNOSIS — M25.551 RIGHT HIP PAIN: Primary | ICD-10-CM

## 2024-02-07 PROCEDURE — 99214 OFFICE O/P EST MOD 30 MIN: CPT | Performed by: PHYSICIAN ASSISTANT

## 2024-02-07 PROCEDURE — 1123F ACP DISCUSS/DSCN MKR DOCD: CPT | Performed by: PHYSICIAN ASSISTANT

## 2024-02-07 PROCEDURE — 72100 X-RAY EXAM L-S SPINE 2/3 VWS: CPT | Performed by: PHYSICIAN ASSISTANT

## 2024-02-07 PROCEDURE — 72170 X-RAY EXAM OF PELVIS: CPT | Performed by: PHYSICIAN ASSISTANT

## 2024-02-07 RX ORDER — CYCLOBENZAPRINE HCL 10 MG
10 TABLET ORAL 3 TIMES DAILY PRN
Qty: 60 TABLET | Refills: 1 | Status: SHIPPED | OUTPATIENT
Start: 2024-02-07 | End: 2024-06-06

## 2024-02-07 RX ORDER — LIDOCAINE 50 MG/G
1 PATCH TOPICAL DAILY
Qty: 30 PATCH | Refills: 3 | Status: SHIPPED | OUTPATIENT
Start: 2024-02-07 | End: 2024-06-06

## 2024-02-07 NOTE — PROGRESS NOTES
Patient: Chasidy Horne                MRN: [id number]       SSN: [id number]  YOB: 1950        AGE: 73 y.o.        SEX: female  There is no height or weight on file to calculate BMI.    PCP: Gabby Chu MD  02/07/24      This office note has been dictated.      REVIEW OF SYSTEMS:  Constitutional: Negative for fever, chills, weight loss and malaise/fatigue.   HENT: Negative.    Eyes: Negative.    Respiratory: Negative.   Cardiovascular: Negative.   Gastrointestinal: No bowel incontinence or constipation.  Genitourinary: No bladder incontinence or saddle anesthesia.  Skin: Negative.   Neurological: Negative.    Endo/Heme/Allergies: Negative.    Psychiatric/Behavioral: Negative.  Musculoskeletal: As per HPI above.     Past Medical History:   Diagnosis Date    Anemia     Asthma     Blood disorder     Bursitis of right hip     Cancer (HCC)     breast, diagnosed 2006 left    Cancer (HCC)     Chest pain, unspecified     The working diagnosis is chest pain.  The differential diagnosis includes chest wall pain, stable angina.    Chronic obstructive pulmonary disease (HCC)     Essential hypertension     Essential hypertension, benign     Uncontrolled     GERD (gastroesophageal reflux disease)     Hypercholesterolemia     Hypertension     Low back pain     Neuropathy     Nonspecific abnormal electrocardiogram (ECG) (EKG)     Obesity, unspecified     Weight loss has been strongly encouraged by following dietary restrictions and an exercise routine.    Osteoarthritis of right knee     Pain in both knees     Polio     as a young child    Pre-operative cardiovascular examination     Sciatica     Sleep apnea     Unspecified cerebral artery occlusion with cerebral infarction     TIA? no residual    Vitamin D deficiency          Current Outpatient Medications:     predniSONE 10 MG (21) TBPK, Take 6 tablets on day 1; take 5 tablets on day 2; take 4 tablets on day 3; take 3 tablets on day 4; take 2

## 2024-02-08 ENCOUNTER — APPOINTMENT (OUTPATIENT)
Facility: HOSPITAL | Age: 74
End: 2024-02-08
Payer: MEDICARE

## 2024-02-08 ENCOUNTER — HOSPITAL ENCOUNTER (EMERGENCY)
Facility: HOSPITAL | Age: 74
Discharge: HOME | End: 2024-02-08
Attending: EMERGENCY MEDICINE
Payer: MEDICARE

## 2024-02-08 VITALS
SYSTOLIC BLOOD PRESSURE: 184 MMHG | WEIGHT: 202 LBS | HEART RATE: 91 BPM | BODY MASS INDEX: 33.66 KG/M2 | TEMPERATURE: 97.8 F | OXYGEN SATURATION: 100 % | HEIGHT: 65 IN | RESPIRATION RATE: 16 BRPM | DIASTOLIC BLOOD PRESSURE: 85 MMHG

## 2024-02-08 DIAGNOSIS — M54.2 NECK PAIN: ICD-10-CM

## 2024-02-08 DIAGNOSIS — R51.9 ACUTE NONINTRACTABLE HEADACHE, UNSPECIFIED HEADACHE TYPE: Primary | ICD-10-CM

## 2024-02-08 DIAGNOSIS — I10 POORLY-CONTROLLED HYPERTENSION: ICD-10-CM

## 2024-02-08 LAB
ANION GAP SERPL CALC-SCNC: 3 MMOL/L (ref 3–18)
BASOPHILS # BLD: 0 K/UL (ref 0–0.1)
BASOPHILS NFR BLD: 0 % (ref 0–2)
BUN SERPL-MCNC: 15 MG/DL (ref 7–18)
BUN/CREAT SERPL: 18 (ref 12–20)
CALCIUM SERPL-MCNC: 9.6 MG/DL (ref 8.5–10.1)
CHLORIDE SERPL-SCNC: 101 MMOL/L (ref 100–111)
CO2 SERPL-SCNC: 36 MMOL/L (ref 21–32)
CREAT SERPL-MCNC: 0.85 MG/DL (ref 0.6–1.3)
DIFFERENTIAL METHOD BLD: ABNORMAL
EOSINOPHIL # BLD: 0 K/UL (ref 0–0.4)
EOSINOPHIL NFR BLD: 0 % (ref 0–5)
ERYTHROCYTE [DISTWIDTH] IN BLOOD BY AUTOMATED COUNT: 15.7 % (ref 11.6–14.5)
GLUCOSE SERPL-MCNC: 163 MG/DL (ref 74–99)
HCT VFR BLD AUTO: 36.3 % (ref 35–45)
HGB BLD-MCNC: 11.4 G/DL (ref 12–16)
IMM GRANULOCYTES # BLD AUTO: 0.1 K/UL (ref 0–0.04)
IMM GRANULOCYTES NFR BLD AUTO: 1 % (ref 0–0.5)
LYMPHOCYTES # BLD: 1.9 K/UL (ref 0.9–3.6)
LYMPHOCYTES NFR BLD: 15 % (ref 21–52)
MAGNESIUM SERPL-MCNC: 1.8 MG/DL (ref 1.6–2.6)
MCH RBC QN AUTO: 26.8 PG (ref 24–34)
MCHC RBC AUTO-ENTMCNC: 31.4 G/DL (ref 31–37)
MCV RBC AUTO: 85.2 FL (ref 78–100)
MONOCYTES # BLD: 0.4 K/UL (ref 0.05–1.2)
MONOCYTES NFR BLD: 3 % (ref 3–10)
NEUTS SEG # BLD: 10 K/UL (ref 1.8–8)
NEUTS SEG NFR BLD: 81 % (ref 40–73)
NRBC # BLD: 0 K/UL (ref 0–0.01)
NRBC BLD-RTO: 0 PER 100 WBC
PLATELET # BLD AUTO: 278 K/UL (ref 135–420)
PMV BLD AUTO: 10 FL (ref 9.2–11.8)
POTASSIUM SERPL-SCNC: 3.6 MMOL/L (ref 3.5–5.5)
RBC # BLD AUTO: 4.26 M/UL (ref 4.2–5.3)
SODIUM SERPL-SCNC: 140 MMOL/L (ref 136–145)
TROPONIN I SERPL HS-MCNC: 16 NG/L (ref 0–54)
WBC # BLD AUTO: 12.4 K/UL (ref 4.6–13.2)

## 2024-02-08 PROCEDURE — 80048 BASIC METABOLIC PNL TOTAL CA: CPT

## 2024-02-08 PROCEDURE — 84484 ASSAY OF TROPONIN QUANT: CPT

## 2024-02-08 PROCEDURE — 85025 COMPLETE CBC W/AUTO DIFF WBC: CPT

## 2024-02-08 PROCEDURE — 70450 CT HEAD/BRAIN W/O DYE: CPT

## 2024-02-08 PROCEDURE — 99285 EMERGENCY DEPT VISIT HI MDM: CPT

## 2024-02-08 PROCEDURE — 93005 ELECTROCARDIOGRAM TRACING: CPT | Performed by: EMERGENCY MEDICINE

## 2024-02-08 PROCEDURE — 6370000000 HC RX 637 (ALT 250 FOR IP): Performed by: EMERGENCY MEDICINE

## 2024-02-08 PROCEDURE — 83735 ASSAY OF MAGNESIUM: CPT

## 2024-02-08 PROCEDURE — 71045 X-RAY EXAM CHEST 1 VIEW: CPT

## 2024-02-08 RX ORDER — ACETAMINOPHEN 500 MG
1000 TABLET ORAL
Status: COMPLETED | OUTPATIENT
Start: 2024-02-08 | End: 2024-02-08

## 2024-02-08 RX ADMIN — ACETAMINOPHEN 1000 MG: 500 TABLET ORAL at 17:50

## 2024-02-08 ASSESSMENT — PAIN DESCRIPTION - ORIENTATION: ORIENTATION: RIGHT

## 2024-02-08 ASSESSMENT — PAIN DESCRIPTION - DESCRIPTORS: DESCRIPTORS: ACHING

## 2024-02-08 ASSESSMENT — PAIN SCALES - GENERAL: PAINLEVEL_OUTOF10: 8

## 2024-02-08 ASSESSMENT — LIFESTYLE VARIABLES
HOW MANY STANDARD DRINKS CONTAINING ALCOHOL DO YOU HAVE ON A TYPICAL DAY: PATIENT DOES NOT DRINK
HOW OFTEN DO YOU HAVE A DRINK CONTAINING ALCOHOL: NEVER

## 2024-02-08 ASSESSMENT — PAIN DESCRIPTION - LOCATION: LOCATION: HEAD

## 2024-02-08 ASSESSMENT — PAIN - FUNCTIONAL ASSESSMENT: PAIN_FUNCTIONAL_ASSESSMENT: NONE - DENIES PAIN

## 2024-02-08 NOTE — ED TRIAGE NOTES
Pt with R sided headache this am x 2 days, denies photophobia or blurry vision. C/o lightheadedness, gait steady.

## 2024-02-09 LAB
EKG ATRIAL RATE: 83 BPM
EKG DIAGNOSIS: NORMAL
EKG P AXIS: 80 DEGREES
EKG P-R INTERVAL: 136 MS
EKG Q-T INTERVAL: 404 MS
EKG QRS DURATION: 80 MS
EKG QTC CALCULATION (BAZETT): 474 MS
EKG R AXIS: 29 DEGREES
EKG T AXIS: 81 DEGREES
EKG VENTRICULAR RATE: 83 BPM

## 2024-02-09 PROCEDURE — 93010 ELECTROCARDIOGRAM REPORT: CPT | Performed by: INTERNAL MEDICINE

## 2024-02-09 NOTE — DISCHARGE INSTRUCTIONS
Use Tylenol for any headache systems.  As you have discomfort in the right side of the neck, this could be related.  I recommend the use of a topical medication such as capsaicin cream.  Moist heat and range of motion exercise will be helpful.  Follow-up with primary care early next week regarding your headache/neck discomfort as well as your elevated blood pressure.  Return for any acute concerns   EENMT

## 2024-02-10 NOTE — ED PROVIDER NOTES
02/09/2024  9:45 pm    Patient was contacted via telephone.  She was told that she has a nodule in her left upper lung.  She also was told that this nodule need investigation to rule out cancer.  She  was instructed to follow up with her PCP to have him to do the  work up to rule out cancer. I explain to the patient that she needs a CT scan of her chest and that her PCP can order it for her.  She also states she understands and will follow up with her PCP to get this done. However, if she can not get to see her PCP, she can return to the ER to get the CT scan of chest.    Signed     MD Phuc Holland Kenneth B, MD  02/09/24 2099    
200-62.5-25 MCG/ACT AEPB INHALER    inhale 1 puff by mouth once daily       ALLERGIES     Patient has no known allergies.    FAMILY HISTORY       Family History   Problem Relation Age of Onset    Heart Disease Neg Hx         Negative family history of premature CAD or CVA    Hypertension Other     Cancer Mother     Cancer Father     Osteoarthritis Other           SOCIAL HISTORY       Social History     Socioeconomic History    Marital status:      Spouse name: None    Number of children: None    Years of education: None    Highest education level: None   Tobacco Use    Smoking status: Never    Smokeless tobacco: Never   Substance and Sexual Activity    Alcohol use: No     Alcohol/week: 0.0 standard drinks of alcohol    Drug use: No   Social History Narrative         ** Merged History Encounter **       SCREENINGS   NIH Stroke Scale  Interval: Baseline  Level of Consciousness (1a): Alert  LOC Questions (1b): Answers both correctly  LOC Commands (1c): Performs both tasks correctly  Best Gaze (2): Normal  Visual (3): No visual loss  Facial Palsy (4): Normal symmetrical movement  Motor Arm, Left (5a): No drift  Motor Arm, Right (5b): No drift  Motor Leg, Left (6a): No drift  Motor Leg, Right (6b): No drift  Limb Ataxia (7): Absent  Sensory (8): Normal  Best Language (9): No aphasia  Dysarthria (10): Normal  Extinction and Inattention (11): No abnormality  Total: 0    Kajal Coma Scale  Eye Opening: Spontaneous  Best Verbal Response: Oriented  Best Motor Response: Obeys commands  Kajal Coma Scale Score: 15                CIWA Assessment  BP: (!) 173/78  Pulse: 91             PHYSICAL EXAM         ED Triage Vitals   BP Temp Temp src Pulse Resp SpO2 Height Weight   -- -- -- -- -- -- -- --     CONST: appears mildly uncomfortable and anxious. VS are noted including hypertension  HEAD: NC/AT  EYES: PERRL. EOMI. Conjunctiva normal bilaterally. Sclera anicteric  ENT: NP - no discharge or epistaxis. OP - mmm. No

## 2024-02-16 ENCOUNTER — HOSPITAL ENCOUNTER (OUTPATIENT)
Facility: HOSPITAL | Age: 74
End: 2024-02-16
Payer: MEDICARE

## 2024-02-16 VITALS — WEIGHT: 202 LBS | BODY MASS INDEX: 33.66 KG/M2 | HEIGHT: 65 IN

## 2024-02-16 DIAGNOSIS — Z12.31 SCREENING MAMMOGRAM FOR HIGH-RISK PATIENT: ICD-10-CM

## 2024-02-16 PROCEDURE — 77063 BREAST TOMOSYNTHESIS BI: CPT

## 2024-02-20 ENCOUNTER — TRANSCRIBE ORDERS (OUTPATIENT)
Facility: HOSPITAL | Age: 74
End: 2024-02-20

## 2024-02-20 DIAGNOSIS — R92.8 ABNORMALITY OF LEFT BREAST ON SCREENING MAMMOGRAM: Primary | ICD-10-CM

## 2024-02-26 ENCOUNTER — HOSPITAL ENCOUNTER (OUTPATIENT)
Facility: HOSPITAL | Age: 74
Discharge: HOME | End: 2024-02-29
Payer: MEDICARE

## 2024-02-26 DIAGNOSIS — R93.89 ABNORMAL X-RAY: ICD-10-CM

## 2024-02-26 PROCEDURE — 71046 X-RAY EXAM CHEST 2 VIEWS: CPT

## 2024-03-19 ENCOUNTER — HOSPITAL ENCOUNTER (OUTPATIENT)
Facility: HOSPITAL | Age: 74
Discharge: HOME | End: 2024-03-22
Payer: MEDICARE

## 2024-03-19 DIAGNOSIS — R92.8 ABNORMALITY OF LEFT BREAST ON SCREENING MAMMOGRAM: ICD-10-CM

## 2024-03-19 PROCEDURE — G0279 TOMOSYNTHESIS, MAMMO: HCPCS

## 2024-03-25 ENCOUNTER — TELEPHONE (OUTPATIENT)
Age: 74
End: 2024-03-25

## 2024-03-25 NOTE — TELEPHONE ENCOUNTER
Patient is requesting a refill of diclofenac to be sent to the [facility] pharmacy on [facility].    Patient can be reached at [phone].

## 2024-03-28 ENCOUNTER — HOSPITAL ENCOUNTER (EMERGENCY)
Facility: HOSPITAL | Age: 74
Discharge: HOME | End: 2024-03-28
Payer: MEDICARE

## 2024-03-28 VITALS
DIASTOLIC BLOOD PRESSURE: 100 MMHG | TEMPERATURE: 99 F | SYSTOLIC BLOOD PRESSURE: 188 MMHG | BODY MASS INDEX: 33.32 KG/M2 | WEIGHT: 200 LBS | HEART RATE: 77 BPM | OXYGEN SATURATION: 100 % | RESPIRATION RATE: 18 BRPM | HEIGHT: 65 IN

## 2024-03-28 DIAGNOSIS — H61.23 BILATERAL IMPACTED CERUMEN: Primary | ICD-10-CM

## 2024-03-28 DIAGNOSIS — T16.1XXA FOREIGN BODY OF RIGHT EAR, INITIAL ENCOUNTER: ICD-10-CM

## 2024-03-28 DIAGNOSIS — H60.313 ACUTE DIFFUSE OTITIS EXTERNA OF BOTH EARS: ICD-10-CM

## 2024-03-28 PROCEDURE — 6370000000 HC RX 637 (ALT 250 FOR IP): Performed by: PHYSICIAN ASSISTANT

## 2024-03-28 PROCEDURE — 69209 REMOVE IMPACTED EAR WAX UNI: CPT

## 2024-03-28 PROCEDURE — 99283 EMERGENCY DEPT VISIT LOW MDM: CPT

## 2024-03-28 RX ORDER — CIPROFLOXACIN AND DEXAMETHASONE 3; 1 MG/ML; MG/ML
4 SUSPENSION/ DROPS AURICULAR (OTIC) 2 TIMES DAILY
Qty: 1 EACH | Refills: 0 | Status: SHIPPED | OUTPATIENT
Start: 2024-03-28 | End: 2024-04-04

## 2024-03-28 RX ORDER — ASPIRIN 81 MG
5 TABLET, DELAYED RELEASE (ENTERIC COATED) ORAL 2 TIMES DAILY
Qty: 1 EACH | Refills: 0 | Status: SHIPPED | OUTPATIENT
Start: 2024-03-28 | End: 2024-04-04

## 2024-03-28 RX ADMIN — FAMOTIDINE 5 DROP: 20 TABLET, FILM COATED ORAL at 15:38

## 2024-03-28 ASSESSMENT — ENCOUNTER SYMPTOMS
COUGH: 1
ABDOMINAL PAIN: 0
DIARRHEA: 0
RHINORRHEA: 0
CONSTIPATION: 0
SHORTNESS OF BREATH: 0
BACK PAIN: 0
EYE REDNESS: 0
EYE DISCHARGE: 0
SORE THROAT: 0
VOMITING: 0
VOICE CHANGE: 1
WHEEZING: 0
NAUSEA: 0

## 2024-03-28 ASSESSMENT — PAIN - FUNCTIONAL ASSESSMENT: PAIN_FUNCTIONAL_ASSESSMENT: 0-10

## 2024-03-28 ASSESSMENT — PAIN SCALES - GENERAL: PAINLEVEL_OUTOF10: 0

## 2024-03-28 NOTE — ED PROVIDER NOTES
[facility] EMERGENCY DEPT  EMERGENCY DEPARTMENT ENCOUNTER      Pt Name: Chasidy Horne  Pt Age: 73 y.o.  Sex: female   MRN: [id number]  CSN: [id number]   Birthdate 1950  Date of evaluation: 3/28/2024  Provider: GRAY ALMAGUER  Room: [address]  Time Dictated: 4:50 PM    Chief Complaint   Chief Complaint   Patient presents with    Ear Fullness       History of Present Illness   The history is provided by the patient.       3:00 PM  73 y.o. female presents to the ED C/O bilateral ear fullness for the last 2 weeks.  Patient reports she has had an intermittent yellow productive cough, hoarseness, and runny/stuffy nose with it.  Patient not taken any medications for the symptoms. No fever, chills SOB, difficulty breathing, wheezing, back pain, chest pain, or sore throat.     Nursing Note reviewed    REVIEW OF SYSTEMS    (2-9 systems for level 4, 10 or more for level 5)   Review of Systems   Constitutional:  Negative for chills and fever.   HENT:  Positive for congestion, hearing loss and voice change. Negative for rhinorrhea and sore throat.    Eyes:  Negative for discharge and redness.   Respiratory:  Positive for cough. Negative for shortness of breath and wheezing.    Cardiovascular:  Negative for chest pain and palpitations.   Gastrointestinal:  Negative for abdominal pain, constipation, diarrhea, nausea and vomiting.   Genitourinary:  Negative for dysuria, frequency and urgency.   Musculoskeletal:  Negative for back pain, myalgias and neck pain.   Skin:  Negative for rash and wound.   Neurological:  Negative for dizziness and headaches.   Hematological:  Negative for adenopathy.   Psychiatric/Behavioral:  Negative for agitation and confusion.        PAST MEDICAL HISTORY     Past Medical History:   Diagnosis Date    Anemia     Asthma     Blood disorder     Breast cancer (HCC)     Bursitis of right hip     Cancer (HCC)     breast, diagnosed 2006 left    Cancer (HCC)     Chest pain, unspecified     The working

## 2024-03-28 NOTE — ED NOTES
Qtip tip found in patient's right ear canal while flushing right ear. Provider JAMEL CASTELLANO made aware.

## 2024-03-28 NOTE — DISCHARGE INSTRUCTIONS
Use both drops as prescribed for 1 week. If no improvement F/U with your PCP, or ENT, or an Urgent care to have them flushed again.

## 2024-04-02 ENCOUNTER — TELEPHONE (OUTPATIENT)
Age: 74
End: 2024-04-02

## 2024-04-02 NOTE — TELEPHONE ENCOUNTER
Patient picked up our prescription for   diclofenac (VOLTAREN) 50 MG EC tablet but didn't realize it wasn't the gel and is asking if we may be able to change it to the gel form instead.  She has not started taking the tablets yet.    She is also reporting that she hasn't been able to schedule with pain management yet and is requesting we provide her a prescription for Gabapentin to help with her foot pain.    Patient prefers [facility] on [facility]    Can we help her with these requests?  Please advise patient at [phone].

## 2024-04-04 NOTE — TELEPHONE ENCOUNTER
Patient called back regarding yesterday's message.    Please advise.    Patient can be reached at [phone].

## 2024-04-04 NOTE — TELEPHONE ENCOUNTER
4/4/24 Patient was called. She knows  and Dr. Urbano are out of the office. She will wait to hear from them to get a letter.

## 2024-04-14 DIAGNOSIS — M54.50 LOW BACK PAIN, UNSPECIFIED BACK PAIN LATERALITY, UNSPECIFIED CHRONICITY, UNSPECIFIED WHETHER SCIATICA PRESENT: ICD-10-CM

## 2024-04-16 RX ORDER — IBUPROFEN 800 MG/1
800 TABLET ORAL EVERY 8 HOURS PRN
Qty: 90 TABLET | Refills: 2 | Status: SHIPPED | OUTPATIENT
Start: 2024-04-16

## 2024-04-19 ENCOUNTER — OFFICE VISIT (OUTPATIENT)
Age: 74
End: 2024-04-19
Payer: MEDICARE

## 2024-04-19 VITALS — TEMPERATURE: 98.9 F | BODY MASS INDEX: 33.99 KG/M2 | WEIGHT: 204 LBS | HEIGHT: 65 IN | HEART RATE: 82 BPM

## 2024-04-19 DIAGNOSIS — M54.16 LUMBAR NEURITIS: Primary | ICD-10-CM

## 2024-04-19 DIAGNOSIS — M51.36 DDD (DEGENERATIVE DISC DISEASE), LUMBAR: ICD-10-CM

## 2024-04-19 DIAGNOSIS — M48.061 SPINAL STENOSIS OF LUMBAR REGION, UNSPECIFIED WHETHER NEUROGENIC CLAUDICATION PRESENT: ICD-10-CM

## 2024-04-19 PROCEDURE — 1123F ACP DISCUSS/DSCN MKR DOCD: CPT | Performed by: PHYSICAL MEDICINE & REHABILITATION

## 2024-04-19 PROCEDURE — 99213 OFFICE O/P EST LOW 20 MIN: CPT | Performed by: PHYSICAL MEDICINE & REHABILITATION

## 2024-04-19 RX ORDER — METHOCARBAMOL 500 MG/1
TABLET, FILM COATED ORAL
COMMUNITY
Start: 2024-04-11

## 2024-04-19 RX ORDER — CETIRIZINE HYDROCHLORIDE 10 MG/1
10 TABLET ORAL DAILY
COMMUNITY
Start: 2024-02-29

## 2024-04-19 RX ORDER — TRIAMCINOLONE ACETONIDE 1 MG/G
OINTMENT TOPICAL
COMMUNITY
Start: 2024-03-20

## 2024-04-19 RX ORDER — METHYLPREDNISOLONE 4 MG/1
TABLET ORAL
COMMUNITY
Start: 2024-04-11

## 2024-04-19 NOTE — PROGRESS NOTES
[facility]  [address]  [place] [address]  Tel: [phone]  Fax: [phone]          PROGRESS NOTE      HISTORY OF PRESENT ILLNESS:  The patient is a 73 y.o. female and was seen today c/o low back pain radiating to the RLE. Previously, her pain radiated to BLE(R>L) in a S1 distribution to the feet x 10/17/2022 after a fall. Patient says her pain is intermittent and has been holding steady for the past 2 months. Her pain is exacerbated by no position. POOR HISTORIAN. Pt denies change in bowel or bladder habits. Patient denies recent fevers, weight loss, rashes or skin sores. No stomach ulcers or bleeding disorders.No history of spinal surgery or injections. Patient denies recent physical therapy or chiropractic care. Pt denies DM.Patient reports that to her knowledge her kidneys function properly, no GFR on file. Patient says she has no relief with flexeril, celebrex, Gabapentin 400 mg PRN, and Lidoderm patches. Patient is not sure if Tramadol provided relief. Patient says she has some relief with Oxycodone QID. Patient said she had no relief with MDP on 1/27/2023. Pain management for Oxycodone QID and Gabapentin 400 mg PRN chronically through Dr. Don Khanna. Patient has been in pain management for her right knee pain. Patient had a L spine ordered through [facility] but she did not get called to go to the MRI so she did not get the MRI done. Per Dr. Urbano no more narcotics given to patient. PmHx of TIA, HTN, chronic right knee pain, left breast cancer(2006 and 2012) with radiation, COPD. Note from [facility] dated 1/27/2023 indicating patient was seen with c/o low back pain to the RLE. Exacerbated by walking as well as lying in her bed at night. Also has lateral right hip pain when lying on her right side. Known advancing arthritis of the right knee. Patient had a right knee injection and a right hip bursitis injection. Ordered a L spine MRI.

## 2024-04-22 DIAGNOSIS — M48.061 SPINAL STENOSIS OF LUMBAR REGION, UNSPECIFIED WHETHER NEUROGENIC CLAUDICATION PRESENT: ICD-10-CM

## 2024-04-30 ENCOUNTER — APPOINTMENT (OUTPATIENT)
Facility: HOSPITAL | Age: 74
End: 2024-04-30
Payer: MEDICARE

## 2024-04-30 ENCOUNTER — HOSPITAL ENCOUNTER (EMERGENCY)
Facility: HOSPITAL | Age: 74
Discharge: HOME | End: 2024-04-30
Payer: MEDICARE

## 2024-04-30 VITALS
BODY MASS INDEX: 33.32 KG/M2 | HEART RATE: 80 BPM | OXYGEN SATURATION: 97 % | HEIGHT: 65 IN | SYSTOLIC BLOOD PRESSURE: 164 MMHG | DIASTOLIC BLOOD PRESSURE: 102 MMHG | TEMPERATURE: 98 F | WEIGHT: 200 LBS | RESPIRATION RATE: 20 BRPM

## 2024-04-30 DIAGNOSIS — M25.432 SWELLING OF JOINT OF LEFT WRIST: ICD-10-CM

## 2024-04-30 DIAGNOSIS — M25.50 ARTHRALGIA, UNSPECIFIED JOINT: Primary | ICD-10-CM

## 2024-04-30 PROCEDURE — 73130 X-RAY EXAM OF HAND: CPT

## 2024-04-30 PROCEDURE — 73502 X-RAY EXAM HIP UNI 2-3 VIEWS: CPT

## 2024-04-30 PROCEDURE — 99283 EMERGENCY DEPT VISIT LOW MDM: CPT

## 2024-04-30 RX ORDER — PREDNISONE 10 MG/1
TABLET ORAL
Qty: 21 EACH | Refills: 0 | Status: SHIPPED | OUTPATIENT
Start: 2024-04-30

## 2024-04-30 RX ORDER — ACETAMINOPHEN 500 MG
500 TABLET ORAL EVERY 6 HOURS PRN
Qty: 30 TABLET | Refills: 0 | Status: SHIPPED | OUTPATIENT
Start: 2024-04-30

## 2024-04-30 ASSESSMENT — PAIN - FUNCTIONAL ASSESSMENT: PAIN_FUNCTIONAL_ASSESSMENT: 0-10

## 2024-04-30 ASSESSMENT — PAIN SCALES - GENERAL: PAINLEVEL_OUTOF10: 10

## 2024-04-30 NOTE — ED PROVIDER NOTES
EMERGENCY DEPARTMENT HISTORY AND PHYSICAL EXAM      Patient Name: Chasidy Horne  MRN: [id number]  YOB: 1950  Provider: Mary Mata PA-C  PCP: Gabby Chu MD   Time/Date of evaluation: 2:02 PM EDT on 4/30/24    History of Presenting Illness     Chief Complaint   Patient presents with    hand swelling    Groin Pain       History Provided By: Patient     History (Narative):   Chasidy Horne is a 73 y.o. female with a PMHX of of anemia, breast cancer, COPD, hypertension, GERD, chronic pain, polio vitamin D deficiency, TIA  who presents to the emergency department  by POV C/O multiple complaints.    Patient's initial complaint was nontraumatic right hand swelling, that had been persistent for the past week.  Patient states that this has been persistently painful and has taken a couple doses of Motrin with minimal relief.  Patient also states that she has left hand swelling, and states that she has a history of lymphedema due to prior breast cancer.  She denies any trauma or injuries fever, chills recent instrumentation or IV drug use, history of autoimmune disorders such as rheumatoid arthritis.    Patient also states that she did fall yesterday, due to her right knee giving out, and since then she has been also having right groin pain.  She denies any loss of bowel or bladder, numbness, weakness.     Patient states that she does have history of chronic pain, and used to be on Percocet given by pain management.  However, due to issues with insurance patient was discharged from pain management.    Past History     Past Medical History:  Past Medical History:   Diagnosis Date    Anemia     Asthma     Blood disorder     Breast cancer (HCC)     Bursitis of right hip     Cancer (HCC)     breast, diagnosed 2006 left    Cancer (HCC)     Chest pain, unspecified     The working diagnosis is chest pain.  The differential diagnosis includes chest wall pain, stable angina.    Chronic obstructive

## 2024-04-30 NOTE — DISCHARGE INSTRUCTIONS
Please take medications as prescribed and return to ER if necessary. Follow up with ortho, Please ICE area, and elevated

## 2024-04-30 NOTE — ED TRIAGE NOTES
Pt states her right hand started swelling last week. Left hand started 2 days ago. Also c/o right groin pain. Has hx of lymphedema on the left

## 2024-05-15 ENCOUNTER — TELEPHONE (OUTPATIENT)
Age: 74
End: 2024-05-15

## 2024-05-15 DIAGNOSIS — M48.061 SPINAL STENOSIS OF LUMBAR REGION, UNSPECIFIED WHETHER NEUROGENIC CLAUDICATION PRESENT: ICD-10-CM

## 2024-05-15 DIAGNOSIS — M17.11 UNILATERAL PRIMARY OSTEOARTHRITIS, RIGHT KNEE: ICD-10-CM

## 2024-05-15 DIAGNOSIS — M17.12 PRIMARY OSTEOARTHRITIS OF LEFT KNEE: ICD-10-CM

## 2024-05-15 DIAGNOSIS — M51.36 DDD (DEGENERATIVE DISC DISEASE), LUMBAR: ICD-10-CM

## 2024-05-15 DIAGNOSIS — M25.551 RIGHT HIP PAIN: ICD-10-CM

## 2024-05-15 DIAGNOSIS — M54.16 LUMBAR NEURITIS: Primary | ICD-10-CM

## 2024-05-15 NOTE — TELEPHONE ENCOUNTER
Patient is requesting a new referral to pain management sent to [facility] on [address] in [place].  She says she's been looking for 4 months and finally found a place that accepts her insurance.  Please prepare new order and send to them as soon as possible.

## 2024-05-15 NOTE — TELEPHONE ENCOUNTER
[place] [facility] [place] fax# [phone]

## 2024-06-04 NOTE — PROGRESS NOTES
Lisa Cuevas presents today for   Chief Complaint   Patient presents with    Peripheral Neuropathy     follow up       Is someone accompanying this pt? Yes,  in car    Is the patient using any DME equipment during [address]? no    Depression Screening:  3 most recent PHQ Screens 1/21/2021   Little interest or pleasure in doing things Not at all   Feeling down, depressed, irritable, or hopeless Not at all   Total Score PHQ 2 0       Learning Assessment:  Learning Assessment 3/14/2018   PRIMARY LEARNER Patient   PRIMARY LANGUAGE ENGLISH   LEARNER PREFERENCE PRIMARY READING   ANSWERED BY patient   RELATIONSHIP SELF       Abuse Screening:  Abuse Screening Questionnaire 5/10/2019   Do you ever feel afraid of your partner? N   Are you in a relationship with someone who physically or mentally threatens you? N   Is it safe for you to go home? Y       Fall Risk  Fall Risk Assessment, last 12 mths 12/10/2020   Able to walk? Yes   Fall in past 12 months? No   Number of falls in past 12 months -   Fall with injury? -         Coordination of Care:  1. Have you been to the ER, urgent care clinic since your last visit? Hospitalized since your last visit? no    2. Have you seen or consulted any other health care providers outside of the [address] [place] [address] since your last visit? Include any pap smears or colon screening.  no [de-identified] : 15 year old female with intermittent episodes of rectal bleeding.presents for follow up. Stool was pos for C diff and Alice was treated with flagyl 7.5 mg/kg tid x 10 days. No longer with blood or mucous in the stool. Having BMs daily, [place] 4.  History of chest pain with sensation of food sticking and dysphagia at times which occurs intermittently with last episode prior to initial visit in March 26, 2024. Also was evaluated by cardiology for lightheadedness and concern for POTS. Continues with the same symptoms.  Lab assessment also noted to f/u appt in June 2024 given mild inc transaminase level.  Family Hx: mother - MS, Stage IV breast CA, GERD, hiatal hernia; brother - recurrent pancreatitis Social H: lives with parents, brother. [de-identified] : Vyvanse, Prozac

## 2024-06-06 ENCOUNTER — HOSPITAL ENCOUNTER (EMERGENCY)
Facility: HOSPITAL | Age: 74
Discharge: HOME | End: 2024-06-06
Payer: MEDICARE

## 2024-06-06 ENCOUNTER — APPOINTMENT (OUTPATIENT)
Facility: HOSPITAL | Age: 74
End: 2024-06-06
Payer: MEDICARE

## 2024-06-06 VITALS
TEMPERATURE: 98.2 F | HEART RATE: 80 BPM | SYSTOLIC BLOOD PRESSURE: 178 MMHG | RESPIRATION RATE: 18 BRPM | WEIGHT: 199 LBS | OXYGEN SATURATION: 94 % | HEIGHT: 65 IN | DIASTOLIC BLOOD PRESSURE: 84 MMHG | BODY MASS INDEX: 33.15 KG/M2

## 2024-06-06 DIAGNOSIS — M25.512 ACUTE PAIN OF LEFT SHOULDER: Primary | ICD-10-CM

## 2024-06-06 PROCEDURE — 99283 EMERGENCY DEPT VISIT LOW MDM: CPT

## 2024-06-06 PROCEDURE — 73030 X-RAY EXAM OF SHOULDER: CPT

## 2024-06-06 PROCEDURE — 6370000000 HC RX 637 (ALT 250 FOR IP): Performed by: EMERGENCY MEDICINE

## 2024-06-06 RX ORDER — ACETAMINOPHEN 500 MG
500 TABLET ORAL EVERY 6 HOURS PRN
Qty: 30 TABLET | Refills: 1 | Status: SHIPPED | OUTPATIENT
Start: 2024-06-06

## 2024-06-06 RX ORDER — TRAMADOL HYDROCHLORIDE 50 MG/1
50 TABLET ORAL
Status: COMPLETED | OUTPATIENT
Start: 2024-06-06 | End: 2024-06-06

## 2024-06-06 RX ORDER — TRAMADOL HYDROCHLORIDE 50 MG/1
50 TABLET ORAL EVERY 4 HOURS PRN
Qty: 18 TABLET | Refills: 0 | Status: SHIPPED | OUTPATIENT
Start: 2024-06-06 | End: 2024-06-09

## 2024-06-06 RX ORDER — LIDOCAINE 50 MG/G
PATCH TOPICAL
Qty: 30 PATCH | Refills: 0 | Status: SHIPPED | OUTPATIENT
Start: 2024-06-06

## 2024-06-06 RX ADMIN — TRAMADOL HYDROCHLORIDE 50 MG: 50 TABLET ORAL at 14:39

## 2024-06-06 ASSESSMENT — PAIN DESCRIPTION - ORIENTATION
ORIENTATION: LEFT
ORIENTATION: LEFT

## 2024-06-06 ASSESSMENT — PAIN DESCRIPTION - LOCATION
LOCATION: SHOULDER
LOCATION: SHOULDER

## 2024-06-06 ASSESSMENT — PAIN - FUNCTIONAL ASSESSMENT: PAIN_FUNCTIONAL_ASSESSMENT: 0-10

## 2024-06-06 ASSESSMENT — PAIN SCALES - GENERAL
PAINLEVEL_OUTOF10: 10
PAINLEVEL_OUTOF10: 10

## 2024-06-06 ASSESSMENT — ENCOUNTER SYMPTOMS
SHORTNESS OF BREATH: 0
ABDOMINAL PAIN: 0

## 2024-06-06 NOTE — ED PROVIDER NOTES
[facility] EMERGENCY DEPT  EMERGENCY DEPARTMENT ENCOUNTER      Pt Name: Chasidy Horne  MRN: [id number]  Birthdate 1950  Date of evaluation: 6/6/2024  Provider: GRAY Mireles  2:35 PM    CHIEF COMPLAINT       Chief Complaint   Patient presents with    Arm Pain    Shoulder Pain         HISTORY OF PRESENT ILLNESS    Chasidy Horne is a 73 y.o. female who presents to the emergency department with pain in her left shoulder joint for the past week or so.  She went to go see an orthopedic surgeon for her left wrist and was diagnosed with carpal tunnel but she said at that visit there was no pain in her left shoulder.  Denies chest pain shortness of breath denies any injury numbness or weakness.  She said it is very painful now she does not want to use it does not want a full upper pants to get dressed and feels very limited in her activities of daily living.    HPI    Nursing Notes were reviewed.    REVIEW OF SYSTEMS       Review of Systems   Respiratory:  Negative for shortness of breath.    Cardiovascular:  Negative for chest pain.   Gastrointestinal:  Negative for abdominal pain.   Musculoskeletal:  Positive for arthralgias.       Except as noted above the remainder of the review of systems was reviewed and negative.       PAST MEDICAL HISTORY     Past Medical History:   Diagnosis Date    Anemia     Asthma     Blood disorder     Breast cancer (HCC)     Bursitis of right hip     Cancer (HCC)     breast, diagnosed 2006 left    Cancer (HCC)     Chest pain, unspecified     The working diagnosis is chest pain.  The differential diagnosis includes chest wall pain, stable angina.    Chronic obstructive pulmonary disease (HCC)     Essential hypertension     Essential hypertension, benign     Uncontrolled     GERD (gastroesophageal reflux disease)     History of therapeutic radiation     Hypercholesterolemia     Hypertension     Low back pain     Neuropathy     Nonspecific abnormal electrocardiogram (ECG) (EKG)

## 2024-06-06 NOTE — ED NOTES
Condition stable.   Patient education was completed.  Patient education was taught to patient using discussion and handout.   Patient verbalized understanding.   Patient was discharged to home by family.  Patient was discharged ambulatory with cane.

## 2024-06-06 NOTE — ED TRIAGE NOTES
Patient presents to ER with c/o left shoulder and hand pain over the past 4 days.  Patient states she saw Dr. Goetz this past week related to the pain in her hand and was told she had Carpal tunnel.  Patient has swelling to left upper extremity; hx left breast CA.

## 2024-08-25 ENCOUNTER — APPOINTMENT (OUTPATIENT)
Facility: HOSPITAL | Age: 74
End: 2024-08-25
Payer: MEDICARE

## 2024-08-25 ENCOUNTER — HOSPITAL ENCOUNTER (EMERGENCY)
Facility: HOSPITAL | Age: 74
Discharge: HOME | End: 2024-08-25
Attending: EMERGENCY MEDICINE
Payer: MEDICARE

## 2024-08-25 VITALS
TEMPERATURE: 98.2 F | RESPIRATION RATE: 18 BRPM | OXYGEN SATURATION: 99 % | DIASTOLIC BLOOD PRESSURE: 96 MMHG | WEIGHT: 200 LBS | HEART RATE: 99 BPM | HEIGHT: 65 IN | SYSTOLIC BLOOD PRESSURE: 176 MMHG | BODY MASS INDEX: 33.32 KG/M2

## 2024-08-25 DIAGNOSIS — M25.511 ACUTE PAIN OF RIGHT SHOULDER: Primary | ICD-10-CM

## 2024-08-25 DIAGNOSIS — M19.011 OSTEOARTHRITIS OF RIGHT SHOULDER, UNSPECIFIED OSTEOARTHRITIS TYPE: ICD-10-CM

## 2024-08-25 PROCEDURE — 6370000000 HC RX 637 (ALT 250 FOR IP): Performed by: EMERGENCY MEDICINE

## 2024-08-25 PROCEDURE — 99283 EMERGENCY DEPT VISIT LOW MDM: CPT

## 2024-08-25 PROCEDURE — 73030 X-RAY EXAM OF SHOULDER: CPT

## 2024-08-25 RX ORDER — PREDNISONE 20 MG/1
60 TABLET ORAL
Status: COMPLETED | OUTPATIENT
Start: 2024-08-25 | End: 2024-08-25

## 2024-08-25 RX ORDER — METHYLPREDNISOLONE 4 MG
TABLET, DOSE PACK ORAL
Qty: 1 KIT | Refills: 0 | Status: SHIPPED | OUTPATIENT
Start: 2024-08-26 | End: 2024-08-31

## 2024-08-25 RX ORDER — OXYCODONE AND ACETAMINOPHEN 5; 325 MG/1; MG/1
1 TABLET ORAL EVERY 6 HOURS PRN
Qty: 5 TABLET | Refills: 0 | Status: SHIPPED | OUTPATIENT
Start: 2024-08-25 | End: 2024-08-28

## 2024-08-25 RX ORDER — OXYCODONE AND ACETAMINOPHEN 5; 325 MG/1; MG/1
1 TABLET ORAL
Status: COMPLETED | OUTPATIENT
Start: 2024-08-25 | End: 2024-08-25

## 2024-08-25 RX ADMIN — OXYCODONE HYDROCHLORIDE AND ACETAMINOPHEN 1 TABLET: 5; 325 TABLET ORAL at 13:38

## 2024-08-25 RX ADMIN — PREDNISONE 60 MG: 20 TABLET ORAL at 14:27

## 2024-08-25 ASSESSMENT — PAIN - FUNCTIONAL ASSESSMENT: PAIN_FUNCTIONAL_ASSESSMENT: 0-10

## 2024-08-25 ASSESSMENT — PAIN DESCRIPTION - DESCRIPTORS: DESCRIPTORS: ACHING

## 2024-08-25 ASSESSMENT — LIFESTYLE VARIABLES
HOW OFTEN DO YOU HAVE A DRINK CONTAINING ALCOHOL: NEVER
HOW MANY STANDARD DRINKS CONTAINING ALCOHOL DO YOU HAVE ON A TYPICAL DAY: PATIENT DOES NOT DRINK

## 2024-08-25 ASSESSMENT — PAIN DESCRIPTION - ORIENTATION: ORIENTATION: RIGHT

## 2024-08-25 ASSESSMENT — PAIN SCALES - GENERAL: PAINLEVEL_OUTOF10: 9

## 2024-08-25 ASSESSMENT — PAIN DESCRIPTION - LOCATION: LOCATION: SHOULDER

## 2024-08-25 NOTE — ED PROVIDER NOTES
General: Skin is warm and dry.      Capillary Refill: Capillary refill takes less than 2 seconds.   Neurological:      General: No focal deficit present.      Mental Status: She is alert and oriented to person, place, and time.   Psychiatric:         Mood and Affect: Mood normal.         Behavior: Behavior normal.      Comments: Supportive son is at the bedside         Diagnostic Study Results     Labs -  No results found for this or any previous visit (from the past 12 hour(s)).    Radiologic Studies -   XR SHOULDER RIGHT (MIN 2 VIEWS)   Final Result      No acute osseous abnormality. At least moderate glenohumeral and   acromioclavicular joint osteoarthritis.            Electronically signed by Abdiaziz Horner            Medical Decision Making   I am the first provider for this patient.    I reviewed the vital signs, available nursing notes, past medical history, past surgical history, family history and social history.    Vital Signs-Reviewed the patient's vital signs.        ED Course: Progress Notes, Reevaluation, and Consults:    Provider Notes (Medical Decision Making):     MDM  Number of Diagnoses or Management Options  Acute pain of right shoulder  Osteoarthritis of right shoulder, unspecified osteoarthritis type  Diagnosis management comments: Patient is a 73-year-old female with a history of hypertension, COPD, recent hand surgery, breast cancer, presents emergency department with right shoulder pain has been progressive for the last 3 weeks with limited range of motion.  Patient has point tenderness along the entire joint line mostly in the posterior aspect around her rotator cuff however the joint itself is diffusely tender.  X-ray shows diffuse osteoarthritis and suspect that is more of the cause of the pain than a rotator cuff injury however both are in the differential.  Patient has been on steroids in the past and I think it would help with the inflammation and also increase her opioid pain control.      Disclaimer: Sections of this note are dictated using utilizing voice recognition software.  Minor typographical errors may be present. If questions arise, please do not hesitate to contact me or call our department.          Skip Tello MD  08/26/24 0722

## 2024-08-25 NOTE — ED TRIAGE NOTES
Right shoulder pain x 3 days. Denies injury or strenuous activities. She states this has happened before but unsure of cause and gets medicine for it. She s requesting a medication refill on Gabapentin, Prednisone, and Percocet. BP elevated in triage; denies latrice her BP medications today

## 2024-08-25 NOTE — DISCHARGE INSTRUCTIONS
Your x-ray shows you have arthritis of the right shoulder and have given you a limited number of Percocet to help with your pain until the steroids take effect and you start having some improvement.  I would like you to see Dr. Smith as he is a shoulder specialist and he will be able to help with your pain.  Please return if you are at all worsened or concerned.  Make sure to have your blood pressure rechecked by your primary provider this week as it is elevated.

## 2024-11-05 ENCOUNTER — OFFICE VISIT (OUTPATIENT)
Age: 74
End: 2024-11-05
Payer: MEDICARE

## 2024-11-05 VITALS — HEIGHT: 65 IN | BODY MASS INDEX: 33.15 KG/M2 | WEIGHT: 199 LBS | TEMPERATURE: 98 F

## 2024-11-05 DIAGNOSIS — M54.16 LUMBAR NEURITIS: Primary | ICD-10-CM

## 2024-11-05 DIAGNOSIS — M48.061 SPINAL STENOSIS OF LUMBAR REGION, UNSPECIFIED WHETHER NEUROGENIC CLAUDICATION PRESENT: ICD-10-CM

## 2024-11-05 DIAGNOSIS — M51.362 DEGENERATION OF INTERVERTEBRAL DISC OF LUMBAR REGION WITH DISCOGENIC BACK PAIN AND LOWER EXTREMITY PAIN: ICD-10-CM

## 2024-11-05 PROCEDURE — G8427 DOCREV CUR MEDS BY ELIG CLIN: HCPCS | Performed by: PHYSICAL MEDICINE & REHABILITATION

## 2024-11-05 PROCEDURE — 1090F PRES/ABSN URINE INCON ASSESS: CPT | Performed by: PHYSICAL MEDICINE & REHABILITATION

## 2024-11-05 PROCEDURE — G8484 FLU IMMUNIZE NO ADMIN: HCPCS | Performed by: PHYSICAL MEDICINE & REHABILITATION

## 2024-11-05 PROCEDURE — 99214 OFFICE O/P EST MOD 30 MIN: CPT | Performed by: PHYSICAL MEDICINE & REHABILITATION

## 2024-11-05 PROCEDURE — 1160F RVW MEDS BY RX/DR IN RCRD: CPT | Performed by: PHYSICAL MEDICINE & REHABILITATION

## 2024-11-05 PROCEDURE — 3017F COLORECTAL CA SCREEN DOC REV: CPT | Performed by: PHYSICAL MEDICINE & REHABILITATION

## 2024-11-05 PROCEDURE — 1123F ACP DISCUSS/DSCN MKR DOCD: CPT | Performed by: PHYSICAL MEDICINE & REHABILITATION

## 2024-11-05 PROCEDURE — 1036F TOBACCO NON-USER: CPT | Performed by: PHYSICAL MEDICINE & REHABILITATION

## 2024-11-05 PROCEDURE — 1125F AMNT PAIN NOTED PAIN PRSNT: CPT | Performed by: PHYSICAL MEDICINE & REHABILITATION

## 2024-11-05 PROCEDURE — 1159F MED LIST DOCD IN RCRD: CPT | Performed by: PHYSICAL MEDICINE & REHABILITATION

## 2024-11-05 PROCEDURE — G8417 CALC BMI ABV UP PARAM F/U: HCPCS | Performed by: PHYSICAL MEDICINE & REHABILITATION

## 2024-11-05 PROCEDURE — G8400 PT W/DXA NO RESULTS DOC: HCPCS | Performed by: PHYSICAL MEDICINE & REHABILITATION

## 2024-11-05 NOTE — PROGRESS NOTES
[facility]  [address]  [place] [address]  Tel: [phone]  Fax: [phone]          PROGRESS NOTE      HISTORY OF PRESENT ILLNESS:  The patient is a 73 y.o. female and was seen today for follow up of lower back pain with symptoms into the right hip and groin radiating to the ankle anteriorly. Previously seen for low back pain radiating to the RLE. Previously, her pain radiated to BLE(R>L) in a S1 distribution to the feet x 10/17/2022 after a fall. Patient says her pain is intermittent and has been holding steady for the past 2 months. Her pain is exacerbated by no position. POOR HISTORIAN. Pt denies change in bowel or bladder habits. Patient denies recent fevers, weight loss, rashes or skin sores. No stomach ulcers or bleeding disorders.No history of spinal surgery or injections. Patient denies recent physical therapy or chiropractic care. Pt denies DM.Patient reports that to her knowledge her kidneys function properly, no GFR on file. Patient says she has no relief with flexeril, celebrex, Gabapentin 400 mg PRN, and Lidoderm patches. Patient is not sure if Tramadol provided relief. Patient says she has some relief with Oxycodone QID. Patient said she had no relief with MDP on 1/27/2023. Pain management for Oxycodone QID and Gabapentin 400 mg PRN chronically through Dr. Don Khanna. Patient has been in pain management for her right knee pain. Patient had a L spine ordered through [facility] but she did not get called to go to the MRI so she did not get the MRI done. Per Dr. Urbano no more narcotics given to patient. PmHx of TIA, HTN, chronic right knee pain, left breast cancer(2006 and 2012) with radiation, COPD. Note from [facility] dated 1/27/2023 indicating patient was seen with c/o low back pain to the RLE. Exacerbated by walking as well as lying in her bed at night. Also has lateral right hip pain when lying on her right side. Known

## 2024-11-07 RX ORDER — FUROSEMIDE 40 MG/1
TABLET ORAL
Qty: 90 TABLET | Refills: 1 | OUTPATIENT
Start: 2024-11-07

## 2024-11-14 ENCOUNTER — OFFICE VISIT (OUTPATIENT)
Age: 74
End: 2024-11-14

## 2024-11-14 ENCOUNTER — TELEPHONE (OUTPATIENT)
Age: 74
End: 2024-11-14

## 2024-11-14 VITALS — BODY MASS INDEX: 32.49 KG/M2 | WEIGHT: 195 LBS | HEIGHT: 65 IN

## 2024-11-14 DIAGNOSIS — M17.11 UNILATERAL PRIMARY OSTEOARTHRITIS, RIGHT KNEE: Primary | ICD-10-CM

## 2024-11-14 DIAGNOSIS — M51.362 DEGENERATION OF INTERVERTEBRAL DISC OF LUMBAR REGION WITH DISCOGENIC BACK PAIN AND LOWER EXTREMITY PAIN: ICD-10-CM

## 2024-11-14 DIAGNOSIS — M17.12 PRIMARY OSTEOARTHRITIS OF LEFT KNEE: ICD-10-CM

## 2024-11-14 NOTE — TELEPHONE ENCOUNTER
Patient is requesting a rx for  percocet to be sent to [facility] on [address].    Patient can be reached at [phone].

## 2024-11-14 NOTE — PROGRESS NOTES
Patient: Chasidy Horne                MRN: [id number]       SSN: [id number]  YOB: 1950        AGE: 73 y.o.        SEX: female  Body mass index is 32.45 kg/m².    PCP: Gabby Chu MD  11/14/24      This office note has been dictated.      REVIEW OF SYSTEMS:  Constitutional: Negative for fever, chills, weight loss and malaise/fatigue.   HENT: Negative.    Eyes: Negative.    Respiratory: Negative.   Cardiovascular: Negative.   Gastrointestinal: No bowel incontinence or constipation.  Genitourinary: No bladder incontinence or saddle anesthesia.  Skin: Negative.   Neurological: Negative.    Endo/Heme/Allergies: Negative.    Psychiatric/Behavioral: Negative.  Musculoskeletal: As per HPI above.     Past Medical History:   Diagnosis Date    Anemia     Asthma     Blood disorder     Breast cancer (HCC)     Bursitis of right hip     Cancer (HCC)     breast, diagnosed 2006 left    Cancer (HCC)     Chest pain, unspecified     The working diagnosis is chest pain.  The differential diagnosis includes chest wall pain, stable angina.    Chronic obstructive pulmonary disease (HCC)     Essential hypertension     Essential hypertension, benign     Uncontrolled     GERD (gastroesophageal reflux disease)     History of therapeutic radiation     Hypercholesterolemia     Hypertension     Low back pain     Neuropathy     Nonspecific abnormal electrocardiogram (ECG) (EKG)     Obesity, unspecified     Weight loss has been strongly encouraged by following dietary restrictions and an exercise routine.    Osteoarthritis of right knee     Pain in both knees     Polio     as a young child    Pre-operative cardiovascular examination     Sciatica     Sleep apnea     Unspecified cerebral artery occlusion with cerebral infarction     TIA? no residual    Vitamin D deficiency          Current Outpatient Medications:     diclofenac sodium (VOLTAREN) 1 % GEL, apply 2 grams to affected area four times a day (Patient not

## 2024-11-19 ENCOUNTER — TELEPHONE (OUTPATIENT)
Age: 74
End: 2024-11-19

## 2024-11-19 DIAGNOSIS — M48.061 SPINAL STENOSIS OF LUMBAR REGION, UNSPECIFIED WHETHER NEUROGENIC CLAUDICATION PRESENT: ICD-10-CM

## 2024-11-19 DIAGNOSIS — M51.362 DEGENERATION OF INTERVERTEBRAL DISC OF LUMBAR REGION WITH DISCOGENIC BACK PAIN AND LOWER EXTREMITY PAIN: ICD-10-CM

## 2024-11-19 DIAGNOSIS — M54.16 LUMBAR NEURITIS: ICD-10-CM

## 2024-11-19 NOTE — TELEPHONE ENCOUNTER
Call received from Carmelita Avitia with [facility] stating the patient was a patient with them before, and was discharged on 4/2023. Carmelita Avitia states they can not, and will not see the patient.    [facility] # [phone]

## 2024-12-07 NOTE — ED TRIAGE NOTES
Pt presents with swelling to right hand. Denies injury, trauma, or fall. Right wrist is tender to touch. Pt deine history of gout, states some mild arthritis to knee. Right hand sensation in tact. Warm to touch, limited mobility noted. Four or more times a week

## 2024-12-16 ENCOUNTER — OFFICE VISIT (OUTPATIENT)
Age: 74
End: 2024-12-16
Payer: MEDICARE

## 2024-12-16 VITALS
SYSTOLIC BLOOD PRESSURE: 130 MMHG | DIASTOLIC BLOOD PRESSURE: 82 MMHG | HEIGHT: 65 IN | WEIGHT: 201.6 LBS | OXYGEN SATURATION: 93 % | HEART RATE: 112 BPM | BODY MASS INDEX: 33.59 KG/M2 | RESPIRATION RATE: 20 BRPM | TEMPERATURE: 97.5 F

## 2024-12-16 DIAGNOSIS — R92.8 ABNORMAL MAMMOGRAM OF LEFT BREAST: ICD-10-CM

## 2024-12-16 DIAGNOSIS — I89.0 LYMPHEDEMA OF LEFT UPPER EXTREMITY: ICD-10-CM

## 2024-12-16 DIAGNOSIS — Z85.3 HISTORY OF LEFT BREAST CANCER: Primary | ICD-10-CM

## 2024-12-16 PROCEDURE — G8484 FLU IMMUNIZE NO ADMIN: HCPCS | Performed by: SURGERY

## 2024-12-16 PROCEDURE — 3075F SYST BP GE 130 - 139MM HG: CPT | Performed by: SURGERY

## 2024-12-16 PROCEDURE — G8417 CALC BMI ABV UP PARAM F/U: HCPCS | Performed by: SURGERY

## 2024-12-16 PROCEDURE — G8400 PT W/DXA NO RESULTS DOC: HCPCS | Performed by: SURGERY

## 2024-12-16 PROCEDURE — 3017F COLORECTAL CA SCREEN DOC REV: CPT | Performed by: SURGERY

## 2024-12-16 PROCEDURE — 1090F PRES/ABSN URINE INCON ASSESS: CPT | Performed by: SURGERY

## 2024-12-16 PROCEDURE — 3079F DIAST BP 80-89 MM HG: CPT | Performed by: SURGERY

## 2024-12-16 PROCEDURE — 1123F ACP DISCUSS/DSCN MKR DOCD: CPT | Performed by: SURGERY

## 2024-12-16 PROCEDURE — 99214 OFFICE O/P EST MOD 30 MIN: CPT | Performed by: SURGERY

## 2024-12-16 PROCEDURE — G8427 DOCREV CUR MEDS BY ELIG CLIN: HCPCS | Performed by: SURGERY

## 2024-12-16 PROCEDURE — 1036F TOBACCO NON-USER: CPT | Performed by: SURGERY

## 2024-12-16 ASSESSMENT — ENCOUNTER SYMPTOMS
CHEST TIGHTNESS: 0
SHORTNESS OF BREATH: 0

## 2024-12-16 NOTE — PROGRESS NOTES
Chasidy Horne is a 74 y.o. female (: 1950)     Chief Complaint   Patient presents with    Follow-up     Left breast        Medication list and allergies have been reviewed with Chasidy Horne and updated as of today's date.     I have gone over all Medical, Surgical and Social History with Chasidy Horne and updated/added the information accordingly.     1. Have you been to the ER, urgent care clinic since your last visit?  Hospitalized since your last visit?No    2. Have you seen or consulted any other health care providers outside of the [facility] since your last visit?  Include any pap smears or colon screening. No     
Final    Comment:    Pediatric calculator link: https://www.kidney.org/professionals/kdoqi/gfr_calculatorped     These results are not intended for use in patients <18 years of age.     eGFR results are calculated without a race factor using  the 2021 CKD-EPI equation. Careful clinical correlation is recommended, particularly when comparing to results calculated using previous equations.  The CKD-EPI equation is less accurate in patients with extremes of muscle mass, extra-renal metabolism of creatinine, excessive creatine ingestion, or following therapy that affects renal tubular secretion.      Calcium 02/08/2024 9.6  8.5 - 10.1 MG/DL Final    Magnesium 02/08/2024 1.8  1.6 - 2.6 mg/dL Final    Troponin, High Sensitivity 02/08/2024 16  0 - 54 ng/L Final    Comment: A HS troponin value change of (+ or -) 50% or more below the 99th percentile, in a 1/2/3 hr interval represents a significant change. Clinical correlation is recommended.  A HS troponin value change of (+ or -) 20% or above the 99th percentile, in a 1/2/3 hr interval represents a significant change. Clinical correlation is recommended.  99th Percentile:    Women:  0-54 ng/L                                                               Men:  0-78 ng/L         XR SHOULDER RIGHT (MIN 2 VIEWS)  Narrative: EXAM: XR SHOULDER RIGHT (MIN 2 VIEWS)    CLINICAL INDICATION/HISTORY: pain    TECHNIQUE: 2 views of the right shoulder    COMPARISON: None    FINDINGS:    No fracture. Normal joint alignment. Prominent osteophyte formation at the  glenohumeral and acromioclavicular joints. Negative soft tissues. The visible  portions of the right lung are clear.  Impression: No acute osseous abnormality. At least moderate glenohumeral and  acromioclavicular joint osteoarthritis.    Electronically signed by Abdiaziz Horner         Physical Exam:  /82   Pulse (!) 112   Temp 97.5 °F (36.4 °C)   Resp 20   Ht 1.651 m (5' 5\")   Wt 91.4 kg (201 lb 9.6 oz)   LMP  (LMP

## 2024-12-27 DIAGNOSIS — M54.50 LOW BACK PAIN, UNSPECIFIED BACK PAIN LATERALITY, UNSPECIFIED CHRONICITY, UNSPECIFIED WHETHER SCIATICA PRESENT: ICD-10-CM

## 2024-12-27 RX ORDER — IBUPROFEN 800 MG/1
800 TABLET, FILM COATED ORAL EVERY 8 HOURS PRN
Qty: 90 TABLET | Refills: 2 | Status: SHIPPED | OUTPATIENT
Start: 2024-12-27

## 2025-03-05 ENCOUNTER — OFFICE VISIT (OUTPATIENT)
Age: 75
End: 2025-03-05

## 2025-03-05 DIAGNOSIS — M16.11 PRIMARY OSTEOARTHRITIS OF RIGHT HIP: Primary | ICD-10-CM

## 2025-03-05 DIAGNOSIS — M70.61 TROCHANTERIC BURSITIS, RIGHT HIP: ICD-10-CM

## 2025-03-05 RX ORDER — ACETAMINOPHEN 500 MG
1000 TABLET ORAL EVERY 8 HOURS PRN
Qty: 90 TABLET | Refills: 1 | Status: SHIPPED | OUTPATIENT
Start: 2025-03-05

## 2025-03-05 RX ORDER — IBUPROFEN 800 MG/1
800 TABLET, FILM COATED ORAL
Qty: 90 TABLET | Refills: 0 | Status: SHIPPED | OUTPATIENT
Start: 2025-03-05

## 2025-03-05 NOTE — PROGRESS NOTES
History:   Procedure Laterality Date    BREAST BIOPSY  4/11/12    Left    BREAST BIOPSY Left     left mastectomy    BREAST SURGERY      initial surgery 2006, then left modified radical mastectomy 6/2012    VEIN SURGERY Bilateral        Patient seen evaluated today with complaints of right hip pain which has been ongoing for a couple weeks without injury or fall.  She reports groin discomfort.  She has trouble put on her shoes and socks.  She has trouble getting in and out of a car.  She has trouble getting out of bed.  She has pain at night.  She denies fevers or chills.    Patient denies recent fevers, chills, chest pain, SOB, or injuries.   No recent systemic changes noted.  A 12-point review of systems is performed today.  Pertinent positives are noted.  All other systems reviewed and otherwise are negative.    Physical exam: General: Alert and oriented x3, nad.  well-developed, well nourished.  normal affect, AF.  NC/AT, EOMI, neck supple, trachea midline, no JVD present. Breathing is non-labored.  Examination of lower extremities reveals decreased range of motion of the right hip with reproduction of groin and thigh discomfort.  Negative straight leg raise.  Negative calf tenderness.  Negative Homans.  No signs of DVT present.    Radiographs obtained in the office today 3/5/2025 at the [place] location include AP pelvis, AP and crosstable lateral of the right hip shows fairly advanced arthritis of the right hip with some acetabular spurring noted.  No acute abnormalities noted.    Assessment: Right hip advanced arthritis    Plan: At this point, we discussed treatment options.  Total hip replacements were discussed and the patient is not interested.  We will get her set up for intra-articular injection of the right hip.  We will see her back about 3 to 4 weeks after the injection to  the efficacy.  A prescription for ibuprofen 800 and Tylenol be sent to her pharmacy.  She is instructed on use and

## 2025-03-12 ENCOUNTER — HOSPITAL ENCOUNTER (OUTPATIENT)
Facility: HOSPITAL | Age: 75
Discharge: HOME | End: 2025-03-15
Payer: MEDICARE

## 2025-03-12 DIAGNOSIS — M16.11 PRIMARY OSTEOARTHRITIS OF RIGHT HIP: ICD-10-CM

## 2025-03-12 DIAGNOSIS — M70.61 TROCHANTERIC BURSITIS, RIGHT HIP: ICD-10-CM

## 2025-03-12 PROCEDURE — 2709999900 FL GUIDED NEEDLE PLACEMENT

## 2025-03-12 PROCEDURE — 6360000002 HC RX W HCPCS: Performed by: PHYSICIAN ASSISTANT

## 2025-03-12 PROCEDURE — 6360000004 HC RX CONTRAST MEDICATION: Performed by: PHYSICIAN ASSISTANT

## 2025-03-12 RX ORDER — TRIAMCINOLONE ACETONIDE 40 MG/ML
40 INJECTION, SUSPENSION INTRA-ARTICULAR; INTRAMUSCULAR ONCE
Status: COMPLETED | OUTPATIENT
Start: 2025-03-12 | End: 2025-03-12

## 2025-03-12 RX ORDER — LIDOCAINE HYDROCHLORIDE 10 MG/ML
10 INJECTION, SOLUTION EPIDURAL; INFILTRATION; INTRACAUDAL; PERINEURAL ONCE
Status: COMPLETED | OUTPATIENT
Start: 2025-03-12 | End: 2025-03-12

## 2025-03-12 RX ORDER — IOPAMIDOL 408 MG/ML
10 INJECTION, SOLUTION INTRATHECAL ONCE
Status: COMPLETED | OUTPATIENT
Start: 2025-03-12 | End: 2025-03-12

## 2025-03-12 RX ADMIN — IOPAMIDOL 10 ML: 408 INJECTION, SOLUTION INTRATHECAL at 14:20

## 2025-03-12 RX ADMIN — LIDOCAINE HYDROCHLORIDE 10 ML: 10 INJECTION, SOLUTION EPIDURAL; INFILTRATION; INTRACAUDAL; PERINEURAL at 14:20

## 2025-03-12 RX ADMIN — TRIAMCINOLONE ACETONIDE 40 MG: 40 INJECTION, SUSPENSION INTRA-ARTICULAR; INTRAMUSCULAR at 14:20

## 2025-04-17 ENCOUNTER — TELEPHONE (OUTPATIENT)
Age: 75
End: 2025-04-17

## 2025-06-25 DIAGNOSIS — M54.50 LOW BACK PAIN, UNSPECIFIED BACK PAIN LATERALITY, UNSPECIFIED CHRONICITY, UNSPECIFIED WHETHER SCIATICA PRESENT: ICD-10-CM

## 2025-07-01 RX ORDER — IBUPROFEN 800 MG/1
800 TABLET, FILM COATED ORAL EVERY 8 HOURS PRN
Qty: 90 TABLET | Refills: 2 | OUTPATIENT
Start: 2025-07-01